# Patient Record
Sex: FEMALE | Race: WHITE | NOT HISPANIC OR LATINO | Employment: FULL TIME | URBAN - METROPOLITAN AREA
[De-identification: names, ages, dates, MRNs, and addresses within clinical notes are randomized per-mention and may not be internally consistent; named-entity substitution may affect disease eponyms.]

---

## 2017-03-06 DIAGNOSIS — M79.7 FIBROMYALGIA SYNDROME: ICD-10-CM

## 2017-03-07 RX ORDER — DULOXETIN HYDROCHLORIDE 60 MG/1
CAPSULE, DELAYED RELEASE ORAL
Qty: 90 CAPSULE | Refills: 2 | Status: SHIPPED | OUTPATIENT
Start: 2017-03-07 | End: 2017-11-22 | Stop reason: SDUPTHER

## 2017-03-10 ENCOUNTER — OFFICE VISIT (OUTPATIENT)
Dept: OBSTETRICS AND GYNECOLOGY | Facility: CLINIC | Age: 37
End: 2017-03-10
Payer: COMMERCIAL

## 2017-03-10 VITALS
WEIGHT: 204.56 LBS | HEIGHT: 68 IN | BODY MASS INDEX: 31 KG/M2 | SYSTOLIC BLOOD PRESSURE: 96 MMHG | DIASTOLIC BLOOD PRESSURE: 68 MMHG

## 2017-03-10 DIAGNOSIS — R10.2 CHRONIC PELVIC PAIN IN FEMALE: ICD-10-CM

## 2017-03-10 DIAGNOSIS — Z01.419 WELL WOMAN EXAM WITH ROUTINE GYNECOLOGICAL EXAM: Primary | ICD-10-CM

## 2017-03-10 DIAGNOSIS — N94.10 DYSPAREUNIA IN FEMALE: ICD-10-CM

## 2017-03-10 DIAGNOSIS — G89.29 CHRONIC PELVIC PAIN IN FEMALE: ICD-10-CM

## 2017-03-10 PROCEDURE — 99999 PR PBB SHADOW E&M-EST. PATIENT-LVL III: CPT | Mod: PBBFAC,,, | Performed by: OBSTETRICS & GYNECOLOGY

## 2017-03-10 PROCEDURE — 99395 PREV VISIT EST AGE 18-39: CPT | Mod: S$GLB,,, | Performed by: OBSTETRICS & GYNECOLOGY

## 2017-03-10 RX ORDER — MEDROXYPROGESTERONE ACETATE 150 MG/ML
150 INJECTION, SUSPENSION INTRAMUSCULAR
Qty: 1 ML | Refills: 3 | Status: SHIPPED | OUTPATIENT
Start: 2017-03-10 | End: 2018-02-23 | Stop reason: SDUPTHER

## 2017-03-10 RX ORDER — CHOLECALCIFEROL (VITAMIN D3) 25 MCG
185 TABLET ORAL DAILY
COMMUNITY
End: 2017-10-13 | Stop reason: SDUPTHER

## 2017-03-10 NOTE — MR AVS SNAPSHOT
[facility] - OB/ GYN  [address]  [place] [address]  Phone: [phone]  Fax: [phone]                  Joy Recinos   3/10/2017 8:30 AM   Office Visit    Description:  Female : 1980   Provider:  David Goldman MD   Department:  [facility] - OB/ GYN           Reason for Visit     Annual Exam           Diagnoses this Visit        Comments    Well woman exam with routine gynecological exam    -  Primary     Chronic pelvic pain in female         Dyspareunia in female                To Do List           Goals (5 Years of Data)     None      Follow-Up and Disposition     Return in about 1 year (around 3/10/2018).       These Medications        Disp Refills Start End    medroxyPROGESTERone (DEPO-PROVERA) 150 mg/mL Syrg 1 mL 3 3/10/2017     Inject 1 mL (150 mg total) into the muscle every 3 (three) months. - Intramuscular    Pharmacy: [facility] - [place], [place] - [address] [place] [address] Ph #: [phone]         [facility]     [facility] Nurse Care Line -  Assistance  Registered nurses in the [facility] provide clinical advisement, health education, appointment booking, and other advisory services.  Call for this free service at [phone].             Medications           Message regarding Medications     Verify the changes and/or additions to your medication regime listed below are the same as discussed with your clinician today.  If any of these changes or additions are incorrect, please notify your healthcare provider.        START taking these NEW medications        Refills    medroxyPROGESTERone (DEPO-PROVERA) 150 mg/mL Syrg 3    Sig: Inject 1 mL (150 mg total) into the muscle every 3 (three) months.    Class: Normal    Route: Intramuscular      STOP taking these medications     VITAMIN D2 50,000 unit capsule TAKE ONE CAPSULE WEEKLY           Verify that the below list of medications is an accurate representation of the medications  "you are currently taking.  If none reported, the list may be blank. If incorrect, please contact your healthcare provider. Carry this list with you in case of emergency.           Current Medications     duloxetine (CYMBALTA) 60 MG capsule TAKE 1 CAPSULE EVERY DAY    fluticasone (FLONASE) 50 mcg/actuation nasal spray 2 sprays by Each Nare route once daily.    omeprazole (PRILOSEC) 20 MG capsule TAKE ONE CAPSULE BY MOUTH DAILY    topiramate (TOPAMAX) 50 MG tablet TAKE 1 TABLET BY MOUTH DAILY    vitamin D 1000 units Tab Take 185 mg by mouth once daily.    albuterol (PROAIR HFA) 90 mcg/actuation inhaler Inhale 2 puffs into the lungs every 4 (four) hours as needed for Shortness of Breath.    medroxyPROGESTERone (DEPO-PROVERA) 150 mg/mL Syrg Inject 1 mL (150 mg total) into the muscle every 3 (three) months.           Clinical Reference Information           Your Vitals Were     BP Height Weight BMI       96/68 5' 8" (1.727 m) 92.8 kg (204 lb 9.4 oz) 31.11 kg/m2       Blood Pressure          Most Recent Value    BP  96/68      Allergies as of 3/10/2017     No Known Allergies      Immunizations Administered on Date of Encounter - 3/10/2017     None      Language Assistance Services     ATTENTION: Language assistance services are available, free of charge. Please call [phone].      ATENCIÓN: Si elo brandt, tiene a olmos disposición servicios gratuitos de asistencia lingüística. Llame al [phone].     CHÚ Ý: N?u b?n nói Ti?ng Vi?t, có các d?ch v? h? tr? ngôn ng? mi?n phí dành cho b?n. G?i s? [phone].         [facility] complies with applicable Federal civil rights laws and does not discriminate on the basis of race, color, national origin, age, disability, or sex.        "

## 2017-03-10 NOTE — PROGRESS NOTES
Subjective:       Patient ID: Joy Recinos is a 36 y.o. female.    Chief Complaint:  Annual Exam      History of Present Illness  HPI  Annual Exam-Premenopausal  Patient presents for annual exam. The patient is sexually active. GYN screening history: last pap: approximate date  and was normal. The patient wears seatbelts: yes. The patient participates in regular exercise: no. Has the patient ever been transfused or tattooed?: no. The patient reports that there is not domestic violence in her life.  Pt is doing well on Depo Provera and so far is satisfied with results.  However, pt states that she is still having pain with intercourse and has recently noted issues with ability to keep penis in vagina during intercourse.  Pt did not follow up with PT as advised previously due to insurance not covering this particular PT clinic.  Pt has not tried recommended home exercises either.          GYN & OB History  No LMP recorded. Patient is not currently having periods (Reason: Birth Control).   Date of Last Pap: 2016    OB History    Para Term  AB SAB TAB Ectopic Multiple Living   2 2        2      # Outcome Date GA Lbr Zachery/2nd Weight Sex Delivery Anes PTL Lv   2 Para      CS-LTranv   Y   1 Para      CS-LTranv   Y          Review of Systems  Review of Systems   Constitutional: Negative for activity change, appetite change, fatigue, fever and unexpected weight change.   Respiratory: Negative for shortness of breath.    Cardiovascular: Negative for chest pain, palpitations and leg swelling.   Gastrointestinal: Negative for abdominal pain, constipation, diarrhea, nausea and vomiting.   Genitourinary: Positive for dyspareunia. Negative for dysuria, genital sores, hematuria, menorrhagia, menstrual problem, pelvic pain, vaginal bleeding, vaginal discharge, vaginal pain, dysmenorrhea and vaginal odor.   Musculoskeletal: Negative for back pain.   Neurological: Negative for syncope and headaches.    Breast: Negative for breast mass, breast pain, nipple discharge and skin changes          Objective:    Physical Exam:   Constitutional: She is oriented to person, place, and time. She appears well-developed and well-nourished. No distress.    HENT:   Head: Normocephalic and atraumatic.    Eyes: EOM are normal. Pupils are equal, round, and reactive to light.    Neck: Normal range of motion. Neck supple.    Cardiovascular: Normal rate, regular rhythm and normal heart sounds.     Pulmonary/Chest: Effort normal and breath sounds normal.        Abdominal: Soft. Bowel sounds are normal. She exhibits no distension. There is no tenderness.     Genitourinary: Vagina normal and uterus normal. Pelvic exam was performed with patient supine. There is no rash, tenderness, lesion or injury on the right labia. There is no rash, tenderness, lesion or injury on the left labia. Uterus is not deviated, not enlarged and not tender. Cervix is normal. Right adnexum displays no mass, no tenderness and no fullness. Left adnexum displays no mass, no tenderness and no fullness. No erythema, tenderness or bleeding in the vagina. No foreign body in the vagina. No signs of injury around the vagina. No vaginal discharge found. Cervix exhibits no motion tenderness, no discharge and no friability.   Genitourinary Comments: Exam limited to guarding and habitus           Musculoskeletal: Normal range of motion and moves all extremeties. She exhibits no edema or tenderness.       Neurological: She is alert and oriented to person, place, and time.    Skin: Skin is warm and dry.    Psychiatric: She has a normal mood and affect. Her behavior is normal. Thought content normal.          Assessment:        1. Well woman exam with routine gynecological exam    2. Chronic pelvic pain in female    3. Dyspareunia in female             Plan:      Well woman exam with routine gynecological exam  -     Pt was counseled on cervical/vaginal screening guidelines  and recommendations.  Last pap NILM on 2016.  As per current ASCCP guidelines, next pap is due 2020.  -     Pt was advised on current breast cancer screening recommendations.      Chronic pelvic pain in female  -     medroxyPROGESTERone (DEPO-PROVERA) 150 mg/mL Syrg; Inject 1 mL (150 mg total) into the muscle every 3 (three) months.  Dispense: 1 mL; Refill: 3  -     Good response thus far on Depo Provera.  Pt would like to continue use.    Dyspareunia in female  -     Exam today unremarkable except for pt guarding during exam.  Pains are non-specific and pt has several risk factors (scarring from prior abdominal and pelvic surgeries).  Pt again advised on recommendations for pelvic floor therapy with PT and on home exercises.  Pt voiced understanding and will look into insurance coverage.  Pt will call back with decision on PT.        Return in about 1 year (around 3/10/2018).

## 2017-06-02 ENCOUNTER — PATIENT MESSAGE (OUTPATIENT)
Dept: NEUROLOGY | Facility: CLINIC | Age: 37
End: 2017-06-02

## 2017-06-03 ENCOUNTER — HOSPITAL ENCOUNTER (EMERGENCY)
Facility: HOSPITAL | Age: 37
Discharge: HOME | End: 2017-06-03
Attending: EMERGENCY MEDICINE
Payer: COMMERCIAL

## 2017-06-03 VITALS
BODY MASS INDEX: 30.31 KG/M2 | TEMPERATURE: 98 F | HEART RATE: 93 BPM | HEIGHT: 68 IN | OXYGEN SATURATION: 100 % | RESPIRATION RATE: 16 BRPM | DIASTOLIC BLOOD PRESSURE: 85 MMHG | WEIGHT: 200 LBS | SYSTOLIC BLOOD PRESSURE: 118 MMHG

## 2017-06-03 DIAGNOSIS — R51.9 NONINTRACTABLE HEADACHE, UNSPECIFIED CHRONICITY PATTERN, UNSPECIFIED HEADACHE TYPE: Primary | ICD-10-CM

## 2017-06-03 PROCEDURE — 99284 EMERGENCY DEPT VISIT MOD MDM: CPT

## 2017-06-03 PROCEDURE — 25000003 PHARM REV CODE 250: Performed by: EMERGENCY MEDICINE

## 2017-06-03 RX ORDER — BUTALBITAL, ACETAMINOPHEN AND CAFFEINE 50; 325; 40 MG/1; MG/1; MG/1
1 TABLET ORAL
Status: COMPLETED | OUTPATIENT
Start: 2017-06-03 | End: 2017-06-03

## 2017-06-03 RX ORDER — BUTALBITAL, ACETAMINOPHEN AND CAFFEINE 50; 325; 40 MG/1; MG/1; MG/1
1 TABLET ORAL EVERY 4 HOURS PRN
Qty: 15 TABLET | Refills: 0 | Status: SHIPPED | OUTPATIENT
Start: 2017-06-03 | End: 2017-07-03

## 2017-06-03 RX ADMIN — BUTALBITAL, ACETAMINOPHEN, AND CAFFEINE 1 TABLET: 50; 325; 40 TABLET ORAL at 11:06

## 2017-06-03 NOTE — ED PROVIDER NOTES
SCRIBE #1 NOTE: I, Kadeem Dickson, am scribing for, and in the presence of, Kemar Hernández MD. I have scribed the entire note.      History      Chief Complaint   Patient presents with    Migraine     Patient c/o migraine for the last few days       Review of patient's allergies indicates:  No Known Allergies     HPI   HPI    6/3/2017, 11:39 AM   History obtained from the patient      History of Present Illness: Joy Recinos is a 36 y.o. female patient who presents to the Emergency Department for headache which onset 1 day ago. Symptoms are intermittent and moderate in severity. Pt states the headache is not similar to her normal migraine. Pt states the pain has resolved but still has a pressure in her head. Pt's headache is mitigated by lying down. No exacerbating factors reported. Associated sxs include neck pain. Patient denies any fever, chills, n/v/d, lightheadedness, dizziness, numbness, weakness, facial asymmetry, speech difficulty, and all other sxs at this time. No further complaints or concerns at this time.         Arrival mode: Personal vehicle     PCP: Zeus Oliva MD       Past Medical History:  Past Medical History:   Diagnosis Date    B12 deficiency     Chronic low back pain     oberlander    Endometriosis     Fibromyalgia syndrome     Idiopathic hypersomnia     Irritable bowel syndrome     goldman    Migraines     frances       Past Surgical History:  Past Surgical History:   Procedure Laterality Date     SECTION      OOPHORECTOMY      PELVIC LAPAROSCOPY      TUBAL LIGATION           Family History:  Family History   Problem Relation Age of Onset    Cataracts Maternal Grandmother     Diabetes Maternal Grandmother     Glaucoma Maternal Grandmother     Cataracts Maternal Grandfather     Breast cancer Neg Hx     Ovarian cancer Neg Hx     Deep vein thrombosis Neg Hx        Social History:  Social History     Social History Main Topics    Smoking status: Never  Smoker    Smokeless tobacco: Unknown    Alcohol use Yes      Comment: rarely    Drug use: No    Sexual activity: Yes     Partners: Male     Birth control/ protection: None       ROS   Review of Systems   Constitutional: Negative for chills and fever.   HENT: Negative for sore throat.    Respiratory: Negative for shortness of breath.    Cardiovascular: Negative for chest pain.   Gastrointestinal: Negative for diarrhea, nausea and vomiting.   Genitourinary: Negative for dysuria.   Musculoskeletal: Positive for neck pain. Negative for back pain.   Skin: Negative for rash.   Neurological: Positive for headaches. Negative for dizziness, facial asymmetry, speech difficulty, weakness, light-headedness and numbness.   Hematological: Does not bruise/bleed easily.       Physical Exam      Initial Vitals [06/03/17 1136]   BP Pulse Resp Temp SpO2   (!) 169/97 104 18 98.2 °F (36.8 °C) 98 %      Physical Exam  Nursing Notes and Vital Signs Reviewed.  Constitutional: Patient is in no acute distress. Well-developed and well-nourished.  Head: Atraumatic. Normocephalic.  Eyes: PERRL. EOM intact. Conjunctivae are not pale. No scleral icterus.  ENT: Mucous membranes are moist. Oropharynx is clear and symmetric.    Neck: Supple. Full ROM. No lymphadenopathy.  Cardiovascular: Regular rate. Regular rhythm. No murmurs, rubs, or gallops. Distal pulses are 2+ and symmetric.  Pulmonary/Chest: No respiratory distress. Clear to auscultation bilaterally. No wheezing, rales, or rhonchi.  Abdominal: Soft and non-distended.  There is no tenderness.  No rebound, guarding, or rigidity.   Musculoskeletal: Moves all extremities. No obvious deformities. No edema.  Skin: Warm and dry.  Neurological:  Alert, awake, and appropriate.  Normal speech.  No acute focal neurological deficits are appreciated.  Psychiatric: Normal affect. Good eye contact. Appropriate in content.    ED Course    Procedures  ED Vital Signs:  Vitals:    06/03/17 1136 06/03/17  "1241   BP: (!) 169/97 118/85   Pulse: 104 93   Resp: 18 16   Temp: 98.2 °F (36.8 °C)    TempSrc: Oral    SpO2: 98% 100%   Weight: 90.7 kg (200 lb)    Height: 5' 8" (1.727 m)          Imaging Results:  Imaging Results          CT Cervical Spine Without Contrast (Final result)  Result time 06/03/17 12:16:35    Final result by Cliff Zamora MD (06/03/17 12:16:35)                 Impression:           Normal cervical spine CT.    All CT scans at this facility use dose modulation, iterative reconstruction and/or weight based dosing when appropriate to reduce radiation dose to as low as reasonably achievable.      Electronically signed by: CLIFF ZAMORA MD  Date:     06/03/17  Time:    12:16              Narrative:    Exam: CT scan of the cervical spine without contrast    Technique: Axial CT imaging was performed through the cervical spine without intravenous contrast. Multiplanar reformats were reviewed and interpreted.    Clinical History:    Severe headache and neck pain with onset one day ago.    Comparison:  None     Findings:         Vertebral body heights are normal.Alignment is normal.  Disc spaces are normal.  No soft tissue abnormality detected. No acute fracture or subluxation of the cervical spine.                             CT Head Without Contrast (Final result)  Result time 06/03/17 12:11:40    Final result by Cliff Zamora MD (06/03/17 12:11:40)                 Impression:         Normal unenhanced head CT.        All CT scans at this facility use dose modulation, iterative reconstruction and/or weight based dosing when appropriate to reduce radiation dose to as low as reasonably achievable.      Electronically signed by: CLIFF ZAMORA MD  Date:     06/03/17  Time:    12:11              Narrative:    Exam: CT scan of the head without contrast    Clinical History: Severe headache with onset one day.      Technique: Axial CT imaging was performed through the head without intravenous contrast.     Findings:   "  No hydrocephalus, midline shift, mass effect, or acute intracranial hemorrhage. Cortical sulcal pattern and gray-white matter differentiation is normal. Basilar cisterns and posterior fossa are normal. The visualized paranasal sinuses and mastoid air cells are clear. The skull is intact.                                      The Emergency Provider reviewed the vital signs and test results, which are outlined above.    ED Discussion     12:31 PM: Reassessed pt at this time.  Pt is awake, alert, and in no distress. Discussed with pt all pertinent ED information and results. Discussed pt dx and plan of tx. Gave pt all f/u and return to the ED instructions. All questions and concerns were addressed at this time. Pt expresses understanding of information and instructions, and is comfortable with plan to discharge. Pt is stable for discharge.        ED Medication(s):  Medications   butalbital-acetaminophen-caffeine -40 mg per tablet 1 tablet (1 tablet Oral Given 6/3/17 1156)       Discharge Medication List as of 6/3/2017 12:33 PM      START taking these medications    Details   butalbital-acetaminophen-caffeine -40 mg (FIORICET, ESGIC) -40 mg per tablet Take 1 tablet by mouth every 4 (four) hours as needed for Pain., Starting Sat 6/3/2017, Until Mon 7/3/2017, Print             Follow-up Information     Zeus Oliva MD.    Specialty:  Family Medicine  Contact information:  [address]  [place] [address] [phone]                     Medical Decision Making    Medical Decision Making:   Clinical Tests:   Radiological Study: Ordered and Reviewed           Scribe Attestation:   Scribe #1: I performed the above scribed service and the documentation accurately describes the services I performed. I attest to the accuracy of the note.    Attending:   Physician Attestation Statement for Scribe #1: I, Kemar Hernández MD, personally performed the services described in this documentation, as  scribed by Kadeem Dickson, in my presence, and it is both accurate and complete.          Clinical Impression       ICD-10-CM ICD-9-CM   1. Nonintractable headache, unspecified chronicity pattern, unspecified headache type R51 784.0       Disposition:   Disposition: Discharged  Condition: Stable         Kemar Hernández MD  06/03/17 1597

## 2017-06-07 ENCOUNTER — OFFICE VISIT (OUTPATIENT)
Dept: NEUROLOGY | Facility: CLINIC | Age: 37
End: 2017-06-07
Payer: COMMERCIAL

## 2017-06-07 VITALS
SYSTOLIC BLOOD PRESSURE: 120 MMHG | BODY MASS INDEX: 30.87 KG/M2 | DIASTOLIC BLOOD PRESSURE: 70 MMHG | HEIGHT: 68 IN | HEART RATE: 88 BPM | WEIGHT: 203.69 LBS

## 2017-06-07 DIAGNOSIS — M35.9 UNDIFFERENTIATED CONNECTIVE TISSUE DISEASE: ICD-10-CM

## 2017-06-07 DIAGNOSIS — G43.009 MIGRAINE WITHOUT AURA AND WITHOUT STATUS MIGRAINOSUS, NOT INTRACTABLE: Primary | ICD-10-CM

## 2017-06-07 PROCEDURE — 99214 OFFICE O/P EST MOD 30 MIN: CPT | Mod: S$GLB,,, | Performed by: PSYCHIATRY & NEUROLOGY

## 2017-06-07 PROCEDURE — 99999 PR PBB SHADOW E&M-EST. PATIENT-LVL III: CPT | Mod: PBBFAC,,, | Performed by: PSYCHIATRY & NEUROLOGY

## 2017-06-07 NOTE — PROGRESS NOTES
This is an 36-year-old right-handed patient who has a long-standing history of idiopathic hypersomnia and chronic migraine headache.  The patient returns today because of an incident that occurred over the past weekend.    The patient states that on Thursday she woke from her usual sleeping routine but when she attempted to stand up she had a sudden severe sharp pain.  The pain was distributed in a holocephalic distribution and was only relieved by laying flat in bed.  The patient returned to the supine position and the headache pain resolves until she attempted to sit up again when the headache returned.  In this fashion throughout the day Thursday and into finding a, she would experience headache when standing relieved by being supine.  She was concerned and was seen in the hospital emergency room where a CT scan of the brain was done and was negative.  She denies any recent head trauma and does not recall any straining prior to the development of the headache.    Over the past several days, the patient states that the severe pain has resolved when she occasionally will feel a slight pressure sensation when standing.  She denies any stiff neck other than the pain that she has associated with her fibromyalgia.  She does not recall having any fever.  She did not have any nausea or vomiting.  She did not notice any tinnitus or change in hearing.  In fact, the patient did not notice any change in vision or any other neurological symptoms.    The patient continues to express her headache on an almost daily basis with very severe migraine occurring at least once a month.  The daily headache develops during the day and is distributed generally.  Her migraine headache however is much more severe pain that since incapacitating.  The patient is no longer attempting to treat her idiopathic hypersomnia as the medications all appear to be ineffective.      ROS:  GENERAL: No fever, chills, fatigability or weight loss.  SKIN: No  rashes, itching or changes in color or texture of skin.  HEAD: No  recent head trauma.  EYES: Visual acuity fine. No photophobia, ocular pain or diplopia.  EARS: Denies ear pain, discharge or vertigo.  NOSE: No loss of smell, no epistaxis or postnasal drip.  MOUTH & THROAT: No hoarseness or change in voice. No excessive gum bleeding.  NODES: Denies swollen glands.  CHEST: Denies ARELLANO, cyanosis, wheezing, cough and sputum production.  CARDIOVASCULAR: Denies chest pain, PND, orthopnea or reduced exercise tolerance.  ABDOMEN: Appetite fine. No weight loss. Denies diarrhea, abdominal pain, hematemesis or blood in stool.  URINARY: No flank pain, dysuria or hematuria.  PERIPHERAL VASCULAR: No claudication or cyanosis.  MUSCULOSKELETAL: No joint stiffness or swelling. Denies back pain.  NEUROLOGIC: No history of seizures, paralysis, alteration of gait or coordination.    The patient's past history, family history, and social history were reviewed with the patient and her medical records.    PE:   VITAL SIGNS: Blood pressure 120/70, pulse 80, weight 92.4 kg, height 5 foot 8 inches, BMI 30.97  APPEARANCE: Well nourished, well developed, in no acute distress.    HEAD: Normocephalic, atraumatic.  No TMJ tenderness.  EYES: PERRL. EOMI.  Non-icteric sclerae.    EARS: TM's intact. Light reflex normal. No retraction or perforation.    NOSE: Mucosa pink. Airway clear.  MOUTH & THROAT: No tonsillar enlargement. No pharyngeal erythema or exudate. No stridor.  NECK: Supple. No bruits.  No palpable cervical paraspinous muscle spasm  CHEST: Lungs clear to auscultation.  CARDIOVASCULAR: Regular rhythm without significant murmurs.  ABDOMEN: Bowel sounds normal. Not distended. Soft. No tenderness or masses.  MUSCULOSKELETAL:  No bony deformity seen.  Muscle tone and muscle mass are normal in both upper and both lower extremities.  NEUROLOGIC:   Mental Status:  The patient is well oriented to person, time, place, and situation.  The patient  is attentive to the environment and cooperative for the exam.  Cranial Nerves: II-XII grossly intact. Fundoscopic exam is normal.  No hemorrhage, exudate or papilledema is present. The extraocular muscles are intact in the cardinal directions of gaze.  No ptosis is present. Facial features are symmetrical.  Speech is normal in fluency, diction, and phrasing.  Tongue protrudes in the midline.    Gait and Station:  Romberg is negative.  Good alternate armswing with normal gait.  Motor:  No downdrift of either arm when held at shoulder level.  Manual muscle testing of proximal and distal muscles of both upper and lower extremities is normal.   Sensory:  Intact both upper and lower extremities to pin prick, touch, and vibration.  Cerebellar:  Finger to nose done well.  Alternating movements intact.  No involuntary movements or tremor seen.  Reflexes:  Stretch reflexes are 2+ both upper and lower extremities.  Plantar stimulation is flexor bilaterally and no pathological reflexes are seen    ASSESSMENT:  1.  History of chronic daily headache and migraine with recent orthostatic headache suggestive of idiopathic intracranial hypotension although now the symptoms are resolving spontaneously    DISCUSSION:  The patient's recent headache does sound like an sudden onset of intracranial hypotension.  This is usually associated with spinal fluid leak although now the patient's symptoms are resolving.  Because of the resolution of her symptoms I do not think it reasonable to undertake diagnostic evaluation such as a cisternogram in an attempt to identify a spinal fluid leak.  If the symptoms do occur again, then she will need that type of study to try to define a spinal fluid leak.  In the meantime, she should continue her usual treatment of her migraine headache and chronic daily headache.  She is to return in 6 months for routine follow-up.        This note is generated with speech recognition software and is subject to  transcription error and sound alike phrases that may be missed by proofreading.

## 2017-06-08 ENCOUNTER — LAB VISIT (OUTPATIENT)
Dept: LAB | Facility: HOSPITAL | Age: 37
End: 2017-06-08
Attending: PSYCHIATRY & NEUROLOGY
Payer: COMMERCIAL

## 2017-06-08 DIAGNOSIS — M35.9 UNDIFFERENTIATED CONNECTIVE TISSUE DISEASE: ICD-10-CM

## 2017-06-08 DIAGNOSIS — G43.009 MIGRAINE WITHOUT AURA AND WITHOUT STATUS MIGRAINOSUS, NOT INTRACTABLE: ICD-10-CM

## 2017-06-08 LAB
25(OH)D3+25(OH)D2 SERPL-MCNC: 15 NG/ML
ALBUMIN SERPL BCP-MCNC: 3.7 G/DL
ALP SERPL-CCNC: 131 U/L
ALT SERPL W/O P-5'-P-CCNC: 10 U/L
ANION GAP SERPL CALC-SCNC: 8 MMOL/L
AST SERPL-CCNC: 11 U/L
BASOPHILS # BLD AUTO: 0.02 K/UL
BASOPHILS NFR BLD: 0.3 %
BILIRUB SERPL-MCNC: 0.2 MG/DL
BUN SERPL-MCNC: 13 MG/DL
CALCIUM SERPL-MCNC: 9.1 MG/DL
CHLORIDE SERPL-SCNC: 111 MMOL/L
CO2 SERPL-SCNC: 22 MMOL/L
CREAT SERPL-MCNC: 0.9 MG/DL
DIFFERENTIAL METHOD: NORMAL
EOSINOPHIL # BLD AUTO: 0.1 K/UL
EOSINOPHIL NFR BLD: 1.1 %
ERYTHROCYTE [DISTWIDTH] IN BLOOD BY AUTOMATED COUNT: 13.4 %
EST. GFR  (AFRICAN AMERICAN): >60 ML/MIN/1.73 M^2
EST. GFR  (NON AFRICAN AMERICAN): >60 ML/MIN/1.73 M^2
GLUCOSE SERPL-MCNC: 89 MG/DL
HCT VFR BLD AUTO: 41.3 %
HGB BLD-MCNC: 13.3 G/DL
LYMPHOCYTES # BLD AUTO: 2 K/UL
LYMPHOCYTES NFR BLD: 25.8 %
MCH RBC QN AUTO: 29.8 PG
MCHC RBC AUTO-ENTMCNC: 32.2 %
MCV RBC AUTO: 92 FL
MONOCYTES # BLD AUTO: 0.4 K/UL
MONOCYTES NFR BLD: 5.7 %
NEUTROPHILS # BLD AUTO: 5.1 K/UL
NEUTROPHILS NFR BLD: 66.8 %
PLATELET # BLD AUTO: 215 K/UL
PMV BLD AUTO: 12.2 FL
POTASSIUM SERPL-SCNC: 4.1 MMOL/L
PROT SERPL-MCNC: 7.2 G/DL
RBC # BLD AUTO: 4.47 M/UL
SODIUM SERPL-SCNC: 141 MMOL/L
WBC # BLD AUTO: 7.6 K/UL

## 2017-06-08 PROCEDURE — 85025 COMPLETE CBC W/AUTO DIFF WBC: CPT

## 2017-06-08 PROCEDURE — 82306 VITAMIN D 25 HYDROXY: CPT

## 2017-06-08 PROCEDURE — 80053 COMPREHEN METABOLIC PANEL: CPT

## 2017-06-22 RX ORDER — TOPIRAMATE 50 MG/1
50 TABLET, FILM COATED ORAL DAILY
Qty: 30 TABLET | Refills: 11 | Status: SHIPPED | OUTPATIENT
Start: 2017-06-22 | End: 2017-06-30 | Stop reason: SDUPTHER

## 2017-06-30 RX ORDER — TOPIRAMATE 50 MG/1
50 TABLET, FILM COATED ORAL DAILY
Qty: 120 TABLET | Refills: 6 | Status: SHIPPED | OUTPATIENT
Start: 2017-06-30 | End: 2018-07-11 | Stop reason: SDUPTHER

## 2017-07-10 ENCOUNTER — PATIENT MESSAGE (OUTPATIENT)
Dept: NEUROLOGY | Facility: CLINIC | Age: 37
End: 2017-07-10

## 2017-07-28 ENCOUNTER — OFFICE VISIT (OUTPATIENT)
Dept: PSYCHIATRY | Facility: CLINIC | Age: 37
End: 2017-07-28
Payer: COMMERCIAL

## 2017-07-28 DIAGNOSIS — R69 OTHER UNKNOWN AND UNSPECIFIED CAUSE OF MORBIDITY OR MORTALITY: Primary | ICD-10-CM

## 2017-07-28 PROCEDURE — 90791 PSYCH DIAGNOSTIC EVALUATION: CPT | Mod: S$GLB,,, | Performed by: SOCIAL WORKER

## 2017-07-31 ENCOUNTER — PATIENT MESSAGE (OUTPATIENT)
Dept: PSYCHIATRY | Facility: CLINIC | Age: 37
End: 2017-07-31

## 2017-09-22 ENCOUNTER — OFFICE VISIT (OUTPATIENT)
Dept: URGENT CARE | Facility: CLINIC | Age: 37
End: 2017-09-22
Payer: COMMERCIAL

## 2017-09-22 VITALS
DIASTOLIC BLOOD PRESSURE: 74 MMHG | BODY MASS INDEX: 32.27 KG/M2 | HEIGHT: 68 IN | SYSTOLIC BLOOD PRESSURE: 122 MMHG | TEMPERATURE: 99 F | HEART RATE: 116 BPM | WEIGHT: 212.94 LBS | OXYGEN SATURATION: 99 %

## 2017-09-22 DIAGNOSIS — B96.89 BACTERIAL LOWER RESPIRATORY INFECTION: Primary | ICD-10-CM

## 2017-09-22 DIAGNOSIS — J22 BACTERIAL LOWER RESPIRATORY INFECTION: Primary | ICD-10-CM

## 2017-09-22 DIAGNOSIS — R05.9 COUGH: ICD-10-CM

## 2017-09-22 PROCEDURE — 96372 THER/PROPH/DIAG INJ SC/IM: CPT | Mod: S$GLB,,, | Performed by: PHYSICIAN ASSISTANT

## 2017-09-22 PROCEDURE — 99999 PR PBB SHADOW E&M-EST. PATIENT-LVL III: CPT | Mod: PBBFAC,,, | Performed by: PHYSICIAN ASSISTANT

## 2017-09-22 PROCEDURE — 99213 OFFICE O/P EST LOW 20 MIN: CPT | Mod: 25,S$GLB,, | Performed by: PHYSICIAN ASSISTANT

## 2017-09-22 PROCEDURE — 3008F BODY MASS INDEX DOCD: CPT | Mod: S$GLB,,, | Performed by: PHYSICIAN ASSISTANT

## 2017-09-22 RX ORDER — PROMETHAZINE HYDROCHLORIDE AND DEXTROMETHORPHAN HYDROBROMIDE 6.25; 15 MG/5ML; MG/5ML
5 SYRUP ORAL NIGHTLY PRN
Qty: 120 ML | Refills: 0 | Status: SHIPPED | OUTPATIENT
Start: 2017-09-22 | End: 2017-10-13

## 2017-09-22 RX ORDER — AMOXICILLIN AND CLAVULANATE POTASSIUM 875; 125 MG/1; MG/1
1 TABLET, FILM COATED ORAL 2 TIMES DAILY
Qty: 14 TABLET | Refills: 0 | Status: SHIPPED | OUTPATIENT
Start: 2017-09-22 | End: 2017-09-26

## 2017-09-22 RX ORDER — CETIRIZINE HYDROCHLORIDE 10 MG/1
10 TABLET ORAL DAILY
COMMUNITY
End: 2021-09-24 | Stop reason: ALTCHOICE

## 2017-09-22 RX ORDER — BETAMETHASONE SODIUM PHOSPHATE AND BETAMETHASONE ACETATE 3; 3 MG/ML; MG/ML
6 INJECTION, SUSPENSION INTRA-ARTICULAR; INTRALESIONAL; INTRAMUSCULAR; SOFT TISSUE
Status: COMPLETED | OUTPATIENT
Start: 2017-09-22 | End: 2017-09-22

## 2017-09-22 RX ADMIN — BETAMETHASONE SODIUM PHOSPHATE AND BETAMETHASONE ACETATE 6 MG: 3; 3 INJECTION, SUSPENSION INTRA-ARTICULAR; INTRALESIONAL; INTRAMUSCULAR; SOFT TISSUE at 11:09

## 2017-09-22 NOTE — PATIENT INSTRUCTIONS
Sinusitis (Antibiotic Treatment)    The sinuses are air-filled spaces within the bones of the face. They connect to the inside of the nose. Sinusitis is an inflammation of the tissue lining the sinus cavity. Sinus inflammation can occur during a cold. It can also be due to allergies to pollens and other particles in the air. Sinusitis can cause symptoms of sinus congestion and fullness. A sinus infection causes fever, headache and facial pain. There is often green or yellow drainage from the nose or into the back of the throat (post-nasal drip). You have been given antibiotics to treat this condition.  Home care:  · Take the full course of antibiotics as instructed. Do not stop taking them, even if you feel better.  · Drink plenty of water, hot tea, and other liquids. This may help thin mucus. It also may promote sinus drainage.  · Heat may help soothe painful areas of the face. Use a towel soaked in hot water. Or,  the shower and direct the hot spray onto your face. Using a vaporizer along with a menthol rub at night may also help.   · An expectorant containing guaifenesin may help thin the mucus and promote drainage from the sinuses.  · Over-the-counter decongestants may be used unless a similar medicine was prescribed. Nasal sprays work the fastest. Use one that contains phenylephrine or oxymetazoline. First blow the nose gently. Then use the spray. Do not use these medicines more often than directed on the label or symptoms may get worse. You may also use tablets containing pseudoephedrine. Avoid products that combine ingredients, because side effects may be increased. Read labels. You can also ask the pharmacist for help. (NOTE: Persons with high blood pressure should not use decongestants. They can raise blood pressure.)  · Over-the-counter antihistamines may help if allergies contributed to your sinusitis.    · Do not use nasal rinses or irrigation during an acute sinus infection, unless told to by  your health care provider. Rinsing may spread the infection to other sinuses.  · Use acetaminophen or ibuprofen to control pain, unless another pain medicine was prescribed. (If you have chronic liver or kidney disease or ever had a stomach ulcer, talk with your doctor before using these medicines. Aspirin should never be used in anyone under 18 years of age who is ill with a fever. It may cause severe liver damage.)  · Don't smoke. This can worsen symptoms.  Follow-up care  Follow up with your healthcare provider or our staff if you are not improving within the next week.  When to seek medical advice  Call your healthcare provider if any of these occur:  · Facial pain or headache becoming more severe  · Stiff neck  · Unusual drowsiness or confusion  · Swelling of the forehead or eyelids  · Vision problems, including blurred or double vision  · Fever of 100.4ºF (38ºC) or higher, or as directed by your healthcare provider  · Seizure  · Breathing problems  · Symptoms not resolving within 10 days  Date Last Reviewed: 4/13/2015  © 8595-1689 [employer]. [address] [place] [address]. All rights reserved. This information is not intended as a substitute for professional medical care. Always follow your healthcare professional's instructions.        Bronchitis, Antibiotic Treatment (Adult)    Bronchitis is an infection of the air passages (bronchial tubes) in your lungs. It often occurs when you have a cold. This illness is contagious during the first few days and is spread through the air by coughing and sneezing, or by direct contact (touching the sick person and then touching your own eyes, nose, or mouth).  Symptoms of bronchitis include cough with mucus (phlegm) and low-grade fever. Bronchitis usually lasts 7 to 14 days. Mild cases can be treated with simple home remedies. More severe infection is treated with an antibiotic.  Home care  Follow these guidelines when caring for yourself at  home:  · If your symptoms are severe, rest at home for the first 2 to 3 days. When you go back to your usual activities, don't let yourself get too tired.  · Do not smoke. Also avoid being exposed to secondhand smoke.  · You may use over-the-counter medicines to control fever or pain, unless another medicine was prescribed. (Note: If you have chronic liver or kidney disease or have ever had a stomach ulcer or gastrointestinal bleeding, talk with your healthcare provider before using these medicines. Also talk to your provider if you are taking medicine to prevent blood clots.) Aspirin should never be given to anyone younger than 18 years of age who is ill with a viral infection or fever. It may cause severe liver or brain damage.  · Your appetite may be poor, so a light diet is fine. Avoid dehydration by drinking 6 to 8 glasses of fluids per day (such as water, soft drinks, sports drinks, juices, tea, or soup). Extra fluids will help loosen secretions in the nose and lungs.  · Over-the-counter cough, cold, and sore-throat medicines will not shorten the length of the illness, but they may be helpful to reduce symptoms. (Note: Do not use decongestants if you have high blood pressure.)  · Finish all antibiotic medicine. Do this even if you are feeling better after only a few days.  Follow-up care  Follow up with your healthcare provider, or as advised. If you had an X-ray or ECG (electrocardiogram), a specialist will review it. You will be notified of any new findings that may affect your care.  Note: If you are age 65 or older, or if you have a chronic lung disease or condition that affects your immune system, or you smoke, talk to your healthcare provider about having pneumococcal vaccinations and a yearly influenza vaccination (flu shot).  When to seek medical advice  Call your healthcare provider right away if any of these occur:  · Fever of 100.4°F (38°C) or higher  · Coughing up increased amounts of colored  sputum  · Weakness, drowsiness, headache, facial pain, ear pain, or a stiff neck  Call 911, or get immediate medical care  Contact emergency services right away if any of these occur.  · Coughing up blood  · Worsening weakness, drowsiness, headache, or stiff neck  · Trouble breathing, wheezing, or pain with breathing  Date Last Reviewed: 9/13/2015  © 9352-8560 [employer]. [address] [place] [address], [place] [address]. All rights reserved. This information is not intended as a substitute for professional medical care. Always follow your healthcare professional's instructions.

## 2017-09-22 NOTE — PROGRESS NOTES
"Subjective:      Patient ID: Joy Recinos is a 37 y.o. female.    Chief Complaint: Nasal Congestion and Cough    Ms. Recinos is a 38yo female that presents to Urgent Care for URI symptoms x1wk with no resolution with otc treatment.     Patient also states last Friday she had abdominal pain and what felt like a "bump" in the middle of her stomach.  She admits she felt nauseated and vomited.  Patient continued to feel dizzy/nauseated all weekend.  Patient states nausea has improved but she still occasionally has belly pain.  Patient states the "bump" she initially felt has disappeared.      Patient does not currently have periods.  On Depo shot (last received last week, after her symptoms began).       URI    This is a new problem. The current episode started 1 to 4 weeks ago. The problem has been gradually worsening. Associated symptoms include abdominal pain, congestion, coughing (productive in AM), ear pain (pressure bilaterally), headaches, nausea, rhinorrhea, sinus pain, a sore throat (improved), vomiting (1 episode, Friday) and wheezing (mild). Pertinent negatives include no diarrhea or rash. Treatments tried: Mucinex, Zyrtec. The treatment provided mild relief.     Review of Systems   Constitutional: Negative for chills, diaphoresis and fever.   HENT: Positive for congestion, ear pain (pressure bilaterally), rhinorrhea, sinus pain, sinus pressure and sore throat (improved).    Respiratory: Positive for cough (productive in AM) and wheezing (mild). Negative for shortness of breath.    Gastrointestinal: Positive for abdominal pain, constipation (  patient's norm), nausea and vomiting (1 episode, Friday). Negative for diarrhea.   Skin: Negative for rash.   Neurological: Positive for dizziness (mild) and headaches. Negative for light-headedness.       Objective:   /74 (BP Location: Right arm, Patient Position: Sitting, BP Method: Large (Manual))   Pulse (!) 116   Temp 99.3 °F (37.4 °C) " "(Tympanic)   Ht 5' 8" (1.727 m)   Wt 96.6 kg (212 lb 15.4 oz)   SpO2 99%   BMI 32.38 kg/m²   Physical Exam   Constitutional: She appears well-developed and well-nourished. She does not appear ill. No distress.   HENT:   Head: Normocephalic and atraumatic.   Right Ear: Tympanic membrane and ear canal normal. No tenderness. Tympanic membrane is not erythematous. No middle ear effusion.   Left Ear: Tympanic membrane and ear canal normal. No tenderness. Tympanic membrane is not erythematous.  No middle ear effusion.   Nose: Right sinus exhibits maxillary sinus tenderness and frontal sinus tenderness. Left sinus exhibits maxillary sinus tenderness and frontal sinus tenderness.   Mouth/Throat: Uvula is midline. Posterior oropharyngeal erythema present.   Cardiovascular: Normal rate, regular rhythm and normal heart sounds.    No murmur heard.  Pulmonary/Chest: Effort normal and breath sounds normal. No respiratory distress. She has no decreased breath sounds. She has no wheezes. She has no rhonchi. She has no rales.   Abdominal: Soft. She exhibits no distension. There is tenderness (mild) in the periumbilical area.   No abdominal mass felt   Skin: Skin is warm and dry. No rash noted. She is not diaphoretic.   Psychiatric: She has a normal mood and affect. Her speech is normal and behavior is normal. Thought content normal.     Assessment:      1. Bacterial lower respiratory infection    2. Cough       Plan:   Bacterial lower respiratory infection  -     amoxicillin-clavulanate 875-125mg (AUGMENTIN) 875-125 mg per tablet; Take 1 tablet by mouth 2 (two) times daily.  Dispense: 14 tablet; Refill: 0    Cough  -     promethazine-dextromethorphan (PROMETHAZINE-DM) 6.25-15 mg/5 mL Syrp; Take 5 mLs by mouth nightly as needed (Cough).  Dispense: 120 mL; Refill: 0    Other orders  -     betamethasone acetate-betamethasone sodium phosphate injection 6 mg; Inject 1 mL (6 mg total) into the muscle one time.    Patient requesting " "steroid injection.  Risk and benefits of steroid therapy were reviewed in detail and include, but not limited to, elevated blood sugars, joint avascular necrosis, necrosis of tissue or infection of the injection site, hallucinations, insomnia, sweating, hyperactivity, tremors, suppression of one's own cortisone production, delayed wound healing and GI bleeding. The patient would like to proceed with steroid treatment (injection/medication) knowing and verbally accepting the risks.    Gave handout on bronchitis/sinusitis.  Printed and reviewed AVS.     Further patient instruction:  Take Mucinex or Mucinex-D for congestion (avoid decongestants (Sudafed,Mucinex-D,Claritin-D, etc) if you have hypertension.  Use nasal steroid (Flonase) for inflammation.  Use normal saline nasal spray during the day every 3 hours for sinus irrigation and congestion.    Avoid cigarette smoke.    Practice good handwashing.  Use warm compresses on sinuses for relief several times a day.  Increase fluid intake to at least 64 ounces of water per day to keep secretions thin and loose.  Use a cool mist vaporizer or humidifier in home to help relieve congestion.    Take tylenol/ibuprofen for sinus headache.    Use warm salt water gargles and lozenges for throat discomfort.  Go to ER if you develop fever of 103 or higher, chest pain, shortness of breath, upper back pain, stiff neck or severe headache.    Discussed with patient taking cough syrup at night due to potential drowsiness.  Explained that patient should monitor their BP and watch for signs of elevated BP (headache, dizziness, light headedness, etc.)  If symptoms occur, discontinue this medication.      For severe abdominal pain or intractable vomiting, go to ED.  Follow up with PCP if abdominal pain does not resolve or if "bump" returns.     Patient expresses understanding and agrees with treatment plan.   "

## 2017-09-26 ENCOUNTER — TELEPHONE (OUTPATIENT)
Dept: URGENT CARE | Facility: CLINIC | Age: 37
End: 2017-09-26

## 2017-09-26 DIAGNOSIS — J22 ACUTE LOWER RESPIRATORY INFECTION: Primary | ICD-10-CM

## 2017-09-26 RX ORDER — AZITHROMYCIN 250 MG/1
TABLET, FILM COATED ORAL
Qty: 6 TABLET | Refills: 0 | Status: SHIPPED | OUTPATIENT
Start: 2017-09-26 | End: 2017-10-13

## 2017-09-26 NOTE — TELEPHONE ENCOUNTER
Call returned to patient. States she have been feeling dizzy and drowsy since starting her antibiotics. Pt would like to know if she should take another medication. States she is currently taking antibiotic with food and staying hydrated. Please advise.

## 2017-09-26 NOTE — TELEPHONE ENCOUNTER
Recommend taking medication with food, start probiotics, and eat yogurt to see if that helps. If she does not like the way the augmentin is making her feel, I sent a zpack to the pharmacy for her to change to.

## 2017-09-26 NOTE — TELEPHONE ENCOUNTER
----- Message from Ganesh Mitchell sent at 9/26/2017 12:03 PM CDT -----  Contact: pt  Pt is calling nurse staff regarding questions about the medication. Pt feels dizzy and Nausea with taking antibiotics Pt is not sure if the medications. Pt call back [phone] to be advised. thanks

## 2017-10-09 ENCOUNTER — OFFICE VISIT (OUTPATIENT)
Dept: URGENT CARE | Facility: CLINIC | Age: 37
End: 2017-10-09
Payer: COMMERCIAL

## 2017-10-09 ENCOUNTER — LAB VISIT (OUTPATIENT)
Dept: LAB | Facility: HOSPITAL | Age: 37
End: 2017-10-09
Attending: PHYSICIAN ASSISTANT
Payer: COMMERCIAL

## 2017-10-09 ENCOUNTER — CLINICAL SUPPORT (OUTPATIENT)
Dept: CARDIOLOGY | Facility: CLINIC | Age: 37
End: 2017-10-09
Payer: COMMERCIAL

## 2017-10-09 VITALS
WEIGHT: 216.63 LBS | OXYGEN SATURATION: 98 % | TEMPERATURE: 99 F | BODY MASS INDEX: 32.83 KG/M2 | HEART RATE: 73 BPM | HEIGHT: 68 IN | DIASTOLIC BLOOD PRESSURE: 80 MMHG | SYSTOLIC BLOOD PRESSURE: 132 MMHG

## 2017-10-09 DIAGNOSIS — R07.89 CHEST TIGHTNESS: ICD-10-CM

## 2017-10-09 DIAGNOSIS — R53.1 WEAKNESS: ICD-10-CM

## 2017-10-09 DIAGNOSIS — R53.1 WEAKNESS: Primary | ICD-10-CM

## 2017-10-09 LAB
ALBUMIN SERPL BCP-MCNC: 3.8 G/DL
ALP SERPL-CCNC: 117 U/L
ALT SERPL W/O P-5'-P-CCNC: 13 U/L
ANION GAP SERPL CALC-SCNC: 7 MMOL/L
AST SERPL-CCNC: 12 U/L
BASOPHILS # BLD AUTO: 0.02 K/UL
BASOPHILS NFR BLD: 0.3 %
BILIRUB SERPL-MCNC: 0.3 MG/DL
BUN SERPL-MCNC: 14 MG/DL
CALCIUM SERPL-MCNC: 9.7 MG/DL
CHLORIDE SERPL-SCNC: 109 MMOL/L
CO2 SERPL-SCNC: 25 MMOL/L
CREAT SERPL-MCNC: 0.9 MG/DL
CTP QC/QA: YES
DIFFERENTIAL METHOD: NORMAL
EOSINOPHIL # BLD AUTO: 0.1 K/UL
EOSINOPHIL NFR BLD: 1.8 %
ERYTHROCYTE [DISTWIDTH] IN BLOOD BY AUTOMATED COUNT: 13.2 %
EST. GFR  (AFRICAN AMERICAN): >60 ML/MIN/1.73 M^2
EST. GFR  (NON AFRICAN AMERICAN): >60 ML/MIN/1.73 M^2
FLUAV AG NPH QL: NEGATIVE
FLUBV AG NPH QL: NEGATIVE
GLUCOSE SERPL-MCNC: 102 MG/DL (ref 70–110)
GLUCOSE SERPL-MCNC: 96 MG/DL
HCT VFR BLD AUTO: 40.1 %
HGB BLD-MCNC: 13.6 G/DL
LYMPHOCYTES # BLD AUTO: 2.3 K/UL
LYMPHOCYTES NFR BLD: 30.4 %
MCH RBC QN AUTO: 30.8 PG
MCHC RBC AUTO-ENTMCNC: 33.9 G/DL
MCV RBC AUTO: 91 FL
MONOCYTES # BLD AUTO: 0.4 K/UL
MONOCYTES NFR BLD: 5.8 %
NEUTROPHILS # BLD AUTO: 4.7 K/UL
NEUTROPHILS NFR BLD: 61.7 %
PLATELET # BLD AUTO: 199 K/UL
PMV BLD AUTO: 11.8 FL
POTASSIUM SERPL-SCNC: 3.9 MMOL/L
PROT SERPL-MCNC: 7.4 G/DL
RBC # BLD AUTO: 4.42 M/UL
SODIUM SERPL-SCNC: 141 MMOL/L
WBC # BLD AUTO: 7.61 K/UL

## 2017-10-09 PROCEDURE — 36415 COLL VENOUS BLD VENIPUNCTURE: CPT | Mod: PO

## 2017-10-09 PROCEDURE — 99214 OFFICE O/P EST MOD 30 MIN: CPT | Mod: 25,S$GLB,, | Performed by: PHYSICIAN ASSISTANT

## 2017-10-09 PROCEDURE — 80053 COMPREHEN METABOLIC PANEL: CPT | Mod: PO

## 2017-10-09 PROCEDURE — 99999 PR PBB SHADOW E&M-EST. PATIENT-LVL III: CPT | Mod: PBBFAC,,, | Performed by: PHYSICIAN ASSISTANT

## 2017-10-09 PROCEDURE — 99173 VISUAL ACUITY SCREEN: CPT | Mod: S$GLB,,, | Performed by: PHYSICIAN ASSISTANT

## 2017-10-09 PROCEDURE — 93000 ELECTROCARDIOGRAM COMPLETE: CPT | Mod: S$GLB,,, | Performed by: INTERNAL MEDICINE

## 2017-10-09 PROCEDURE — 82948 REAGENT STRIP/BLOOD GLUCOSE: CPT | Mod: S$GLB,,, | Performed by: PHYSICIAN ASSISTANT

## 2017-10-09 PROCEDURE — 85025 COMPLETE CBC W/AUTO DIFF WBC: CPT | Mod: PO

## 2017-10-09 PROCEDURE — 87804 INFLUENZA ASSAY W/OPTIC: CPT | Mod: QW,S$GLB,, | Performed by: PHYSICIAN ASSISTANT

## 2017-10-09 NOTE — PROGRESS NOTES
"Subjective:      Patient ID: Joy Recinos is a 37 y.o. female.    Chief Complaint: Fatigue    Ms. Recinos is a 36yo female that presents to Urgent Care for generalized weakness and feeling unwell.  Patient states symptoms began with sudden onset, was not doing anything in particular when symptoms began.  Patient's  states she has had episodes like this previously that improved after having a light snack and lying down.       Fatigue   This is a new problem. The current episode started today. Associated symptoms include abdominal pain, diaphoresis, fatigue and nausea. Pertinent negatives include no chest pain, chills, congestion, coughing, fever, headaches, numbness, rash, vomiting or weakness.     Review of Systems   Constitutional: Positive for diaphoresis and fatigue. Negative for chills and fever.   HENT: Negative for congestion, ear pain, rhinorrhea and tinnitus.    Eyes: Positive for visual disturbance (blurry).   Respiratory: Positive for chest tightness. Negative for cough, shortness of breath and wheezing.    Cardiovascular: Negative for chest pain, palpitations and leg swelling.   Gastrointestinal: Positive for abdominal pain and nausea. Negative for constipation, diarrhea and vomiting.   Musculoskeletal:        Body feels "heavy"   Skin: Negative for rash.        Itchy on face   Neurological: Positive for dizziness and light-headedness. Negative for syncope, speech difficulty, weakness, numbness and headaches.   Psychiatric/Behavioral: The patient is nervous/anxious.        Objective:   /80   Pulse 73   Temp 98.8 °F (37.1 °C)   Ht 5' 8" (1.727 m)   Wt 98.2 kg (216 lb 9.6 oz)   SpO2 98%   BMI 32.93 kg/m²   Physical Exam   Constitutional: She is oriented to person, place, and time. She appears well-developed and well-nourished. She does not appear ill. No distress.   HENT:   Head: Normocephalic and atraumatic.   Right Ear: Tympanic membrane and ear canal normal. No tenderness. " Tympanic membrane is not erythematous. No middle ear effusion.   Left Ear: Tympanic membrane and ear canal normal. No tenderness. Tympanic membrane is not erythematous.  No middle ear effusion.   Nose: Nose normal. Right sinus exhibits no maxillary sinus tenderness and no frontal sinus tenderness. Left sinus exhibits no maxillary sinus tenderness and no frontal sinus tenderness.   Mouth/Throat: Uvula is midline and oropharynx is clear and moist.   Eyes: Conjunctivae, EOM and lids are normal. Pupils are equal, round, and reactive to light.   Cardiovascular: Normal rate, regular rhythm and normal heart sounds.    No murmur heard.  Pulmonary/Chest: Effort normal and breath sounds normal. No respiratory distress. She has no decreased breath sounds. She has no wheezes. She has no rhonchi. She has no rales.   Neurological: She is alert and oriented to person, place, and time. She has normal strength. No sensory deficit.   CN 2, 4, 6, 7, 11 and 12 checked intact    Skin: Skin is warm and dry. No rash noted. She is not diaphoretic. There is erythema.        Psychiatric: She has a normal mood and affect. Her speech is normal and behavior is normal. Thought content normal.   Patient tearful on examination     Assessment:      1. Weakness    2. Chest tightness       Plan:   Weakness  -     POCT Influenza A/B  -     POCT glucose  -     Visual acuity screening  -     CBC auto differential; Future; Expected date: 10/09/2017  -     Comprehensive metabolic panel; Future; Expected date: 10/09/2017    Chest tightness  -     EKG 12-lead; Future    Flu, EKG, blood sugar, visual acuity and CBC unremarkable.  Will release CMP to [facility].   Advise patient to follow up with PCP for further evaluation.  For severe or worsening symptoms, go to ED.   Gave handout on weakness, printed and reviewed AVS.   Patient expresses understanding and agrees with treatment plan.

## 2017-10-09 NOTE — PATIENT INSTRUCTIONS
Weakness (Uncertain Cause)  Based on your exam today, the exact cause of your weakness is not certain. However, your weakness does not seem to be a sign of a serious illness at this time. Keep an eye on your symptoms and get medical advice as instructed below.  Home care  · Rest at home today. Do not over-exert yourself.  · Take any medicine as prescribed.  · For the next few days, drink extra fluids (unless your healthcare provider wants you to restrict fluids for other reasons). Do not skip meals.  Follow-up care  Follow up with your healthcare provider or as advised.  When to seek medical advice  Call your healthcare provider for any of the following  · Worsening of your symptoms  · Symptoms don't start getting better within 2 days  · Fever of 100.4º F (38º C) or higher, or as directed by your healthcare provider·    Call 911  Get emergency medical care for any of these:  · Chest, arm, neck, jaw or upper back pain  · Trouble breathing  · Numbness or weakness of the face, one arm or one leg  · Slurred speech, confusion, trouble speaking, walking or seeing  · Blood in vomit or stool (black or red color)  · Loss of consciousness  Date Last Reviewed: 6/10/2015  © 8389-9337 [employer]. [address] [place] [address], [place] [address]. All rights reserved. This information is not intended as a substitute for professional medical care. Always follow your healthcare professional's instructions.

## 2017-10-13 ENCOUNTER — HOSPITAL ENCOUNTER (OUTPATIENT)
Dept: RADIOLOGY | Facility: HOSPITAL | Age: 37
Discharge: HOME | End: 2017-10-13
Attending: NURSE PRACTITIONER
Payer: COMMERCIAL

## 2017-10-13 ENCOUNTER — OFFICE VISIT (OUTPATIENT)
Dept: INTERNAL MEDICINE | Facility: CLINIC | Age: 37
End: 2017-10-13
Payer: COMMERCIAL

## 2017-10-13 VITALS
TEMPERATURE: 99 F | HEIGHT: 68 IN | HEART RATE: 105 BPM | OXYGEN SATURATION: 99 % | BODY MASS INDEX: 32.61 KG/M2 | DIASTOLIC BLOOD PRESSURE: 74 MMHG | WEIGHT: 215.19 LBS | SYSTOLIC BLOOD PRESSURE: 122 MMHG

## 2017-10-13 DIAGNOSIS — E55.9 VITAMIN D DEFICIENCY: ICD-10-CM

## 2017-10-13 DIAGNOSIS — R53.83 FATIGUE, UNSPECIFIED TYPE: Primary | ICD-10-CM

## 2017-10-13 DIAGNOSIS — M79.7 FIBROMYALGIA SYNDROME: ICD-10-CM

## 2017-10-13 DIAGNOSIS — Q79.59 ABDOMINAL WALL ANOMALY: ICD-10-CM

## 2017-10-13 DIAGNOSIS — E53.8 B12 DEFICIENCY: ICD-10-CM

## 2017-10-13 PROCEDURE — 99999 PR PBB SHADOW E&M-EST. PATIENT-LVL IV: CPT | Mod: PBBFAC,,, | Performed by: NURSE PRACTITIONER

## 2017-10-13 PROCEDURE — 76705 ECHO EXAM OF ABDOMEN: CPT | Mod: TC,PO

## 2017-10-13 PROCEDURE — 99214 OFFICE O/P EST MOD 30 MIN: CPT | Mod: S$GLB,,, | Performed by: NURSE PRACTITIONER

## 2017-10-13 PROCEDURE — 76705 ECHO EXAM OF ABDOMEN: CPT | Mod: 26,,, | Performed by: RADIOLOGY

## 2017-10-13 RX ORDER — CHOLECALCIFEROL (VITAMIN D3) 25 MCG
1000 TABLET ORAL DAILY
Qty: 30 TABLET | Refills: 1 | Status: SHIPPED | OUTPATIENT
Start: 2017-10-13 | End: 2018-02-16 | Stop reason: SDUPTHER

## 2017-10-13 NOTE — PROGRESS NOTES
Subjective:       Patient ID: Joy Recinos is a 37 y.o. female.    Chief Complaint: Follow-up (fatigue and pain)    Patient presents for urgent care follow up.  PCP: Dr. Oliva.  Has been feeling fatigue/weak with some short-term memory loss.  Reported in urgent care visit, she was not able to recall the name of current president.  Reports today she feels much better. Has been off Vitamin D supplement for the past 2 months.  Requesting refill.  Has fibromyalgia.  Under rheumatologist care .  Last visit was 03/2016.  Will schedule patient a follow up.      Patient also reported of feeling an abdominal protrusion that comes and goes.  Reports area is tender.        Review of Systems   Constitutional: Negative for activity change and unexpected weight change.   HENT: Negative for hearing loss, rhinorrhea and trouble swallowing.    Eyes: Positive for visual disturbance. Negative for discharge.   Respiratory: Positive for chest tightness. Negative for wheezing.    Cardiovascular: Negative for chest pain and palpitations.   Gastrointestinal: Positive for constipation (currently taking Miralax daily and has improved. ) and vomiting. Negative for blood in stool and diarrhea.   Endocrine: Negative for polydipsia and polyuria.   Genitourinary: Positive for menstrual problem. Negative for difficulty urinating, dysuria and hematuria.   Musculoskeletal: Positive for arthralgias and neck pain. Negative for joint swelling.   Neurological: Positive for weakness and headaches.   Psychiatric/Behavioral: Positive for confusion and dysphoric mood.       Objective:      Physical Exam   Constitutional: She is oriented to person, place, and time. Vital signs are normal. She appears well-developed and well-nourished.   HENT:   Head: Normocephalic and atraumatic.   Neck: Normal range of motion.   Cardiovascular: Normal rate and regular rhythm.    Pulmonary/Chest: Effort normal and breath sounds normal.   Abdominal:  Soft. Bowel sounds are normal. She exhibits no distension and no mass. There is no tenderness.   Musculoskeletal: Normal range of motion.   Neurological: She is alert and oriented to person, place, and time.   Skin: Skin is warm.   Psychiatric: She has a normal mood and affect. Her behavior is normal.       Assessment:       1. Fatigue, unspecified type    2. B12 deficiency    3. Vitamin D deficiency    4. Abdominal wall anomaly (intermittent protrustion)        Plan:         Fatigue, unspecified type  -     Vitamin D; Future; Expected date: 10/13/2017  -     TSH; Future; Expected date: 10/13/2017    B12 deficiency  -     Vitamin B12; Future; Expected date: 10/13/2017    Vitamin D deficiency  -     Vitamin D; Future; Expected date: 10/13/2017  -     vitamin D 1000 units Tab; Take 1 tablet (1,000 Units total) by mouth once daily.  Dispense: 30 tablet; Refill: 1    Abdominal wall anomaly (intermittent protrustion)  -     US Abdomen Complete; Future; Expected date: 10/13/2017

## 2017-10-13 NOTE — PATIENT INSTRUCTIONS
Managing Fatigue     Family members can help with meals and chores around the house.   Fatigue is common. It can be caused by worry, lack of sleep, or poor appetite. Fatigue can also be a sign of anemia, a shortage of red blood cells. You might need medical treatment for anemia. The tips below can help you feel better.  Conserving energy  · Keep track of the times of day when you are most tired and plan around them. For instance, if you are more tired in the afternoon, try to get tasks done in the morning.  · Decide which tasks are most important. Do those first.  · Pass tasks along to others when you need to. Ask for help.  · Accept help when its offered. Tell people what they can do to help. For instance, you may need someone to fix a meal, fold clothes, or put gas in your car.  · Plan rest times. You may want to take a nap each day. Just sitting quietly for a few minutes can make you feel more rested.  What you can do to feel better  · Relax before you try to sleep. Take a bath or read for a while.  · Form a sleep pattern. Go to bed at the same time each night and get up at the same time each morning.  · Eat well. Choose foods from all of the food groups each day.  · Exercise. Take a brisk walk to help increase your energy.  · Avoid caffeine and alcohol. Drink plenty of water or fruit juices instead.  Treating anemia  If you begin to feel more tired than normal, tell your doctor. Fatigue could be a sign of anemia. This problem is fairly common in cancer patients, especially during chemotherapy and radiation treatments. If your red blood cell count is too low, you may get a blood transfusion. In some cases, you may need medicine to increase the number of red blood cells your body makes.  When to call your healthcare provider  Call your healthcare provider if you have:  · Shortness of breath or chest pain  · A dizzy feeling when you get up from lying or sitting down  · Paler skin than normal  · Extreme tiredness  that is not helped by sleep   Date Last Reviewed: 1/3/2016  © 5447-6146 [employer]. [address] [place] [address], [place] [address]. All rights reserved. This information is not intended as a substitute for professional medical care. Always follow your healthcare professional's instructions.        Fibromyalgia  Fibromyalgia is a chronic condition. It causes pain and tenderness in connective tissues. This causes muscle pain. Often, there are also many tender areas throughout the body. Symptoms may also include stiffness and feelings of numbness and tingling. Symptoms may be worse upon waking up. They may increase with poor sleep, heavy activity, cold or damp weather, anxiety, or stress.  People with fibromyalgia often feel tired. They may have trouble sleeping. Other symptoms include morning stiffness, headaches, and painful menstrual periods. Some people have problems with thinking clearly and changes in memory.  The cause of fibromyalgia is not known. Symptoms are similar to that of other diseases. These include rheumatoid arthritis, low thyroid, chronic fatigue syndrome, and Lyme disease. In some cases, these diseases may occur together.  Fibromyalgia is often treated with medicines. You and your healthcare provider can discuss the medicine that may work best for you. You may have to try more than one medicine or combination of medicines before you find what works for you.  Home care  · If your healthcare provider has prescribed or recommended medicines, take them as directed.  · Rest as needed. Try to get enough sleep. If you have trouble sleeping, discuss this with your healthcare provider.   · Be active. Regular exercise can help manage symptoms. Some options include walking, swimming, and biking. Strengthening exercises may also be helpful. Talk to your healthcare provider about the best ways to be active.  · Follow a healthy diet. Limit caffeine and alcohol. If you smoke, ask your healthcare  provider for help to stop.  · Notice how your body reacts to stress. Learn to listen to your body signals. This will help you take action before the stress becomes severe.  · Learn relaxation techniques. Also consider joining a stress reduction program or class.  · Talk to your healthcare provider about trying complementary treatments. These include acupuncture, hypnosis, and biofeedback. Yoga and daquan chi may be helpful.  · Ask your healthcare provider about cognitive behavioral therapy (CBT). This type of counseling can help people with fibromyalgia cope better with their illness.  Follow-up care  Follow up with your healthcare provider or as advised by our staff. In many cases, fibromyalgia is best treated with a team approach.  This may involve your primary care provider, a rheumatologist, a physical therapist, and a mental health professional.   For more information: National [place] [employer] Skin Diseases (NIAMS)  www.niams.nih.gov 786-336-8443  When to seek medical advice  Contact your healthcare provider right away if any of these occur:  · Symptoms getting worse or new symptoms developing  · You feel hopeless, helpless, or lose interest in day-to-day life  Date Last Reviewed: 3/1/2017  © 5696-0140 [employer]. [address] [place] [address], [place] [address]. All rights reserved. This information is not intended as a substitute for professional medical care. Always follow your healthcare professional's instructions.

## 2017-11-15 ENCOUNTER — OFFICE VISIT (OUTPATIENT)
Dept: GASTROENTEROLOGY | Facility: CLINIC | Age: 37
End: 2017-11-15
Payer: COMMERCIAL

## 2017-11-15 VITALS
SYSTOLIC BLOOD PRESSURE: 110 MMHG | BODY MASS INDEX: 31.66 KG/M2 | HEART RATE: 98 BPM | WEIGHT: 221.13 LBS | DIASTOLIC BLOOD PRESSURE: 68 MMHG | HEIGHT: 70 IN

## 2017-11-15 DIAGNOSIS — K58.2 IRRITABLE BOWEL SYNDROME WITH BOTH CONSTIPATION AND DIARRHEA: ICD-10-CM

## 2017-11-15 DIAGNOSIS — R10.13 EPIGASTRIC ABDOMINAL PAIN: Primary | ICD-10-CM

## 2017-11-15 DIAGNOSIS — R10.11 RUQ ABDOMINAL PAIN: ICD-10-CM

## 2017-11-15 PROCEDURE — 99999 PR PBB SHADOW E&M-EST. PATIENT-LVL III: CPT | Mod: PBBFAC,,, | Performed by: NURSE PRACTITIONER

## 2017-11-15 PROCEDURE — 99204 OFFICE O/P NEW MOD 45 MIN: CPT | Mod: S$GLB,,, | Performed by: NURSE PRACTITIONER

## 2017-11-21 ENCOUNTER — TELEPHONE (OUTPATIENT)
Dept: RADIOLOGY | Facility: HOSPITAL | Age: 37
End: 2017-11-21

## 2017-11-22 ENCOUNTER — HOSPITAL ENCOUNTER (OUTPATIENT)
Dept: RADIOLOGY | Facility: HOSPITAL | Age: 37
Discharge: HOME | End: 2017-11-22
Attending: NURSE PRACTITIONER
Payer: COMMERCIAL

## 2017-11-22 ENCOUNTER — PATIENT MESSAGE (OUTPATIENT)
Dept: GASTROENTEROLOGY | Facility: CLINIC | Age: 37
End: 2017-11-22

## 2017-11-22 DIAGNOSIS — R10.11 RUQ ABDOMINAL PAIN: ICD-10-CM

## 2017-11-22 DIAGNOSIS — R10.13 EPIGASTRIC ABDOMINAL PAIN: ICD-10-CM

## 2017-11-22 DIAGNOSIS — M79.7 FIBROMYALGIA SYNDROME: ICD-10-CM

## 2017-11-22 PROCEDURE — 76700 US EXAM ABDOM COMPLETE: CPT | Mod: 26,,, | Performed by: RADIOLOGY

## 2017-11-22 PROCEDURE — 76700 US EXAM ABDOM COMPLETE: CPT | Mod: TC,PO

## 2017-11-22 RX ORDER — DULOXETIN HYDROCHLORIDE 60 MG/1
CAPSULE, DELAYED RELEASE ORAL
Qty: 90 CAPSULE | Refills: 2 | Status: SHIPPED | OUTPATIENT
Start: 2017-11-22 | End: 2018-05-11 | Stop reason: ALTCHOICE

## 2017-11-27 ENCOUNTER — OFFICE VISIT (OUTPATIENT)
Dept: RHEUMATOLOGY | Facility: CLINIC | Age: 37
End: 2017-11-27
Payer: COMMERCIAL

## 2017-11-27 ENCOUNTER — LAB VISIT (OUTPATIENT)
Dept: LAB | Facility: HOSPITAL | Age: 37
End: 2017-11-27
Attending: INTERNAL MEDICINE
Payer: COMMERCIAL

## 2017-11-27 VITALS
HEART RATE: 100 BPM | SYSTOLIC BLOOD PRESSURE: 119 MMHG | DIASTOLIC BLOOD PRESSURE: 82 MMHG | WEIGHT: 222.69 LBS | BODY MASS INDEX: 32.32 KG/M2

## 2017-11-27 DIAGNOSIS — R76.8 POSITIVE ANA (ANTINUCLEAR ANTIBODY): ICD-10-CM

## 2017-11-27 DIAGNOSIS — R76.8 POSITIVE ANA (ANTINUCLEAR ANTIBODY): Primary | ICD-10-CM

## 2017-11-27 DIAGNOSIS — L53.8 PERIORBITAL ERYTHEMA: ICD-10-CM

## 2017-11-27 DIAGNOSIS — M79.7 FIBROMYALGIA SYNDROME: ICD-10-CM

## 2017-11-27 PROCEDURE — 86038 ANTINUCLEAR ANTIBODIES: CPT

## 2017-11-27 PROCEDURE — 99214 OFFICE O/P EST MOD 30 MIN: CPT | Mod: S$GLB,,, | Performed by: INTERNAL MEDICINE

## 2017-11-27 PROCEDURE — 99999 PR PBB SHADOW E&M-EST. PATIENT-LVL IV: CPT | Mod: PBBFAC,,, | Performed by: INTERNAL MEDICINE

## 2017-11-27 PROCEDURE — 86039 ANTINUCLEAR ANTIBODIES (ANA): CPT

## 2017-11-27 PROCEDURE — 36415 COLL VENOUS BLD VENIPUNCTURE: CPT | Mod: PO

## 2017-11-27 PROCEDURE — 86235 NUCLEAR ANTIGEN ANTIBODY: CPT | Mod: 59

## 2017-11-27 NOTE — ASSESSMENT & PLAN NOTE
Positive DARRION without any specific antibody for Lupus/Scleroderma/Myositis/RA/ Sjogren's syndrome. Her joint exam failed to reveal any arthritis to just underlying inflammation. Her ESR and CRP were normal in past. Even with all above negative results, I went ahead and gave a trial of steroid for one week and she had no benefit which confirmed absence of any arthritides. Repeat DARRION yearly.

## 2017-11-27 NOTE — ASSESSMENT & PLAN NOTE
Active. On 60 mg cymbalta. Failed high dose lyrica and muscle relaxants in past.  didn't recommend starting opioids for her pain. Continue cymbalta.

## 2017-11-27 NOTE — ASSESSMENT & PLAN NOTE
Mild intermittent history of rash around periorbital region. Normal CK, normal aldolase, no suspicion for dermatomyositis. Refer to  for evaluation of the rash.

## 2017-11-27 NOTE — PROGRESS NOTES
RHEUMATOLOGY CLINIC FOLLOW UP VISIT  Chief complaints:-  I hurt all over. I have getting rash around my eyelids.     HPI:-  Joy Willett a 37 y.o. pleasant female comes in for a follow up visit with above chief complaints.   Location- All over.  Severity- Moderate to severe.  Timing- All day long  Modifying factors-Worsened by activity.   Quality- Sharp, achy .   Duration- Since last visit.   Context- Fibromyalgia.   Associated signs & symptoms- Intermittent rash around eyelids and malar erythema.     ROS    Past Medical History:   Diagnosis Date    B12 deficiency     Chronic low back pain     oberlander    Endometriosis     Fibromyalgia syndrome     Idiopathic hypersomnia     Irritable bowel syndrome     goldman    Migraines     frances       Past Surgical History:   Procedure Laterality Date     SECTION      OOPHORECTOMY      PELVIC LAPAROSCOPY      TUBAL LIGATION          Social History   Substance Use Topics    Smoking status: Never Smoker    Smokeless tobacco: Never Used    Alcohol use Yes      Comment: rarely       Family History   Problem Relation Age of Onset    Cataracts Maternal Grandmother     Diabetes Maternal Grandmother     Glaucoma Maternal Grandmother     Cataracts Maternal Grandfather     Breast cancer Neg Hx     Ovarian cancer Neg Hx     Deep vein thrombosis Neg Hx        Review of patient's allergies indicates:  No Known Allergies        Physical examination:-    Vitals:    17 1507   BP: 119/82   Pulse: 100   Weight: 101 kg (222 lb 10.6 oz)   PainSc:   4       Physical Exam   Constitutional: She is oriented to person, place, and time and well-developed, well-nourished, and in no distress. No distress.   HENT:   Head: Normocephalic.   Mouth/Throat: Oropharynx is clear and moist.   Eyes: Conjunctivae and EOM are normal. Pupils are equal, round, and reactive to light.   Neck: Normal range of motion. Neck  supple.   Cardiovascular: Normal rate and intact distal pulses.    Pulmonary/Chest: Effort normal. No respiratory distress.   Abdominal: Soft. There is no tenderness.   Musculoskeletal:   Multiple tender points. No synovitis over small joints of hands or feet.  No effusion over large joints.   Neurological: She is alert and oriented to person, place, and time. No cranial nerve deficit.   Skin: Skin is warm. No rash noted. No erythema.   Psychiatric: Mood and affect normal.   Nursing note and vitals reviewed.      Labs:-  Positive DARRION with negative KATLYN.     Medication List with Changes/Refills   Current Medications    ALBUTEROL (PROAIR HFA) 90 MCG/ACTUATION INHALER    Inhale 2 puffs into the lungs every 4 (four) hours as needed for Shortness of Breath.    CETIRIZINE (ZYRTEC) 10 MG TABLET    Take 10 mg by mouth once daily.    DULOXETINE (CYMBALTA) 60 MG CAPSULE    TAKE 1 CAPSULE EVERY DAY    MEDROXYPROGESTERONE (DEPO-PROVERA) 150 MG/ML SYRG    Inject 1 mL (150 mg total) into the muscle every 3 (three) months.    OMEPRAZOLE (PRILOSEC) 20 MG CAPSULE    TAKE ONE CAPSULE BY MOUTH DAILY    TOPIRAMATE (TOPAMAX) 50 MG TABLET    Take 1 tablet (50 mg total) by mouth once daily.    VITAMIN D 1000 UNITS TAB    Take 1 tablet (1,000 Units total) by mouth once daily.       Assessment/Plans:-  # Positive DARRION (antinuclear antibody)  Positive DARRION without any specific antibody for Lupus/Scleroderma/Myositis/RA/ Sjogren's syndrome. Her joint exam failed to reveal any arthritis to just underlying inflammation. Her ESR and CRP were normal in past. Even with all above negative results, I went ahead and gave a trial of steroid for one week and she had no benefit which confirmed absence of any arthritides. Repeat DARROIN yearly.   - DARRION; Future    # Periorbital erythema  Mild intermittent history of rash around periorbital region. Normal CK, normal aldolase, no suspicion for dermatomyositis. Refer to  for evaluation of the rash.   -  Ambulatory Referral to Dermatology    # Fibromyalgia syndrome  Active. On 60 mg cymbalta. Failed high dose lyrica and muscle relaxants in past.  didn't recommend starting opioids for her pain. Continue cymbalta.      Disclaimer: This note was prepared using voice recognition system and is likely to have sound alike errors and is not proof read.  Please call me with any questions.

## 2017-11-28 LAB
ANA SER QL IF: POSITIVE
ANA TITR SER IF: NORMAL {TITER}

## 2017-11-30 ENCOUNTER — PATIENT MESSAGE (OUTPATIENT)
Dept: RHEUMATOLOGY | Facility: CLINIC | Age: 37
End: 2017-11-30

## 2017-11-30 LAB
ANTI SM ANTIBODY: 5.53 EU
ANTI SM/RNP ANTIBODY: 4.97 EU
ANTI-SM INTERPRETATION: NEGATIVE
ANTI-SM/RNP INTERPRETATION: NEGATIVE
ANTI-SSA ANTIBODY: 8.28 EU
ANTI-SSA INTERPRETATION: NEGATIVE
ANTI-SSB ANTIBODY: 0.64 EU
ANTI-SSB INTERPRETATION: NEGATIVE
DSDNA AB SER-ACNC: NORMAL [IU]/ML

## 2017-12-15 ENCOUNTER — HOSPITAL ENCOUNTER (OUTPATIENT)
Dept: RADIOLOGY | Facility: HOSPITAL | Age: 37
Discharge: HOME | End: 2017-12-15
Attending: NURSE PRACTITIONER
Payer: COMMERCIAL

## 2017-12-15 ENCOUNTER — PATIENT MESSAGE (OUTPATIENT)
Dept: URGENT CARE | Facility: CLINIC | Age: 37
End: 2017-12-15

## 2017-12-15 ENCOUNTER — OFFICE VISIT (OUTPATIENT)
Dept: URGENT CARE | Facility: CLINIC | Age: 37
End: 2017-12-15
Payer: COMMERCIAL

## 2017-12-15 VITALS
BODY MASS INDEX: 33.28 KG/M2 | TEMPERATURE: 101 F | HEIGHT: 68 IN | WEIGHT: 219.56 LBS | SYSTOLIC BLOOD PRESSURE: 120 MMHG | DIASTOLIC BLOOD PRESSURE: 80 MMHG | HEART RATE: 131 BPM | OXYGEN SATURATION: 98 %

## 2017-12-15 DIAGNOSIS — E55.9 VITAMIN D DEFICIENCY: ICD-10-CM

## 2017-12-15 DIAGNOSIS — R68.89 FLU-LIKE SYMPTOMS: Primary | ICD-10-CM

## 2017-12-15 DIAGNOSIS — M79.674 PAIN OF TOE OF RIGHT FOOT: ICD-10-CM

## 2017-12-15 PROCEDURE — 99999 PR PBB SHADOW E&M-EST. PATIENT-LVL IV: CPT | Mod: PBBFAC,,, | Performed by: NURSE PRACTITIONER

## 2017-12-15 PROCEDURE — 71020 XR CHEST PA AND LATERAL: CPT | Mod: TC,PO

## 2017-12-15 PROCEDURE — 73630 X-RAY EXAM OF FOOT: CPT | Mod: TC,PO,RT

## 2017-12-15 PROCEDURE — 71020 XR CHEST PA AND LATERAL: CPT | Mod: 26,,, | Performed by: RADIOLOGY

## 2017-12-15 PROCEDURE — 99214 OFFICE O/P EST MOD 30 MIN: CPT | Mod: S$GLB,,, | Performed by: NURSE PRACTITIONER

## 2017-12-15 PROCEDURE — 73630 X-RAY EXAM OF FOOT: CPT | Mod: 26,RT,, | Performed by: RADIOLOGY

## 2017-12-15 RX ORDER — CHOLECALCIFEROL (VITAMIN D3) 25 MCG
1000 TABLET ORAL DAILY
Qty: 30 TABLET | Refills: 1 | OUTPATIENT
Start: 2017-12-15

## 2017-12-15 RX ORDER — OSELTAMIVIR PHOSPHATE 75 MG/1
75 CAPSULE ORAL 2 TIMES DAILY
Qty: 10 CAPSULE | Refills: 0 | Status: SHIPPED | OUTPATIENT
Start: 2017-12-15 | End: 2017-12-20

## 2017-12-15 NOTE — PATIENT INSTRUCTIONS

## 2017-12-16 NOTE — PROGRESS NOTES
Subjective:       Patient ID: Joy Recinos is a 37 y.o. female.    Chief Complaint: Cough (x5 days); Wheezing; Headache; Fever (x3 days); and Toe Pain (Right foot big toe pain)    Toe Pain    The incident occurred more than 1 week ago (1 month). There was no injury mechanism. The pain is present in the right foot (right GT pain). Pertinent negatives include no inability to bear weight, loss of motion, loss of sensation, muscle weakness, numbness or tingling. The symptoms are aggravated by weight bearing. She has tried nothing for the symptoms.   URI    This is a new problem. The current episode started in the past 7 days. Associated symptoms include congestion, coughing, headaches, rhinorrhea, sinus pain and wheezing. Pertinent negatives include no chest pain, ear pain, sneezing or sore throat.     Review of Systems   Constitutional: Positive for diaphoresis and fever (3 days). Negative for chills and fatigue.   HENT: Positive for congestion, rhinorrhea, sinus pain and sinus pressure. Negative for ear discharge, ear pain, postnasal drip, sneezing and sore throat.    Respiratory: Positive for cough and wheezing. Negative for shortness of breath.    Cardiovascular: Negative for chest pain and palpitations.   Musculoskeletal: Positive for gait problem and myalgias (3 days). Negative for back pain and joint swelling.   Neurological: Positive for headaches. Negative for tingling and numbness.       Objective:      Physical Exam   Constitutional: She is oriented to person, place, and time. She appears well-developed and well-nourished. No distress.   HENT:   Head: Normocephalic.   Right Ear: External ear and ear canal normal. Tympanic membrane is not erythematous. A middle ear effusion is present.   Left Ear: External ear and ear canal normal. Tympanic membrane is not erythematous. A middle ear effusion is present.   Nose: Nose normal. No mucosal edema or rhinorrhea. Right sinus exhibits no maxillary sinus  tenderness and no frontal sinus tenderness. Left sinus exhibits no maxillary sinus tenderness and no frontal sinus tenderness.   Mouth/Throat: Uvula is midline, oropharynx is clear and moist and mucous membranes are normal. No oropharyngeal exudate, posterior oropharyngeal edema or posterior oropharyngeal erythema.   Eyes: Conjunctivae and EOM are normal.   Neck: Normal range of motion. Neck supple.   Cardiovascular: Normal rate, regular rhythm and normal heart sounds.    Pulmonary/Chest: Effort normal and breath sounds normal. No accessory muscle usage. No apnea, no tachypnea and no bradypnea. No respiratory distress. She has no decreased breath sounds. She has no wheezes. She has no rhonchi. She has no rales.   Musculoskeletal:        Right foot: There is normal range of motion, no tenderness, no bony tenderness, no swelling, normal capillary refill, no crepitus, no deformity and no laceration.        Feet:    Pain with standing in the right GT; no increased warmth, no erythema, no swelling, nail is normal ; skin intact    Lymphadenopathy:        Head (right side): No submental, no submandibular and no tonsillar adenopathy present.        Head (left side): No submental, no submandibular and no tonsillar adenopathy present.     She has no cervical adenopathy.   Neurological: She is alert and oriented to person, place, and time.   Skin: Skin is warm and dry. She is not diaphoretic.       Assessment:       1. Flu-like symptoms    2. Pain of toe of right foot        Plan:   Joy was seen today for cough, wheezing, headache, fever and toe pain.    Diagnoses and all orders for this visit:    Flu-like symptoms  -     X-Ray Chest PA And Lateral  -     oseltamivir (TAMIFLU) 75 MG capsule; Take 1 capsule (75 mg total) by mouth 2 (two) times daily.    Pain of toe of right foot  -     X-Ray Foot Complete Right      -     Diagnosis and treatment discussed, AVS provided  -     CXR to r/o pneumonia   -     Follow up with PCP  or ER immediately for worsening, new or no improvement of symptoms.   -     Patient understands and agrees with plan

## 2017-12-18 ENCOUNTER — PATIENT MESSAGE (OUTPATIENT)
Dept: PEDIATRICS | Facility: CLINIC | Age: 37
End: 2017-12-18

## 2017-12-22 ENCOUNTER — OFFICE VISIT (OUTPATIENT)
Dept: URGENT CARE | Facility: CLINIC | Age: 37
End: 2017-12-22
Payer: COMMERCIAL

## 2017-12-22 VITALS
DIASTOLIC BLOOD PRESSURE: 80 MMHG | BODY MASS INDEX: 33.34 KG/M2 | WEIGHT: 220 LBS | SYSTOLIC BLOOD PRESSURE: 114 MMHG | OXYGEN SATURATION: 99 % | TEMPERATURE: 99 F | HEIGHT: 68 IN | HEART RATE: 85 BPM

## 2017-12-22 DIAGNOSIS — J40 BRONCHITIS: Primary | ICD-10-CM

## 2017-12-22 PROCEDURE — 99999 PR PBB SHADOW E&M-EST. PATIENT-LVL III: CPT | Mod: PBBFAC,,, | Performed by: FAMILY MEDICINE

## 2017-12-22 PROCEDURE — 99214 OFFICE O/P EST MOD 30 MIN: CPT | Mod: S$GLB,,, | Performed by: FAMILY MEDICINE

## 2017-12-22 RX ORDER — AZITHROMYCIN 250 MG/1
TABLET, FILM COATED ORAL
Qty: 6 TABLET | Refills: 0 | Status: SHIPPED | OUTPATIENT
Start: 2017-12-22 | End: 2018-01-04

## 2017-12-22 RX ORDER — ALBUTEROL SULFATE 90 UG/1
2 AEROSOL, METERED RESPIRATORY (INHALATION) EVERY 6 HOURS PRN
Qty: 1 INHALER | Refills: 0 | Status: SHIPPED | OUTPATIENT
Start: 2017-12-22 | End: 2018-09-17

## 2017-12-22 NOTE — PROGRESS NOTES
"Subjective:       Patient ID: Joy Recinos is a 37 y.o. female.    Chief Complaint: Cough; Wheezing; and Headache    /80 (BP Location: Left arm, Patient Position: Sitting, BP Method: Large (Manual))   Pulse 85   Temp 98.5 °F (36.9 °C) (Tympanic)   Ht 5' 8" (1.727 m)   Wt 99.8 kg (220 lb 0.3 oz)   SpO2 99%   BMI 33.45 kg/m²     HPI  Cough and wheezing for 2 weeks. Seen here last week for same, given tamiflu, finished full course. Subjective fever last night.    Review of Systems   Constitutional: Positive for activity change and fever. Negative for chills.   HENT: Positive for congestion. Negative for postnasal drip, sinus pressure and sore throat.    Respiratory: Positive for cough and wheezing.    Cardiovascular: Negative.    Gastrointestinal: Negative.    Genitourinary: Negative.    Musculoskeletal: Negative.  Negative for myalgias.   Skin: Negative.    Neurological: Positive for headaches.   Hematological: Negative.        Objective:      Physical Exam   Constitutional: She is oriented to person, place, and time. She appears well-developed and well-nourished. No distress.   HENT:   Head: Normocephalic and atraumatic.   Mouth/Throat: No oropharyngeal exudate.   Eyes: EOM are normal. Pupils are equal, round, and reactive to light. Right eye exhibits no discharge. Left eye exhibits no discharge.   Neck: Normal range of motion. Neck supple.   Cardiovascular: Normal rate, regular rhythm and normal heart sounds.    No murmur heard.  Pulmonary/Chest: Effort normal and breath sounds normal. She has no wheezes. She has no rales.   Musculoskeletal: Normal range of motion.   Lymphadenopathy:     She has no cervical adenopathy.   Neurological: She is alert and oriented to person, place, and time. No cranial nerve deficit.   Skin: Skin is warm and dry. She is not diaphoretic.   Nursing note and vitals reviewed.      Assessment:       1. Bronchitis        Plan:     Joy was seen today for cough, " wheezing and headache.    Diagnoses and all orders for this visit:    Bronchitis  -     azithromycin (Z-ADALID) 250 MG tablet; As per packet instructions  -     albuterol 90 mcg/actuation inhaler; Inhale 2 puffs into the lungs every 6 (six) hours as needed for Wheezing.              Discussed with pt all information and results pertaining to this visit. Discussed dx and plan of tx.  All questions and concerns were addressed at this time. Pt expresses understanding of information and instructions.  Care and follow up instruction given to patient.

## 2018-01-04 ENCOUNTER — INITIAL CONSULT (OUTPATIENT)
Dept: DERMATOLOGY | Facility: CLINIC | Age: 38
End: 2018-01-04
Payer: COMMERCIAL

## 2018-01-04 DIAGNOSIS — D22.9 MULTIPLE NEVI: ICD-10-CM

## 2018-01-04 DIAGNOSIS — H01.133 EYELID ECZEMA, RIGHT: Primary | ICD-10-CM

## 2018-01-04 DIAGNOSIS — L82.1 SEBORRHEIC KERATOSIS: ICD-10-CM

## 2018-01-04 DIAGNOSIS — L65.9 ALOPECIA: ICD-10-CM

## 2018-01-04 DIAGNOSIS — D18.00 ANGIOMA: ICD-10-CM

## 2018-01-04 PROCEDURE — 88305 TISSUE EXAM BY PATHOLOGIST: CPT | Performed by: PATHOLOGY

## 2018-01-04 PROCEDURE — 99999 PR PBB SHADOW E&M-EST. PATIENT-LVL III: CPT | Mod: PBBFAC,,, | Performed by: DERMATOLOGY

## 2018-01-04 PROCEDURE — 11100 PR BIOPSY OF SKIN LESION: CPT | Mod: S$GLB,,, | Performed by: DERMATOLOGY

## 2018-01-04 PROCEDURE — 99203 OFFICE O/P NEW LOW 30 MIN: CPT | Mod: 25,S$GLB,, | Performed by: DERMATOLOGY

## 2018-01-04 PROCEDURE — 88305 TISSUE EXAM BY PATHOLOGIST: CPT | Mod: 26,,, | Performed by: PATHOLOGY

## 2018-01-04 NOTE — PROGRESS NOTES
Subjective:       Patient ID:  Joy Recinos is a 37 y.o. female who presents for   Chief Complaint   Patient presents with    Eczema     c/o eczema flare up right eyelid x 3 months w/ flaking tx cortisone    Hair Loss     c/o hair loss/ thinning x 3 years tx nioxin shampoo     Hx of IBS, fibromyalgia    History of Present Illness: The patient presents with chief complaint of eczema.  Location: right eyelid  Duration: 3 months   Signs/Symptoms: itchy, flaky    Prior treatments: OTC cortisone    Patient denies personal hx of skin cancer.  Father has hx of skin cancer    History of Present Illness: The patient presents with chief complaint of hair loss.  Location: scalp  Duration: several years  Signs/Symptoms: hair loss    Prior treatments: nioxin    Brothers and father with thinning hair            Review of Systems   Constitutional: Negative for fever and chills.   Gastrointestinal: Negative for nausea and vomiting.   Skin: Negative for daily sunscreen use, activity-related sunscreen use and recent sunburn.   Hematologic/Lymphatic: Does not bruise/bleed easily.        Objective:    Physical Exam   Constitutional: She appears well-developed and well-nourished. No distress.   Neurological: She is alert and oriented to person, place, and time. She is not disoriented.   Psychiatric: She has a normal mood and affect.   Skin:   Areas Examined (abnormalities noted in diagram):   Scalp / Hair Palpated and Inspected  Head / Face Inspection Performed  Neck Inspection Performed  Chest / Axilla Inspection Performed  Abdomen Inspection Performed  Genitals / Buttocks / Groin Inspection Performed  Back Inspection Performed  RUE Inspected  LUE Inspection Performed  RLE Inspected  LLE Inspection Performed  Nails and Digits Inspection Performed                   Diagram Legend     Erythematous scaling macule/papule c/w actinic keratosis       Vascular papule c/w angioma      Pigmented verrucoid papule/plaque c/w  seborrheic keratosis      Yellow umbilicated papule c/w sebaceous hyperplasia      Irregularly shaped tan macule c/w lentigo     1-2 mm smooth white papules consistent with Milia      Movable subcutaneous cyst with punctum c/w epidermal inclusion cyst      Subcutaneous movable cyst c/w pilar cyst      Firm pink to brown papule c/w dermatofibroma      Pedunculated fleshy papule(s) c/w skin tag(s)      Evenly pigmented macule c/w junctional nevus     Mildly variegated pigmented, slightly irregular-bordered macule c/w mildly atypical nevus      Flesh colored to evenly pigmented papule c/w intradermal nevus       Pink pearly papule/plaque c/w basal cell carcinoma      Erythematous hyperkeratotic cursted plaque c/w SCC      Surgical scar with no sign of skin cancer recurrence      Open and closed comedones      Inflammatory papules and pustules      Verrucoid papule consistent consistent with wart     Erythematous eczematous patches and plaques     Dystrophic onycholytic nail with subungual debris c/w onychomycosis     Umbilicated papule    Erythematous-base heme-crusted tan verrucoid plaque consistent with inflamed seborrheic keratosis     Erythematous Silvery Scaling Plaque c/w Psoriasis     See annotation                Assessment / Plan:      Pathology Orders:     Normal Orders This Visit    Tissue Specimen To Pathology, Dermatology     Questions:    Directional Terms:  Other(comment)    Clinical information:  female pattern hair loss vs. diffuse AA vs. chronic telogen.  hx of vitamin B12 def.    Specific Site:  scalp        Eyelid eczema, right  -     crisaborole (EUCRISA) 2 % Oint; AAA of eyelid bid prn eczema  Dispense: 60 g; Refill: 1  -      Discussed dx, recommend d/c eye make up.  Will start above med.    Multiple nevi  Reassurance given.  Discussed ABCDEF of melanoma and changes for patient to look for.  AAD Handout given. Discussed importance of daily use of sunscreen.      Seborrheic keratosis  Reassurance  given. Discussed diagnosis and that lesions are benign.  AAD handout given.     Angioma  Reassurance given.  Lesions are benign.    Alopecia  -     Tissue Specimen To Pathology, Dermatology  -     See differential above. Punch biopsy done.  RTC in 3 weeks for results.           Return in about 3 weeks (around 1/25/2018).     PROCEDURE NOTE -- PUNCH BIOPSY  Location: scalp    After risks, benefits, and alternatives were discussed with the patient, the patient agrees to the procedure by verbal consent. The area(s) is cleansed with alcohol. A total of 2 cc of lidocaine 1% with epinephrine and sodium bicarbonate was injected for local anesthesia. A 3 mm punch biopsy was used to remove part or all of the lesion(s). The specimen was sent for tissue pathology. The defect(s) was then closed with interrupted 4.0 Prolene sutures. The area was dressed with antibiotic ointment and bandaged. The patient tolerated the procedure well without adverse events.  Wound care instructions were given to the patient on the AVS.  The patient will be notified of pathology results once available. Results will also be available in Epic.

## 2018-01-04 NOTE — LETTER
January 4, 2018      Adalberto Kong MD  [address] [facility] [address]  [place] [address]           [facility]  [address] [place] [address]  Phone: [phone]  Fax: [phone]          Patient: Joy Recinos   MR Number: [id number]   YOB: 1980   Date of Visit: 1/4/2018       Dear Dr. Adalberto Kong:    Thank you for referring Joy Recinos to me for evaluation. Attached you will find relevant portions of my assessment and plan of care.    If you have questions, please do not hesitate to call me. I look forward to following Joy Recinos along with you.    Sincerely,    Vane Pike MD    Enclosure  CC:  No Recipients    If you would like to receive this communication electronically, please contact [email or web address] or [phone] to request more information on [employer] Link access.    For providers and/or their staff who would like to refer a patient to [facility], please contact us through our one-stop-shop provider referral line, [facility], at [phone].    If you feel you have received this communication in error or would no longer like to receive these types of communications, please e-mail [email or web address]

## 2018-01-04 NOTE — PATIENT INSTRUCTIONS
Punch Biopsy Wound Care    Your doctor has performed a punch biopsy today.   It is recommended that you keep the area dry for the first 24 hours.  After 24 hours, you may wash the area with warm soap and water and apply Vaseline jelly.  Many patients prefer to use Neosporin or Bacitracin ointment.  This is acceptable; however know that you can develop an allergy to this medication even if you have used it safely for years.  It is important to keep the area moist.  Letting it dry out and get air slows healing time, will worsen the scar, and make it more difficult to remove the stitches if they were placed.  Band aid is optional after first 24 hours.      If you notice increasing redness, tenderness, pain, or yellow drainage at the biopsy or surgical site, please notify your doctor.  These are signs of an infection.    If your biopsy/surgical site is bleeding, apply firm pressure for 15 minutes straight.  Repeat for another 15 minutes, if it is still bleeding.   If the surgical site continues to bleed, then please contact your doctor.      [facility]   DERMATOLOGY  [address]   [place] [address]   Dept: [phone]   Dept Fax: [phone]

## 2018-01-15 ENCOUNTER — CLINICAL SUPPORT (OUTPATIENT)
Dept: DERMATOLOGY | Facility: CLINIC | Age: 38
End: 2018-01-15
Payer: COMMERCIAL

## 2018-01-15 DIAGNOSIS — Z48.02 VISIT FOR SUTURE REMOVAL: Primary | ICD-10-CM

## 2018-01-15 PROCEDURE — 99024 POSTOP FOLLOW-UP VISIT: CPT | Mod: S$GLB,,, | Performed by: DERMATOLOGY

## 2018-01-15 NOTE — PROGRESS NOTES
Patient presents for suture removal of scalp. The wound is well healed without signs of infection.  The sutures are removed. Wound care and activity instructions given. Return in 6 months.

## 2018-01-25 ENCOUNTER — TELEPHONE (OUTPATIENT)
Dept: DERMATOLOGY | Facility: CLINIC | Age: 38
End: 2018-01-25

## 2018-01-25 ENCOUNTER — PATIENT MESSAGE (OUTPATIENT)
Dept: DERMATOLOGY | Facility: CLINIC | Age: 38
End: 2018-01-25

## 2018-01-25 NOTE — TELEPHONE ENCOUNTER
FINAL PATHOLOGIC DIAGNOSIS  [place] DIAGNOSIS:  SKIN, SCALP, PUNCH BIOPSY ([id number]; 1/4/2018):  Non-diagnostic study, favor non-scarring non-inflammatory alopecia. See comment.  LEELEE Melendez (Ray)., Ph.D.  Note: Please see the attached report for comment.    Called pt and notified of the above results.  Notified of non-specific findings and no evidence of scarring. Will also send message via [facility].  Recommend biotin 4700-5762 mcg daily and rogaine x 6 months.  Will send 6 month appt reminder.

## 2018-02-16 ENCOUNTER — LAB VISIT (OUTPATIENT)
Dept: LAB | Facility: HOSPITAL | Age: 38
End: 2018-02-16
Attending: INTERNAL MEDICINE
Payer: COMMERCIAL

## 2018-02-16 ENCOUNTER — OFFICE VISIT (OUTPATIENT)
Dept: INTERNAL MEDICINE | Facility: CLINIC | Age: 38
End: 2018-02-16
Payer: COMMERCIAL

## 2018-02-16 VITALS
BODY MASS INDEX: 34.89 KG/M2 | DIASTOLIC BLOOD PRESSURE: 82 MMHG | HEIGHT: 68 IN | SYSTOLIC BLOOD PRESSURE: 118 MMHG | TEMPERATURE: 99 F | OXYGEN SATURATION: 98 % | WEIGHT: 230.19 LBS | HEART RATE: 104 BPM

## 2018-02-16 DIAGNOSIS — E55.9 VITAMIN D DEFICIENCY: ICD-10-CM

## 2018-02-16 DIAGNOSIS — M79.7 FIBROMYALGIA SYNDROME: ICD-10-CM

## 2018-02-16 DIAGNOSIS — F41.8 DEPRESSION WITH ANXIETY: ICD-10-CM

## 2018-02-16 DIAGNOSIS — G43.009 MIGRAINE WITHOUT AURA AND WITHOUT STATUS MIGRAINOSUS, NOT INTRACTABLE: ICD-10-CM

## 2018-02-16 DIAGNOSIS — Z23 NEED FOR INFLUENZA VACCINATION: ICD-10-CM

## 2018-02-16 DIAGNOSIS — E53.8 B12 DEFICIENCY: Primary | ICD-10-CM

## 2018-02-16 DIAGNOSIS — E53.8 B12 DEFICIENCY: ICD-10-CM

## 2018-02-16 PROCEDURE — 3008F BODY MASS INDEX DOCD: CPT | Mod: S$GLB,,, | Performed by: INTERNAL MEDICINE

## 2018-02-16 PROCEDURE — 99999 PR PBB SHADOW E&M-EST. PATIENT-LVL V: CPT | Mod: PBBFAC,,, | Performed by: INTERNAL MEDICINE

## 2018-02-16 PROCEDURE — 99214 OFFICE O/P EST MOD 30 MIN: CPT | Mod: 25,S$GLB,, | Performed by: INTERNAL MEDICINE

## 2018-02-16 PROCEDURE — 83921 ORGANIC ACID SINGLE QUANT: CPT

## 2018-02-16 PROCEDURE — 36415 COLL VENOUS BLD VENIPUNCTURE: CPT | Mod: PO

## 2018-02-16 PROCEDURE — 90471 IMMUNIZATION ADMIN: CPT | Mod: S$GLB,,, | Performed by: INTERNAL MEDICINE

## 2018-02-16 PROCEDURE — 90686 IIV4 VACC NO PRSV 0.5 ML IM: CPT | Mod: S$GLB,,, | Performed by: INTERNAL MEDICINE

## 2018-02-16 RX ORDER — BUPROPION HYDROCHLORIDE 150 MG/1
150 TABLET ORAL DAILY
Qty: 90 TABLET | Refills: 3 | Status: SHIPPED | OUTPATIENT
Start: 2018-02-16 | End: 2018-03-16 | Stop reason: SDUPTHER

## 2018-02-16 RX ORDER — VIT C/E/ZN/COPPR/LUTEIN/ZEAXAN 250MG-90MG
1000 CAPSULE ORAL DAILY
Qty: 90 CAPSULE | Refills: 0 | COMMUNITY
Start: 2018-02-16 | End: 2018-12-19 | Stop reason: SDUPTHER

## 2018-02-16 NOTE — PROGRESS NOTES
"Subjective:      Patient ID: Joy Recinos is a 37 y.o. female.    Chief Complaint: Establish Care    38 yo with Patient Active Problem List:     Chronic pelvic pain in female     Dyspareunia in female     B12 deficiency     Idiopathic hypersomnia     Migraines     Fibromyalgia syndrome     Breakthrough bleeding on birth control pills     Vitamin D deficiency     Positive DARRION (antinuclear antibody)     Periorbital erythema    Here today for management of mult med problems as outlined below and c/o Depression is worsening. Anxiety, stress, nervousness is worsening. Feeling like she doesn't want to get out of bed. Depression is worse than anxiety.   Compliant with meds without significant side effects.       Review of Systems   Constitutional: Negative for chills and fever.   Respiratory: Negative for cough.    Cardiovascular: Negative for chest pain.   Gastrointestinal: Negative for abdominal pain.   Psychiatric/Behavioral: Positive for dysphoric mood. Negative for hallucinations and suicidal ideas. The patient is nervous/anxious.      Objective:   /82 (BP Location: Right arm, Patient Position: Sitting)   Pulse 104   Temp 98.8 °F (37.1 °C) (Tympanic)   Ht 5' 8" (1.727 m)   Wt 104.4 kg (230 lb 2.6 oz)   LMP  (LMP Unknown)   SpO2 98%   BMI 35.00 kg/m²     Physical Exam   Constitutional: She is oriented to person, place, and time. She appears well-developed and well-nourished. No distress.   HENT:   Head: Normocephalic and atraumatic.   Mouth/Throat: Oropharynx is clear and moist.   Eyes: EOM are normal. Pupils are equal, round, and reactive to light.   Neck: Neck supple. No thyromegaly present.   Cardiovascular: Normal rate and regular rhythm.    Pulmonary/Chest: Breath sounds normal. She has no wheezes. She has no rales.   Abdominal: Soft. Bowel sounds are normal. There is no tenderness.   Lymphadenopathy:     She has no cervical adenopathy.   Neurological: She is alert and oriented to person, " place, and time.   Skin: Skin is warm and dry.   Psychiatric: She has a normal mood and affect. Her behavior is normal.     No visits with results within 2 Week(s) from this visit.   Latest known visit with results is:   Lab Visit on 11/27/2017   Component Date Value Ref Range Status    DARRION Screen 11/27/2017 Positive* Negative <1:160 Final    DARRION HEP-2 Titer 11/27/2017 Positive 1:160 Homogeneous   Final    Anti Sm Antibody 11/27/2017 5.53  0.00 - 19.99 EU Final    Anti-Sm Interpretation 11/27/2017 Negative  Negative Final    Comment: <20 EU...............Negative  20 - 25 EU...........Borderline  >25 EU...............Positive  Borderline results are repeated before reporting. If  repeat results are still borderline, the sample has no   significant antibody.      Anti-SSA Antibody 11/27/2017 8.28  0.00 - 19.99 EU Final    Anti-SSA Interpretation 11/27/2017 Negative  Negative Final    Comment: <20 EU...............Negative  20 - 25 EU...........Borderline  >25 EU...............Positive  Borderline results are repeated before reporting. If  repeat results are still borderline, the sample has no   significant antibody.      Anti-SSB Antibody 11/27/2017 0.64  0.00 - 19.99 EU Final    Anti-SSB Interpretation 11/27/2017 Negative  Negative Final    Comment: <20 EU...............Negative  20 - 25 EU...........Borderline  >25 EU...............Positive  Borderline results are repeated before reporting. If  repeat results are still borderline, the sample has no   significant antibody.      ds DNA Ab 11/27/2017 Negative 1:10  Negative 1:10 Final    Anti Sm/RNP Antibody 11/27/2017 4.97  0.00 - 19.99 EU Final    Anti-Sm/RNP Interpretation 11/27/2017 Negative  Negative Final    Comment: <20 EU...............Negative  20 - 25 EU...........Borderline  >25 EU...............Positive  Borderline results are repeated before reporting. If  repeat results are still borderline, the sample has no   significant antibody.          Assessment:     1. B12 deficiency    2. Migraine without aura and without status migrainosus, not intractable    3. Fibromyalgia syndrome    4. Vitamin D deficiency    5. Depression with anxiety    6. Need for influenza vaccination      Plan:   B12 deficiency  Start otc b12 daily  -     Methylmalonic acid, serum; Future; Expected date: 02/16/2018    Migraine without aura and without status migrainosus, not intractable  Stable    Fibromyalgia syndrome  Cont recs and f/u as per rheum    Vitamin D deficiency  Currently on 1000 iu daily x 2 mo  -     cholecalciferol, vitamin D3, 1,000 unit capsule; Take 1 capsule (1,000 Units total) by mouth once daily.  Dispense: 90 capsule; Refill: 0  -     Vitamin D; Future; Expected date: 03/16/2018    Depression with anxiety  Worsening  Try add wellbutrin  -     Ambulatory Referral to Social Work  -     Ambulatory referral to Psychiatry  -     buPROPion (WELLBUTRIN XL) 150 MG TB24 tablet; Take 1 tablet (150 mg total) by mouth once daily.  Dispense: 90 tablet; Refill: 3    Need for influenza vaccination    Other orders  -     Influenza - Quadrivalent (3 years & older) (PF)            Problem List Items Addressed This Visit        Neuro    Migraines    Overview     frances            Endocrine    B12 deficiency - Primary    Relevant Orders    Methylmalonic acid, serum    Vitamin D deficiency    Relevant Medications    cholecalciferol, vitamin D3, 1,000 unit capsule    Other Relevant Orders    Vitamin D       Orthopedic    Fibromyalgia syndrome      Other Visit Diagnoses     Depression with anxiety        Relevant Medications    buPROPion (WELLBUTRIN XL) 150 MG TB24 tablet    Other Relevant Orders    Ambulatory Referral to Social Work    Ambulatory referral to Psychiatry    Need for influenza vaccination              Follow-up in about 4 weeks (around 3/16/2018), or if symptoms worsen or fail to improve.

## 2018-02-21 LAB — METHYLMALONATE SERPL-SCNC: 0.24 UMOL/L

## 2018-02-23 DIAGNOSIS — G89.29 CHRONIC PELVIC PAIN IN FEMALE: ICD-10-CM

## 2018-02-23 DIAGNOSIS — R10.2 CHRONIC PELVIC PAIN IN FEMALE: ICD-10-CM

## 2018-02-23 RX ORDER — MEDROXYPROGESTERONE ACETATE 150 MG/ML
INJECTION, SUSPENSION INTRAMUSCULAR
Qty: 1 ML | Refills: 0 | Status: SHIPPED | OUTPATIENT
Start: 2018-02-23 | End: 2018-08-01

## 2018-03-05 ENCOUNTER — OFFICE VISIT (OUTPATIENT)
Dept: OBSTETRICS AND GYNECOLOGY | Facility: CLINIC | Age: 38
End: 2018-03-05
Payer: COMMERCIAL

## 2018-03-05 ENCOUNTER — PATIENT OUTREACH (OUTPATIENT)
Dept: ADMINISTRATIVE | Facility: HOSPITAL | Age: 38
End: 2018-03-05

## 2018-03-05 ENCOUNTER — TELEPHONE (OUTPATIENT)
Dept: OBSTETRICS AND GYNECOLOGY | Facility: CLINIC | Age: 38
End: 2018-03-05

## 2018-03-05 VITALS
SYSTOLIC BLOOD PRESSURE: 130 MMHG | HEIGHT: 68 IN | WEIGHT: 231.06 LBS | DIASTOLIC BLOOD PRESSURE: 84 MMHG | BODY MASS INDEX: 35.02 KG/M2

## 2018-03-05 DIAGNOSIS — B02.9 HERPES ZOSTER WITHOUT COMPLICATION: Primary | ICD-10-CM

## 2018-03-05 PROCEDURE — 99213 OFFICE O/P EST LOW 20 MIN: CPT | Mod: S$GLB,,, | Performed by: NURSE PRACTITIONER

## 2018-03-05 PROCEDURE — 99999 PR PBB SHADOW E&M-EST. PATIENT-LVL III: CPT | Mod: PBBFAC,,, | Performed by: NURSE PRACTITIONER

## 2018-03-05 RX ORDER — VALACYCLOVIR HYDROCHLORIDE 1 G/1
1000 TABLET, FILM COATED ORAL 3 TIMES DAILY
Qty: 21 TABLET | Refills: 0 | Status: SHIPPED | OUTPATIENT
Start: 2018-03-05 | End: 2018-03-16 | Stop reason: ALTCHOICE

## 2018-03-05 NOTE — TELEPHONE ENCOUNTER
----- Message from Sera Mcclain sent at 3/5/2018  7:50 AM CST -----  Contact: self [phone]  States that she is having pain in her rt breast for about a week. States that last night she noticed several red bumps on rt breast. Please call back at [phone]//thank you acc

## 2018-03-05 NOTE — TELEPHONE ENCOUNTER
Spoke with pt. States her right breast has been sore for 1 week and last night 3/4/18 noticed red bumps under her breast. Notified pt Dr. Goldman does not have availability until next week. Scheduled with Alexus BRASWELL for today 3/5/18 at 10:00. Pt verbalized understanding.

## 2018-03-05 NOTE — PROGRESS NOTES
"    Joy Recinos is a 37 y.o. female  presents for pain to right breast for a about a week, then last night noted some red nodules to skin.  Pt made same day with gyn for evaluation.     LMP: No LMP recorded (lmp unknown). Patient has had an injection..      Past Medical History:   Diagnosis Date    B12 deficiency     Chronic low back pain     oberlander    Endometriosis     Fibromyalgia syndrome     Idiopathic hypersomnia     Irritable bowel syndrome     goldman    Migraines     frances     Past Surgical History:   Procedure Laterality Date     SECTION      OOPHORECTOMY      PELVIC LAPAROSCOPY      TUBAL LIGATION       Social History     Social History    Marital status:      Spouse name: N/A    Number of children: 2    Years of education: N/A     Occupational History     [employer]     Social History Main Topics    Smoking status: Never Smoker    Smokeless tobacco: Never Used    Alcohol use Yes      Comment: rarely    Drug use: No    Sexual activity: Yes     Partners: Male     Birth control/ protection: Injection      Comment:      Other Topics Concern    Are You Pregnant Or Think You May Be? No    Breast-Feeding No     Social History Narrative    No narrative on file     Family History   Problem Relation Age of Onset    No Known Problems Mother     Heart disease Father      Bypass    Skin cancer Father     Cataracts Maternal Grandmother     Diabetes Maternal Grandmother     Glaucoma Maternal Grandmother     Cataracts Maternal Grandfather     Breast cancer Neg Hx     Ovarian cancer Neg Hx     Deep vein thrombosis Neg Hx      OB History      Para Term  AB Living    2 2       2    SAB TAB Ectopic Multiple Live Births            2          /84   Ht 5' 8" (1.727 m)   Wt 104.8 kg (231 lb 0.7 oz)   LMP  (LMP Unknown)   BMI 35.13 kg/m²       ROS:  Per hpi    PHYSICAL EXAM:  APPEARANCE: Well nourished, well developed, in no " acute distress.  AFFECT: WNL, alert and oriented x 3    BREASTS: pt appears to have developed a 2 row pattern of blisters along bottom outer quadrant of right breast - appears to be shingles      Physical Exam    1. Herpes zoster without complication  valACYclovir (VALTREX) 1000 MG tablet    AND PLAN:    Start valtrex today and f/u with her pcp

## 2018-03-05 NOTE — PATIENT INSTRUCTIONS

## 2018-03-14 ENCOUNTER — TELEPHONE (OUTPATIENT)
Dept: OBSTETRICS AND GYNECOLOGY | Facility: CLINIC | Age: 38
End: 2018-03-14

## 2018-03-14 NOTE — TELEPHONE ENCOUNTER
Rescheduled patient's appointment with Annabella Vásquez this Friday due to Dr. Goldman being in surgery all day. Patient verbalized understanding of new time and location.

## 2018-03-16 ENCOUNTER — TELEPHONE (OUTPATIENT)
Dept: OBSTETRICS AND GYNECOLOGY | Facility: CLINIC | Age: 38
End: 2018-03-16

## 2018-03-16 ENCOUNTER — HOSPITAL ENCOUNTER (OUTPATIENT)
Dept: RADIOLOGY | Facility: HOSPITAL | Age: 38
Discharge: HOME | End: 2018-03-16
Attending: NURSE PRACTITIONER
Payer: COMMERCIAL

## 2018-03-16 ENCOUNTER — OFFICE VISIT (OUTPATIENT)
Dept: INTERNAL MEDICINE | Facility: CLINIC | Age: 38
End: 2018-03-16
Payer: COMMERCIAL

## 2018-03-16 ENCOUNTER — OFFICE VISIT (OUTPATIENT)
Dept: OBSTETRICS AND GYNECOLOGY | Facility: CLINIC | Age: 38
End: 2018-03-16
Payer: COMMERCIAL

## 2018-03-16 VITALS
SYSTOLIC BLOOD PRESSURE: 124 MMHG | BODY MASS INDEX: 35.42 KG/M2 | WEIGHT: 233.69 LBS | HEIGHT: 68 IN | DIASTOLIC BLOOD PRESSURE: 82 MMHG

## 2018-03-16 VITALS
HEIGHT: 68 IN | TEMPERATURE: 99 F | DIASTOLIC BLOOD PRESSURE: 82 MMHG | OXYGEN SATURATION: 98 % | SYSTOLIC BLOOD PRESSURE: 118 MMHG | BODY MASS INDEX: 34.38 KG/M2 | HEART RATE: 93 BPM | WEIGHT: 226.88 LBS

## 2018-03-16 DIAGNOSIS — N64.4 BREAST PAIN, RIGHT: ICD-10-CM

## 2018-03-16 DIAGNOSIS — N94.2 VAGINISMUS: ICD-10-CM

## 2018-03-16 DIAGNOSIS — Z01.419 ENCOUNTER FOR WELL WOMAN EXAM WITH ROUTINE GYNECOLOGICAL EXAM: Primary | ICD-10-CM

## 2018-03-16 DIAGNOSIS — F41.8 DEPRESSION WITH ANXIETY: ICD-10-CM

## 2018-03-16 DIAGNOSIS — Z00.00 PREVENTATIVE HEALTH CARE: ICD-10-CM

## 2018-03-16 DIAGNOSIS — R09.A2 GLOBUS SENSATION: Primary | ICD-10-CM

## 2018-03-16 PROCEDURE — 76642 ULTRASOUND BREAST LIMITED: CPT | Mod: TC,50,PO

## 2018-03-16 PROCEDURE — 76642 ULTRASOUND BREAST LIMITED: CPT | Mod: 26,50,, | Performed by: RADIOLOGY

## 2018-03-16 PROCEDURE — 77062 BREAST TOMOSYNTHESIS BI: CPT | Mod: TC,PO

## 2018-03-16 PROCEDURE — 77066 DX MAMMO INCL CAD BI: CPT | Mod: 26,,, | Performed by: RADIOLOGY

## 2018-03-16 PROCEDURE — 99999 PR PBB SHADOW E&M-EST. PATIENT-LVL IV: CPT | Mod: PBBFAC,,, | Performed by: INTERNAL MEDICINE

## 2018-03-16 PROCEDURE — 99214 OFFICE O/P EST MOD 30 MIN: CPT | Mod: S$GLB,,, | Performed by: INTERNAL MEDICINE

## 2018-03-16 PROCEDURE — 99395 PREV VISIT EST AGE 18-39: CPT | Mod: S$GLB,,, | Performed by: NURSE PRACTITIONER

## 2018-03-16 PROCEDURE — 99999 PR PBB SHADOW E&M-EST. PATIENT-LVL IV: CPT | Mod: PBBFAC,,, | Performed by: NURSE PRACTITIONER

## 2018-03-16 PROCEDURE — 88175 CYTOPATH C/V AUTO FLUID REDO: CPT

## 2018-03-16 PROCEDURE — G0279 TOMOSYNTHESIS, MAMMO: HCPCS | Mod: 26,,, | Performed by: RADIOLOGY

## 2018-03-16 RX ORDER — FLUTICASONE PROPIONATE 50 MCG
2 SPRAY, SUSPENSION (ML) NASAL DAILY
Qty: 16 G | Refills: 1 | Status: SHIPPED | OUTPATIENT
Start: 2018-03-16 | End: 2018-04-15

## 2018-03-16 RX ORDER — BUPROPION HYDROCHLORIDE 300 MG/1
300 TABLET ORAL DAILY
Qty: 90 TABLET | Refills: 0 | Status: SHIPPED | OUTPATIENT
Start: 2018-03-16 | End: 2018-05-11 | Stop reason: DRUGHIGH

## 2018-03-16 NOTE — TELEPHONE ENCOUNTER
----- Message from Annabella Vásquez NP sent at 3/16/2018  4:26 PM CDT -----  Needs an appt with RENY Jackson NP. I talked to the patient and she is aware of results.

## 2018-03-16 NOTE — PROGRESS NOTES
"CC: Well woman exam    Joy Recinos is a 37 y.o. female  presents for well woman exam.  LMP: No LMP recorded (lmp unknown). Patient has had an injection..  Patient is currently on depo for heavy cycles, wants to consider another option. Was seen by Alexus and given meds for shingles of the right breast. Patient reports breast is painful. No nipple discharge. No f/h of breast cancer. Patient reports " sex in painful". Last pap exam was normal.    Past Medical History:   Diagnosis Date    B12 deficiency     Chronic low back pain     oberlander    Endometriosis     Fibromyalgia syndrome     Idiopathic hypersomnia     Irritable bowel syndrome     goldman    Migraines     frances    Shingles      Past Surgical History:   Procedure Laterality Date     SECTION      x 2     OOPHORECTOMY      PELVIC LAPAROSCOPY      TUBAL LIGATION       Social History     Social History    Marital status:      Spouse name: N/A    Number of children: 2    Years of education: N/A     Occupational History     [employer]     Social History Main Topics    Smoking status: Never Smoker    Smokeless tobacco: Never Used    Alcohol use Yes      Comment: rarely    Drug use: No    Sexual activity: Yes     Partners: Male     Birth control/ protection: Injection      Comment:      Other Topics Concern    Are You Pregnant Or Think You May Be? No    Breast-Feeding No     Social History Narrative    No narrative on file     Family History   Problem Relation Age of Onset    No Known Problems Mother     Heart disease Father      Bypass    Skin cancer Father     Cataracts Maternal Grandmother     Diabetes Maternal Grandmother     Glaucoma Maternal Grandmother     Skin cancer Maternal Grandmother     Cataracts Maternal Grandfather     Skin cancer Paternal Grandfather     Breast cancer Neg Hx     Ovarian cancer Neg Hx     Deep vein thrombosis Neg Hx      OB History      Para " "Term  AB Living    2 2 2     2    SAB TAB Ectopic Multiple Live Births            2          /82 (BP Location: Right arm, Patient Position: Sitting, BP Method: Medium (Manual))   Ht 5' 8" (1.727 m)   Wt 106 kg (233 lb 11 oz)   LMP  (LMP Unknown)   BMI 35.53 kg/m²       ROS:  GENERAL: Denies weight gain or weight loss. Feeling well overall.   SKIN: Denies rash or lesions.   HEAD: Denies head injury or headache.   NODES: Denies enlarged lymph nodes.   CHEST: Denies chest pain or shortness of breath.   CARDIOVASCULAR: Denies palpitations or left sided chest pain.   ABDOMEN: No abdominal pain, constipation, diarrhea, nausea, vomiting or rectal bleeding.   URINARY: No frequency, dysuria, hematuria, or burning on urination.  REPRODUCTIVE: See HPI.   BREASTS: HPI  HEMATOLOGIC: No easy bruisability or excessive bleeding.   MUSCULOSKELETAL: Denies joint pain or swelling.   NEUROLOGIC: Denies syncope or weakness.   PSYCHIATRIC: Denies depression, anxiety or mood swings.    PHYSICAL EXAM:  APPEARANCE: Well nourished, well developed, in no acute distress.  AFFECT: WNL, alert and oriented x 3  SKIN: No acne or hirsutism  NECK: Neck symmetric without masses or thyromegaly  NODES: No inguinal, cervical, axillary, or femoral lymph node enlargement  CHEST: Good respiratory effect  ABDOMEN: Soft.  No tenderness or masses.  No hepatosplenomegaly.  No hernias.  BREASTS: Symmetrical, no skin changes or visible lesions.  No palpable masses, nipple discharge bilaterally.  PELVIC: Normal external genitalia without lesions.  Normal hair distribution.  Adequate perineal body, normal urethral meatus.  Vagina moist and well rugated without lesions or discharge.  Cervix pink, without lesions, discharge or tenderness.  No significant cystocele or rectocele.  Bimanual exam shows uterus to be normal size, regular, mobile and nontender.  Adnexa without masses or tenderness.    EXTREMITIES: No edema.    1. Encounter for well woman " exam with routine gynecological exam  Liquid-based pap smear, screening   2. Preventative health care  Liquid-based pap smear, screening   3. Vaginismus  PT evaluation   4. Breast pain, right  Mammo Digital Diagnostic Right With CAD    US Breast Right Limited    CANCELED: US Breast Right Complete    PLAN:  Pap exam  Patient sent for PT pelvic floor for painful intercourse  DX right breast mammogram and ultrasound        Patient was counseled today on A.C.S. Pap guidelines and recommendations for yearly pelvic exams, mammograms and monthly self breast exams; to see her PCP for other health maintenance.

## 2018-03-18 NOTE — PROGRESS NOTES
"Subjective:      Patient ID: Joy Recinos is a 37 y.o. female.    Chief Complaint: Follow-up    36 yo with Patient Active Problem List:     B12 deficiency    Today  Idiopathic hypersomnia     Migraines     Fibromyalgia syndrome     Breakthrough bleeding on birth control pills     Vitamin D deficiency     Positive DARRION (antinuclear antibody)     Periorbital erythema    Here today for f/u on depression and  Anxiety.  She reports that since medication adjustment she has been less irritable.  Her  is here with her today and affirms this.  She has a psych appointment scheduled for May 11.  She reports it has been some mild improvement in her volition.  She also reports a globus sensation for 1-2 months.  She admits to some ALLERGY symptoms.  She denies any heartburn or reflux.  She is taking Zyrtec daily.      Review of Systems   Constitutional: Negative for chills and fever.   HENT: Negative for ear pain and sore throat.    Respiratory: Negative for cough.    Cardiovascular: Negative for chest pain.   Gastrointestinal: Negative for abdominal pain and blood in stool.   Genitourinary: Negative for dysuria and hematuria.   Neurological: Negative for seizures and syncope.   Psychiatric/Behavioral: Positive for dysphoric mood. Negative for hallucinations and suicidal ideas. The patient is nervous/anxious.      Objective:   /82 (BP Location: Right arm, Patient Position: Sitting)   Pulse 93   Temp 98.9 °F (37.2 °C) (Tympanic)   Ht 5' 8" (1.727 m)   Wt 102.9 kg (226 lb 13.7 oz)   LMP  (LMP Unknown)   SpO2 98%   BMI 34.49 kg/m²     Physical Exam   Constitutional: She is oriented to person, place, and time. She appears well-developed and well-nourished. No distress.   HENT:   Head: Normocephalic and atraumatic.   Right Ear: Tympanic membrane normal.   Left Ear: Tympanic membrane normal.   Nose: Right sinus exhibits no maxillary sinus tenderness and no frontal sinus tenderness. Left sinus exhibits no " maxillary sinus tenderness and no frontal sinus tenderness.   Mouth/Throat: Posterior oropharyngeal erythema present. No oropharyngeal exudate or posterior oropharyngeal edema.   Eyes: EOM are normal. Pupils are equal, round, and reactive to light.   Neck: Neck supple. No thyromegaly present.   Cardiovascular: Normal rate and regular rhythm.    Pulmonary/Chest: Breath sounds normal. She has no wheezes. She has no rales.   Abdominal: Soft. Bowel sounds are normal. There is no tenderness.   Lymphadenopathy:     She has no cervical adenopathy.   Neurological: She is alert and oriented to person, place, and time.   Skin: Skin is warm and dry.   Psychiatric: She has a normal mood and affect. Her behavior is normal.       Assessment:     1. Globus sensation    2. Depression with anxiety      Plan:   Globus sensation  -     fluticasone (FLONASE) 50 mcg/actuation nasal spray; 2 sprays (100 mcg total) by Each Nare route once daily. For nasal congestion or runny nose  Dispense: 16 g; Refill: 1    Depression with anxiety  -     buPROPion (WELLBUTRIN XL) 300 MG 24 hr tablet; Take 1 tablet (300 mg total) by mouth once daily.  Dispense: 90 tablet; Refill: 0    try increase wellbutrin from 150 to 300. Keep psyc appt.     Lab Frequency Next Occurrence       Problem List Items Addressed This Visit     None      Visit Diagnoses     Globus sensation    -  Primary    Relevant Medications    fluticasone (FLONASE) 50 mcg/actuation nasal spray    Depression with anxiety        Relevant Medications    buPROPion (WELLBUTRIN XL) 300 MG 24 hr tablet          Follow-up in about 4 weeks (around 4/13/2018), or if symptoms worsen or fail to improve.

## 2018-03-19 ENCOUNTER — OFFICE VISIT (OUTPATIENT)
Dept: SURGERY | Facility: CLINIC | Age: 38
End: 2018-03-19
Payer: COMMERCIAL

## 2018-03-19 VITALS
WEIGHT: 229.94 LBS | DIASTOLIC BLOOD PRESSURE: 80 MMHG | HEIGHT: 68 IN | HEART RATE: 92 BPM | OXYGEN SATURATION: 100 % | SYSTOLIC BLOOD PRESSURE: 122 MMHG | BODY MASS INDEX: 34.85 KG/M2

## 2018-03-19 DIAGNOSIS — Z55.9 EDUCATIONAL CIRCUMSTANCE: ICD-10-CM

## 2018-03-19 DIAGNOSIS — R92.8 ABNORMAL MAMMOGRAM: Primary | ICD-10-CM

## 2018-03-19 DIAGNOSIS — Z71.89 COUNSELING REGARDING GOALS OF CARE: ICD-10-CM

## 2018-03-19 PROCEDURE — 99204 OFFICE O/P NEW MOD 45 MIN: CPT | Mod: S$GLB,,, | Performed by: NURSE PRACTITIONER

## 2018-03-19 PROCEDURE — 99999 PR PBB SHADOW E&M-EST. PATIENT-LVL III: CPT | Mod: PBBFAC,,, | Performed by: NURSE PRACTITIONER

## 2018-03-19 SDOH — SOCIAL DETERMINANTS OF HEALTH (SDOH): PROBLEMS RELATED TO EDUCATION AND LITERACY, UNSPECIFIED: Z55.9

## 2018-03-19 NOTE — PROGRESS NOTES
Pt is on Wellbutrin and Cymbalta for anxiety. She has been referred to see phys. Her apt is may 11 2018

## 2018-03-19 NOTE — PROGRESS NOTES
Patient ID: Joy Recinos is a 37 y.o. female.    Chief Complaint: Abnormal Mammogram (Lt Breast )      HPI: Patient presents for the evaluation of an abnormal mammogram of the left breast.  Patient reports seeing right breast skin eruption few weeks ago when in to see GYN was diagnosed with shingles, was placed on antivirals.  Denies any discomfort in the area and no new lesions.  States lesions have disappeared and the largest pink spots that remain.  The skin eruption led to a bilateral mammogram.  Right breast did not reveal any abnormality.  Left breast revealed in the 8:00 position 2 nodules 1 solid M1 a complex cyst.  Patient comes in today to discuss management.      Review of Systems   Constitutional: Positive for unexpected weight change. Negative for activity change.   HENT: Positive for trouble swallowing. Negative for hearing loss and rhinorrhea.    Eyes: Negative for discharge and visual disturbance.   Respiratory: Positive for chest tightness and wheezing.    Cardiovascular: Positive for chest pain and palpitations.   Gastrointestinal: Positive for constipation and vomiting. Negative for blood in stool and diarrhea.   Endocrine: Negative for polydipsia and polyuria.   Genitourinary: Positive for menstrual problem. Negative for difficulty urinating, dysuria and hematuria.   Musculoskeletal: Positive for arthralgias and neck pain. Negative for joint swelling.   Neurological: Positive for weakness and headaches.   Psychiatric/Behavioral: Positive for confusion and dysphoric mood.   Breast: Pt denies any breast pain, nipple discharge, or palpable mass on the left. No prior trauma or bruising. No breast surgeries or abnormalities.    Current Outpatient Prescriptions   Medication Sig Dispense Refill    albuterol 90 mcg/actuation inhaler Inhale 2 puffs into the lungs every 6 (six) hours as needed for Wheezing. 1 Inhaler 0    buPROPion (WELLBUTRIN XL) 300 MG 24 hr tablet Take 1 tablet (300 mg  total) by mouth once daily. 90 tablet 0    cetirizine (ZYRTEC) 10 MG tablet Take 10 mg by mouth once daily.      cholecalciferol, vitamin D3, 1,000 unit capsule Take 1 capsule (1,000 Units total) by mouth once daily. 90 capsule 0    crisaborole (EUCRISA) 2 % Oint AAA of eyelid bid prn eczema 60 g 1    DULoxetine (CYMBALTA) 60 MG capsule TAKE 1 CAPSULE EVERY DAY 90 capsule 2    fluticasone (FLONASE) 50 mcg/actuation nasal spray 2 sprays (100 mcg total) by Each Nare route once daily. For nasal congestion or runny nose 16 g 1    medroxyPROGESTERone (DEPO-PROVERA) 150 mg/mL injection INJECT 1 ML INTO THE MUSCLE EVERY 3 MONTHS 1 mL 0    omeprazole (PRILOSEC) 20 MG capsule TAKE ONE CAPSULE BY MOUTH DAILY 30 capsule 0    topiramate (TOPAMAX) 50 MG tablet Take 1 tablet (50 mg total) by mouth once daily. 120 tablet 6     No current facility-administered medications for this visit.        Review of patient's allergies indicates:  No Known Allergies    Past Medical History:   Diagnosis Date    B12 deficiency     Chronic low back pain     oberlander    Endometriosis     Fibromyalgia syndrome     Idiopathic hypersomnia     Irritable bowel syndrome     goldman    Migraines     frances    Shingles        Past Surgical History:   Procedure Laterality Date     SECTION      x 2     OOPHORECTOMY      PELVIC LAPAROSCOPY      TUBAL LIGATION         Family History   Problem Relation Age of Onset    No Known Problems Mother     Heart disease Father      Bypass    Skin cancer Father     Cataracts Maternal Grandmother     Diabetes Maternal Grandmother     Glaucoma Maternal Grandmother     Skin cancer Maternal Grandmother     Cataracts Maternal Grandfather     Skin cancer Paternal Grandfather     Breast cancer Neg Hx     Ovarian cancer Neg Hx     Deep vein thrombosis Neg Hx        Social History     Social History    Marital status:      Spouse name: N/A    Number of children: 2    Years of  education: N/A     Occupational History     [employer]     Social History Main Topics    Smoking status: Never Smoker    Smokeless tobacco: Never Used    Alcohol use Yes      Comment: rarely    Drug use: No    Sexual activity: Yes     Partners: Male     Birth control/ protection: Injection      Comment:      Other Topics Concern    Are You Pregnant Or Think You May Be? No    Breast-Feeding No     Social History Narrative    No narrative on file       Vitals:    03/19/18 1035   BP: 122/80   Pulse: 92       Physical Exam   Constitutional: She is oriented to person, place, and time. She appears well-developed and well-nourished.   HENT:   Head: Normocephalic and atraumatic.   Right Ear: External ear normal.   Left Ear: External ear normal.   Mouth/Throat: No oropharyngeal exudate.   Eyes: Conjunctivae and EOM are normal. Pupils are equal, round, and reactive to light. Right eye exhibits no discharge. Left eye exhibits no discharge. No scleral icterus.   Neck: Normal range of motion. Neck supple. No thyromegaly present.   Cardiovascular: Normal rate, regular rhythm and normal heart sounds.    Pulmonary/Chest: Effort normal and breath sounds normal. Right breast exhibits no inverted nipple, no mass, no nipple discharge, no skin change and no tenderness. Left breast exhibits no inverted nipple, no mass, no nipple discharge, no skin change and no tenderness.       No palpable mass appreciated in the left breast   Abdominal: Soft. Bowel sounds are normal.   Musculoskeletal: Normal range of motion. She exhibits no edema.        Right shoulder: She exhibits no crepitus and normal strength.   Lymphadenopathy:        Head (right side): No submental, no submandibular, no tonsillar, no preauricular, no posterior auricular and no occipital adenopathy present.        Head (left side): No submental, no submandibular, no tonsillar, no preauricular, no posterior auricular and no occipital adenopathy present.      She has no cervical adenopathy.        Right cervical: No superficial cervical and no posterior cervical adenopathy present.       Left cervical: No superficial cervical and no posterior cervical adenopathy present.     She has no axillary adenopathy.        Right: No supraclavicular adenopathy present.        Left: No supraclavicular adenopathy present.   Neurological: She is alert and oriented to person, place, and time. She has normal reflexes.   Skin: Skin is warm and dry. No rash noted. No erythema. No pallor.   Psychiatric: She has a normal mood and affect. Her behavior is normal. Judgment and thought content normal.       Imaging: 3/16/18  Result:   Mammo Digital Diagnostic Bilat with Tomosynthesis_CAD  US Breast Bilateral Limited     History:  Patient is 37 y.o. and is seen for a diagnostic mammogram.     Films Compared:  Study is a baseline exam.     Findings:  This procedure was performed using tomosynthesis.  Computer-aided detection was utilized in the interpretation of this examination.  The breasts are heterogeneously dense, which may obscure small masses.      Mammo Digital Diagnostic Bilat with Tomosynthesis_CAD  Left  Finding 1- Mass: There is a 10 mm oval mass with circumscribed margins seen in the lower inner quadrant of the left breast in the anterior depth.   Finding 2-Mass: There is a 16 mm oval mass with circumscribed margins seen in the left breast at 8 o'clock in the anterior depth.      US Breast Bilateral Limited  Left  Mass: There is a 10 mm x 6 mm x 9 mm oval, parallel, hypoechoic mass with circumscribed margins seen in the left breast at 8 o'clock, 5 cm from the nipple. The appearance is highly suggestive of fibroadenoma.   Cyst: There is a 15 mm x 7 mm x 12 mm oval complicated cyst with circumscribed margins seen in the left breast at 8 o'clock, 1 cm from the nipple. Finding correlates with the mammographic abnormality.      Right  There is no evidence of suspicious masses,  calcifications, or other abnormal findings.  No abnormal mammographic or sonographic findings at the area of clinical concern in the lower outer quadrant.      Impression:  Left  Mass: Left breast 10 mm mass at the lower inner anterior position. Assessment: 3 - Probably benign. Short Interval Follow-Up in 6 Months is recommended.   Mass: Left breast 16 mm mass at the anterior 8 o'clock position. Assessment: 3 - Probably benign. Short Interval Follow-Up in 6 Months is recommended.      Right  There is no mammographic or sonographic evidence of malignancy.     BI-RADS Category:   Overall: 3 - Probably Benign     Recommendation:  Short interval follow-up is recommended in 6 Months.     Note that a negative imaging exam does not supersede clinical suspicion.  Any decision to biopsy should be made on a clinical basis.        The patient's estimated lifetime risk of breast cancer (to age 85) based on Tyrer-Cuzick - 7 risk assessment model is: Tyrer-Cuzick: 13.04 %. According to the American Cancer Society,  patients with a lifetime breast cancer risk of 20% or higher might benefit from supplemental screening tests.    Menarche at 12  G 2  P 2  Fist pregnancy at 23 y/o  LMP: on depoprovera- no cycle  Estrogen: none  Radiation to the neck or chest wall- none    Prior breast biopsies or atypical hyperplasia- none     FH: negative for breast cancer    Assessment & Plan:  Abnormal mammogram  -     Mammo Digital Diagnostic Left with CAD; Future; Expected date: 09/19/2018  -     US Breast Left Limited; Future; Expected date: 09/19/2018     right breast healed rash.  Faint pink healed eruption.  Left breast solid mass and complex cyst at 8:00.  Solid mass to favor a fibroadenoma.  Six-month follow-up recommended for both entities.  Negative clinical exam  Explained to patient imaging and clinical exam results.  Patient and  verbalized understanding.  They are in agreement with six-month follow-up.  Patient was encouraged  to do monthly breast self-examinations and if she feels any changes she is to let us know.  We will plan on doing a left breast diagnostic mammogram and ultrasound in September.  I will see her a few days after for a clinical exam and review of imaging.  It was explained to the patient that follow-up may be recommended every 6 months for 18 months versus 24 months to document stability.  Patient was asked if she had any thoughts of harming herself.  Patient denies any thoughts of harming herself or others.  Encouraged patient to reach out to her primary doctor if she should have any changes in her depression.  Patient verbalizes understanding.

## 2018-03-19 NOTE — LETTER
March 19, 2018      Annabella Vásquez NP  [address] [place] [address] [address] Rafi GRAVES [id number]           [facility]  [address] [place] [address] [address]  [place] [address]  Phone: [phone]  Fax: [phone]          Patient: Joy Recinos   MR Number: [id number]   YOB: 1980   Date of Visit: 3/19/2018       Dear Annabella Vásquez:    Thank you for referring Joy Rceinos to me for evaluation. Attached you will find relevant portions of my assessment and plan of care.    If you have questions, please do not hesitate to call me. I look forward to following Joy Recinos along with you.    Sincerely,    Debora Jackson NP    Enclosure  CC:  No Recipients    If you would like to receive this communication electronically, please contact [email or web address] or [phone] to request more information on [employer] access.    For providers and/or their staff who would like to refer a patient to [facility], please contact us through our one-stop-shop provider referral line, [facility] , at [phone].    If you feel you have received this communication in error or would no longer like to receive these types of communications, please e-mail [email or web address]

## 2018-04-02 ENCOUNTER — PATIENT MESSAGE (OUTPATIENT)
Dept: NEUROLOGY | Facility: CLINIC | Age: 38
End: 2018-04-02

## 2018-04-09 ENCOUNTER — PATIENT MESSAGE (OUTPATIENT)
Dept: DERMATOLOGY | Facility: CLINIC | Age: 38
End: 2018-04-09

## 2018-05-10 NOTE — PROGRESS NOTES
"Outpatient Psychiatry Initial Visit (MD/NP)    5/11/2018    Joy Recinos, a 37 y.o. female, presenting for initial evaluation visit. Met with patient.    Reason for Encounter: Patient complains of depression, anxiety.     History of Present Illness: 37 year old F presents for establishment of care, reports problems with anxiety and depression. From PCP note (3.16.18):    Here today for f/u on depression & anxiety. She reports that since medication adjustment she has been less irritable. Her  is here with her today & affirms this. She has a psych appointment scheduled for May 11. She reports it has been some mild improvement in her volition.     Reports treatment with cymbalta - for fibromyalgia, anxiety, depression for 5 years. Neither fibromyalgia pain no moods improved.  wellbutrin trial has been perhaps helpful. Describes baseline symptoms as "worried about everything", "worried about worrying", sad, not interested in things, "like I don't have a purpose". Negative thoughts about self. Tired all the time, even when sleeps excessively. Denies SI. "All my life". "stays all the time", though better/worse at times, influenced by stressful situations. Problems with attention/concentration. Reports all of the following daily in past 2 weeks (around which time she was laid off):    Feeling nervous, anxious or on edge   Not being able to stop or control worrying  Worrying too much about different things   Trouble relaxing   Being so restless that it is hard to sit still   Becoming easily annoyed or irritable   Feeling afraid as if something awful might happen    DOUG-7 = 21 (estimates a 19 in weeks prior to the layoff)    Sleep Problems - hypersomnia  Sad Mood more than 1/2 time  Appetite and weight changes (decreased appetite, small subjective weight loss)  Concentration problems  Guilt   Thoughts of Emptiness  Anhedonia  Anergia  Slowing/PMR    QIDS = 12    Also has decreased sexual interest, pain " "during intercourse.    PsychHx: first treatment by Obgyn - sertraline(?) when had problems with endometriosis. No AVH, no SI/HI, no delusions. No hospitalizations or self-harm behaviors. Prolonged duloxetine trial, thinks transient or minimal benefit.     MedHx: idiopathic hypersomnia. 2 sleep studies, narcolepsy ruled out. Endometriosis. 2 herniated lumbar discs. Migraines. GERD. Seasonal allergies.   FamHx: mother and dad both take medication for depression. Father diagnosed with adhd in 60's.   SocHx: from [facility]. Heart murmur at birth. "fainted and blacked out school", had several episodes between 5 and 9. Worked up but never medically explained. Still gets dizzy and weak, but doesn't black out. Also had back pain frequently as a child. No developmental problems. Regular classes in school. Ok with going to school. Normal socially. Average student. 3 semesters of college. Went to technical school for 1 year, finished (computer/). Has worked as . Most recently in [employer] work for past 6 years. laid off along with 4 other people 2 weeks ago. Applying for new jobs. Lives with  and 2 kids.  x 14 years. "really good" relationship x decreased sexual interest and pain. Week before  pulled out a tampon - was extremely painful. Nambe has been painful ever since. Has had PT recommended for this. 2 kids (13 and 9). Dtr has been bullied most of her life due to being overweight. She also has problems with oppositionality at home (though is a good student, doesn't have discipline problems at school), won't take a bath. Son is "good kid", "very attached to me". Quiet, not many friends. Mom is supportive, lives nearby.       Review Of Systems:     GENERAL:  No weight gain or loss  SKIN:  No rashes or lacerations  HEAD:  No headaches  EYES:  No exophthalmos, jaundice or blindness  EARS:  No dizziness, tinnitus or hearing loss  NOSE:  No changes in " smell  MOUTH & THROAT:  No dyskinetic movements or obvious goiter  CHEST:  No shortness of breath, hyperventilation or cough  CARDIOVASCULAR:  No tachycardia or chest pain  ABDOMEN:  No nausea, vomiting, pain, constipation or diarrhea  URINARY:  No frequency, dysuria or sexual dysfunction  ENDOCRINE:  No polydipsia, polyuria  MUSCULOSKELETAL:  No pain or stiffness of the joints  NEUROLOGIC:  No weakness, sensory changes, seizures, confusion, memory loss, tremor or other abnormal movements    Current Evaluation:     Nutritional Screening: Considering the patient's height and weight, medications, medical history and preferences, should a referral be made to the dietitian? no    Constitutional  Vitals:  Most recent vital signs, dated less than 90 days prior to this appointment, were not reviewed.    There were no vitals filed for this visit.     General:  unremarkable, age appropriate     Musculoskeletal  Muscle Strength/Tone:  no tremor, no tic   Gait & Station:  non-ataxic     Psychiatric  Appearance: casually dressed & groomed;   Behavior: calm,   Cooperation: cooperative with assessment  Speech: normal rate, volume, tone  Thought Process: linear, goal-directed  Thought Content: No suicidal or homicidal ideation; no delusions  Affect: anxious  Mood: anxious  Perceptions: No auditory or visual hallucinations  Level of Consciousness: alert throughout interview  Insight: fair  Cognition: Oriented to person, place, time, & situation  Memory: no apparent deficits to general clinical interview; not formally assessed  Attention/Concentration: no apparent deficits to general clinical interview; not formally assessed  Fund of Knowledge: average by vocabulary/education    Laboratory Data  No visits with results within 1 Month(s) from this visit.   Latest known visit with results is:   Lab Visit on 03/16/2018   Component Date Value Ref Range Status    Vit D, 25-Hydroxy 03/16/2018 22* 30 - 96 ng/mL Final      Medications  Outpatient Encounter Prescriptions as of 5/11/2018   Medication Sig Dispense Refill    albuterol 90 mcg/actuation inhaler Inhale 2 puffs into the lungs every 6 (six) hours as needed for Wheezing. 1 Inhaler 0    buPROPion (WELLBUTRIN XL) 300 MG 24 hr tablet Take 1 tablet (300 mg total) by mouth once daily. 90 tablet 0    cetirizine (ZYRTEC) 10 MG tablet Take 10 mg by mouth once daily.      cholecalciferol, vitamin D3, 1,000 unit capsule Take 1 capsule (1,000 Units total) by mouth once daily. 90 capsule 0    crisaborole (EUCRISA) 2 % Oint AAA of eyelid bid prn eczema 60 g 1    DULoxetine (CYMBALTA) 60 MG capsule TAKE 1 CAPSULE EVERY DAY 90 capsule 2    medroxyPROGESTERone (DEPO-PROVERA) 150 mg/mL injection INJECT 1 ML INTO THE MUSCLE EVERY 3 MONTHS 1 mL 0    omeprazole (PRILOSEC) 20 MG capsule TAKE ONE CAPSULE BY MOUTH DAILY 30 capsule 0    topiramate (TOPAMAX) 50 MG tablet Take 1 tablet (50 mg total) by mouth once daily. 120 tablet 6     No facility-administered encounter medications on file as of 5/11/2018.      Assessment - Diagnosis - Goals:     Impression: 36 y/o F with generalized anxiety disorder. Previous trials of duloxetine with minimal benefit, low dose bupropion with modest benefit.     Dx: generalized anxiety disorder.     Treatment Goals:  Specify outcomes written in observable, behavioral terms:   Reduce anxiety by DOUG-7.     Treatment Plan/Recommendations:   · escitalopram 10 mg daily. Continue bupropion.   · Discussed risks, benefits, and alternatives to treatment plan documented above with patient. I answered all patient questions related to this plan and patient expressed understanding and agreement.     Return to Clinic: 2 months    Counseling time: 10 minutes  Total time: 50 minutes    WINIFRED Nieto MD  Psychiatry  [facility]  [address], [place] [address]  [phone]

## 2018-05-11 ENCOUNTER — OFFICE VISIT (OUTPATIENT)
Dept: PSYCHIATRY | Facility: CLINIC | Age: 38
End: 2018-05-11
Payer: COMMERCIAL

## 2018-05-11 DIAGNOSIS — F41.1 GENERALIZED ANXIETY DISORDER: Primary | ICD-10-CM

## 2018-05-11 DIAGNOSIS — F41.8 DEPRESSION WITH ANXIETY: ICD-10-CM

## 2018-05-11 PROCEDURE — 90792 PSYCH DIAG EVAL W/MED SRVCS: CPT | Mod: S$GLB,,, | Performed by: PSYCHIATRY & NEUROLOGY

## 2018-05-11 RX ORDER — BUPROPION HYDROCHLORIDE 150 MG/1
150 TABLET, EXTENDED RELEASE ORAL 2 TIMES DAILY
Qty: 60 TABLET | Refills: 2 | Status: SHIPPED | OUTPATIENT
Start: 2018-05-11 | End: 2018-07-20 | Stop reason: SDUPTHER

## 2018-05-11 RX ORDER — ESCITALOPRAM OXALATE 10 MG/1
10 TABLET ORAL DAILY
Qty: 30 TABLET | Refills: 2 | Status: SHIPPED | OUTPATIENT
Start: 2018-05-11 | End: 2018-07-20 | Stop reason: SDUPTHER

## 2018-05-29 ENCOUNTER — OFFICE VISIT (OUTPATIENT)
Dept: PODIATRY | Facility: CLINIC | Age: 38
End: 2018-05-29
Payer: COMMERCIAL

## 2018-05-29 ENCOUNTER — HOSPITAL ENCOUNTER (OUTPATIENT)
Dept: RADIOLOGY | Facility: HOSPITAL | Age: 38
Discharge: HOME | End: 2018-05-29
Attending: PODIATRIST
Payer: COMMERCIAL

## 2018-05-29 VITALS
HEART RATE: 88 BPM | HEIGHT: 68 IN | SYSTOLIC BLOOD PRESSURE: 118 MMHG | WEIGHT: 229.94 LBS | DIASTOLIC BLOOD PRESSURE: 74 MMHG | BODY MASS INDEX: 34.85 KG/M2

## 2018-05-29 DIAGNOSIS — M79.671 RIGHT FOOT PAIN: ICD-10-CM

## 2018-05-29 DIAGNOSIS — B35.1 DERMATOPHYTOSIS OF NAIL: ICD-10-CM

## 2018-05-29 DIAGNOSIS — M79.671 RIGHT FOOT PAIN: Primary | ICD-10-CM

## 2018-05-29 DIAGNOSIS — M20.5X1 HALLUX LIMITUS OF RIGHT FOOT: Primary | ICD-10-CM

## 2018-05-29 DIAGNOSIS — M79.7 FIBROMYALGIA: ICD-10-CM

## 2018-05-29 PROCEDURE — 3008F BODY MASS INDEX DOCD: CPT | Mod: CPTII,S$GLB,, | Performed by: PODIATRIST

## 2018-05-29 PROCEDURE — 99999 PR PBB SHADOW E&M-EST. PATIENT-LVL IV: CPT | Mod: PBBFAC,,, | Performed by: PODIATRIST

## 2018-05-29 PROCEDURE — 99203 OFFICE O/P NEW LOW 30 MIN: CPT | Mod: 25,S$GLB,, | Performed by: PODIATRIST

## 2018-05-29 PROCEDURE — 87101 SKIN FUNGI CULTURE: CPT

## 2018-05-29 PROCEDURE — 73630 X-RAY EXAM OF FOOT: CPT | Mod: TC,FY,PO,RT

## 2018-05-29 PROCEDURE — 73630 X-RAY EXAM OF FOOT: CPT | Mod: 26,RT,, | Performed by: RADIOLOGY

## 2018-05-29 PROCEDURE — 11755 BIOPSY NAIL UNIT: CPT | Mod: T5,S$GLB,, | Performed by: PODIATRIST

## 2018-05-29 RX ORDER — INDOMETHACIN 50 MG/1
50 CAPSULE ORAL 2 TIMES DAILY WITH MEALS
Qty: 28 CAPSULE | Refills: 0 | Status: SHIPPED | OUTPATIENT
Start: 2018-05-29 | End: 2018-08-09 | Stop reason: ALTCHOICE

## 2018-05-29 NOTE — PROGRESS NOTES
[facility]  PODIATRIC MEDICINE AND SURGERY  PROGRESS NOTE  6/7/2018    PODIATRY NOTE  PCP: Dr. Zeus Oliva MD    CHIEF COMPLAINT   Chief Complaint   Patient presents with    Foot Pain     right 1st met and bunion site pain starting in november worse when walking xrays prior       HPI  Joy Recinos is a 37 y.o. female who has a past medical history of B12 deficiency; Chronic low back pain; Endometriosis; Fibromyalgia syndrome; Idiopathic hypersomnia; Irritable bowel syndrome; Migraines; and Shingles.   Joy presents to clinic today complaining of right great toe joint pain.    Patient describes pain as:   Location: pt points to right great toe and 1st mtpj   Quality: throbbing  Severity:7/10  Duration: several months  Modifying Factors (Aggravating):  Weight bearing   Modifying Factors (Alleviating):  No alleviating factors    Pt has history of fibromyalgia which affects multiple joints in body. Was taking cymbalta but recently stopped due to medication changes.    Patient denies other pedal complaints at this time.     PMH  Past Medical History:   Diagnosis Date    B12 deficiency     Chronic low back pain     oberlander    Endometriosis     Fibromyalgia syndrome     Idiopathic hypersomnia     Irritable bowel syndrome     goldman    Migraines     frances    Shingles        PROBLEM LIST  Patient Active Problem List    Diagnosis Date Noted    Positive DARRION (antinuclear antibody) 11/27/2017    Periorbital erythema 11/27/2017    Vitamin D deficiency 03/18/2016    Breakthrough bleeding on birth control pills 10/03/2014    B12 deficiency     Idiopathic hypersomnia     Migraines     Fibromyalgia syndrome        MEDS  Current Outpatient Prescriptions on File Prior to Visit   Medication Sig Dispense Refill    albuterol 90 mcg/actuation inhaler Inhale 2 puffs into the lungs every 6 (six) hours as needed for Wheezing. 1 Inhaler 0    buPROPion (WELLBUTRIN SR) 150 MG TBSR 12 hr  tablet Take 1 tablet (150 mg total) by mouth 2 (two) times daily. 60 tablet 2    cetirizine (ZYRTEC) 10 MG tablet Take 10 mg by mouth once daily.      cholecalciferol, vitamin D3, 1,000 unit capsule Take 1 capsule (1,000 Units total) by mouth once daily. 90 capsule 0    crisaborole (EUCRISA) 2 % Oint AAA of eyelid bid prn eczema 60 g 1    escitalopram oxalate (LEXAPRO) 10 MG tablet Take 1 tablet (10 mg total) by mouth once daily. 30 tablet 2    omeprazole (PRILOSEC) 20 MG capsule TAKE ONE CAPSULE BY MOUTH DAILY 30 capsule 0    topiramate (TOPAMAX) 50 MG tablet Take 1 tablet (50 mg total) by mouth once daily. 120 tablet 6    medroxyPROGESTERone (DEPO-PROVERA) 150 mg/mL injection INJECT 1 ML INTO THE MUSCLE EVERY 3 MONTHS 1 mL 0     No current facility-administered medications on file prior to visit.        Medication List with Changes/Refills   New Medications    EFINACONAZOLE 10 % GEETA    Apply 1 application topically once daily.    INDOMETHACIN (INDOCIN) 50 MG CAPSULE    Take 1 capsule (50 mg total) by mouth 2 (two) times daily with meals. Take for 14 days   Current Medications    ALBUTEROL 90 MCG/ACTUATION INHALER    Inhale 2 puffs into the lungs every 6 (six) hours as needed for Wheezing.    BUPROPION (WELLBUTRIN SR) 150 MG TBSR 12 HR TABLET    Take 1 tablet (150 mg total) by mouth 2 (two) times daily.    CETIRIZINE (ZYRTEC) 10 MG TABLET    Take 10 mg by mouth once daily.    CHOLECALCIFEROL, VITAMIN D3, 1,000 UNIT CAPSULE    Take 1 capsule (1,000 Units total) by mouth once daily.    CRISABOROLE (EUCRISA) 2 % OINT    AAA of eyelid bid prn eczema    ESCITALOPRAM OXALATE (LEXAPRO) 10 MG TABLET    Take 1 tablet (10 mg total) by mouth once daily.    MEDROXYPROGESTERONE (DEPO-PROVERA) 150 MG/ML INJECTION    INJECT 1 ML INTO THE MUSCLE EVERY 3 MONTHS    OMEPRAZOLE (PRILOSEC) 20 MG CAPSULE    TAKE ONE CAPSULE BY MOUTH DAILY    TOPIRAMATE (TOPAMAX) 50 MG TABLET    Take 1 tablet (50 mg total) by mouth once daily.  "      PS     Past Surgical History:   Procedure Laterality Date     SECTION      x 2     OOPHORECTOMY      PELVIC LAPAROSCOPY      TUBAL LIGATION          ALL  Review of patient's allergies indicates:  No Known Allergies    SOC     Social History   Substance Use Topics    Smoking status: Never Smoker    Smokeless tobacco: Never Used    Alcohol use Yes      Comment: rarely         FAMILY HX    Family History   Problem Relation Age of Onset    No Known Problems Mother     Heart disease Father         Bypass    Skin cancer Father     Cataracts Maternal Grandmother     Diabetes Maternal Grandmother     Glaucoma Maternal Grandmother     Skin cancer Maternal Grandmother     Cataracts Maternal Grandfather     Skin cancer Paternal Grandfather     Breast cancer Neg Hx     Ovarian cancer Neg Hx     Deep vein thrombosis Neg Hx             REVIEW OF SYSTEMS   General: This patient is well-developed, well-nourished and appears stated age, well-oriented to person, place and time, and cooperative and pleasant on today's visit  Constitutional: Negative for chills and fever.   Respiratory: Negative for shortness of breath.    Cardiovascular: Negative for chest pain, palpitations, orthopnea  Gastrointestinal: Negative for diarrhea, nausea and vomiting.   Musculoskeletal: Positive for above noted in HPI  Skin: Negative for rash, skin, or nail changes   Neurological: negative for tingling and sensory changes  Peripheral Vascular: no claudication or cyanosis  Psychiatric/Behavioral: Negative for altered mental status     PHYSICAL EXAM  Vitals:    18 1333   BP: 118/74   Pulse: 88   Weight: 104.3 kg (229 lb 15 oz)   Height: 5' 8" (1.727 m)   PainSc:   7   PainLoc: Foot       General: This patient is well-developed, well-nourished and appears stated age, well-oriented to person, place and time, and cooperative and pleasant on today's visit    LOWER EXTREMITY  Vascular exam:   · Dorsalis pedis and posterior " tibial pulses palpable 2/4 bilaterally.   · Capillary refill time immediate to the toes.   · Feet are warm to the touch. Skin temperature warm to warm from proximally to distally   · There are no varicosities, telangiectasias noted to bilateral foot and ankle regions.   · There are no ecchymoses noted to bilateral foot and ankle regions.   · There is no gross lower extremity edema.    Dermatologic exam:   · Skin moist with healthy texture and turgor.  · There are no open ulcerations, lacerations, or fissures to bilateral foot and ankle regions. There are no signs of infection as there is no erythema, no proximal-extending lymphangiitis, no fluctuance, or crepitus noted on palpation to bilateral foot and ankle regions.   · There is no interdigital maceration.   · There are no hyperkeratotic lesions noted to feet. Nails are well-trimmed, thickened discolored     Neurologic exam:  · Epicritic sensation is intact as the patient is able to sense light touch to bilateral foot and ankle regions.   · Achilles and patellar deep tendon reflexes intact  · Babinski reflex absent    Musculoskeletal/Orthopedic exam:   · No symptomatic structural abnormalities noted  · Muscle strength AT/EHL/EDL/PT: 5/5; Achilles/Gastroc/Soleus: 5/5; PB/PL: 5/5 Muscle tone is normal.  · Ankle joint ROM  B/L supple DF/PF, non-crepitus  · STJ ROM supple inv/ev, non crepitus   · TTP w/ ROM of 1st MTPJ with limitation right     IMAGING    Results for orders placed during the hospital encounter of 05/29/18   X-Ray Foot Complete Right    Narrative EXAMINATION:  XR FOOT COMPLETE 3 VIEW RIGHT    CLINICAL HISTORY:  . Pain in right foot    TECHNIQUE:  AP, lateral, and oblique views of the right foot were performed.    COMPARISON:  December 15, 2017.    FINDINGS:  The joint spaces are preserved.  No periarticular erosions.  Small posterior calcaneal spur.  No acute fracture or dislocation.      Impression As above.      Electronically signed by: CARLOS Wallis  MD [place]  Date:    05/29/2018  Time:    13:19           ASSESSMENT  Encounter Diagnoses   Name Primary?    Hallux limitus of right foot Yes    Fibromyalgia     Dermatophytosis of nail          PLAN  1. Patient was educated about clinical and imaging findings, and verbalizes understanding of above.     Diagnoses and all orders for this visit:  Hallux limitus of right foot    Fibromyalgia    Dermatophytosis of nail  -     CULTURE, FUNGUS - SKIN, HAIR, OR NAILS    Other orders  -     indomethacin (INDOCIN) 50 MG capsule; Take 1 capsule (50 mg total) by mouth 2 (two) times daily with meals. Take for 14 days  Dispense: 28 capsule; Refill: 0  -     efinaconazole 10 % Wu; Apply 1 application topically once daily.  Dispense: 8 mL; Refill: 11      2. Treatment plan:  -Patient was given written and verbal instructions on maintenance care for mycotic nails. The need for proper skin care in order to prevent infection and colonization in the nails was explained to the patient.    -For the nails, patient was prescribed  Jublia. Nail culture biopsy was taken from proximal nail plate.   -In addition, recommendations were made to spray and disinfect shoes with Lysol and to alternate shoes      Reviewed findings and explained condition to the patient with visual reference to the foot and x-rays. Patient was educated and counseled regarding hallux limitus. I explained the abnormal mechanics have lead to the jamming and degenerative changes of the big toe joint. Sometimes surgical intervention involving remodeling of the joint and decompressional osteotomy of the 1st ray is necessary to resolve symptoms if conservative treatment measures fail. We discussed the use of NSAIDs, injections, and functional foot orthotics to improve pain and mechanics of the foot. The patient was given a prescription for Indomethacin and did not wish to proceed with injection to the joint.        3. RTC  for follow up/evaluation as scheduled        Future Appointments  Date Time Provider Department Center   7/20/2018 3:00 PM Lenin Doyle MD [facility] [place]       Report Electronically Signed By:  Roxana Yarbrough DPM   Podiatric Medicine & Surgery  [facility]  6/7/2018

## 2018-05-29 NOTE — PATIENT INSTRUCTIONS
Hallux Rigidus  What Is Hallux Rigidus?  Hallux rigidus is a disorder of the joint located at the base of the big toe. It causes pain and stiffness in the joint, and with time it gets increasingly harder to bend the toe. Hallux refers to the big toe, while rigidus indicates that the toe is rigid and cannot move. Hallux rigidus is actually a form of degenerative arthritis.  This disorder can be very troubling and even disabling, since we use the big toe whenever we walk, stoop down, climb up, or even stand. Many patients confuse hallux rigidus with a bunion, which affects the same joint, but they are very different conditions requiring different treatment.  Because hallux rigidus is a progressive condition, the toes motion decreases as time goes on. In its earlier stage, when motion of the big toe is only somewhat limited, the condition is called hallux limitus. But as the problem advances, the toes range of motion gradually decreases until it potentially reaches the end stage of rigidus, in which the big toe becomes stiff, or what is sometimes called a frozen joint.    Causes  Common causes of hallux rigidus are faulty function (biomechanics) and structural abnormalities of the foot that can lead to osteoarthritis in the big toe joint. This type of arthritis - the kind that results from wear and tear - often develops in people who have defects that change the way their foot and big toe functions. For example, those with fallen arches or excessive pronation (rolling in) of the ankles are susceptible to developing hallux rigidus.  In some people, hallux rigidus runs in the family and is a result of inheriting a foot type that is prone to developing this condition. In other cases, it is associated with overuse - especially among people engaged in activities or jobs that increase the stress on the big toe, such as workers who often have to stoop or squat. Hallux rigidus can also result from an injury, such  as stubbing your toe. Or it may be caused by inflammatory diseases such as rheumatoid arthritis or gout. Your foot and ankle surgeon can determine the cause of your hallux rigidus and recommend the best treatment.    Symptoms  Early signs and symptoms include:  Pain and stiffness in the big toe during use (walking, standing, bending, etc.)   Pain and stiffness aggravated by cold, damp weather   Difficulty with certain activities (running, squatting)   Swelling and inflammation around the joint   As the disorder gets more serious, additional symptoms may develop, including:  Pain, even during rest   Difficulty wearing shoes because bone spurs (overgrowths) develop   Dull pain in the hip, knee, or lower back due to changes in the way you walk   Limping (in severe cases)     Diagnosis  The sooner this condition is diagnosed, the easier it is to treat. Therefore, the best time to see a foot and ankle surgeon is when you first notice symptoms. If you wait until bone spurs develop, your condition is likely to be more difficult to manage.  In diagnosing hallux rigidus, the surgeon will examine your feet and move the toe to determine its range of motion. X-rays help determine how much arthritis is present as well as to evaluate any bone spurs or other abnormalities that may have formed.  Non-Surgical Treatment  In many cases, early treatment may prevent or postpone the need for surgery in the future. Treatment for mild or moderate cases of hallux rigidus may include  Shoe modifications. Shoes with a large toe box put less pressure on your toe. Stiff or rocker-bottom soles may also be recommended.   Orthotic devices. Custom orthotic devices may improve foot function.   Medications. Oral nonsteroidal anti-inflammatory drugs (NSAIDs), such as ibuprofen, may be recommended to reduce pain and inflammation.   Injection therapy. Injections of corticosteroids may reduce inflammation and pain.   Physical therapy. Ultrasound therapy  or other physical therapy modalities may be undertaken to provide temporary relief.       Treatment  Pain relievers and anti-inflammatory medications such as Ibuprofen may help reduce the swelling and ease the pain. Applying ice packs or taking contrast baths (described below) may also help reduce inflammation and control symptoms for a short period of time. But they aren't enough to stop the condition from progressing. Wearing a shoe with a large toe box will reduce the pressure on the toe, and you will probably have to give up wearing high heels. Your doctor may recommend that you get a stiff-soled shoe with a rocker or roller bottom design and possibly even a steel shank or metal brace in the sole. This type of shoe supports the foot when you walk and reduces the amount of bend in the big toe.  An injection of cortisone into the joint may give temporary relief of symptoms. Your doctor may suggest an injection in conjunction with trying special shoes to control your pain while walking. As with any injection into a joint, a small risk of infection exists with this procedure.    When Is Surgery Needed?  In some cases, surgery is the only way to eliminate or reduce pain. There are several types of surgery for treatment of hallux rigidus. In selecting the procedure or combination of procedures for your particular case, the foot and ankle surgeon will take into consideration the extent of your deformity based on the x-ray findings, your age, your activity level, and other factors. The length of the recovery period will vary, depending on the procedure or procedures performed.    Surgical Treatment    Cheilectomy   This surgery is usually recommended when damage is mild or moderate. It involves removing the bone spurs as well as a portion of the foot bone, so the toe has more room to bend. The incision is made on the top of the foot. The toe and the operative site may remain swollen for several months after the  operation, and you will have to wear a wooden-soled sandal for at least two weeks after the surgery. But most patients do experience long-term relief.    Arthrodesis   Fusing the bones together (arthrodesis) is often recommended when the damage to the cartilage is severe. The damaged cartilage is removed and pins, screws, or a plate are used to fix the joint in a permanent position. Gradually, the bones grow together. This type of surgery means that you will not be able to bend the toe at all. However, it is the most reliable way to reduce pain in these severe cases.   For the first six weeks after surgery, you will have to wear a cast and use crutches.  You will be allowed to transition into a walking boot for full weight bearing for about another two weeks.    The fusion typically takes about three months to become solid.  Once healed, you won't be able to wear high heels, and you may need to wear a shoe with a rocker-type sole.    Arthroplasty (Artificial Joint Replacement)  Older patients who place few functional demands on the feet may be candidates for joint replacement surgery. In this procedure, one or both of the joint surfaces is removed and replaced with a plastic or metal surface. This procedure may relieve the pain and preserve the joint motion. The major drawback to this procedure is that the artificial joint probably will not last a lifetime and will require more operations later if it begins to fail.   Modified from:  [employer]

## 2018-05-31 ENCOUNTER — CLINICAL SUPPORT (OUTPATIENT)
Dept: REHABILITATION | Facility: HOSPITAL | Age: 38
End: 2018-05-31
Payer: COMMERCIAL

## 2018-05-31 DIAGNOSIS — N94.2 VAGINISMUS: ICD-10-CM

## 2018-05-31 PROCEDURE — 97535 SELF CARE MNGMENT TRAINING: CPT | Performed by: PHYSICAL THERAPIST

## 2018-05-31 PROCEDURE — 97161 PT EVAL LOW COMPLEX 20 MIN: CPT | Performed by: PHYSICAL THERAPIST

## 2018-05-31 NOTE — PROGRESS NOTES
OUTPATIENT PHYSICAL THERAPY EVALUATION        Patient: Joy Huntley [facility] #:  [id number]    Date of treatment: 2018         HISTORY      Joy is a 37 y.o. female evaluated on 2018    Physician:  Annabella Vásquez NP   Diagnosis:   Encounter Diagnosis   Name Primary?    Vaginismus       Treatment ordered: Physical Therapy  Medical History:   Past Medical History:   Diagnosis Date    B12 deficiency     Chronic low back pain     oberlander    Endometriosis     Fibromyalgia syndrome     Idiopathic hypersomnia     Irritable bowel syndrome     goldman    Migraines     frances    Shingles       Surgical History:   Past Surgical History:   Procedure Laterality Date     SECTION      x 2     OOPHORECTOMY      PELVIC LAPAROSCOPY      TUBAL LIGATION        Medications:   Current Outpatient Prescriptions   Medication Sig    albuterol 90 mcg/actuation inhaler Inhale 2 puffs into the lungs every 6 (six) hours as needed for Wheezing.    buPROPion (WELLBUTRIN SR) 150 MG TBSR 12 hr tablet Take 1 tablet (150 mg total) by mouth 2 (two) times daily.    cetirizine (ZYRTEC) 10 MG tablet Take 10 mg by mouth once daily.    cholecalciferol, vitamin D3, 1,000 unit capsule Take 1 capsule (1,000 Units total) by mouth once daily.    crisaborole (EUCRISA) 2 % Oint AAA of eyelid bid prn eczema    efinaconazole 10 % Wu Apply 1 application topically once daily.    escitalopram oxalate (LEXAPRO) 10 MG tablet Take 1 tablet (10 mg total) by mouth once daily.    indomethacin (INDOCIN) 50 MG capsule Take 1 capsule (50 mg total) by mouth 2 (two) times daily with meals. Take for 14 days    medroxyPROGESTERone (DEPO-PROVERA) 150 mg/mL injection INJECT 1 ML INTO THE MUSCLE EVERY 3 MONTHS    omeprazole (PRILOSEC) 20 MG capsule TAKE ONE CAPSULE BY MOUTH DAILY    topiramate (TOPAMAX) 50 MG tablet Take 1 tablet (50 mg total) by mouth once daily.     No current facility-administered medications for this visit.         Allergies: Review of patient's allergies indicates:  No Known Allergies     Precautions: none    OB/GYN History:  Caesarean, painful periods, painful vaginal penetration, pelvic pain and endometriosis    Bladder/Bowel History: Pt reports hx of IBS- constipation mainly with bouts of diarrhea . Pt denies UTI      SUBJECTIVE     Date of onset: 15 years ago, Pt reports removing a tampon and experienced tremendous pain in vagina. States that intercourse become painful after this incident.  Pt appears anxious at times and becomes tearful  History of current complaint: pain with intercourse at initial penetration  Patient's goals for therapy: pain free intercourse  Pain: Patient reports 8/10, with 0 being the lowest and 10 being the highest.    Activities that cause symptoms: sexual activity    Previous treatment included none    Sexually active? Yes. 2-4 x yearly    Frequency of urination:   Daytime: 2-4 hours          Nighttime: 0    Difficulty initiating urine stream: no  Urine stream: splayed  Complete emptying: no  Bladder leakage: no      Frequency of bowel movements: once every 3 days  Difficulty initiating BM: yes  Quality/Shape of BM: Reeves Stool Chart type 1        Types of fluid intake: water  Diet:rarely gets breakfast, low fiber- minimal fruits and vegetables  Current exercise:none    Occupation: Pt recently laid off     OBJECTIVE     ORTHO SCREEN  WNL  Chaperone: refused  Consent signed: yes    VAGINAL PELVIC FLOOR EXAM    EXTERNAL ASSESSMENT  Introitus: normal  Skin condition: normal  Scarring: none   Sensation:WNL  Pain:  none  Voluntary contraction: visible lift  Voluntary relaxation: visible lift  Involuntary contraction: visible lift  Bearing down: visible lift  Perineal descent: absent      INTERNAL ASSESSMENT  Pain: at 3-5 o clock    Sensation: hypersensitive  Vaginal vault: {WNL  Muscle Bulk: increased tone to R pelvic mm   Muscle Power: 4/5       Quality of contraction: WNL  Specificity:     Coordination: Pt unable to relax pelvic floor mm during voluntary and involunary   Prolapse check:DNA      SEMG EVALUATION: next visit    PFDI-20      Modifier  Impairment Limitation Restriction    CH  0 % impaired, limited or restricted    CI  @ least 1% but less than 20% impaired, limited or restricted    CJ  @ least 20%<40% impaired, limited or restricted    CK  @ least 40%<60% impaired, limited or restricted    CL  @ least 60% <80% impaired, limited or restricted    CM  @ least 80%<100% impaired limited or restricted    CN  100% impaired, limited or restricted         TREATMENT          Neuromuscular Re-education     Manual Therapy not today    Modalities NT      Education: instructed on general anatomy/physiology of urinary/bowel system; discussed plan of care with patient and parent/guardian; instructed in benefits/risks of treatment; instructed in alternative methods of treatment; instructed in risks of refusing treatment; patient a parent agreed to treatment plan.     Also educated in: bladder irritants, anatomy/physiology of pelvic floor, diaphragmatic breathing, double voiding techniques, proper bearing down techniques, fluid intake/dietary modifications and behavior modifications    ASSESSMENT      This is a 37 y.o. female referred to outpatient physical therapy and presents with a medical diagnosis of vaginismus. Patient will benefit from skilled physical therapy to improve coordination of pelvic mm to reduce constipation and pelvic pain during intercourse    SEMG Problems: did not evaluate  No skin irritation, erythema, or other adverse effects observed from electrode placement.    Educational/Spiritual/Cultural needs: none  Abuse/Neglect: no signs      Pt's spiritual, cultural and educational needs considered and pt agreeable to plan of care and goals as stated below:     Medical necessity is demonstrated by the following IMPAIRMENTS/PROMBLEMS     History  Co-morbidities and personal factors that may  impact the plan of care Examination  Body Structures and Functions, activity limitations and participation restrictions that may impact the plan of care Clinical Presentation   Decision Making/ Complexity Score   Co-morbidities:                 Personal Factors:    Body Regions/Systems/Functions:    pelvic floor tenderness, increased tension of the pelvic muscles, poor quality of pelvic muscle contraction, poor fluid intake, poor diet, dysfunctional defecation and unable to co-contract or co-relax abdominal wall and pelvic floor muscles           Activity limitations:   Barriers to Learning:none  Environmental Barriers: none noted    Participation Restrictions: intercourse          low           PLAN    Frequency: 1-2x weekly  Duration: 6-8 weeks  Short Term Goals: 2-4  weeks   Pt/family will be independent with HEP for continued self-management of symptoms.  Pt will be able to tolerate speculum exam for increased access to women's healthcare.   Pt/family with be independent with double voiding techniques.  Pt to report increased fiber and water intake for improved bowel consistency for less difficult bowel movement  Pt I in identifying relaxation of PF mm  Long Term Goals: 5-8 weeks   1. Pt to report minimal( 1-2) to no pain during intercourse with   2. Pt to report normal bowel habits- not difficult initiating BM and stool type 4 regularity     Physical therapy will include: therapeutic exercises, neuromuscular re-education, manual therapy, modalities PRN, patient/family education and self care/home management      Therapist: Bee Benton, PT  5/31/2018

## 2018-06-06 ENCOUNTER — CLINICAL SUPPORT (OUTPATIENT)
Dept: REHABILITATION | Facility: HOSPITAL | Age: 38
End: 2018-06-06
Payer: COMMERCIAL

## 2018-06-06 DIAGNOSIS — N94.2 VAGINISMUS: Primary | ICD-10-CM

## 2018-06-06 PROCEDURE — 97112 NEUROMUSCULAR REEDUCATION: CPT | Performed by: PHYSICAL THERAPIST

## 2018-06-06 PROCEDURE — 97110 THERAPEUTIC EXERCISES: CPT | Performed by: PHYSICAL THERAPIST

## 2018-06-06 PROCEDURE — 97140 MANUAL THERAPY 1/> REGIONS: CPT | Performed by: PHYSICAL THERAPIST

## 2018-06-06 NOTE — PROGRESS NOTES
OUTPATIENT PHYSICAL THERAPY DAILY NOTE       Patient: Joy Huntley [facility] #:  [id number]    Diagnosis:   Encounter Diagnosis   Name Primary?    Vaginismus Yes      Date of treatment: 06/06/2018       SUBJECTIVE   Pt reports: no changes since last visit. Have been trying to do relaxation exercises and diaphragmatic breathing maybe 10 minutes daily    OBJECTIVE       Neuromuscular Re-education to develop relaxation of PF mm: physiological quieting and diaphragmatic breathing 25 min  diaphragmatic breathing    Manual Therapy to develop desensitization for 10 minutes including: urogenital diaphragm mm- externally      Modalities t    Education: Discussed progression of plan of care with patient; educated pt in activity modification; reviewed HEP with pt. Pt demonstrated and verbalized understanding of all instruction and was provided with a handout of HEP (see Patient Instructions). TP release 8 min      ASSESSMENT      Pt tolerated entire treatment and reported decreased pain from 5/10 to 1/10 after manual PT. Pt also demo decreased PF mm tone from 5.5 MV to 3.5 MV        PLAN     Continue with established Plan of Care,

## 2018-06-26 LAB — FUNGUS BLD CULT: NORMAL

## 2018-06-29 ENCOUNTER — CLINICAL SUPPORT (OUTPATIENT)
Dept: REHABILITATION | Facility: HOSPITAL | Age: 38
End: 2018-06-29
Payer: COMMERCIAL

## 2018-06-29 DIAGNOSIS — N94.2 VAGINISMUS: Primary | ICD-10-CM

## 2018-06-29 PROCEDURE — 97140 MANUAL THERAPY 1/> REGIONS: CPT | Performed by: PHYSICAL THERAPIST

## 2018-06-29 PROCEDURE — 97110 THERAPEUTIC EXERCISES: CPT | Performed by: PHYSICAL THERAPIST

## 2018-07-11 RX ORDER — TOPIRAMATE 50 MG/1
TABLET, FILM COATED ORAL
Qty: 120 TABLET | Refills: 3 | Status: SHIPPED | OUTPATIENT
Start: 2018-07-11 | End: 2019-07-18 | Stop reason: SDUPTHER

## 2018-07-13 ENCOUNTER — CLINICAL SUPPORT (OUTPATIENT)
Dept: REHABILITATION | Facility: HOSPITAL | Age: 38
End: 2018-07-13
Payer: COMMERCIAL

## 2018-07-13 DIAGNOSIS — N94.2 VAGINISMUS: Primary | ICD-10-CM

## 2018-07-13 PROCEDURE — 97014 ELECTRIC STIMULATION THERAPY: CPT | Performed by: PHYSICAL THERAPIST

## 2018-07-13 PROCEDURE — 97112 NEUROMUSCULAR REEDUCATION: CPT | Performed by: PHYSICAL THERAPIST

## 2018-07-13 NOTE — PROGRESS NOTES
OUTPATIENT PHYSICAL THERAPY DAILY NOTE       Patient: Joy Huntley [facility] #:  [id number]    Diagnosis:   Encounter Diagnosis   Name Primary?    Vaginismus Yes      Date of treatment: 07/13/2018       SUBJECTIVE   Pt reports:I have been very stressed at work lately due to working over time and I am having more flair ups. Dry needling helped pain for about a day    OBJECTIVE       Neuromuscular Re-education to develop relaxation of PF mm: physiological quieting and diaphragmatic breathing 25 min  diaphragmatic breathing    Manual Therapy NT      Modalities tens internally with rectal probe at 100 mhz continue     Education: Discussed progression of plan of care with patient; educated pt in activity modification; reviewed HEP with pt. Pt demonstrated and verbalized understanding of all instruction and was provided with a handout of HEP (see Patient Instructions). TP release 8 min      ASSESSMENT      Pt tolerated entire treatment         PLAN     Continue with established Plan of Care, ordering vaginal dilators for desensitization of tissue

## 2018-07-17 NOTE — PROGRESS NOTES
OUTPATIENT PHYSICAL THERAPY DAILY NOTE       Patient: Joy Huntley [facility] #:  [id number]    Diagnosis:   Encounter Diagnosis   Name Primary?    Vaginismus Yes      Date of treatment: 07/17/2018       SUBJECTIVE   Pt reports: no changes since last visit. Have been trying to do relaxation exercises and diaphragmatic breathing maybe 10 minutes daily    OBJECTIVE       Neuromuscular Re-education to develop relaxation of PF mm: physiological quieting and diaphragmatic breathing 25 min  diaphragmatic breathing    Manual Therapy to develop desensitization for 10 minutes including: urogenital diaphragm mm- externally. Dry needling to gluteal medius and piriformis mm 15      Modalities t    Education: Discussed progression of plan of care with patient; educated pt in activity modification; reviewed HEP with pt. Pt demonstrated and verbalized understanding of all instruction and was provided with a handout of HEP (see Patient Instructions). TP release 8 min      ASSESSMENT      Pt demo WNL resting pelvic tone. She appears highly anxious at this time and mentioned stress in her life which could be related to increased pain and tone in pelvic mm        PLAN     Continue with established Plan of Care,

## 2018-07-19 NOTE — PROGRESS NOTES
"Outpatient Psychiatry Follow-up Visit (MD/NP)    7/20/2018    Joy Recinos, a 38 y.o. female, presenting for follow-up visit. Met with patient.    Reason for Encounter: f/u, DOUG.     Interval History: Patient is a 38 year old F with DOUG returns for follow-up, last seen about 2 months prior. Despite having been laid off, was invited back to work due to ongoing work demand, now back for 3 weeks. Describes situation as gratifying but stressful (as it was before), though doing "half of my old position and half a new position". More difficult physically since back at work. Has had benefit from medication with large initial effect (first week "thought it was a miracle" - was cleaning house, energy to do many tasks), but benefit seemed to wane over time. Has had more trouble with pain and stiffness in recent weeks. To follow up with rheumatology. Taking 1 daily dose of wellbutrin (irritable when took daytime dose). lexapro (some increased vividness of dreaming). Family situation ok. Endorses the following:     Feeling nervous, anxious or on edge   Not being able to stop or control worrying  Worrying too much about different things   Trouble relaxing   Being so restless that it is hard to sit still   Becoming easily annoyed or irritable   Feeling afraid as if something awful might happen    DOUG-7 = 15 (down from 21)      Mild Sleep Problems  Sad Mood less than half the time  Concentration problems  Guilt  Thoughts of Emptiness/Death/Suicide  Anhedonia  Anergia  Slowing/PMR    QIDS = 6 (down from 12)    Background: 37 year old F presents for establishment of care, reports problems with anxiety and depression. From PCP note (3.16.18): Here today for f/u on depression & anxiety. She reports that since medication adjustment she has been less irritable. Her  is here with her today & affirms this. She has a psych appointment scheduled for May 11. She reports it has been some mild improvement in her volition. " "Reports treatment with cymbalta - for fibromyalgia, anxiety, depression for 5 years. Neither fibromyalgia pain no moods improved.  wellbutrin trial has been perhaps helpful. Describes baseline symptoms as "worried about everything", "worried about worrying", sad, not interested in things, "like I don't have a purpose". Negative thoughts about self. Tired all the time, even when sleeps excessively. Denies SI. "All my life". "stays all the time", though better/worse at times, influenced by stressful situations. Problems with attention/concentration. Reports all of the following daily in past 2 weeks (around which time she was laid off): Feeling nervous, anxious or on edge, Not being able to stop or control worrying  Worrying too much about different things   Trouble relaxing   Being so restless that it is hard to sit still   Becoming easily annoyed or irritable   Feeling afraid as if something awful might happen    DOUG-7 = 21 (estimates a 19 in weeks prior to the layoff)    Sleep Problems - hypersomnia  Sad Mood more than 1/2 time  Appetite and weight changes (decreased appetite, small subjective weight loss)  Concentration problems  Guilt   Thoughts of Emptiness  Anhedonia  Anergia  Slowing/PMR    QIDS = 12    Also has decreased sexual interest, pain during intercourse.    PsychHx: first treatment by Obgyn - sertraline(?) when had problems with endometriosis. No AVH, no SI/HI, no delusions. No hospitalizations or self-harm behaviors. Prolonged duloxetine trial, thinks transient or minimal benefit.     MedHx: idiopathic hypersomnia. 2 sleep studies, narcolepsy ruled out. Endometriosis. 2 herniated lumbar discs. Migraines. GERD. Seasonal allergies.   FamHx: mother and dad both take medication for depression. Father diagnosed with adhd in 60's.   SocHx: from [facility]. Heart murmur at birth. "fainted and blacked out school", had several episodes between 5 and 9. Worked up but never medically explained. Still gets " "dizzy and weak, but doesn't black out. Also had back pain frequently as a child. No developmental problems. Regular classes in school. Ok with going to school. Normal socially. Average student. 3 semesters of college. Went to technical school for 1 year, finished (computer/). 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 Applying for new jobs. Lives with  and 2 kids.  x 14 years. "really good" relationship x decreased sexual interest and pain. Week before  pulled out a tampon - was extremely painful. [place] has been painful ever since. Has had PT recommended for this. 2 kids (13 and 9). Dtr has been bullied most of her life due to being overweight. She also has problems with oppositionality at home (though is a good student, doesn't have discipline problems at school), won't take a bath. Son is "good kid", "very attached to me". Quiet, not many friends. Mom is supportive, lives nearby.       Review Of Systems:     GENERAL:  No weight gain or loss  SKIN:  No rashes or lacerations  HEAD:  No headaches  CHEST:  No shortness of breath, hyperventilation or cough  CARDIOVASCULAR:  No tachycardia or chest pain  ABDOMEN:  No nausea, vomiting, pain, constipation or diarrhea  URINARY:  No frequency, dysuria or sexual dysfunction  ENDOCRINE:  No polydipsia, polyuria  MUSCULOSKELETAL:  pain and stiffness of the joints  NEUROLOGIC:  No weakness, sensory changes, seizures, confusion, memory loss, tremor or other abnormal movements    Current Evaluation:     Nutritional Screening: Considering the patient's height and weight, medications, medical history and preferences, should a referral be made to the dietitian? no    Constitutional  Vitals:  Most recent vital signs, dated less than 90 days prior to this appointment, were not reviewed.    There were no vitals filed for this visit.     General:  " unremarkable, age appropriate     Musculoskeletal  Muscle Strength/Tone:  no tremor, no tic   Gait & Station:  non-ataxic     Psychiatric  Appearance: casually dressed & groomed;   Behavior: calm,   Cooperation: cooperative with assessment  Speech: normal rate, volume, tone  Thought Process: linear, goal-directed  Thought Content: No suicidal or homicidal ideation; no delusions  Affect: anxious  Mood: anxious  Perceptions: No auditory or visual hallucinations  Level of Consciousness: alert throughout interview  Insight: fair  Cognition: Oriented to person, place, time, & situation  Memory: no apparent deficits to general clinical interview; not formally assessed  Attention/Concentration: no apparent deficits to general clinical interview; not formally assessed  Fund of Knowledge: average by vocabulary/education    Laboratory Data  No visits with results within 1 Month(s) from this visit.   Latest known visit with results is:   Office Visit on 05/29/2018   Component Date Value Ref Range Status    Fungus Cult, skin, hair or nails 05/29/2018 No fungus isolated after 4 weeks   Final     Medications  Outpatient Encounter Prescriptions as of 7/20/2018   Medication Sig Dispense Refill    albuterol 90 mcg/actuation inhaler Inhale 2 puffs into the lungs every 6 (six) hours as needed for Wheezing. 1 Inhaler 0    buPROPion (WELLBUTRIN SR) 150 MG TBSR 12 hr tablet Take 1 tablet (150 mg total) by mouth 2 (two) times daily. 60 tablet 2    cetirizine (ZYRTEC) 10 MG tablet Take 10 mg by mouth once daily.      cholecalciferol, vitamin D3, 1,000 unit capsule Take 1 capsule (1,000 Units total) by mouth once daily. 90 capsule 0    crisaborole (EUCRISA) 2 % Oint AAA of eyelid bid prn eczema 60 g 1    efinaconazole 10 % Wu Apply 1 application topically once daily. 8 mL 11    escitalopram oxalate (LEXAPRO) 10 MG tablet Take 1 tablet (10 mg total) by mouth once daily. 30 tablet 2    indomethacin (INDOCIN) 50 MG capsule Take 1  capsule (50 mg total) by mouth 2 (two) times daily with meals. Take for 14 days 28 capsule 0    medroxyPROGESTERone (DEPO-PROVERA) 150 mg/mL injection INJECT 1 ML INTO THE MUSCLE EVERY 3 MONTHS 1 mL 0    omeprazole (PRILOSEC) 20 MG capsule TAKE ONE CAPSULE BY MOUTH DAILY 30 capsule 0    topiramate (TOPAMAX) 50 MG tablet TAKE 1 TABLET EVERY  tablet 3     No facility-administered encounter medications on file as of 7/20/2018.      Assessment - Diagnosis - Goals:     Impression: 36 y/o F with generalized anxiety disorder. Previous trials of duloxetine with minimal benefit, low dose bupropion with modest benefit. Recent response to escitalopram, tolerating ok. Working again.     Dx: generalized anxiety disorder.     Treatment Goals:  Specify outcomes written in observable, behavioral terms:   Reduce anxiety by DOUG-7.     Treatment Plan/Recommendations:   · escitalopram to 20 mg daily as tolerated. Continue bupropion.   · Discussed risks, benefits, and alternatives to treatment plan documented above with patient. I answered all patient questions related to this plan and patient expressed understanding and agreement.     Return to Clinic: 2 months    Counseling time: 5 minutes  Total time: 20 minutes    WINIFRED Nieto MD  Psychiatry  [facility]  [address] , [place] [address] [phone]

## 2018-07-20 ENCOUNTER — OFFICE VISIT (OUTPATIENT)
Dept: PSYCHIATRY | Facility: CLINIC | Age: 38
End: 2018-07-20
Payer: COMMERCIAL

## 2018-07-20 DIAGNOSIS — F41.8 DEPRESSION WITH ANXIETY: ICD-10-CM

## 2018-07-20 DIAGNOSIS — F41.1 GAD (GENERALIZED ANXIETY DISORDER): Primary | ICD-10-CM

## 2018-07-20 PROCEDURE — 99213 OFFICE O/P EST LOW 20 MIN: CPT | Mod: S$GLB,,, | Performed by: PSYCHIATRY & NEUROLOGY

## 2018-07-20 PROCEDURE — 99999 PR PBB SHADOW E&M-EST. PATIENT-LVL I: CPT | Mod: PBBFAC,,, | Performed by: PSYCHIATRY & NEUROLOGY

## 2018-07-20 RX ORDER — BUPROPION HYDROCHLORIDE 150 MG/1
150 TABLET, EXTENDED RELEASE ORAL DAILY
Qty: 30 TABLET | Refills: 1 | Status: SHIPPED | OUTPATIENT
Start: 2018-07-20 | End: 2018-12-18 | Stop reason: SDUPTHER

## 2018-07-20 RX ORDER — ESCITALOPRAM OXALATE 10 MG/1
20 TABLET ORAL DAILY
Qty: 60 TABLET | Refills: 2 | Status: SHIPPED | OUTPATIENT
Start: 2018-07-20 | End: 2018-10-29 | Stop reason: SDUPTHER

## 2018-07-24 ENCOUNTER — OFFICE VISIT (OUTPATIENT)
Dept: RHEUMATOLOGY | Facility: CLINIC | Age: 38
End: 2018-07-24
Payer: COMMERCIAL

## 2018-07-24 ENCOUNTER — LAB VISIT (OUTPATIENT)
Dept: LAB | Facility: HOSPITAL | Age: 38
End: 2018-07-24
Attending: INTERNAL MEDICINE
Payer: COMMERCIAL

## 2018-07-24 VITALS
BODY MASS INDEX: 35.52 KG/M2 | HEIGHT: 68 IN | SYSTOLIC BLOOD PRESSURE: 119 MMHG | WEIGHT: 234.38 LBS | DIASTOLIC BLOOD PRESSURE: 77 MMHG | HEART RATE: 75 BPM

## 2018-07-24 DIAGNOSIS — M79.7 FIBROMYALGIA SYNDROME: ICD-10-CM

## 2018-07-24 DIAGNOSIS — M79.674 GREAT TOE PAIN, RIGHT: Primary | ICD-10-CM

## 2018-07-24 DIAGNOSIS — M79.674 GREAT TOE PAIN, RIGHT: ICD-10-CM

## 2018-07-24 DIAGNOSIS — R76.8 POSITIVE ANA (ANTINUCLEAR ANTIBODY): ICD-10-CM

## 2018-07-24 LAB
ALBUMIN SERPL BCP-MCNC: 3.8 G/DL
ALP SERPL-CCNC: 126 U/L
ALT SERPL W/O P-5'-P-CCNC: 14 U/L
ANION GAP SERPL CALC-SCNC: 7 MMOL/L
AST SERPL-CCNC: 14 U/L
BILIRUB SERPL-MCNC: 0.3 MG/DL
BUN SERPL-MCNC: 16 MG/DL
CALCIUM SERPL-MCNC: 9.3 MG/DL
CHLORIDE SERPL-SCNC: 107 MMOL/L
CO2 SERPL-SCNC: 24 MMOL/L
CREAT SERPL-MCNC: 0.9 MG/DL
EST. GFR  (AFRICAN AMERICAN): >60 ML/MIN/1.73 M^2
EST. GFR  (NON AFRICAN AMERICAN): >60 ML/MIN/1.73 M^2
GLUCOSE SERPL-MCNC: 91 MG/DL
POTASSIUM SERPL-SCNC: 4.1 MMOL/L
PROT SERPL-MCNC: 7.2 G/DL
SODIUM SERPL-SCNC: 138 MMOL/L
URATE SERPL-MCNC: 5.4 MG/DL

## 2018-07-24 PROCEDURE — 3008F BODY MASS INDEX DOCD: CPT | Mod: CPTII,S$GLB,, | Performed by: INTERNAL MEDICINE

## 2018-07-24 PROCEDURE — 96372 THER/PROPH/DIAG INJ SC/IM: CPT | Mod: S$GLB,,, | Performed by: INTERNAL MEDICINE

## 2018-07-24 PROCEDURE — 84550 ASSAY OF BLOOD/URIC ACID: CPT

## 2018-07-24 PROCEDURE — 80053 COMPREHEN METABOLIC PANEL: CPT

## 2018-07-24 PROCEDURE — 99999 PR PBB SHADOW E&M-EST. PATIENT-LVL IV: CPT | Mod: PBBFAC,,, | Performed by: INTERNAL MEDICINE

## 2018-07-24 PROCEDURE — 36415 COLL VENOUS BLD VENIPUNCTURE: CPT | Mod: PO

## 2018-07-24 PROCEDURE — 99214 OFFICE O/P EST MOD 30 MIN: CPT | Mod: 25,S$GLB,, | Performed by: INTERNAL MEDICINE

## 2018-07-24 RX ORDER — BETAMETHASONE SODIUM PHOSPHATE AND BETAMETHASONE ACETATE 3; 3 MG/ML; MG/ML
6 INJECTION, SUSPENSION INTRA-ARTICULAR; INTRALESIONAL; INTRAMUSCULAR; SOFT TISSUE
Status: COMPLETED | OUTPATIENT
Start: 2018-07-24 | End: 2018-07-24

## 2018-07-24 RX ORDER — TRAMADOL HYDROCHLORIDE 50 MG/1
50 TABLET ORAL
Qty: 30 TABLET | Refills: 0 | Status: SHIPPED | OUTPATIENT
Start: 2018-07-24 | End: 2023-12-27

## 2018-07-24 RX ADMIN — BETAMETHASONE SODIUM PHOSPHATE AND BETAMETHASONE ACETATE 6 MG: 3; 3 INJECTION, SUSPENSION INTRA-ARTICULAR; INTRALESIONAL; INTRAMUSCULAR; SOFT TISSUE at 09:07

## 2018-07-24 NOTE — PROGRESS NOTES
Administered 1 cc Betamethasone 6mg/cc  to right ventrogluteal. Pt tolerated well. No acute reaction noted to site. Pt instructed on S/S to report. Advised patient to wait in lobby 15 minutes after receiving injection to monitor for any reactions. Pt verbalized understanding.     Lot: [id number]  Exp: 10/18

## 2018-07-24 NOTE — PROGRESS NOTES
RHEUMATOLOGY CLINIC FOLLOW UP VISIT  Chief complaints: R 1st hallux pain and diffused body aches       HPI:-  Joy Willett a 38 y.o. pleasant female comes in for a follow up visit with above chief complaints.     Patient has a history +DARRION without any specific antibodies for SLE/Scleroderma/RA/Sjogrens/Myositis.  Inflammatory markers are negative.  Repeated labs had been negative.      Patient has a history of fibromyalgia that was previously treated with Cymbalta.  Patient had been referred to psychiatry for further manangment of depression/anxiety.  She has been off of Cymbalta for ~2months now and started on Wellbutrin and Lexapro.  Today she is complaining that she is having diffused body aches that is not controlled with current medication.  She is feeling more stress from work.  Sleeping ~7hours/night, but having night time awaking, difficulty falling asleep and snoring/tossing in bed.  She states that her mood has improved and she is trying to be more active.  But she is feeling more pain and fatigue.      Patient has a history of R hallux pain that went to podiatry for evaluation.  Since the evaluation, the area has been swollen and tender (making it difficult to ambulate).  Patient recalled prior to the onset of swelling, she had some seafood.  FHx of gout.  Denies any recent trauma to the area.      Denies any new rash, sicca symptoms, GI/ abnormalities, myalgia, photosensitivity, or dysphagia     Review of Systems   Constitutional: Negative for chills, diaphoresis, fever, malaise/fatigue and weight loss.   HENT: Negative for congestion, ear discharge, ear pain, hearing loss, nosebleeds, sinus pain and tinnitus.    Eyes: Negative for photophobia, pain, discharge and redness.   Respiratory: Negative for cough, hemoptysis, sputum production, shortness of breath, wheezing and stridor.    Cardiovascular: Negative for chest pain, palpitations,  "orthopnea, claudication, leg swelling and PND.   Gastrointestinal: Negative for abdominal pain, constipation, diarrhea, heartburn, nausea and vomiting.   Genitourinary: Negative for dysuria, frequency, hematuria and urgency.   Musculoskeletal: Positive for joint pain. Negative for back pain, myalgias and neck pain.   Skin: Negative for rash.   Neurological: Negative for dizziness, tingling, tremors, weakness and headaches.   Endo/Heme/Allergies: Does not bruise/bleed easily.   Psychiatric/Behavioral: Negative for depression, hallucinations and suicidal ideas. The patient is nervous/anxious and has insomnia.        Past Medical History:   Diagnosis Date    B12 deficiency     Chronic low back pain     oberlander    Endometriosis     Fibromyalgia syndrome     Idiopathic hypersomnia     Irritable bowel syndrome     goldman    Migraines     frances    Shingles        Past Surgical History:   Procedure Laterality Date     SECTION      x 2     OOPHORECTOMY      PELVIC LAPAROSCOPY      TUBAL LIGATION          Social History   Substance Use Topics    Smoking status: Never Smoker    Smokeless tobacco: Never Used    Alcohol use Yes      Comment: rarely       Family History   Problem Relation Age of Onset    No Known Problems Mother     Heart disease Father         Bypass    Skin cancer Father     Cataracts Maternal Grandmother     Diabetes Maternal Grandmother     Glaucoma Maternal Grandmother     Skin cancer Maternal Grandmother     Cataracts Maternal Grandfather     Skin cancer Paternal Grandfather     Breast cancer Neg Hx     Ovarian cancer Neg Hx     Deep vein thrombosis Neg Hx        Review of patient's allergies indicates:  No Known Allergies        Physical examination:-    Vitals:    18 0837   BP: 119/77   Pulse: 75   Weight: 106.3 kg (234 lb 5.6 oz)   Height: 5' 8" (1.727 m)   PainSc:   8   PainLoc: Foot       Physical Exam   Constitutional: She is oriented to person, place, and " time and well-developed, well-nourished, and in no distress.   HENT:   Head: Normocephalic and atraumatic.   Eyes: Conjunctivae are normal. Pupils are equal, round, and reactive to light.   Neck: Normal range of motion. Neck supple.   Cardiovascular: Normal rate, regular rhythm and normal heart sounds.    Pulmonary/Chest: Effort normal and breath sounds normal.   Abdominal: Soft. Bowel sounds are normal.   Musculoskeletal: Normal range of motion.   R hallux - swollen and tender.  All other joints - no synovitis, dactylitis, or deformities. ROM intact  8/18 point tenderness for fibromyalgia    Neurological: She is alert and oriented to person, place, and time. Gait normal.   Skin: Skin is warm and dry.   Psychiatric: Mood, memory, affect and judgment normal.       Labs:-  Results for FAB ALVARADO (MRN [id number]) as of 7/24/2018 11:11   Ref. Range 10/9/2017 17:05   WBC Latest Ref Range: 3.90 - 12.70 K/uL 7.61   RBC Latest Ref Range: 4.00 - 5.40 M/uL 4.42   Hemoglobin Latest Ref Range: 12.0 - 16.0 g/dL 13.6   Hematocrit Latest Ref Range: 37.0 - 48.5 % 40.1   MCV Latest Ref Range: 82 - 98 fL 91   MCH Latest Ref Range: 27.0 - 31.0 pg 30.8   MCHC Latest Ref Range: 32.0 - 36.0 g/dL 33.9   RDW Latest Ref Range: 11.5 - 14.5 % 13.2   Platelets Latest Ref Range: 150 - 350 K/uL 199   MPV Latest Ref Range: 9.2 - 12.9 fL 11.8   Gran% Latest Ref Range: 38.0 - 73.0 % 61.7   Gran # (ANC) Latest Ref Range: 1.8 - 7.7 K/uL 4.7   Lymph% Latest Ref Range: 18.0 - 48.0 % 30.4   Lymph # Latest Ref Range: 1.0 - 4.8 K/uL 2.3   Mono% Latest Ref Range: 4.0 - 15.0 % 5.8   Mono # Latest Ref Range: 0.3 - 1.0 K/uL 0.4   Eosinophil% Latest Ref Range: 0.0 - 8.0 % 1.8   Eos # Latest Ref Range: 0.0 - 0.5 K/uL 0.1   Basophil% Latest Ref Range: 0.0 - 1.9 % 0.3   Baso # Latest Ref Range: 0.00 - 0.20 K/uL 0.02     Results for FAB ALVARADO (MRN [id number]) as of 7/24/2018 11:11   Ref. Range 10/9/2017 17:05   Sodium Latest Ref Range: 136 -  145 mmol/L 141   Potassium Latest Ref Range: 3.5 - 5.1 mmol/L 3.9   Chloride Latest Ref Range: 95 - 110 mmol/L 109   CO2 Latest Ref Range: 23 - 29 mmol/L 25   Anion Gap Latest Ref Range: 8 - 16 mmol/L 7 (L)   BUN, Bld Latest Ref Range: 6 - 20 mg/dL 14   Creatinine Latest Ref Range: 0.5 - 1.4 mg/dL 0.9   eGFR if non African American Latest Ref Range: >60 mL/min/1.73 m^2 >60   eGFR if  Latest Ref Range: >60 mL/min/1.73 m^2 >60   Glucose Latest Ref Range: 70 - 110 mg/dL 96   Calcium Latest Ref Range: 8.7 - 10.5 mg/dL 9.7   Alkaline Phosphatase Latest Ref Range: 55 - 135 U/L 117   Total Protein Latest Ref Range: 6.0 - 8.4 g/dL 7.4   Albumin Latest Ref Range: 3.5 - 5.2 g/dL 3.8   Total Bilirubin Latest Ref Range: 0.1 - 1.0 mg/dL 0.3   AST Latest Ref Range: 10 - 40 U/L 12   ALT Latest Ref Range: 10 - 44 U/L 13     Results for FAB ALVARADO (MRN [id number]) as of 7/24/2018 11:11   Ref. Range 5/28/2009 11:28 6/6/2012 15:50 3/18/2016 08:53 11/27/2017 15:49   DARRION Latest Ref Range: Neg <1:160  Negative      DARRION HEP-2 Titer Unknown   Positive 1:320 Ho... Positive 1:160 Ho...   Anti-SSA Antibody Latest Ref Range: 0.00 - 19.99 EU   2.04 8.28   Anti-SSA Interpretation Latest Ref Range: Negative    Negative Negative   Anti-SSB Antibody Latest Ref Range: 0.00 - 19.99 EU   0.71 0.64   Anti-SSB Interpretation Latest Ref Range: Negative    Negative Negative   ds DNA Ab Latest Ref Range: Negative 1:10    Negative 1:10 Negative 1:10   Anti Sm Antibody Latest Ref Range: 0.00 - 19.99 EU   7.20 5.53   Anti-Sm Interpretation Latest Ref Range: Negative    Negative Negative   Anti Sm/RNP Antibody Latest Ref Range: 0.00 - 19.99 EU   3.19 4.97   Anti-Sm/RNP Interpretation Latest Ref Range: Negative    Negative Negative   TTG IgA Latest Units: U/mL  <1.2     CCP Antibodies Latest Ref Range: <5.0 U/mL   <0.5    IBD Serology 7 Unknown  See report under ...     Rheumatoid Factor Latest Ref Range: 0.0 - 15.0 IU/mL Negative   <10.0        Radiographs:-  Independent visualization of images done.   5/2018 - XR Foot - No periarticular erosion.  Small posterior calcaneal spur       Medication List with Changes/Refills   Current Medications    ALBUTEROL 90 MCG/ACTUATION INHALER    Inhale 2 puffs into the lungs every 6 (six) hours as needed for Wheezing.    BUPROPION (WELLBUTRIN SR) 150 MG TBSR 12 HR TABLET    Take 1 tablet (150 mg total) by mouth once daily.    CETIRIZINE (ZYRTEC) 10 MG TABLET    Take 10 mg by mouth once daily.    CHOLECALCIFEROL, VITAMIN D3, 1,000 UNIT CAPSULE    Take 1 capsule (1,000 Units total) by mouth once daily.    CRISABOROLE (EUCRISA) 2 % OINT    AAA of eyelid bid prn eczema    EFINACONAZOLE 10 % GEETA    Apply 1 application topically once daily.    ESCITALOPRAM OXALATE (LEXAPRO) 10 MG TABLET    Take 2 tablets (20 mg total) by mouth once daily.    INDOMETHACIN (INDOCIN) 50 MG CAPSULE    Take 1 capsule (50 mg total) by mouth 2 (two) times daily with meals. Take for 14 days    MEDROXYPROGESTERONE (DEPO-PROVERA) 150 MG/ML INJECTION    INJECT 1 ML INTO THE MUSCLE EVERY 3 MONTHS    OMEPRAZOLE (PRILOSEC) 20 MG CAPSULE    TAKE ONE CAPSULE BY MOUTH DAILY    TOPIRAMATE (TOPAMAX) 50 MG TABLET    TAKE 1 TABLET EVERY DAY           Assessment/Plans:-    1. Acute onset of R hallux swelling and tenderness.  Onset after consumption of seafood.  Most likely gout.  - Uric acid and CMP  - If uric acid is normal - may need MRI for further evaluation   - Steroid IM today     2. Fibromyalgia - current medication is not sufficient in pain management.  Failed lyrica and flexeril.   - Return back to psychiatry for more recommendations  - Ultram 50mg PRN for now     3. Positive DARRION - repeat markers are negative.  No other antibodies are positive.  Inflammatory markers are negative.  PE exam unremarkable  - repeat DARRION yearly    RTC in 6mths     Disclaimer: This note was prepared using voice recognition system and is likely to have sound alike  errors and is not proof read.  Please call me with any questions.

## 2018-07-25 ENCOUNTER — PATIENT MESSAGE (OUTPATIENT)
Dept: RHEUMATOLOGY | Facility: CLINIC | Age: 38
End: 2018-07-25

## 2018-07-26 NOTE — TELEPHONE ENCOUNTER
Debra Hamilton.  It does not rule out gout entirely . We have to repeat it when the inflammation resolves. If that's still normal, we proceed with MRI.

## 2018-07-27 PROBLEM — F41.8 DEPRESSION WITH ANXIETY: Status: ACTIVE | Noted: 2018-07-27

## 2018-07-27 PROBLEM — F41.1 GAD (GENERALIZED ANXIETY DISORDER): Status: ACTIVE | Noted: 2018-07-27

## 2018-07-31 ENCOUNTER — PATIENT MESSAGE (OUTPATIENT)
Dept: RHEUMATOLOGY | Facility: CLINIC | Age: 38
End: 2018-07-31

## 2018-07-31 DIAGNOSIS — M79.674 PAIN AND SWELLING OF TOE OF RIGHT FOOT: Primary | ICD-10-CM

## 2018-07-31 DIAGNOSIS — M79.89 PAIN AND SWELLING OF TOE OF RIGHT FOOT: Primary | ICD-10-CM

## 2018-07-31 NOTE — TELEPHONE ENCOUNTER
Please schedule MRI of right foot. Advice patient to avoid any anti inflammatories 72 hours before MRI and take tramadol for pain . Thanks.

## 2018-08-01 ENCOUNTER — OFFICE VISIT (OUTPATIENT)
Dept: URGENT CARE | Facility: CLINIC | Age: 38
End: 2018-08-01
Payer: COMMERCIAL

## 2018-08-01 VITALS
TEMPERATURE: 98 F | BODY MASS INDEX: 35.05 KG/M2 | HEIGHT: 68 IN | RESPIRATION RATE: 16 BRPM | WEIGHT: 231.25 LBS | OXYGEN SATURATION: 98 % | SYSTOLIC BLOOD PRESSURE: 110 MMHG | DIASTOLIC BLOOD PRESSURE: 72 MMHG | HEART RATE: 102 BPM

## 2018-08-01 DIAGNOSIS — R52 BODY ACHES: ICD-10-CM

## 2018-08-01 DIAGNOSIS — R11.2 NON-INTRACTABLE VOMITING WITH NAUSEA, UNSPECIFIED VOMITING TYPE: Primary | ICD-10-CM

## 2018-08-01 DIAGNOSIS — M79.7 FIBROMYALGIA: ICD-10-CM

## 2018-08-01 DIAGNOSIS — K59.00 CONSTIPATION, UNSPECIFIED CONSTIPATION TYPE: ICD-10-CM

## 2018-08-01 PROCEDURE — 96372 THER/PROPH/DIAG INJ SC/IM: CPT | Mod: S$GLB,,, | Performed by: NURSE PRACTITIONER

## 2018-08-01 PROCEDURE — 99214 OFFICE O/P EST MOD 30 MIN: CPT | Mod: 25,S$GLB,, | Performed by: NURSE PRACTITIONER

## 2018-08-01 PROCEDURE — 99999 PR PBB SHADOW E&M-EST. PATIENT-LVL V: CPT | Mod: PBBFAC,,, | Performed by: NURSE PRACTITIONER

## 2018-08-01 PROCEDURE — 3008F BODY MASS INDEX DOCD: CPT | Mod: CPTII,S$GLB,, | Performed by: NURSE PRACTITIONER

## 2018-08-01 RX ORDER — ONDANSETRON 4 MG/1
4 TABLET, ORALLY DISINTEGRATING ORAL EVERY 8 HOURS PRN
Qty: 6 TABLET | Refills: 0 | Status: SHIPPED | OUTPATIENT
Start: 2018-08-01 | End: 2018-08-01 | Stop reason: SDUPTHER

## 2018-08-01 RX ORDER — ONDANSETRON 4 MG/1
4 TABLET, ORALLY DISINTEGRATING ORAL EVERY 8 HOURS PRN
Qty: 6 TABLET | Refills: 0 | Status: SHIPPED | OUTPATIENT
Start: 2018-08-01 | End: 2019-01-30

## 2018-08-01 RX ORDER — PROMETHAZINE HYDROCHLORIDE 25 MG/ML
25 INJECTION, SOLUTION INTRAMUSCULAR; INTRAVENOUS
Status: COMPLETED | OUTPATIENT
Start: 2018-08-01 | End: 2018-08-01

## 2018-08-01 RX ORDER — ACETAMINOPHEN 325 MG/1
650 TABLET ORAL
Status: COMPLETED | OUTPATIENT
Start: 2018-08-01 | End: 2018-08-01

## 2018-08-01 RX ADMIN — PROMETHAZINE HYDROCHLORIDE 25 MG: 25 INJECTION, SOLUTION INTRAMUSCULAR; INTRAVENOUS at 10:08

## 2018-08-01 RX ADMIN — ACETAMINOPHEN 650 MG: 325 TABLET ORAL at 10:08

## 2018-08-01 NOTE — PATIENT INSTRUCTIONS
Rest  Push fluids  zofran as needed for nausea  Tylenol for body aches  If symptoms worsen or fail to improve with treatment, see your Primary Care Provider or go to the nearest Emergency Room.    Diet for Vomiting  (Adult)    Your symptoms may return or get worse after eating certain foods listed below. If this happens, stop eating these foods until your symptoms ease and you feel better.  Once the vomiting stops, follow the steps below.   During the first 12 to 24 hours  During the first 12 to 24 hours, follow this diet:  · Drinks. Plain water, sport drinks like electrolyte solutions, soft drinks without caffeine, mineral water (plain or flavored), clear fruit juices, and decaffeinated tea and coffee.  · Soups. Clear broth.  · Desserts. Plain gelatin, popsicles, and fruit juice bars. As you feel better, you may add 6 to 8 ounces of yogurt per day. If you have diarrhea, don't have foods or drinks that contain sugar, high-fructose corn syrup, or sugar alcohols.  During the next 24 hours  During the next 24 hours you may add the following to the above:  · Hot cereal, plain toast, bread, rolls, and crackers  · Plain noodles, rice, mashed potatoes, and chicken noodle or rice soup  · Unsweetened canned fruit (but not pineapple) and bananas  Don't eat more than 15 grams of fat a day. Do this by staying away from margarine, butter, oils, mayonnaise, sauces, gravies, fried foods, peanut butter, meat, poultry, and fish.  Don't eat much fiber. Stay away from raw or cooked vegetables, fresh fruits (except bananas), and bran cereals.  Limit how much caffeine and chocolate you have. Do not use any spices or seasonings except salt.  During the next 24 hours  Slowly go back to your normal diet, as you feel better and your symptoms ease.  Date Last Reviewed: 8/1/2016  © 5935-1950 [employer]. [address] [place] [address], [place] [address]. All rights reserved. This information is not intended as a substitute for  professional medical care. Always follow your healthcare professional's instructions.

## 2018-08-01 NOTE — PROGRESS NOTES
Administered phenergan 25mg  IM to pt left ventrogluteal. Pt tolerated well. No distress noted. Advised pt to wait 20 minutes in lobby and to inform  if any adverse reaction occurs. Pt stated understanding.

## 2018-08-01 NOTE — PROGRESS NOTES
Subjective:       Patient ID: Joy Recinos is a 38 y.o. female.    Chief Complaint: Emesis    Patient presents to Urgent Care with concern of sudden onset body aches, nausea, vomiting since yesterday. She took her first dose of tramadol the night before. She is not sure if this is viral or a reaction to the tramadol. No fever. No diarrhea. She does admit to feeling flushed at home.      Emesis    This is a new problem. The current episode started yesterday. The problem occurs 2 to 4 times per day. The problem has been waxing and waning. The emesis has an appearance of stomach contents. There has been no fever. Associated symptoms include abdominal pain, arthralgias, chills and myalgias. Pertinent negatives include no chest pain, coughing, diarrhea, dizziness, fever, headaches, sweats, URI or weight loss. She has tried bed rest and increased fluids for the symptoms. The treatment provided no relief.     Review of Systems   Constitutional: Positive for activity change, appetite change and chills. Negative for diaphoresis, fatigue, fever, unexpected weight change and weight loss.   HENT: Negative.    Respiratory: Negative for cough and shortness of breath.    Cardiovascular: Negative for chest pain, palpitations and leg swelling.   Gastrointestinal: Positive for abdominal pain, constipation, nausea and vomiting. Negative for abdominal distention, anal bleeding, blood in stool, diarrhea and rectal pain.   Genitourinary: Negative.    Musculoskeletal: Positive for arthralgias, back pain, myalgias and neck pain. Negative for gait problem, joint swelling and neck stiffness.   Skin: Negative for color change, pallor, rash and wound.   Allergic/Immunologic: Negative for immunocompromised state.   Neurological: Negative.  Negative for dizziness, facial asymmetry and headaches.   Hematological: Negative for adenopathy. Does not bruise/bleed easily.   Psychiatric/Behavioral: Negative for agitation, behavioral problems  and confusion.       Objective:      Physical Exam   Constitutional: She appears well-developed and well-nourished. She is cooperative. She appears ill. No distress.   HENT:   Head: Normocephalic and atraumatic.   Cardiovascular: Normal rate, regular rhythm and normal heart sounds.    No murmur heard.  Pulmonary/Chest: Effort normal and breath sounds normal. No respiratory distress.   Abdominal: Normal appearance and bowel sounds are normal. She exhibits no distension, no pulsatile liver, no fluid wave and no ascites. There is no tenderness. There is no rigidity, no rebound, no guarding and no CVA tenderness.   Musculoskeletal: Normal range of motion.   Neurological: She is alert.   Skin: Skin is warm and dry. No rash noted. She is not diaphoretic.   Psychiatric: She has a normal mood and affect. Her behavior is normal. Judgment and thought content normal.   Nursing note and vitals reviewed.      Assessment:       1. Non-intractable vomiting with nausea, unspecified vomiting type    2. Body aches    3. Constipation, unspecified constipation type    4. Fibromyalgia        Plan:         Joy was seen today for emesis.    Diagnoses and all orders for this visit:    Non-intractable vomiting with nausea, unspecified vomiting type  -     promethazine injection 25 mg; Inject 1 mL (25 mg total) into the muscle one time.  -     acetaminophen tablet 650 mg; Take 2 tablets (650 mg total) by mouth one time.  -     ondansetron (ZOFRAN-ODT) 4 MG TbDL; Take 1 tablet (4 mg total) by mouth every 8 (eight) hours as needed.  Follow clear liquid diet guidelines, may progress to bland diet as tolerated.   Zofran as needed for nausea.   Drink a small sips of clear liquids every 15-20 minutes to re-hydrate yourself.  See PCP or go to ER if decreased urination, inability to hold down fluids, if any dizziness or lightheadedness occurs, or if symptoms worsen of fail to improve with treatment.    Body aches  -     promethazine injection 25  mg; Inject 1 mL (25 mg total) into the muscle one time.  -     acetaminophen tablet 650 mg; Take 2 tablets (650 mg total) by mouth one time.    Constipation, unspecified constipation type  miralax and probiotics     Fibromyalgia  Follow up with rheumatology for continued care

## 2018-08-02 ENCOUNTER — TELEPHONE (OUTPATIENT)
Dept: RADIOLOGY | Facility: HOSPITAL | Age: 38
End: 2018-08-02

## 2018-08-03 ENCOUNTER — HOSPITAL ENCOUNTER (OUTPATIENT)
Dept: RADIOLOGY | Facility: HOSPITAL | Age: 38
Discharge: HOME | End: 2018-08-03
Attending: INTERNAL MEDICINE
Payer: COMMERCIAL

## 2018-08-03 ENCOUNTER — CLINICAL SUPPORT (OUTPATIENT)
Dept: REHABILITATION | Facility: HOSPITAL | Age: 38
End: 2018-08-03
Payer: COMMERCIAL

## 2018-08-03 DIAGNOSIS — M79.674 PAIN AND SWELLING OF TOE OF RIGHT FOOT: ICD-10-CM

## 2018-08-03 DIAGNOSIS — N94.2 VAGINISMUS: Primary | ICD-10-CM

## 2018-08-03 DIAGNOSIS — M79.89 PAIN AND SWELLING OF TOE OF RIGHT FOOT: ICD-10-CM

## 2018-08-03 PROCEDURE — 97535 SELF CARE MNGMENT TRAINING: CPT | Performed by: PHYSICAL THERAPIST

## 2018-08-03 PROCEDURE — 73720 MRI LWR EXTREMITY W/O&W/DYE: CPT | Mod: TC,PO,RT

## 2018-08-03 PROCEDURE — 25500020 PHARM REV CODE 255: Mod: PO | Performed by: INTERNAL MEDICINE

## 2018-08-03 PROCEDURE — 73720 MRI LWR EXTREMITY W/O&W/DYE: CPT | Mod: 26,RT,, | Performed by: RADIOLOGY

## 2018-08-03 PROCEDURE — 97140 MANUAL THERAPY 1/> REGIONS: CPT | Performed by: PHYSICAL THERAPIST

## 2018-08-03 PROCEDURE — A9585 GADOBUTROL INJECTION: HCPCS | Mod: PO | Performed by: INTERNAL MEDICINE

## 2018-08-03 RX ORDER — GADOBUTROL 604.72 MG/ML
10 INJECTION INTRAVENOUS
Status: COMPLETED | OUTPATIENT
Start: 2018-08-03 | End: 2018-08-03

## 2018-08-03 RX ADMIN — GADOBUTROL 10 ML: 604.72 INJECTION INTRAVENOUS at 10:08

## 2018-08-06 ENCOUNTER — PATIENT MESSAGE (OUTPATIENT)
Dept: RHEUMATOLOGY | Facility: CLINIC | Age: 38
End: 2018-08-06

## 2018-08-06 NOTE — PROGRESS NOTES
OUTPATIENT PHYSICAL THERAPY DAILY NOTE       Patient: Joy Huntley [facility] #:  [id number]    Diagnosis:   Encounter Diagnosis   Name Primary?    Vaginismus Yes      Date of treatment: 08/06/2018       SUBJECTIVE   Pt reports:I hve not been doing my exercises at home due to time limitations. I have a hard time doing breathing exercises due to small nasal passages. I am having more low back pain today    OBJECTIVE       Neuromuscular Re-education NT  Manual Therapy general pelvic release externally and c section scar tissue mobilization 15 min      Modalities NT    Education: spent 50% of time together on self care: mediation daily, eating well to reduce inflammation, promote better sleep and GI issues and breathing exercises to rev down nervous system  . Pt also educated on dilator use, progression 38 min    ASSESSMENT      Pt very emotional today regarding her progress and was contemplating changing her sexual goal. She would like to be more sexual with  but feels she wont be able to achieve this        PLAN     Continue with established Plan of Care, adding lumbo pelvic exercises next visit

## 2018-08-06 NOTE — TELEPHONE ENCOUNTER
MRI scan shows signs of inflammation inside the joint space. Have you had any open sore in that toe in the recent months?

## 2018-08-09 ENCOUNTER — TELEPHONE (OUTPATIENT)
Dept: RHEUMATOLOGY | Facility: CLINIC | Age: 38
End: 2018-08-09

## 2018-08-09 ENCOUNTER — PATIENT MESSAGE (OUTPATIENT)
Dept: RHEUMATOLOGY | Facility: CLINIC | Age: 38
End: 2018-08-09

## 2018-08-09 DIAGNOSIS — M06.4 UNDIFFERENTIATED INFLAMMATORY ARTHRITIS: Primary | ICD-10-CM

## 2018-08-09 PROBLEM — M19.90 UNDIFFERENTIATED INFLAMMATORY ARTHRITIS: Status: ACTIVE | Noted: 2018-08-09

## 2018-08-09 RX ORDER — CELECOXIB 200 MG/1
200 CAPSULE ORAL 2 TIMES DAILY
Qty: 60 CAPSULE | Refills: 0 | Status: SHIPPED | OUTPATIENT
Start: 2018-08-09 | End: 2018-08-18

## 2018-08-09 NOTE — TELEPHONE ENCOUNTER
Spoke with pt and advised her of MRI test results. Pt states she has not have any open sores in the recent months. Please Advise.

## 2018-08-09 NOTE — TELEPHONE ENCOUNTER
----- Message from Dalia Soriano sent at 8/9/2018 10:27 AM CDT -----  Contact: Cas  Patient is requesting her MRI results, please call her back at [phone]. Thank you

## 2018-08-17 ENCOUNTER — PATIENT MESSAGE (OUTPATIENT)
Dept: RHEUMATOLOGY | Facility: CLINIC | Age: 38
End: 2018-08-17

## 2018-08-17 ENCOUNTER — CLINICAL SUPPORT (OUTPATIENT)
Dept: REHABILITATION | Facility: HOSPITAL | Age: 38
End: 2018-08-17
Payer: COMMERCIAL

## 2018-08-17 ENCOUNTER — TELEPHONE (OUTPATIENT)
Dept: RHEUMATOLOGY | Facility: CLINIC | Age: 38
End: 2018-08-17

## 2018-08-17 DIAGNOSIS — N94.2 VAGINISMUS: Primary | ICD-10-CM

## 2018-08-17 DIAGNOSIS — M54.40 LOW BACK PAIN WITH SCIATICA, SCIATICA LATERALITY UNSPECIFIED, UNSPECIFIED BACK PAIN LATERALITY, UNSPECIFIED CHRONICITY: ICD-10-CM

## 2018-08-17 DIAGNOSIS — M06.4 UNDIFFERENTIATED INFLAMMATORY ARTHRITIS: Primary | ICD-10-CM

## 2018-08-17 PROCEDURE — 97014 ELECTRIC STIMULATION THERAPY: CPT | Performed by: PHYSICAL THERAPIST

## 2018-08-17 PROCEDURE — 97140 MANUAL THERAPY 1/> REGIONS: CPT | Performed by: PHYSICAL THERAPIST

## 2018-08-17 PROCEDURE — 97112 NEUROMUSCULAR REEDUCATION: CPT | Performed by: PHYSICAL THERAPIST

## 2018-08-17 NOTE — PROGRESS NOTES
OUTPATIENT PHYSICAL THERAPY DAILY NOTE       Patient: Joy Huntley [facility] [address] #:  [id number]    Diagnosis:   Encounter Diagnoses   Name Primary?    Vaginismus Yes    Low back pain with sciatica, sciatica laterality unspecified, unspecified back pain laterality, unspecified chronicity       Date of treatment: 08/17/2018       SUBJECTIVE   Pt reports:I have been in a lot of pain lately, my back and hip hurt a lot. I been using my dilator and was able to get the thinnest one in for 5 minutes half way in    OBJECTIVE       Neuromuscular Re-education piriformis release in ER/IR in flexion 3x x30 secbird dog, cat- cow, LTR, DKTC, abd hollow 15 min  Manual Therapy general pelvic release externally, TL junction gapping mob, STM to B l/sp paraspinals 25 min      Modalities MHP to l/sp and pelvis with estim at S2-5 15 min    Education: spent 25% of time together on self care: mediation daily, eating well to reduce inflammation, promote better sleep and GI issues and breathing exercises to rev down nervous system  . Pt also educated on dilator use, progression 25 min    ASSESSMENT      Pt in better spirits today. Pt progress slow but steady        PLAN     Continue with established Plan of Care

## 2018-08-18 NOTE — TELEPHONE ENCOUNTER
Please stop celebrex and start prednisone . I have sent a new prescription to your pharmacy. We need to repeat labs in one week after starting the medication.  Regards

## 2018-08-20 RX ORDER — PREDNISONE 10 MG/1
TABLET ORAL
Qty: 60 TABLET | Refills: 0 | Status: SHIPPED | OUTPATIENT
Start: 2018-08-20 | End: 2018-09-17

## 2018-08-29 ENCOUNTER — PATIENT MESSAGE (OUTPATIENT)
Dept: NEUROLOGY | Facility: CLINIC | Age: 38
End: 2018-08-29

## 2018-08-31 ENCOUNTER — CLINICAL SUPPORT (OUTPATIENT)
Dept: REHABILITATION | Facility: HOSPITAL | Age: 38
End: 2018-08-31
Payer: COMMERCIAL

## 2018-08-31 DIAGNOSIS — N94.2 VAGINISMUS: Primary | ICD-10-CM

## 2018-08-31 DIAGNOSIS — M54.40 LOW BACK PAIN WITH SCIATICA, SCIATICA LATERALITY UNSPECIFIED, UNSPECIFIED BACK PAIN LATERALITY, UNSPECIFIED CHRONICITY: ICD-10-CM

## 2018-08-31 PROCEDURE — 97140 MANUAL THERAPY 1/> REGIONS: CPT | Performed by: PHYSICAL THERAPIST

## 2018-08-31 PROCEDURE — 97112 NEUROMUSCULAR REEDUCATION: CPT | Performed by: PHYSICAL THERAPIST

## 2018-08-31 PROCEDURE — 97014 ELECTRIC STIMULATION THERAPY: CPT | Performed by: PHYSICAL THERAPIST

## 2018-08-31 PROCEDURE — 97110 THERAPEUTIC EXERCISES: CPT | Performed by: PHYSICAL THERAPIST

## 2018-08-31 NOTE — PROGRESS NOTES
OUTPATIENT PHYSICAL THERAPY DAILY NOTE       Patient: Joy Huntley [facility] #:  [id number]    Diagnosis:   Encounter Diagnoses   Name Primary?    Vaginismus Yes    Low back pain with sciatica, sciatica laterality unspecified, unspecified back pain laterality, unspecified chronicity       Date of treatment: 08/31/2018       SUBJECTIVE   Pt reports:I have been in a lot of pain lately, my back and hip hurt a lot.     OBJECTIVE       Neuromuscular Re-education piriformis release in ER/IR in flexion 3x x30 secbird dog, cat- cow, LTR, DKTC, abd hollow, clams 20x each 15 min  Manual Therapy general pelvic release externally, TL junction gapping mob, STM to B l/sp paraspinals 25 min  Therex: Nu step 10 min    Modalities MHP to l/sp and pelvis with estim at S2-5 15 min    Education: spent 25% of time together on self care: mediation daily, eating well to reduce inflammation, promote better sleep and GI issues and breathing exercises to rev down nervous system  . Pt also educated on dilator use, progression 25 min    ASSESSMENT      Pt in better spirits today. Pt progress slow but steady        PLAN     Continue with established Plan of Care

## 2018-09-07 ENCOUNTER — PATIENT MESSAGE (OUTPATIENT)
Dept: RHEUMATOLOGY | Facility: CLINIC | Age: 38
End: 2018-09-07

## 2018-09-07 ENCOUNTER — CLINICAL SUPPORT (OUTPATIENT)
Dept: REHABILITATION | Facility: HOSPITAL | Age: 38
End: 2018-09-07
Payer: COMMERCIAL

## 2018-09-07 DIAGNOSIS — M06.4 UNDIFFERENTIATED INFLAMMATORY ARTHRITIS: Primary | ICD-10-CM

## 2018-09-07 DIAGNOSIS — N94.2 VAGINISMUS: Primary | ICD-10-CM

## 2018-09-07 DIAGNOSIS — M54.40 LOW BACK PAIN WITH SCIATICA, SCIATICA LATERALITY UNSPECIFIED, UNSPECIFIED BACK PAIN LATERALITY, UNSPECIFIED CHRONICITY: ICD-10-CM

## 2018-09-07 PROCEDURE — 97112 NEUROMUSCULAR REEDUCATION: CPT | Performed by: PHYSICAL THERAPIST

## 2018-09-07 PROCEDURE — 97014 ELECTRIC STIMULATION THERAPY: CPT | Performed by: PHYSICAL THERAPIST

## 2018-09-07 PROCEDURE — 97140 MANUAL THERAPY 1/> REGIONS: CPT | Performed by: PHYSICAL THERAPIST

## 2018-09-07 PROCEDURE — 97110 THERAPEUTIC EXERCISES: CPT | Performed by: PHYSICAL THERAPIST

## 2018-09-07 NOTE — PROGRESS NOTES
OUTPATIENT PHYSICAL THERAPY DAILY NOTE       Patient: Joy Huntley [facility] #:  [id number]    Diagnosis:   Encounter Diagnoses   Name Primary?    Vaginismus Yes    Low back pain with sciatica, sciatica laterality unspecified, unspecified back pain laterality, unspecified chronicity       Date of treatment: 09/07/2018       SUBJECTIVE   Pt reports:I had the most relief of body pain after last session.  I feel like I need to go to the restroom more frequrntly today and I have a heaviness in my bladder region    OBJECTIVE       Neuromuscular Re-education piriformis release in ER/IR in flexion 3x x30 secbird dog, cat- cow, LTR, DKTC, abd hollow, clams 20x each 15 min  Manual Therapy general pelvic release externally, TL junction gapping mob, STM to B l/sp paraspinals 25 min  Therex: Nu step 10 min    Modalities MHP to l/sp and pelvis with estim at S2-5 15 min    Education: spent 25% of time together on self care: mediation daily, eating well to reduce inflammation, promote better sleep and GI issues and breathing exercises to rev down nervous system  . Pt also educated on dilator use, progression 25 min    ASSESSMENT      Pt given handout on bladder care today- avoiding bladder irritants, increasing fluids and need of AZO. Pt did report decrease bladder pain after external trigger point release of bulbo and cavernous mm        PLAN     Continue with established Plan of Care

## 2018-09-13 ENCOUNTER — PATIENT MESSAGE (OUTPATIENT)
Dept: RHEUMATOLOGY | Facility: CLINIC | Age: 38
End: 2018-09-13

## 2018-09-14 ENCOUNTER — HOSPITAL ENCOUNTER (OUTPATIENT)
Dept: RADIOLOGY | Facility: HOSPITAL | Age: 38
Discharge: HOME | End: 2018-09-14
Attending: NURSE PRACTITIONER
Payer: COMMERCIAL

## 2018-09-14 ENCOUNTER — CLINICAL SUPPORT (OUTPATIENT)
Dept: REHABILITATION | Facility: HOSPITAL | Age: 38
End: 2018-09-14
Payer: COMMERCIAL

## 2018-09-14 VITALS — HEIGHT: 68 IN | BODY MASS INDEX: 35.01 KG/M2 | WEIGHT: 231 LBS

## 2018-09-14 DIAGNOSIS — M54.40 LOW BACK PAIN WITH SCIATICA, SCIATICA LATERALITY UNSPECIFIED, UNSPECIFIED BACK PAIN LATERALITY, UNSPECIFIED CHRONICITY: Primary | ICD-10-CM

## 2018-09-14 DIAGNOSIS — R92.8 ABNORMAL MAMMOGRAM: ICD-10-CM

## 2018-09-14 DIAGNOSIS — N94.2 VAGINISMUS: ICD-10-CM

## 2018-09-14 PROCEDURE — 77065 DX MAMMO INCL CAD UNI: CPT | Mod: TC,PO,LT

## 2018-09-14 PROCEDURE — 97112 NEUROMUSCULAR REEDUCATION: CPT | Performed by: PHYSICAL THERAPIST

## 2018-09-14 PROCEDURE — 77065 DX MAMMO INCL CAD UNI: CPT | Mod: 26,LT,, | Performed by: RADIOLOGY

## 2018-09-14 PROCEDURE — G0279 MAMMO DIGITAL DIAGNOSTIC LEFT WITH TOMOSYNTHESIS_CAD: ICD-10-PCS | Mod: 26,LT,, | Performed by: RADIOLOGY

## 2018-09-14 PROCEDURE — 77065 MAMMO DIGITAL DIAGNOSTIC LEFT WITH TOMOSYNTHESIS_CAD: ICD-10-PCS | Mod: 26,LT,, | Performed by: RADIOLOGY

## 2018-09-14 PROCEDURE — 76642 ULTRASOUND BREAST LIMITED: CPT | Mod: 26,LT,, | Performed by: RADIOLOGY

## 2018-09-14 PROCEDURE — 97140 MANUAL THERAPY 1/> REGIONS: CPT | Performed by: PHYSICAL THERAPIST

## 2018-09-14 PROCEDURE — 97014 ELECTRIC STIMULATION THERAPY: CPT | Performed by: PHYSICAL THERAPIST

## 2018-09-14 PROCEDURE — 77061 BREAST TOMOSYNTHESIS UNI: CPT | Mod: TC,PO,LT

## 2018-09-14 PROCEDURE — G0279 TOMOSYNTHESIS, MAMMO: HCPCS | Mod: 26,LT,, | Performed by: RADIOLOGY

## 2018-09-14 PROCEDURE — 76642 ULTRASOUND BREAST LIMITED: CPT | Mod: TC,PO,LT

## 2018-09-14 NOTE — PROGRESS NOTES
OUTPATIENT PHYSICAL THERAPY DAILY NOTE       Patient: Joy Huntley [facility] #:  [id number]    Diagnosis:   Encounter Diagnoses   Name Primary?    Low back pain with sciatica, sciatica laterality unspecified, unspecified back pain laterality, unspecified chronicity Yes    Vaginismus       Date of treatment: 09/14/2018       SUBJECTIVE   Pt reports:I took AZO and it helped my bladder issues. I used the smallest dilator for several minutes and it was ok! I have been having swelling in my joint and have a Rheum Appt on Monday    OBJECTIVE       Neuromuscular Re-education piriformis release in ER/IR in flexion 3x x30 secbird dog, cat- cow, LTR, DKTC, abd hollow, clams 20x each 15 min  Manual Therapy general pelvic release externally, TL junction gapping mob, STM to B l/sp paraspinals 15 min  Therex: Nu step 10 min    Modalities MHP to l/sp and pelvis with estim at S2-5 15 min    Education: spent 25% of time together on self care: mediation daily, eating well to reduce inflammation, promote better sleep and GI issues and breathing exercises to rev down nervous system  . Pt also educated on dilator use, progression 25 min    ASSESSMENT      Pt reports minimal to no pain with external palpation of pelvic floor mm        PLAN     Continue with established Plan of Care

## 2018-09-17 ENCOUNTER — LAB VISIT (OUTPATIENT)
Dept: LAB | Facility: HOSPITAL | Age: 38
End: 2018-09-17
Attending: INTERNAL MEDICINE
Payer: COMMERCIAL

## 2018-09-17 ENCOUNTER — OFFICE VISIT (OUTPATIENT)
Dept: RHEUMATOLOGY | Facility: CLINIC | Age: 38
End: 2018-09-17
Payer: COMMERCIAL

## 2018-09-17 VITALS
DIASTOLIC BLOOD PRESSURE: 71 MMHG | HEART RATE: 90 BPM | SYSTOLIC BLOOD PRESSURE: 118 MMHG | WEIGHT: 236.31 LBS | BODY MASS INDEX: 35.81 KG/M2 | HEIGHT: 68 IN

## 2018-09-17 DIAGNOSIS — R76.8 POSITIVE ANA (ANTINUCLEAR ANTIBODY): Primary | ICD-10-CM

## 2018-09-17 DIAGNOSIS — E55.9 VITAMIN D DEFICIENCY: ICD-10-CM

## 2018-09-17 DIAGNOSIS — M79.674 GREAT TOE PAIN, RIGHT: ICD-10-CM

## 2018-09-17 DIAGNOSIS — R60.0 PEDAL EDEMA: ICD-10-CM

## 2018-09-17 DIAGNOSIS — R76.8 POSITIVE ANA (ANTINUCLEAR ANTIBODY): ICD-10-CM

## 2018-09-17 PROBLEM — M19.90 UNDIFFERENTIATED INFLAMMATORY ARTHRITIS: Status: RESOLVED | Noted: 2018-08-09 | Resolved: 2018-09-17

## 2018-09-17 PROBLEM — M06.4 UNDIFFERENTIATED INFLAMMATORY ARTHRITIS: Status: RESOLVED | Noted: 2018-08-09 | Resolved: 2018-09-17

## 2018-09-17 LAB
BASOPHILS # BLD AUTO: 0.01 K/UL
BASOPHILS NFR BLD: 0.2 %
C3 SERPL-MCNC: 132 MG/DL
C4 SERPL-MCNC: 35 MG/DL
CRP SERPL-MCNC: 13.9 MG/L
DIFFERENTIAL METHOD: NORMAL
EOSINOPHIL # BLD AUTO: 0.1 K/UL
EOSINOPHIL NFR BLD: 1.7 %
ERYTHROCYTE [DISTWIDTH] IN BLOOD BY AUTOMATED COUNT: 13.7 %
ERYTHROCYTE [SEDIMENTATION RATE] IN BLOOD BY WESTERGREN METHOD: 15 MM/HR
HCT VFR BLD AUTO: 40.1 %
HGB BLD-MCNC: 13.1 G/DL
IGA SERPL-MCNC: 226 MG/DL
IGG SERPL-MCNC: 870 MG/DL
IGM SERPL-MCNC: 60 MG/DL
LYMPHOCYTES # BLD AUTO: 1.6 K/UL
LYMPHOCYTES NFR BLD: 25.2 %
MCH RBC QN AUTO: 29.3 PG
MCHC RBC AUTO-ENTMCNC: 32.7 G/DL
MCV RBC AUTO: 90 FL
MONOCYTES # BLD AUTO: 0.4 K/UL
MONOCYTES NFR BLD: 5.6 %
NEUTROPHILS # BLD AUTO: 4.2 K/UL
NEUTROPHILS NFR BLD: 67.3 %
PLATELET # BLD AUTO: 219 K/UL
PMV BLD AUTO: 11.5 FL
RBC # BLD AUTO: 4.47 M/UL
WBC # BLD AUTO: 6.3 K/UL

## 2018-09-17 PROCEDURE — 86160 COMPLEMENT ANTIGEN: CPT | Mod: 59

## 2018-09-17 PROCEDURE — 86160 COMPLEMENT ANTIGEN: CPT

## 2018-09-17 PROCEDURE — 85025 COMPLETE CBC W/AUTO DIFF WBC: CPT | Mod: PO

## 2018-09-17 PROCEDURE — 82784 ASSAY IGA/IGD/IGG/IGM EACH: CPT | Mod: 59

## 2018-09-17 PROCEDURE — 99214 OFFICE O/P EST MOD 30 MIN: CPT | Mod: S$GLB,,, | Performed by: INTERNAL MEDICINE

## 2018-09-17 PROCEDURE — 85651 RBC SED RATE NONAUTOMATED: CPT | Mod: PO

## 2018-09-17 PROCEDURE — 86334 IMMUNOFIX E-PHORESIS SERUM: CPT

## 2018-09-17 PROCEDURE — 86140 C-REACTIVE PROTEIN: CPT

## 2018-09-17 PROCEDURE — 99999 PR PBB SHADOW E&M-EST. PATIENT-LVL III: CPT | Mod: PBBFAC,,, | Performed by: INTERNAL MEDICINE

## 2018-09-17 PROCEDURE — 86038 ANTINUCLEAR ANTIBODIES: CPT

## 2018-09-17 PROCEDURE — 84165 PROTEIN E-PHORESIS SERUM: CPT | Mod: 26,,, | Performed by: PATHOLOGY

## 2018-09-17 PROCEDURE — 3008F BODY MASS INDEX DOCD: CPT | Mod: CPTII,S$GLB,, | Performed by: INTERNAL MEDICINE

## 2018-09-17 PROCEDURE — 86235 NUCLEAR ANTIGEN ANTIBODY: CPT | Mod: 59

## 2018-09-17 PROCEDURE — 84165 PROTEIN E-PHORESIS SERUM: CPT

## 2018-09-17 PROCEDURE — 86334 IMMUNOFIX E-PHORESIS SERUM: CPT | Mod: 26,,, | Performed by: PATHOLOGY

## 2018-09-17 PROCEDURE — 86039 ANTINUCLEAR ANTIBODIES (ANA): CPT

## 2018-09-17 PROCEDURE — 36415 COLL VENOUS BLD VENIPUNCTURE: CPT | Mod: PO

## 2018-09-17 RX ORDER — ERGOCALCIFEROL 1.25 MG/1
50000 CAPSULE ORAL
Qty: 12 CAPSULE | Refills: 3 | Status: SHIPPED | OUTPATIENT
Start: 2018-09-17 | End: 2020-08-17

## 2018-09-17 NOTE — ASSESSMENT & PLAN NOTE
Intermittent worsening bilateral pedal edema with no evidence of effusion found on ultrasound examination in the clinic today.  Advised salt restriction and weight reduction.

## 2018-09-17 NOTE — PROGRESS NOTES
RHEUMATOLOGY CLINIC FOLLOW UP VISIT  Chief complaints:-  Both of my legs swelled up last week.    HPI:-  Joy Willett a 38 y.o. pleasant female comes in for a follow up visit with above chief complaints.     Review of Systems   Constitutional: Negative for chills and fever.   HENT: Negative for congestion and sore throat.    Eyes: Negative for blurred vision and redness.   Respiratory: Negative for cough and shortness of breath.    Cardiovascular: Positive for leg swelling. Negative for chest pain.   Gastrointestinal: Negative for abdominal pain.   Genitourinary: Negative for dysuria.   Musculoskeletal: Positive for back pain, joint pain and myalgias. Negative for falls and neck pain.   Skin: Negative for rash.   Neurological: Negative for headaches.   Endo/Heme/Allergies: Does not bruise/bleed easily.   Psychiatric/Behavioral: Negative for memory loss. The patient does not have insomnia.        Past Medical History:   Diagnosis Date    B12 deficiency     Chronic low back pain     oberlander    Endometriosis     Fibromyalgia syndrome     Idiopathic hypersomnia     Irritable bowel syndrome     goldman    Migraines     frances    Shingles     Undifferentiated inflammatory arthritis 2018    Undifferentiated inflammatory arthritis 2018       Past Surgical History:   Procedure Laterality Date     SECTION      x 2     OOPHORECTOMY      PELVIC LAPAROSCOPY      TUBAL LIGATION          Social History     Tobacco Use    Smoking status: Never Smoker    Smokeless tobacco: Never Used   Substance Use Topics    Alcohol use: Yes     Comment: rarely    Drug use: No       Family History   Problem Relation Age of Onset    No Known Problems Mother     Heart disease Father         Bypass    Skin cancer Father     Cataracts Maternal Grandmother     Diabetes Maternal Grandmother     Glaucoma Maternal Grandmother     Skin cancer Maternal  "Grandmother     Cataracts Maternal Grandfather     Skin cancer Paternal Grandfather     Breast cancer Neg Hx     Ovarian cancer Neg Hx     Deep vein thrombosis Neg Hx        Review of patient's allergies indicates:  No Known Allergies        Physical examination:-    Vitals:    09/17/18 1234   BP: 118/71   Pulse: 90   Weight: 107.2 kg (236 lb 5.3 oz)   Height: 5' 8" (1.727 m)   PainSc:   6       Physical Exam   Constitutional: She is oriented to person, place, and time and well-developed, well-nourished, and in no distress. No distress.   HENT:   Head: Normocephalic.   Mouth/Throat: Oropharynx is clear and moist.   Eyes: Conjunctivae and EOM are normal. Pupils are equal, round, and reactive to light.   Neck: Normal range of motion. Neck supple.   Cardiovascular: Normal rate and intact distal pulses.   Pulmonary/Chest: Effort normal. No respiratory distress.   Abdominal: Soft. There is no tenderness.   Musculoskeletal:   No synovitis over small joints of hands or feet.  Tenderness present over right 1st MTP.  Few tender points.  No effusion over large joints.  No dactylitis/enthesitis/effusion.   Neurological: She is alert and oriented to person, place, and time. No cranial nerve deficit.   Skin: Skin is warm. No rash noted. No erythema.   Psychiatric: Mood and affect normal.   Nursing note and vitals reviewed.                       Medication List           Accurate as of 9/17/18  5:20 PM. If you have any questions, ask your nurse or doctor.               START taking these medications    ergocalciferol 50,000 unit Cap  Commonly known as:  ERGOCALCIFEROL  Take 1 capsule (50,000 Units total) by mouth every 7 days.  Started by:  Adalberto Kong MD        CONTINUE taking these medications    buPROPion 150 MG TBSR 12 hr tablet  Commonly known as:  WELLBUTRIN SR  Take 1 tablet (150 mg total) by mouth once daily.     cetirizine 10 MG tablet  Commonly known as:  ZYRTEC     cholecalciferol (vitamin D3) 1,000 " unit capsule  Commonly known as:  VITAMIN D3  Take 1 capsule (1,000 Units total) by mouth once daily.     crisaborole 2 % Oint  Commonly known as:  EUCRISA  AAA of eyelid bid prn eczema     efinaconazole 10 % Leslie  Commonly known as:  JUBLIA  Apply 1 application topically once daily.     escitalopram oxalate 10 MG tablet  Commonly known as:  LEXAPRO  Take 2 tablets (20 mg total) by mouth once daily.     omeprazole 20 MG capsule  Commonly known as:  PRILOSEC  TAKE ONE CAPSULE BY MOUTH DAILY     ondansetron 4 MG Tbdl  Commonly known as:  ZOFRAN-ODT  Take 1 tablet (4 mg total) by mouth every 8 (eight) hours as needed.     topiramate 50 MG tablet  Commonly known as:  TOPAMAX  TAKE 1 TABLET EVERY DAY     traMADol 50 mg tablet  Commonly known as:  ULTRAM  Take 1 tablet (50 mg total) by mouth every 24 hours as needed for Pain.        STOP taking these medications    albuterol 90 mcg/actuation inhaler  Commonly known as:  PROVENTIL/VENTOLIN HFA  Stopped by:  Adalberto Kong MD     predniSONE 10 MG tablet  Commonly known as:  DELTASONE  Stopped by:  Adalberto Kong MD           Where to Get Your Medications      These medications were sent to [facility]  [address] [facility] [place] [facility] [address]    Hours:  Mon-Fri, 8a-5:30p Phone:  [phone]   · ergocalciferol 50,000 unit Cap         Assessment/Plans:-  Positive DARRION (antinuclear antibody)  Longstanding history of positive DARRION with negative KATLYN without any evidence of systemic lupus erythematosus, Sjogren syndrome, rheumatoid arthritis or myositis.  She has significant vertical knee ridges and the nail deformities raising possibility of early psoriasis.  This could explain the posterior DARRION if confirmed.  Advised to schedule an appointment with dermatologist for the same. Recheck serologies today.   - DARRION; Future  - C3 complement; Standing  - C4 complement; Standing  - C-reactive protein; Standing  - Sedimentation rate; Standing  - Protein  electrophoresis, serum; Standing  - Immunofixation electrophoresis; Standing  - Immunoglobulins (IgG, IgA, IgM) Quantitative; Standing  - CBC auto differential; Future    Great toe pain, right  Chronic pain confirmed to be secondary to osteoarthritis without any associated enthesitis/dactylitis/synovitis.  MRI showed contrast enhancement.  I did a detailed musculoskeletal ultrasound examination today of multiple tendons in both feet.  No evidence of active in decided/synovitis.  Schedule follow-up with podiatrist.    Vitamin D deficiency  Start replacement therapy with weekly ergocalciferol.  Check levels.  - ergocalciferol (ERGOCALCIFEROL) 50,000 unit Cap; Take 1 capsule (50,000 Units total) by mouth every 7 days.  Dispense: 12 capsule; Refill: 3    Pedal edema  Intermittent worsening bilateral pedal edema with no evidence of effusion found on ultrasound examination in the clinic today.  Advised salt restriction and weight reduction.     Disclaimer: This note was prepared using voice recognition system and is likely to have sound alike errors and is not proof read.  Please call me with any questions.

## 2018-09-17 NOTE — ASSESSMENT & PLAN NOTE
Chronic pain confirmed to be secondary to osteoarthritis without any associated enthesitis/dactylitis/synovitis.  MRI showed contrast enhancement.  I did a detailed musculoskeletal ultrasound examination today of multiple tendons in both feet.  No evidence of active in decided/synovitis.  Schedule follow-up with podiatrist.

## 2018-09-18 LAB
ALBUMIN SERPL ELPH-MCNC: 3.84 G/DL
ALPHA1 GLOB SERPL ELPH-MCNC: 0.34 G/DL
ALPHA2 GLOB SERPL ELPH-MCNC: 0.72 G/DL
ANA SER QL IF: POSITIVE
ANA TITR SER IF: NORMAL {TITER}
B-GLOBULIN SERPL ELPH-MCNC: 0.85 G/DL
GAMMA GLOB SERPL ELPH-MCNC: 0.85 G/DL
INTERPRETATION SERPL IFE-IMP: NORMAL
PATHOLOGIST INTERPRETATION IFE: NORMAL
PATHOLOGIST INTERPRETATION SPE: NORMAL
PROT SERPL-MCNC: 6.6 G/DL

## 2018-09-19 LAB
ANTI SM ANTIBODY: 6.05 EU
ANTI SM/RNP ANTIBODY: 3.36 EU
ANTI-SM INTERPRETATION: NEGATIVE
ANTI-SM/RNP INTERPRETATION: NEGATIVE
ANTI-SSA ANTIBODY: 4.35 EU
ANTI-SSA INTERPRETATION: NEGATIVE
ANTI-SSB ANTIBODY: 0.8 EU
ANTI-SSB INTERPRETATION: NEGATIVE
DSDNA AB SER-ACNC: NORMAL [IU]/ML

## 2018-09-20 ENCOUNTER — OFFICE VISIT (OUTPATIENT)
Dept: DERMATOLOGY | Facility: CLINIC | Age: 38
End: 2018-09-20
Payer: COMMERCIAL

## 2018-09-20 DIAGNOSIS — L60.3 ONYCHORRHEXIS: Primary | ICD-10-CM

## 2018-09-20 DIAGNOSIS — L82.1 SEBORRHEIC KERATOSES: ICD-10-CM

## 2018-09-20 DIAGNOSIS — L71.9 ROSACEA: ICD-10-CM

## 2018-09-20 PROCEDURE — 99214 OFFICE O/P EST MOD 30 MIN: CPT | Mod: S$GLB,,, | Performed by: PHYSICIAN ASSISTANT

## 2018-09-20 PROCEDURE — 99999 PR PBB SHADOW E&M-EST. PATIENT-LVL II: CPT | Mod: PBBFAC,,, | Performed by: PHYSICIAN ASSISTANT

## 2018-09-20 NOTE — PATIENT INSTRUCTIONS
Start over the counter daily nail supplement such as Viviscal or AG Pro for nails. Use lukewarm water for handwashing, mild soap, and moisturize the hands immediately after washing. Cetaphil, Eucerin, or Cera Ve.  [place] application of Cera Ve Healing Ointment.     Start broad spectrum sunscreen with minimum SPF 30 and zinc oxide and/or titanium dioxide.  Use sunscreen daily.  Examples: Cera Ve AM and Elta MD UV Physical.

## 2018-09-20 NOTE — PROGRESS NOTES
Subjective:       Patient ID:  Joy Recinos is a 38 y.o. female who presents for   Chief Complaint   Patient presents with    Nail Problem     cheeks     Hx of eczema, hair loss, SKs, IBS, Fibromyalgia, and +DARRION, last seen by  Dr. Pike 1/4/18. Here today at referral of Dr. CARDENAS for evaluation of nail changes concerning for an early presentation of psoriasis. Admits to hand washing > 8 times daily. Denies wet work or gardening. Occupation: administrative document control.      History of Present Illness: The patient presents with chief complaint of nail changes.   Location: bilateral hands (ring fingers > others)  Duration: 2 years (ridges) and 3 weeks (downward curvature)  Signs/Symptoms: denies thickening, pitting, discoloration, or subungal debris of nails; Denies any new rashes.    Prior treatments: none    Also, c/o facial redness of the cheeks. +FHX of rosacea (mother).  +Flushing associated with heat, alcohol. + Denies sensitive skin. +Daily sunscreen w/SPF 15.        Review of Systems   Constitutional: Negative for fever and chills.   Gastrointestinal: Negative for nausea and vomiting.   Skin: Positive for daily sunscreen use and activity-related sunscreen use. Negative for itching, rash, dry skin and recent sunburn.   Hematologic/Lymphatic: Does not bruise/bleed easily.        Objective:    Physical Exam   Constitutional: She appears well-developed and well-nourished. No distress.   Neurological: She is alert and oriented to person, place, and time. She is not disoriented.   Psychiatric: She has a normal mood and affect.   Skin:   Areas Examined (abnormalities noted in diagram):   Head / Face Inspection Performed  Neck Inspection Performed  Chest / Axilla Inspection Performed  RUE Inspected  LUE Inspection Performed  RLE Inspected  LLE Inspection Performed  Nails and Digits Inspection Performed                           Diagram Legend     Erythematous scaling macule/papule c/w actinic keratosis        Vascular papule c/w angioma      Pigmented verrucoid papule/plaque c/w seborrheic keratosis      Yellow umbilicated papule c/w sebaceous hyperplasia      Irregularly shaped tan macule c/w lentigo     1-2 mm smooth white papules consistent with Milia      Movable subcutaneous cyst with punctum c/w epidermal inclusion cyst      Subcutaneous movable cyst c/w pilar cyst      Firm pink to brown papule c/w dermatofibroma      Pedunculated fleshy papule(s) c/w skin tag(s)      Evenly pigmented macule c/w junctional nevus     Mildly variegated pigmented, slightly irregular-bordered macule c/w mildly atypical nevus      Flesh colored to evenly pigmented papule c/w intradermal nevus       Pink pearly papule/plaque c/w basal cell carcinoma      Erythematous hyperkeratotic cursted plaque c/w SCC      Surgical scar with no sign of skin cancer recurrence      Open and closed comedones      Inflammatory papules and pustules      Verrucoid papule consistent consistent with wart     Erythematous eczematous patches and plaques     Dystrophic onycholytic nail with subungual debris c/w onychomycosis     Umbilicated papule    Erythematous-base heme-crusted tan verrucoid plaque consistent with inflamed seborrheic keratosis     Erythematous Silvery Scaling Plaque c/w Psoriasis     See annotation      Assessment / Plan:        Onychorrhexis  Provided reassurance. I do not suspect psorasis at this time. Changes appear to be related to loss of moisture retention of nails. Discussed hand care to wash w/lukewarm water, mild soap, and moisturize after handwashing. Recommend trial of liberal Cera Ve healing ointment daily to nailbeds. Start otc nail supplement such as Viviscal daily or AG Pro. AAD brochure about nails and nail diseases provided.    Rosacea  Discussed dx and potential triggers, as well as tx options. Start daily mineral sunscreen minimum spf 30 and broad-spectrum. Sample of Cera Ve AM provided. Also, Elta MD UV Physical  suggested. Defer rx at this time, but may consider Mirvaso in future, if desired for redness.      Seborrheic keratoses  Reassurance given.  Lesions are benign.       Follow-up in about 3 months (around 12/20/2018) for rosacea.

## 2018-09-21 ENCOUNTER — CLINICAL SUPPORT (OUTPATIENT)
Dept: REHABILITATION | Facility: HOSPITAL | Age: 38
End: 2018-09-21
Payer: COMMERCIAL

## 2018-09-21 DIAGNOSIS — M54.40 LOW BACK PAIN WITH SCIATICA, SCIATICA LATERALITY UNSPECIFIED, UNSPECIFIED BACK PAIN LATERALITY, UNSPECIFIED CHRONICITY: Primary | ICD-10-CM

## 2018-09-21 DIAGNOSIS — N94.2 VAGINISMUS: ICD-10-CM

## 2018-09-21 PROCEDURE — 97014 ELECTRIC STIMULATION THERAPY: CPT | Performed by: PHYSICAL THERAPIST

## 2018-09-21 PROCEDURE — 97110 THERAPEUTIC EXERCISES: CPT | Performed by: PHYSICAL THERAPIST

## 2018-09-21 PROCEDURE — 97112 NEUROMUSCULAR REEDUCATION: CPT | Performed by: PHYSICAL THERAPIST

## 2018-09-21 PROCEDURE — 97140 MANUAL THERAPY 1/> REGIONS: CPT | Performed by: PHYSICAL THERAPIST

## 2018-09-24 NOTE — PROGRESS NOTES
OUTPATIENT PHYSICAL THERAPY DAILY NOTE       Patient: Joy Huntley [facility] #:  [id number]    Diagnosis:   Encounter Diagnoses   Name Primary?    Low back pain with sciatica, sciatica laterality unspecified, unspecified back pain laterality, unspecified chronicity Yes    Vaginismus       Date of treatment: 09/24/2018       SUBJECTIVE   Pt reports:I had a Rheum appt on Monday- Doctor said I need to start a KETO diet but I am afraid to start cause I might gain more weight when I stop the diet    OBJECTIVE       Neuromuscular Re-education piriformis release in ER/IR in flexion 3x x30 secbird dog, cat- cow, LTR, DKTC, abd hollow, clams 20x each 15 min  Manual Therapy general pelvic release externally, TL junction gapping mob, STM to B l/sp paraspinals 15 min  Therex: Nu step 10 min    Modalities MHP to l/sp and pelvis with estim at S2-5 15 min    Education: spent 25% of time together on self care: mediation daily, eating well to reduce inflammation, promote better sleep and GI issues and breathing exercises to rev down nervous system   25 min    ASSESSMENT      Pt reports minimal to no pain with external palpation of pelvic floor mm. Pt to continue use of dilators at home and HEP of pelvic stretches and core stab exercises        PLAN     Continue with established Plan of Care

## 2018-09-26 NOTE — ASSESSMENT & PLAN NOTE
Longstanding history of positive DARRION with negative KATLYN without any evidence of systemic lupus erythematosus, Sjogren syndrome, rheumatoid arthritis or myositis.  She has significant vertical knee ridges and the nail deformities raising possibility of early psoriasis.  This could explain the posterior DARRION if confirmed.  Advised to schedule an appointment with dermatologist for the same.   decreased fahad/decreased step length

## 2018-09-27 DIAGNOSIS — R92.8 FOLLOW-UP EXAMINATION OF ABNORMAL MAMMOGRAM: Primary | ICD-10-CM

## 2018-09-28 ENCOUNTER — CLINICAL SUPPORT (OUTPATIENT)
Dept: REHABILITATION | Facility: HOSPITAL | Age: 38
End: 2018-09-28
Payer: COMMERCIAL

## 2018-09-28 DIAGNOSIS — M54.40 LOW BACK PAIN WITH SCIATICA, SCIATICA LATERALITY UNSPECIFIED, UNSPECIFIED BACK PAIN LATERALITY, UNSPECIFIED CHRONICITY: Primary | ICD-10-CM

## 2018-09-28 DIAGNOSIS — N94.2 VAGINISMUS: ICD-10-CM

## 2018-09-28 PROCEDURE — 97112 NEUROMUSCULAR REEDUCATION: CPT | Performed by: PHYSICAL THERAPIST

## 2018-09-28 PROCEDURE — 97110 THERAPEUTIC EXERCISES: CPT | Performed by: PHYSICAL THERAPIST

## 2018-09-28 PROCEDURE — 97140 MANUAL THERAPY 1/> REGIONS: CPT | Performed by: PHYSICAL THERAPIST

## 2018-09-28 PROCEDURE — 97014 ELECTRIC STIMULATION THERAPY: CPT | Performed by: PHYSICAL THERAPIST

## 2018-09-28 NOTE — PROGRESS NOTES
OUTPATIENT PHYSICAL THERAPY DAILY NOTE       Patient: Joy Huntley [facility] #:  [id number]    Diagnosis:   Encounter Diagnoses   Name Primary?    Low back pain with sciatica, sciatica laterality unspecified, unspecified back pain laterality, unspecified chronicity Yes    Vaginismus       Date of treatment: 09/28/2018       SUBJECTIVE     Pt reports:I am having leg swelling and my ankle get very white, I am tired and I don't feel like my depression meds are working    OBJECTIVE       Neuromuscular Re-education piriformis release in ER/IR in flexion 3x x30 secbird dog, cat- cow, LTR, DKTC, abd hollow, clams 20x each 15 min  Manual Therapy general pelvic release externally, TL junction gapping mob, STM to B l/sp paraspinals 15 min  Therex: Nu step 10 min    Modalities MHP to l/sp and pelvis with estim at S2-5 15 min    Education: spent 25% of time together on self care: mediation daily, eating well to reduce inflammation, promote better sleep and GI issues and breathing exercises to rev down nervous system   25 min    ASSESSMENT      Pt reports moderate pain especially on left with external palpation of pelvic floor mm. Pt to continue use of dilators at home and HEP of pelvic stretches and core stab exercises- pt to bring dilator next visit for biofeedback session        PLAN     Continue with established Plan of Care

## 2018-10-01 ENCOUNTER — OFFICE VISIT (OUTPATIENT)
Dept: PODIATRY | Facility: CLINIC | Age: 38
End: 2018-10-01
Payer: COMMERCIAL

## 2018-10-01 ENCOUNTER — TELEPHONE (OUTPATIENT)
Dept: PODIATRY | Facility: CLINIC | Age: 38
End: 2018-10-01

## 2018-10-01 ENCOUNTER — TELEPHONE (OUTPATIENT)
Dept: SURGERY | Facility: CLINIC | Age: 38
End: 2018-10-01

## 2018-10-01 VITALS
DIASTOLIC BLOOD PRESSURE: 75 MMHG | HEART RATE: 77 BPM | WEIGHT: 237.19 LBS | RESPIRATION RATE: 16 BRPM | HEIGHT: 63 IN | SYSTOLIC BLOOD PRESSURE: 113 MMHG | BODY MASS INDEX: 42.03 KG/M2

## 2018-10-01 DIAGNOSIS — I87.2 VENOUS INSUFFICIENCY (CHRONIC) (PERIPHERAL): ICD-10-CM

## 2018-10-01 DIAGNOSIS — M79.674 PAIN OF RIGHT GREAT TOE: ICD-10-CM

## 2018-10-01 DIAGNOSIS — M20.5X1 HALLUX LIMITUS OF RIGHT FOOT: Primary | ICD-10-CM

## 2018-10-01 PROCEDURE — 99213 OFFICE O/P EST LOW 20 MIN: CPT | Mod: 25,S$GLB,, | Performed by: PODIATRIST

## 2018-10-01 PROCEDURE — 99999 PR PBB SHADOW E&M-EST. PATIENT-LVL III: CPT | Mod: PBBFAC,,, | Performed by: PODIATRIST

## 2018-10-01 PROCEDURE — 20605 DRAIN/INJ JOINT/BURSA W/O US: CPT | Mod: RT,S$GLB,, | Performed by: PODIATRIST

## 2018-10-01 PROCEDURE — 3008F BODY MASS INDEX DOCD: CPT | Mod: CPTII,S$GLB,, | Performed by: PODIATRIST

## 2018-10-01 RX ORDER — DEXAMETHASONE SODIUM PHOSPHATE 4 MG/ML
4 INJECTION, SOLUTION INTRA-ARTICULAR; INTRALESIONAL; INTRAMUSCULAR; INTRAVENOUS; SOFT TISSUE ONCE
Status: COMPLETED | OUTPATIENT
Start: 2018-10-01 | End: 2018-10-01

## 2018-10-01 RX ORDER — TRIAMCINOLONE ACETONIDE 40 MG/ML
40 INJECTION, SUSPENSION INTRA-ARTICULAR; INTRAMUSCULAR ONCE
Status: COMPLETED | OUTPATIENT
Start: 2018-10-01 | End: 2018-10-01

## 2018-10-01 RX ADMIN — TRIAMCINOLONE ACETONIDE 40 MG: 40 INJECTION, SUSPENSION INTRA-ARTICULAR; INTRAMUSCULAR at 04:10

## 2018-10-01 RX ADMIN — DEXAMETHASONE SODIUM PHOSPHATE 4 MG: 4 INJECTION, SOLUTION INTRA-ARTICULAR; INTRALESIONAL; INTRAMUSCULAR; INTRAVENOUS; SOFT TISSUE at 04:10

## 2018-10-01 NOTE — TELEPHONE ENCOUNTER
----- Message from Mahi Qureshi sent at 10/1/2018  7:33 AM CDT -----  Contact: pt   Call pt regarding upcoming appt. Pt states that she just had a Mammo done in Sept and not sure why she need another.    [phone].

## 2018-10-01 NOTE — TELEPHONE ENCOUNTER
----- Message from Mahi Qureshi sent at 10/1/2018  7:35 AM CDT -----  Contact: pt   Call pt regarding upcoming appt. Pt states that she want to know if a procedure will be done on the day of appt.    [phone]

## 2018-10-01 NOTE — PROGRESS NOTES
Subjective:       Patient ID: Joy Recinos is a 38 y.o. female.    Chief Complaint: Toe Pain (bilateral toe pain, pain level 7/10, swelling ankles, wears casual shoes, non-diabetic Pt, PCP Dr. Oliva.)      HPI: Joy Recinos presents to the office today with complaints of Moderate to severe pains to the right foot at the great toe due to arthritis and/or bunion deformity. Patient states pains are recalcitrant to oral NSAID therapy and wider width shoe gear and high toe box shoe gear. Patient has had no injection therapy to the 1st MTPJ on the affected limb prior. Patient has had discomfort to the great toe for the past several months. Patient's Primary Care Provider is Zeus Oliva MD.  Patient also states persistent, mild to moderate edema to bilateral lower leg, ankle and foot.  Denies cough or dyspnea.  States no bilateral calf pains with compression or palpation.  She has seen a Dermatologist and the Rheumatologist for the aforementioned symptoms as well as pains at the bilateral posterior heel and ankle joint. The rheumatologist ruled out any seronegative or seropositive arthropathy at this time.  Patient denies any new medication adjustments.  Denies a history of Cushing Syndrome.  She does have a history of Fibromyalgia.  Review of patient's allergies indicates:  She is not diabetic. Zeus Oliva MD is her internist.    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       Family History   Problem Relation Age of Onset    No Known Problems Mother     Heart disease Father         Bypass    Skin cancer Father     Cataracts Maternal Grandmother      Diabetes Maternal Grandmother     Glaucoma Maternal Grandmother     Skin cancer Maternal Grandmother     Cataracts Maternal Grandfather     Skin cancer Paternal Grandfather     Breast cancer Neg Hx     Ovarian cancer Neg Hx     Deep vein thrombosis Neg Hx        Social History     Socioeconomic History    Marital status:      Spouse name: Not on file    Number of children: 2    Years of education: Not on file    Highest education level: Not on file   Social Needs    Financial resource strain: Not on file    Food insecurity - worry: Not on file    Food insecurity - inability: Not on file    Transportation needs - medical: Not on file    Transportation needs - non-medical: Not on file   Occupational History     Employer: [employer]   Tobacco Use    Smoking status: Never Smoker    Smokeless tobacco: Never Used   Substance and Sexual Activity    Alcohol use: Yes     Comment: rarely    Drug use: No    Sexual activity: Yes     Partners: Male     Birth control/protection: Injection     Comment:    Other Topics Concern    Are you pregnant or think you may be? No    Breast-feeding No   Social History Narrative    Not on file       Past Surgical History:   Procedure Laterality Date     SECTION      x 2     OOPHORECTOMY      PELVIC LAPAROSCOPY      TUBAL LIGATION         Review of Systems   Constitutional: Negative for chills, fatigue and fever.   HENT: Negative for hearing loss.    Eyes: Negative for photophobia and visual disturbance.   Respiratory: Negative for cough, chest tightness, shortness of breath and wheezing.    Cardiovascular: Negative for chest pain and palpitations.   Gastrointestinal: Negative for constipation, diarrhea, nausea and vomiting.   Endocrine: Negative for cold intolerance and heat intolerance.   Genitourinary: Negative for flank pain.   Musculoskeletal: Positive for gait problem. Negative for neck pain and neck stiffness.   Skin: Negative for  "wound.   Neurological: Negative for light-headedness and headaches.   Psychiatric/Behavioral: Negative for sleep disturbance.         Objective:   /75 (BP Location: Right arm, Patient Position: Sitting, BP Method: Large (Automatic))   Pulse 77   Resp 16   Ht 5' 3" (1.6 m)   Wt 107.6 kg (237 lb 3.4 oz)   BMI 42.02 kg/m²       MRI Foot (Forefoot) Right W W/O Contrast   Order: [id number]   Status:  Final result   Visible to patient:  Yes (Patient Portal) Next appt:  10/05/2018 at 01:00 PM in Outpatient [facility]hab (Bee Benton, PT) Dx:  Pain and swelling of toe of right foot   Details     Reading Physician Reading Date Result Priority   Nabor Walsh MD 8/3/2018       Narrative     EXAMINATION:  MRI FOOT (FOREFOOT) RIGHT W W/O CONTRAST    CLINICAL HISTORY:  Foot pain, chronic, etiol unknown, first study;normal xray, soft tissue swelling, no trauma.;  Pain in right toe(s)    TECHNIQUE:  Multiplanar, multisequence MR imaging of the right forefoot without the use of intravenous gadolinium IV contrast.    COMPARISON:  None.    FINDINGS:  There is edema and enhancement noted at the base the 1st digit proximal phalanx.  Less prevalent findings noted within the 1st metatarsal head centrally.  Tiny erosion is present within the central 1st metatarsal.  Mild dorsal synovial enhancement present at the 1st MTP joint.  No significant joint effusion.  Sesamoid complex is unremarkable.    No evidence of periarticular erosion or gouty tophus formation.  No significant periarticular soft tissue enhancement or edema.    Remaining visualized foot is unremarkable.      Impression       Nonspecific inflammatory changes at the 1st MTP joint as above.  Infectious process possible.  Clinical correlation needed.      Electronically signed by: Nabor Walsh MD  Date: 08/03/2018  Time: 11:11             Last Resulted: 08/03/18 11:11             Pathologist Interpretation SPE   Order: [id number]   Status:  Final result   Visible to " patient:  Yes (Patient Portal) Next appt:  10/05/2018 at 01:00 PM in Outpatient Rehab (Bee Benton, PT)   Component 2wk ago   Pathologist Interpretation SPE REVIEWED    Comment: Electronically reviewed and signed by:   Krys White MD   Signed on 09/18/18 at 14:56   Normal total protein, normal pattern.    Resulting Agency [facility]         Specimen Collected: 09/17/18 13:40 Last Resulted: 09/18/18 14:56           DARRION Titer   Order: [id number]   Status:  Final result   Visible to patient:  Yes (Patient Portal) Next appt:  10/05/2018 at 01:00 PM in Outpatient Rehab (Bee Benton, PT)   Component 2wk ago   DARRION HEP-2 Titer Positive 1:320 Homogeneous    Resulting Agency [facility]         Specimen Collected: 09/17/18 13:40 Last Resulted: 09/18/18 15:39           DARRION Profile   Order: [id number]   Status:  Final result   Visible to patient:  Yes (Patient Portal) Next appt:  10/05/2018 at 01:00 PM in Outpatient Rehab (Bee Benton, PT)    Ref Range & Units 2wk ago 10mo ago   Anti Sm Antibody 0.00 - 19.99 EU 6.05  5.53    Anti-Sm Interpretation Negative Negative  Negative CM   Comment: <20 EU...............Negative   20 - 25 EU...........Borderline   >25 EU...............Positive   Borderline results are repeated before reporting. If   repeat results are still borderline, the sample has no   significant antibody.    Anti-SSA Antibody 0.00 - 19.99 EU 4.35  8.28    Anti-SSA Interpretation Negative Negative  Negative CM   Comment: <20 EU...............Negative   20 - 25 EU...........Borderline   >25 EU...............Positive   Borderline results are repeated before reporting. If   repeat results are still borderline, the sample has no   significant antibody.    Anti-SSB Antibody 0.00 - 19.99 EU 0.80  0.64    Anti-SSB Interpretation Negative Negative  Negative CM   Comment: <20 EU...............Negative   20 - 25 EU...........Borderline   >25 EU...............Positive   Borderline results are repeated before reporting. If   repeat  results are still borderline, the sample has no   significant antibody.    ds DNA Ab Negative 1:10 Negative 1:10  Negative 1:10 CM   Comment: Performed by fluorescent crithidia assay.   Anti Sm/RNP Antibody 0.00 - 19.99 EU 3.36  4.97    Anti-Sm/RNP Interpretation Negative Negative  Negative CM   Comment: <20 EU...............Negative   20 - 25 EU...........Borderline   >25 EU...............Positive   Borderline results are repeated before reporting. If   repeat results are still borderline, the sample has no   significant antibody.    Resulting Agency  [facility]         Specimen Collected: 09/17/18 13:40 Last Resulted: 09/19/18 14:51 Lab Flowsheet Order Details View Encounter Lab and Collection Details Routing Result History      CM=Additional comments             Contains abnormal data DARRION   Order: [id number]   Status:  Final result   Visible to patient:  Yes (Patient Portal) Next appt:  10/05/2018 at 01:00 PM in Outpatient [facility]hab (Bee Benton, PT) Dx:  Positive DARRION (antinuclear antibody)    Ref Range & Units 2wk ago 10mo ago   DARRION Screen Negative <1:160 Positive Abnormal   Positive Abnormal     Resulting Agency  [facility]         Specimen Collected: 09/17/18 13:40 Last Resulted: 09/18/18 15:28           C3 complement   Order: [id number]   Status:  Final result   Visible to patient:  Yes (Patient Portal) Next appt:  10/05/2018 at 01:00 PM in Outpatient [facility]hab (Bee Benton, PT) Dx:  Positive DARRION (antinuclear antibody)    Ref Range & Units 2wk ago   Complement (C-3) 50 - 180 mg/dL 132    Resulting Agency  [facility]         Specimen Collected: 09/17/18 13:40 Last Resulted: 09/17/18 22:27           C4 complement   Order: [id number]   Status:  Final result   Visible to patient:  Yes (Patient Portal) Next appt:  10/05/2018 at 01:00 PM in Outpatient Rehab (Bee Benton, PT) Dx:  Positive DARRION (antinuclear antibody)    Ref Range & Units 2wk ago   Complement (C-4) 11 - 44 mg/dL 35    Resulting Agency  [facility]         Specimen Collected:  09/17/18 13:40 Last Resulted: 09/17/18 22:27           C-reactive protein   Order: [id number]   Status:  Final result   Visible to patient:  Yes (Patient Portal) Next appt:  10/05/2018 at 01:00 PM in Outpatient Rehab (Bee Benton, PT) Dx:  Positive DARRION (antinuclear antibody)    Ref Range & Units 2wk ago 2yr ago   CRP 0.0 - 8.2 mg/L 13.9 Abnormally high   4.7    Resulting Agency  [facility]         Specimen Collected: 09/17/18 13:40 Last Resulted: 09/17/18 22:06           Sedimentation rate   Order: [id number]   Status:  Final result   Visible to patient:  Yes (Patient Portal) Next appt:  10/05/2018 at 01:00 PM in Outpatient [facility]hab (Bee Benton, PT) Dx:  Positive DARRION (antinuclear antibody)    Ref Range & Units 2wk ago 2yr ago   Sed Rate 0 - 20 mm/Hr 15  16    Resulting Agency  [facility]         Specimen Collected: 09/17/18 13:40 Last Resulted: 09/17/18 15:14           Protein electrophoresis, serum   Order: [id number]   Status:  Final result   Visible to patient:  Yes (Patient Portal) Next appt:  10/05/2018 at 01:00 PM in Outpatient [facility]hab (Bee Benton, PT) Dx:  Positive DARRION (antinuclear antibody)    Ref Range & Units 2wk ago   Protein, Serum 6.0 - 8.4 g/dL 6.6    Albumin grams/dl 3.35 - 5.55 g/dL 3.84    Alpha-1 grams/dl 0.17 - 0.41 g/dL 0.34    Alpha-2 grams/dl 0.43 - 0.99 g/dL 0.72    Beta grams/dl 0.50 - 1.10 g/dL 0.85    Gamma grams/dl 0.67 - 1.58 g/dL 0.85    Resulting Agency  [facility]         Specimen Collected: 09/17/18 13:40 Last Resulted: 09/18/18 12:15           Immunofixation electrophoresis   Order: [id number]   Status:  Final result   Visible to patient:  Yes (Patient Portal) Next appt:  10/05/2018 at 01:00 PM in Outpatient [facility]hab (Bee Benton, PT) Dx:  Positive DARRION (antinuclear antibody)   Component 2wk ago   Immunofix Interp. SEE COMMENT    Comment: See pathologist's interpretation.   Resulting Agency [facility]         Specimen Collected: 09/17/18 13:40 Last Resulted: 09/18/18 12:05           LOWER EXTREMITY PHYSICAL  EXAMINATION    NEUROLOGY: Patient is alert and oriented to person, place, and time. Protective sensation is intact via 5.07 Winters Chinmay monofilament. Proprioception is intact. Sensation to light touch is intact.    VASCULAR: On the left and right foot, the dorsalis pedis pulse is 2/4 and the posterior tibial pulse is 2/4. Capillary refill time is less than 3 seconds. Hair growth is present on the dorsum of the foot and at the digits. Proximal to distal temperature is warm to warm.  Bilateral lower extremity edema is noted, nonpitting, lower leg, ankle and foot. No palpable cords or tenderness with palpation compression of the calf musculature.  No obvious varicosities or telangiectasias and spider veins noted.    ORTHOPEDIC: Mild bunion deformity is noted to the right foot. Degenerative arthropathy; hallux limitus is noted. Upon ROM in the sagittal plan, there is mild pains to the end ROM, dorsally. There are slight pains to the plantar aspect of the 1st MTPJ as well. No pains to palpation of the tibial or the fibular sesamoid. There is a medium medial eminence appreciated. Mild dorsal spurring noted. Palpation of the dorsal-lateral aspect of the 1st MTPJ is moderate to severely painful. There is joint crepitus noted. The joint is full and somewhat edematous.      DERMATOLOGY: Skin is supple, dry and intact. No hypertrophic skin formation is noted. No overt breaks in the skin is noted.       Physical Exam    Assessment:     1. Hallux limitus of right foot    2. Pain of right great toe    3. Venous insufficiency (chronic) (peripheral)          Plan:     Hallux limitus of right foot  Pain of right great toe  -     dexamethasone injection 4 mg; Inject 1 mL (4 mg total) into the muscle once.  -     triamcinolone acetonide injection 40 mg; 1 mL (40 mg total) by Tendon Sheath Injection route once.    Thorough discussion is had with the patient today, concerning the diagnosis, its etiology, and the treatment algorithm  at present.  MRI is reviewed in detail with the patient. All questions and concerns regarding findings and its/their implications are outlined and discussed.  Following sterile preparation with alcohol swab and/or betadine paint, injection was given into and around the area of the right 1st metatarsophalangeal joint, using 25-gauge needle. Injection consisted of approximately 0.5cc of Dexamethasone Sodium Phosphate, 0.5cc of Kenalog-40 and 1cc of 1% Lidocaine w/ Epinephrine. Bandage application thereafter. Patient tolerated procedure well, and without complications or complaints. Patient educated that the area of pathology, might actually be slightly more painful, due to the injection, over the course of the next one to two days.       Venous insufficiency (chronic) (peripheral)  -     HME - OTHER    Most recent labs reviewed in detail with the patient. All questions and concerns regarding findings and its/their implications are outlined and discussed.  Patient's past medical history is thoroughly reviewed today, in light of the fact that surgical intervention is discussed/planned/initiated.  Prescription is written for compression therapy.  Did discuss possible initiation and/or continuation of lower extremity compression therapy (stockings).  Recommend continue was limb elevation.  Consider initiation oral diuretic of no overt contraindication.  I encouraged the patient to follow-up and discuss with his/her PCP.          Future Appointments   Date Time Provider Department Center   10/5/2018  1:00 PM DEANGELO Jolley [facility] [place]   10/31/2018  3:00 PM Debora Jackson NP [facility] HEM ON[facility] [place]   11/2/2018  9:30 AM John Quiñones DPM [facility] [place]   3/18/2019  4:30 PM Adalberto Kong MD [facility] RHEUM [place]

## 2018-10-02 ENCOUNTER — OFFICE VISIT (OUTPATIENT)
Dept: HEMATOLOGY/ONCOLOGY | Facility: CLINIC | Age: 38
End: 2018-10-02
Payer: COMMERCIAL

## 2018-10-02 ENCOUNTER — PATIENT MESSAGE (OUTPATIENT)
Dept: PODIATRY | Facility: CLINIC | Age: 38
End: 2018-10-02

## 2018-10-02 VITALS
WEIGHT: 237.19 LBS | DIASTOLIC BLOOD PRESSURE: 78 MMHG | HEIGHT: 68 IN | OXYGEN SATURATION: 98 % | BODY MASS INDEX: 35.95 KG/M2 | SYSTOLIC BLOOD PRESSURE: 132 MMHG | HEART RATE: 85 BPM | TEMPERATURE: 98 F

## 2018-10-02 DIAGNOSIS — R92.8 FOLLOW-UP EXAMINATION OF ABNORMAL MAMMOGRAM: Primary | ICD-10-CM

## 2018-10-02 PROCEDURE — 99999 PR PBB SHADOW E&M-EST. PATIENT-LVL IV: CPT | Mod: PBBFAC,,, | Performed by: NURSE PRACTITIONER

## 2018-10-02 PROCEDURE — 99213 OFFICE O/P EST LOW 20 MIN: CPT | Mod: S$GLB,,, | Performed by: NURSE PRACTITIONER

## 2018-10-02 PROCEDURE — 3008F BODY MASS INDEX DOCD: CPT | Mod: CPTII,S$GLB,, | Performed by: NURSE PRACTITIONER

## 2018-10-02 NOTE — PROGRESS NOTES
Patient ID: Joy Recinos is a 38 y.o. female.    Chief Complaint: No chief complaint on file.      HPI: Patient presents for 6 mon f/u evaluation of an abnormal mammogram of the left breast.  In March 2018 Patient reports seeing right breast skin eruption and went in to see GYN was diagnosed with shingles, was placed on antivirals.  The skin eruption led to a bilateral mammogram.  Right breast did not reveal any abnormality.  Left breast revealed in the 8:00 position 2 nodules 1 solid and 1 a complex cyst.  Patient comes in today to to review 6 mon f/u imaging performed Sept 2018.      Review of Systems   Constitutional: Positive for unexpected weight change. Negative for activity change.   HENT: Positive for trouble swallowing. Negative for hearing loss and rhinorrhea.    Eyes: Negative for discharge and visual disturbance.   Respiratory: Positive for chest tightness and wheezing.    Cardiovascular: Positive for chest pain and palpitations.   Gastrointestinal: Positive for constipation and vomiting. Negative for blood in stool and diarrhea.   Endocrine: Negative for polydipsia and polyuria.   Genitourinary: Positive for menstrual problem. Negative for difficulty urinating, dysuria and hematuria.   Musculoskeletal: Positive for arthralgias and neck pain. Negative for joint swelling.   Neurological: Positive for weakness and headaches.   Psychiatric/Behavioral: Positive for confusion and dysphoric mood.   Breast: Pt denies any nipple discharge or palpable mass on the left. Luis breast pain last week and this week- stopped her depo shots in August and has not been on any BC since. No prior trauma or bruising. No breast surgeries.    Current Outpatient Medications   Medication Sig Dispense Refill    buPROPion (WELLBUTRIN SR) 150 MG TBSR 12 hr tablet Take 1 tablet (150 mg total) by mouth once daily. 30 tablet 1    cetirizine (ZYRTEC) 10 MG tablet Take 10 mg by mouth once daily.      cholecalciferol, vitamin  D3, 1,000 unit capsule Take 1 capsule (1,000 Units total) by mouth once daily. 90 capsule 0    crisaborole (EUCRISA) 2 % Oint AAA of eyelid bid prn eczema 60 g 1    efinaconazole 10 % Wu Apply 1 application topically once daily. 8 mL 11    ergocalciferol (ERGOCALCIFEROL) 50,000 unit Cap Take 1 capsule (50,000 Units total) by mouth every 7 days. 12 capsule 3    escitalopram oxalate (LEXAPRO) 10 MG tablet Take 2 tablets (20 mg total) by mouth once daily. 60 tablet 2    omeprazole (PRILOSEC) 20 MG capsule TAKE ONE CAPSULE BY MOUTH DAILY 30 capsule 0    ondansetron (ZOFRAN-ODT) 4 MG TbDL Take 1 tablet (4 mg total) by mouth every 8 (eight) hours as needed. 6 tablet 0    topiramate (TOPAMAX) 50 MG tablet TAKE 1 TABLET EVERY  tablet 3    traMADol (ULTRAM) 50 mg tablet Take 1 tablet (50 mg total) by mouth every 24 hours as needed for Pain. 30 tablet 0     No current facility-administered medications for this visit.        Review of patient's allergies indicates:  No Known Allergies    Past Medical History:   Diagnosis Date    B12 deficiency     Chronic low back pain     oberlander    Endometriosis     Fibromyalgia syndrome     Idiopathic hypersomnia     Irritable bowel syndrome     goldman    Migraines     frances    Shingles     Undifferentiated inflammatory arthritis 2018    Undifferentiated inflammatory arthritis 2018       Past Surgical History:   Procedure Laterality Date     SECTION      x 2     OOPHORECTOMY      PELVIC LAPAROSCOPY      TUBAL LIGATION         Family History   Problem Relation Age of Onset    No Known Problems Mother     Heart disease Father         Bypass    Skin cancer Father     Cataracts Maternal Grandmother     Diabetes Maternal Grandmother     Glaucoma Maternal Grandmother     Skin cancer Maternal Grandmother     Cataracts Maternal Grandfather     Skin cancer Paternal Grandfather     Breast cancer Neg Hx     Ovarian cancer Neg Hx     Deep  vein thrombosis Neg Hx        Social History     Socioeconomic History    Marital status:      Spouse name: Not on file    Number of children: 2    Years of education: Not on file    Highest education level: Not on file   Social Needs    Financial resource strain: Not on file    Food insecurity - worry: Not on file    Food insecurity - inability: Not on file    Transportation needs - medical: Not on file    Transportation needs - non-medical: Not on file   Occupational History     Employer: [employer]   Tobacco Use    Smoking status: Never Smoker    Smokeless tobacco: Never Used   Substance and Sexual Activity    Alcohol use: Yes     Comment: rarely    Drug use: No    Sexual activity: Yes     Partners: Male     Birth control/protection: Injection     Comment:    Other Topics Concern    Are you pregnant or think you may be? No    Breast-feeding No   Social History Narrative    Not on file       There were no vitals filed for this visit.    Physical Exam   Constitutional: She is oriented to person, place, and time. She appears well-developed and well-nourished.   HENT:   Head: Normocephalic and atraumatic.   Right Ear: External ear normal.   Left Ear: External ear normal.   Mouth/Throat: No oropharyngeal exudate.   Eyes: Conjunctivae and EOM are normal. Pupils are equal, round, and reactive to light. Right eye exhibits no discharge. Left eye exhibits no discharge. No scleral icterus.   Neck: Normal range of motion. Neck supple. No thyromegaly present.   Cardiovascular: Normal rate, regular rhythm and normal heart sounds.   Pulmonary/Chest: Effort normal and breath sounds normal. Right breast exhibits no inverted nipple, no mass, no nipple discharge, no skin change and no tenderness. Left breast exhibits no inverted nipple, no mass, no nipple discharge, no skin change and no tenderness.   No palpable mass appreciated in the left breast   Abdominal: Soft. Bowel sounds are normal.    Musculoskeletal: Normal range of motion. She exhibits no edema.        Right shoulder: She exhibits no crepitus and normal strength.   Lymphadenopathy:        Head (right side): No submental, no submandibular, no tonsillar, no preauricular, no posterior auricular and no occipital adenopathy present.        Head (left side): No submental, no submandibular, no tonsillar, no preauricular, no posterior auricular and no occipital adenopathy present.     She has no cervical adenopathy.        Right cervical: No superficial cervical and no posterior cervical adenopathy present.       Left cervical: No superficial cervical and no posterior cervical adenopathy present.     She has no axillary adenopathy.        Right: No supraclavicular adenopathy present.        Left: No supraclavicular adenopathy present.   Neurological: She is alert and oriented to person, place, and time. She has normal reflexes.   Skin: Skin is warm and dry. No rash noted. No erythema. No pallor.   Psychiatric: She has a normal mood and affect. Her behavior is normal. Judgment and thought content normal.       [id number]  Result:   Mammo Digital Diagnostic Left with Tomosynthesis_CAD  US Breast Left Limited     History:  Patient is 38 y.o. and is seen for a diagnostic mammogram.     Films Compared:  Compared to: 2018 Mammo Digital Diagnostic Bilat with Tomosynthesis_CAD and 2018 US Breast Bilateral Limited     Findings:  This procedure was performed using tomosynthesis.  Computer-aided detection was utilized in the interpretation of this examination.  Mammo Digital Diagnostic Left with Tomosynthesis_CAD  The breast is heterogeneously dense, which may obscure small masses.     There is a 10 mm x 6 mm x 9 mm oval mass with circumscribed margins seen in the lower inner quadrant of the left breast at 8 o'clock in the anterior depth, 5 cm from the nipple. Compared to the previous study, there are no significant changes. The appearance is  highly suggestive of fibroadenoma.      There is an oval mass with circumscribed margins seen in the left breast at 8 o'clock in the anterior depth, 1 cm from the nipple. Compared to the previous study, the mass decreased in size. Finding correlates with the mammographic abnormality.      US Breast Left Limited  There is a 10 mm x 6 mm x 9 mm oval mass with circumscribed margins seen in the lower inner quadrant of the left breast at 8 o'clock in the anterior depth, 5 cm from the nipple. Compared to the previous study, there are no significant changes. The appearance is highly suggestive of fibroadenoma.      There is a 7 mm x 4 mm x 4 mm complicated cyst with circumscribed margins seen in the left breast at 8 o'clock in the anterior depth, 1 cm from the nipple. This lesion has decreased in size in the interval. Finding correlates with the mammographic abnormality.      Impression:  Left  Mass: Left breast 10 mm x 6 mm x 9 mm mass at the lower inner anterior 8 o'clock position. Assessment: 3 - Probably benign. Short Interval Follow-Up in 6 Months is recommended.      BI-RADS Category:   Left: 3 - Probably Benign  Overall: 3 - Probably Benign     Recommendation:  Short interval follow-up is recommended in 6 Months.  Annual mammogram is recommended beginning at age 40.     The patient's estimated lifetime risk of breast cancer (to age 85) based on Tyrer-Cuzick - 7 risk assessment model is: Tyrer-Cuzick: 12.95 %. According to the American Cancer Society,  patients with a lifetime breast cancer risk of 20% or higher might benefit from supplemental screening tests.    Menarche at 12  G 2  P 2  Fist pregnancy at 23 y/o  LMP: on depoprovera- no cycle  Estrogen: none  Radiation to the neck or chest wall- none    Prior breast biopsies or atypical hyperplasia- none     FH: negative for breast cancer    Assessment & Plan:  Follow-up examination of abnormal mammogram    1.  Left breast solid mass and complex cyst at 8:00.  Solid  mass to favor a fibroadenoma.  Six-month follow-up recommended for solid entity.  2.  Negative clinical exam  3.  Explained to patient imaging and clinical exam results.  Patient and  verbalized understanding.  They are in agreement with six-month follow-up.  Patient was encouraged to do monthly breast self-examinations and if she feels any changes she is to let us know.  We will plan on doing a left breast diagnostic mammogram and ultrasound in March.  I will see her a few days after for a clinical exam and review of imaging.  It was explained to the patient that follow-up may be recommended every 6 months for 18 months versus 24 months to document stability.

## 2018-10-05 ENCOUNTER — CLINICAL SUPPORT (OUTPATIENT)
Dept: REHABILITATION | Facility: HOSPITAL | Age: 38
End: 2018-10-05
Payer: COMMERCIAL

## 2018-10-05 DIAGNOSIS — M54.40 LOW BACK PAIN WITH SCIATICA, SCIATICA LATERALITY UNSPECIFIED, UNSPECIFIED BACK PAIN LATERALITY, UNSPECIFIED CHRONICITY: Primary | ICD-10-CM

## 2018-10-05 DIAGNOSIS — N94.2 VAGINISMUS: ICD-10-CM

## 2018-10-05 PROCEDURE — 97140 MANUAL THERAPY 1/> REGIONS: CPT | Performed by: PHYSICAL THERAPIST

## 2018-10-05 PROCEDURE — 97112 NEUROMUSCULAR REEDUCATION: CPT | Performed by: PHYSICAL THERAPIST

## 2018-10-05 PROCEDURE — 97110 THERAPEUTIC EXERCISES: CPT | Performed by: PHYSICAL THERAPIST

## 2018-10-05 NOTE — PROGRESS NOTES
OUTPATIENT PHYSICAL THERAPY DAILY NOTE       Patient: Joy [facility] #:  [id number]    Diagnosis:   Encounter Diagnoses   Name Primary?    Low back pain with sciatica, sciatica laterality unspecified, unspecified back pain laterality, unspecified chronicity Yes    Vaginismus       Date of treatment: 10/05/2018       SUBJECTIVE     Pt reports:I used my dilator last night but it still hurts    OBJECTIVE       Neuromuscular Re-education kegels and relaxation with SEMG 10 min  Manual Therapy trigger point release to posterior fourchette and 6 oclock with contract relax techniques 15 min  Therex: physiological qiueting with diaphramatic breathing for PF mm relaxation 25 min    Modalities NT    Education: spent 25% of time together on self care: mediation daily, eating well to reduce inflammation, promote better sleep and GI issues and breathing exercises to rev down nervous system   15 min    ASSESSMENT      Pt reports moderate pain especially on left with external palpation of pelvic floor mm. Pt to continue use of dilators at home and HEP of pelvic stretches and core stab exercises- pt to bring dilator next visit for biofeedback session        PLAN     Continue with established Plan of Care

## 2018-10-23 ENCOUNTER — PATIENT MESSAGE (OUTPATIENT)
Dept: PODIATRY | Facility: CLINIC | Age: 38
End: 2018-10-23

## 2018-10-23 ENCOUNTER — PATIENT MESSAGE (OUTPATIENT)
Dept: RHEUMATOLOGY | Facility: CLINIC | Age: 38
End: 2018-10-23

## 2018-10-25 ENCOUNTER — PATIENT MESSAGE (OUTPATIENT)
Dept: RHEUMATOLOGY | Facility: CLINIC | Age: 38
End: 2018-10-25

## 2018-10-25 NOTE — TELEPHONE ENCOUNTER
Ivon Robertson MD   You 3 minutes ago (10:45 AM)     Looks like it was podiatry who gave injection. I usually recommend pt contact whoever it was that gave her injection first and see them. (Routing comment)

## 2018-10-29 RX ORDER — ESCITALOPRAM OXALATE 10 MG/1
20 TABLET ORAL DAILY
Qty: 60 TABLET | Refills: 2 | Status: SHIPPED | OUTPATIENT
Start: 2018-10-29 | End: 2019-01-28 | Stop reason: SDUPTHER

## 2018-11-02 ENCOUNTER — HOSPITAL ENCOUNTER (OUTPATIENT)
Dept: RADIOLOGY | Facility: HOSPITAL | Age: 38
Discharge: HOME | End: 2018-11-02
Attending: PODIATRIST
Payer: COMMERCIAL

## 2018-11-02 ENCOUNTER — OFFICE VISIT (OUTPATIENT)
Dept: PODIATRY | Facility: CLINIC | Age: 38
End: 2018-11-02
Payer: COMMERCIAL

## 2018-11-02 VITALS
SYSTOLIC BLOOD PRESSURE: 116 MMHG | DIASTOLIC BLOOD PRESSURE: 73 MMHG | HEIGHT: 68 IN | BODY MASS INDEX: 35.88 KG/M2 | WEIGHT: 236.75 LBS | RESPIRATION RATE: 18 BRPM

## 2018-11-02 DIAGNOSIS — M79.672 PAIN IN LEFT FOOT: ICD-10-CM

## 2018-11-02 DIAGNOSIS — M79.674 PAIN OF RIGHT GREAT TOE: ICD-10-CM

## 2018-11-02 DIAGNOSIS — M77.42 METATARSALGIA OF LEFT FOOT: ICD-10-CM

## 2018-11-02 DIAGNOSIS — M20.5X1 HALLUX LIMITUS OF RIGHT FOOT: ICD-10-CM

## 2018-11-02 DIAGNOSIS — S96.912A STRAIN OF LEFT FOOT, INITIAL ENCOUNTER: ICD-10-CM

## 2018-11-02 DIAGNOSIS — M77.42 METATARSALGIA OF LEFT FOOT: Primary | ICD-10-CM

## 2018-11-02 PROCEDURE — 73630 X-RAY EXAM OF FOOT: CPT | Mod: 26,LT,, | Performed by: RADIOLOGY

## 2018-11-02 PROCEDURE — 3008F BODY MASS INDEX DOCD: CPT | Mod: CPTII,S$GLB,, | Performed by: PODIATRIST

## 2018-11-02 PROCEDURE — 73630 X-RAY EXAM OF FOOT: CPT | Mod: TC,FY,PO,LT

## 2018-11-02 PROCEDURE — 99214 OFFICE O/P EST MOD 30 MIN: CPT | Mod: S$GLB,,, | Performed by: PODIATRIST

## 2018-11-02 PROCEDURE — 99999 PR PBB SHADOW E&M-EST. PATIENT-LVL III: CPT | Mod: PBBFAC,,, | Performed by: PODIATRIST

## 2018-11-02 RX ORDER — NABUMETONE 500 MG/1
500 TABLET, FILM COATED ORAL 2 TIMES DAILY
Qty: 60 TABLET | Refills: 1 | Status: SHIPPED | OUTPATIENT
Start: 2018-11-02 | End: 2018-11-06 | Stop reason: SINTOL

## 2018-11-02 NOTE — PROGRESS NOTES
Subjective:       Patient ID: Joy Recinos is a 38 y.o. female.    Chief Complaint: Foot Pain (c/o left foot pain, rates pain 8/10, wears tennis shoes with socks, non-diabetic Pt, PCP Dr. Caceres)    HPI: Joy Recinos presents to the office today, with a concern of severe pain to the left foot, midfoot.  Patient denies recent trauma but states that several days ago, her left foot started to hurt a great deal and became swollen.  Denies being an avid walker or jogging.  States no bruising.  States the swelling has been persistently moderate.  Denies RICE therapy.  Has been taking Motrin 600 daily for relief. Angus Caceres MD is her primary care provided.  States antalgic gait pattern.  Presents wearing sneakers this afternoon.  Denies recent trauma.  Denies local or systemic signs infection.  Pains are exacerbated with prolonged walking standing.  At the last evaluation several weeks ago, did give her intra-articular injection to the right 1st metatarsophalangeal joint. She states resolved symptoms to this location.    Review of patient's allergies indicates:  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       Family History   Problem Relation Age of Onset    No Known Problems Mother     Heart disease Father         Bypass    Skin cancer Father     Cataracts Maternal Grandmother     Diabetes Maternal Grandmother     Glaucoma Maternal Grandmother     Skin cancer Maternal Grandmother     Cataracts Maternal Grandfather     Skin cancer Paternal Grandfather     Breast cancer Neg Hx     Ovarian cancer Neg Hx     Deep vein thrombosis Neg Hx        Social  History     Socioeconomic History    Marital status:      Spouse name: Not on file    Number of children: 2    Years of education: Not on file    Highest education level: Not on file   Social Needs    Financial resource strain: Not on file    Food insecurity - worry: Not on file    Food insecurity - inability: Not on file    Transportation needs - medical: Not on file    Transportation needs - non-medical: Not on file   Occupational History     Employer: [employer]   Tobacco Use    Smoking status: Never Smoker    Smokeless tobacco: Never Used   Substance and Sexual Activity    Alcohol use: Yes     Comment: rarely    Drug use: No    Sexual activity: Yes     Partners: Male     Birth control/protection: Injection     Comment:    Other Topics Concern    Are you pregnant or think you may be? No    Breast-feeding No   Social History Narrative    Not on file       Past Surgical History:   Procedure Laterality Date     SECTION      x 2     OOPHORECTOMY      PELVIC LAPAROSCOPY      TUBAL LIGATION         Review of Systems   Constitutional: Negative for chills, fatigue and fever.   HENT: Negative for hearing loss.    Eyes: Negative for photophobia and visual disturbance.   Respiratory: Negative for cough, chest tightness, shortness of breath and wheezing.    Cardiovascular: Negative for chest pain and palpitations.   Gastrointestinal: Negative for constipation, diarrhea, nausea and vomiting.   Endocrine: Negative for cold intolerance and heat intolerance.   Genitourinary: Negative for flank pain.   Musculoskeletal: Positive for arthralgias and gait problem. Negative for neck pain and neck stiffness.   Skin: Negative for wound.   Neurological: Negative for light-headedness and headaches.   Psychiatric/Behavioral: Negative for sleep disturbance.          Objective:   /73 (BP Location: Right arm, Patient Position: Sitting, BP Method: Large (Automatic))   Resp 18   Ht 5'  "8" (1.727 m)   Wt 107.4 kg (236 lb 12.4 oz)   BMI 36.00 kg/m²     X-Ray Foot Complete Left  Narrative: EXAMINATION:  XR FOOT COMPLETE 3 VIEW LEFT    CLINICAL HISTORY:  . Metatarsalgia, left foot    TECHNIQUE:  AP, lateral, and oblique views of the foot were performed.    COMPARISON:  None    FINDINGS:  There is no evidence to suggest acute fracture or dislocation.  The joint spaces appear to be well maintained.  Small dorsal calcaneal enthesophyte noted.  Impression: As above    Electronically signed by: Jv Lindsay DO  Date:    11/02/2018  Time:    10:23          LOWER EXTREMITY PHYSICAL EXAMINATION  VASCULAR: The right DP pulse is 2/4 and the left DP is 2/4. The right PT pulse is 2/4 and the left PT pulse is 2/4. Proximal to distal, warm to warm. No dependent rubor or elevation palor is noted. Capillary refill time is less than 3 seconds. Hair growth is appreciated to the dorsal foot and digits.    NEUROLOGY: Protective sensation is intact via 5.07 [place] monofilament. Proprioception is intact. Sensation to light touch is intact. Vibratory sensation is WNL.  Upon palpation of the interspaces, there are no neurological sensations stated that radiate proximal or distal. Upon compression of the metatarsal heads from medial to lateral, no neurological sensations or symptoms are stated.    DERMATOLOGY: Skin is supple, dry and intact. No ecchymosis is noted. No hypertrophic skin formation. No erythema or cellulitis is noted.    ORTHOPEDIC: Manual Muscle Testing is 5/5 in all planes on the right, without pains, with and without resistance. No pains to palpation of the medial or lateral ankle ligaments. No discomfort to palpation of the posterior tibial tendon, peroneal tendon, Achilles tendon or the anterior ankle tendons.  No discomfort palpation right 1st metatarsophalangeal joint. Manual Muscle Testing is 5/5 in all planes on the left, without pains, with and without resistance. No pains to palpation of " the medial or lateral ankle ligaments. No discomfort to palpation of the posterior tibial tendon, peroneal tendon, Achilles tendon or the anterior ankle tendons.  Substantial discomfort palpation of plantar aspect of the left 2nd through 4th metatarsal heads this was of dorsal surface of the metatarsal diaphysis and distal metaphysis.  Moderate edema is noted. No crepitus noted.  Gait pattern is antalgic.    Assessment:     1. Metatarsalgia of left foot    2. Pain in left foot    3. Hallux limitus of right foot    4. Pain of right great toe        Plan:     Metatarsalgia of left foot  Pain in left foot  -     X-Ray Foot Complete Left; Future; Expected date: 11/02/2018    Thorough discussion is had with the patient today, concerning the diagnosis, its etiology, and the treatment algorithm at present.  XRAYS are reviewed in detail with the patient. All questions and concerns regarding findings and its/their implications are outlined and discussed.  Prescriptions written for appropriate NSAIDs.  Please start protected weight-bearing with walking boot.    CAM Walker (Walking Boot), short/tall is dispensed to the patient. The CAM Walker is appropriately fitted and customized to the patient's lower extremity physique by the LPN/MA. Patient to ambulate with the CAM Walker at all times. The patient should not sleep with the device or shower with the device, or drive with the device (if dispensed for right ankle/foot pathology).    Patient will follow up in approximately 2 weeks.       Hallux limitus of right foot  Pain of right great toe  Resolved pain to the 1st metatarsophalangeal joint, right foot status post cortisone injection.  Follow up as needed or in the event of symptomology.  NSAIDs as needed.        Future Appointments   Date Time Provider Department Center   3/18/2019  4:45 PM Adalberto Kong MD [facility] [place]

## 2018-11-02 NOTE — LETTER
November 2, 2018      [facility]  [address] [place] [address]  Phone: [phone]       Patient: Joy Recinos   YOB: 1980  Date of Visit: 11/02/2018    To Whom It May Concern:    Park Recinos  was at [facility] on 11/02/2018. She may return to work on 11/05/2018 with restrictions of wearing boot on left foot and tennis shoe on right foot, no prolonged walking or standing, and no climbing of stairs or ladders for the next 8 weeks (12/31/2018). If you have any questions or concerns, or if I can be of further assistance, please do not hesitate to contact me.    Sincerely,    Jennifer Gonzalez LPN

## 2018-11-06 ENCOUNTER — PATIENT MESSAGE (OUTPATIENT)
Dept: PODIATRY | Facility: CLINIC | Age: 38
End: 2018-11-06

## 2018-11-06 DIAGNOSIS — M77.42 METATARSALGIA, LEFT FOOT: Primary | ICD-10-CM

## 2018-11-06 RX ORDER — MELOXICAM 15 MG/1
15 TABLET ORAL DAILY
Qty: 30 TABLET | Refills: 1 | Status: SHIPPED | OUTPATIENT
Start: 2018-11-06 | End: 2018-12-06

## 2018-11-16 ENCOUNTER — OFFICE VISIT (OUTPATIENT)
Dept: CARDIOLOGY | Facility: CLINIC | Age: 38
End: 2018-11-16
Payer: COMMERCIAL

## 2018-11-16 ENCOUNTER — OFFICE VISIT (OUTPATIENT)
Dept: PODIATRY | Facility: CLINIC | Age: 38
End: 2018-11-16
Payer: COMMERCIAL

## 2018-11-16 ENCOUNTER — CLINICAL SUPPORT (OUTPATIENT)
Dept: CARDIOLOGY | Facility: CLINIC | Age: 38
End: 2018-11-16
Payer: COMMERCIAL

## 2018-11-16 VITALS
WEIGHT: 241.88 LBS | HEIGHT: 68 IN | DIASTOLIC BLOOD PRESSURE: 76 MMHG | BODY MASS INDEX: 36.66 KG/M2 | RESPIRATION RATE: 16 BRPM | SYSTOLIC BLOOD PRESSURE: 120 MMHG

## 2018-11-16 VITALS
WEIGHT: 240.94 LBS | HEIGHT: 68 IN | BODY MASS INDEX: 36.52 KG/M2 | DIASTOLIC BLOOD PRESSURE: 76 MMHG | HEART RATE: 79 BPM | SYSTOLIC BLOOD PRESSURE: 116 MMHG

## 2018-11-16 DIAGNOSIS — M79.672 PAIN IN LEFT FOOT: ICD-10-CM

## 2018-11-16 DIAGNOSIS — M77.42 METATARSALGIA OF LEFT FOOT: Primary | ICD-10-CM

## 2018-11-16 DIAGNOSIS — E66.09 CLASS 2 OBESITY DUE TO EXCESS CALORIES WITHOUT SERIOUS COMORBIDITY WITH BODY MASS INDEX (BMI) OF 36.0 TO 36.9 IN ADULT: ICD-10-CM

## 2018-11-16 DIAGNOSIS — I87.2 VENOUS INSUFFICIENCY (CHRONIC) (PERIPHERAL): ICD-10-CM

## 2018-11-16 DIAGNOSIS — R06.09 DOE (DYSPNEA ON EXERTION): ICD-10-CM

## 2018-11-16 DIAGNOSIS — I87.303 VENOUS HYPERTENSION OF BOTH LOWER EXTREMITIES: ICD-10-CM

## 2018-11-16 DIAGNOSIS — R94.31 NONSPECIFIC ABNORMAL ELECTROCARDIOGRAM (ECG) (EKG): ICD-10-CM

## 2018-11-16 DIAGNOSIS — I73.9 PVD (PERIPHERAL VASCULAR DISEASE): ICD-10-CM

## 2018-11-16 DIAGNOSIS — R06.09 DOE (DYSPNEA ON EXERTION): Primary | ICD-10-CM

## 2018-11-16 PROBLEM — E66.812 CLASS 2 OBESITY DUE TO EXCESS CALORIES WITHOUT SERIOUS COMORBIDITY WITH BODY MASS INDEX (BMI) OF 36.0 TO 36.9 IN ADULT: Status: ACTIVE | Noted: 2018-11-16

## 2018-11-16 PROCEDURE — 93000 ELECTROCARDIOGRAM COMPLETE: CPT | Mod: S$GLB,,, | Performed by: INTERNAL MEDICINE

## 2018-11-16 PROCEDURE — 99244 OFF/OP CNSLTJ NEW/EST MOD 40: CPT | Mod: S$GLB,,, | Performed by: INTERNAL MEDICINE

## 2018-11-16 PROCEDURE — 3008F BODY MASS INDEX DOCD: CPT | Mod: CPTII,S$GLB,, | Performed by: PODIATRIST

## 2018-11-16 PROCEDURE — 99999 PR PBB SHADOW E&M-EST. PATIENT-LVL III: CPT | Mod: PBBFAC,,, | Performed by: INTERNAL MEDICINE

## 2018-11-16 PROCEDURE — 99999 PR PBB SHADOW E&M-EST. PATIENT-LVL III: CPT | Mod: PBBFAC,,, | Performed by: PODIATRIST

## 2018-11-16 PROCEDURE — 99214 OFFICE O/P EST MOD 30 MIN: CPT | Mod: S$GLB,,, | Performed by: PODIATRIST

## 2018-11-16 NOTE — PROGRESS NOTES
Subjective:   Patient ID:  Joy Recinos is a 38 y.o. female who presents for evaluation of Establish Care; Shortness of Breath; Chest Pain; Leg Swelling; Fatigue; Numbness; Excessive Sweating; and Nausea      37 yo female, referred for leg swelling.  PMH fibromyalgia f/u at rheumatology, depression/anxeity.  C/o ARELLANO and leg swelling for few months. Gained 5  To 10 pounds in the past 5 months. ARELLANO with walking 50 yards. No orthopnea and PND.  Leg swelling better in AM and worse in PM.  C/o some chest tightness.   C/o fatigue, dizziness.  Chronic coughing for years due to allergy  Good appetiti.  A lot of syncope at teenage and heart murmur.  No smoking/drinking.  Father had CABG at age of 62. Mother and siblings ok.  EKG NSR and LAE        Past Medical History:   Diagnosis Date    B12 deficiency     Chronic low back pain     oberlander    Endometriosis     Fibromyalgia syndrome     Idiopathic hypersomnia     Irritable bowel syndrome     goldman    Migraines     frances    Shingles     Undifferentiated inflammatory arthritis 2018    Undifferentiated inflammatory arthritis 2018       Past Surgical History:   Procedure Laterality Date     SECTION      x 2     OOPHORECTOMY      PELVIC LAPAROSCOPY      TUBAL LIGATION         Social History     Tobacco Use    Smoking status: Never Smoker    Smokeless tobacco: Never Used   Substance Use Topics    Alcohol use: Yes     Comment: rarely    Drug use: No       Family History   Problem Relation Age of Onset    No Known Problems Mother     Heart disease Father         Bypass    Skin cancer Father     Cataracts Maternal Grandmother     Diabetes Maternal Grandmother     Glaucoma Maternal Grandmother     Skin cancer Maternal Grandmother     Cataracts Maternal Grandfather     Skin cancer Paternal Grandfather     Breast cancer Neg Hx     Ovarian cancer Neg Hx     Deep vein thrombosis Neg Hx        Review of Systems   Constitution:  Negative for decreased appetite, diaphoresis, fever, weakness, malaise/fatigue and night sweats.   HENT: Negative for nosebleeds.    Eyes: Negative for blurred vision and double vision.   Cardiovascular: Positive for chest pain, dyspnea on exertion and leg swelling. Negative for claudication, irregular heartbeat, near-syncope, orthopnea, palpitations, paroxysmal nocturnal dyspnea and syncope.   Respiratory: Positive for shortness of breath. Negative for cough, sleep disturbances due to breathing, snoring, sputum production and wheezing.    Endocrine: Negative for cold intolerance and polyuria.   Hematologic/Lymphatic: Does not bruise/bleed easily.   Skin: Negative for rash.   Musculoskeletal: Negative for back pain, falls, joint pain, joint swelling and neck pain.   Gastrointestinal: Negative for abdominal pain, heartburn, nausea and vomiting.   Genitourinary: Negative for dysuria, frequency and hematuria.   Neurological: Negative for difficulty with concentration, dizziness, focal weakness, headaches, light-headedness, numbness and seizures.   Psychiatric/Behavioral: Positive for depression. Negative for memory loss and substance abuse. The patient does not have insomnia.    Allergic/Immunologic: Negative for HIV exposure and hives.       Objective:   Physical Exam   Constitutional: She is oriented to person, place, and time. She appears well-nourished.   HENT:   Head: Normocephalic.   Eyes: Pupils are equal, round, and reactive to light.   Neck: Normal carotid pulses and no JVD present. Carotid bruit is not present. No thyromegaly present.   Cardiovascular: Normal rate, regular rhythm, normal heart sounds and normal pulses.  No extrasystoles are present. PMI is not displaced. Exam reveals no gallop and no S3.   No murmur heard.  Pulmonary/Chest: Breath sounds normal. No stridor. No respiratory distress.   Abdominal: Soft. Bowel sounds are normal. There is no tenderness. There is no rebound.   Musculoskeletal:  Normal range of motion. She exhibits edema.   1+ edema     Neurological: She is alert and oriented to person, place, and time.   Skin: Skin is intact. No rash noted.   Psychiatric: Her behavior is normal.       Lab Results   Component Value Date    CHOL 185 06/08/2017    CHOL 181 06/13/2015    CHOL 207 (H) 06/20/2014     Lab Results   Component Value Date    HDL 45 06/08/2017    HDL 47 06/13/2015    HDL 49 06/20/2014     Lab Results   Component Value Date    LDLCALC 128.2 06/08/2017    LDLCALC 124.2 06/13/2015    LDLCALC 141.8 06/20/2014     Lab Results   Component Value Date    TRIG 59 06/08/2017    TRIG 49 06/13/2015    TRIG 81 06/20/2014     Lab Results   Component Value Date    CHOLHDL 24.3 06/08/2017    CHOLHDL 26.0 06/13/2015    CHOLHDL 23.7 06/20/2014       Chemistry        Component Value Date/Time     07/24/2018 0943    K 4.1 07/24/2018 0943     07/24/2018 0943    CO2 24 07/24/2018 0943    BUN 16 07/24/2018 0943    CREATININE 0.9 07/24/2018 0943    GLU 91 07/24/2018 0943        Component Value Date/Time    CALCIUM 9.3 07/24/2018 0943    ALKPHOS 126 07/24/2018 0943    AST 14 07/24/2018 0943    ALT 14 07/24/2018 0943    BILITOT 0.3 07/24/2018 0943    ESTGFRAFRICA >60.0 07/24/2018 0943    EGFRNONAA >60.0 07/24/2018 0943          No results found for: LABA1C, HGBA1C  Lab Results   Component Value Date    TSH 1.346 10/13/2017     No results found for: INR, PROTIME  Lab Results   Component Value Date    WBC 6.30 09/17/2018    HGB 13.1 09/17/2018    HCT 40.1 09/17/2018    MCV 90 09/17/2018     09/17/2018     BNP  @LABRCNTIP(BNP,BNPTRIAGEBLO)@  CrCl cannot be calculated (Patient's most recent lab result is older than the maximum 7 days allowed.).  No results found in the last 24 hours.  No results found in the last 24 hours.  No results found in the last 24 hours.    Assessment:      1. ARELLANO (dyspnea on exertion)    2. Nonspecific abnormal electrocardiogram (ECG) (EKG)    3. PVD (peripheral  vascular disease)    4. Class 2 obesity due to excess calories without serious comorbidity with body mass index (BMI) of 36.0 to 36.9 in adult      ARELLANO, leg swelling, fatigue for few months    Plan:   ARELLANO (dyspnea on exertion)  -     SCHEDULED EKG 12-LEAD (to Muse); Future  -     TSH; Future; Expected date: 11/16/2018  -     2D echo with color flow doppler; Future    Nonspecific abnormal electrocardiogram (ECG) (EKG)    PVD (peripheral vascular disease)    Class 2 obesity due to excess calories without serious comorbidity with body mass index (BMI) of 36.0 to 36.9 in adult      Will call for the results  Advise pressure hose.  Weight control  F/u with PCP

## 2018-11-16 NOTE — PROGRESS NOTES
Subjective:       Patient ID: Joy Recinos is a 38 y.o. female.    Chief Complaint: Consult (no c/o pain, c/o bilateral swelling at night after wearing boot, non-diabetic Pt, wears walking boot with socks, PCP Dr. Caceres.)    HPI: Joy Recinos presents to the office today, for follow-up concerning metatarsalgia of the left foot. To this juncture, patient has been immobilized with a walking boot since 11/02/2018.  At this time, she states resolved symptoms.  States no pains with and without the walking boot.  She does have the ancillary complaint, once again, of bilateral lower extremity edema which is pitting.  I did evaluate her concerning this pathology several weeks ago (10/01/2018).  At that point time, she was referred to myself by her Rheumatologist, due to the lower extremity edema, as all of her blood work was negative for pathology.  Patient presents this afternoon with her mother.  Patient denies cough or dyspnea.  Denies calf pains.  Does states slight shortness of breath with walking distances.  No history of CHF.  No cardiac history personally, but it does run in the family.  Patient does state NSAID usage.  She has not purchased the compression stockings that I had advised her to (gave her a prescription) several weeks ago.    Review of patient's allergies indicates:  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       Family History   Problem Relation Age of Onset    No Known Problems Mother     Heart disease Father         Bypass    Skin cancer Father     Cataracts Maternal Grandmother     Diabetes Maternal Grandmother     Glaucoma Maternal  Grandmother     Skin cancer Maternal Grandmother     Cataracts Maternal Grandfather     Skin cancer Paternal Grandfather     Breast cancer Neg Hx     Ovarian cancer Neg Hx     Deep vein thrombosis Neg Hx        Social History     Socioeconomic History    Marital status:      Spouse name: Not on file    Number of children: 2    Years of education: Not on file    Highest education level: Not on file   Social Needs    Financial resource strain: Not on file    Food insecurity - worry: Not on file    Food insecurity - inability: Not on file    Transportation needs - medical: Not on file    Transportation needs - non-medical: Not on file   Occupational History     Employer: [email or web address]   Tobacco Use    Smoking status: Never Smoker    Smokeless tobacco: Never Used   Substance and Sexual Activity    Alcohol use: Yes     Comment: rarely    Drug use: No    Sexual activity: Yes     Partners: Male     Birth control/protection: Injection     Comment:    Other Topics Concern    Are you pregnant or think you may be? No    Breast-feeding No   Social History Narrative    Not on file       Past Surgical History:   Procedure Laterality Date     SECTION      x 2     OOPHORECTOMY      PELVIC LAPAROSCOPY      TUBAL LIGATION         Review of Systems   Constitutional: Negative for chills, fatigue and fever.   HENT: Negative for hearing loss.    Eyes: Negative for photophobia and visual disturbance.   Respiratory: Negative for cough, chest tightness, shortness of breath and wheezing.    Cardiovascular: Positive for leg swelling. Negative for chest pain and palpitations.   Gastrointestinal: Negative for constipation, diarrhea, nausea and vomiting.   Endocrine: Negative for cold intolerance and heat intolerance.   Genitourinary: Negative for flank pain.   Musculoskeletal: Negative for back pain, gait problem, neck pain and neck stiffness.   Skin: Negative for wound.   Neurological:  "Negative for light-headedness, numbness and headaches.   Psychiatric/Behavioral: Negative for sleep disturbance.          Objective:   /76 (BP Location: Left arm, Patient Position: Sitting, BP Method: Large (Automatic))   Resp 16   Ht 5' 8" (1.727 m)   Wt 109.7 kg (241 lb 13.5 oz)   BMI 36.77 kg/m²     X-Ray Foot Complete Left  Narrative: EXAMINATION:  XR FOOT COMPLETE 3 VIEW LEFT    CLINICAL HISTORY:  . Metatarsalgia, left foot    TECHNIQUE:  AP, lateral, and oblique views of the foot were performed.    COMPARISON:  None    FINDINGS:  There is no evidence to suggest acute fracture or dislocation.  The joint spaces appear to be well maintained.  Small dorsal calcaneal enthesophyte noted.  Impression: As above    Electronically signed by: Jv Lindsay DO  Date:    11/02/2018  Time:    10:23       LOWER EXTREMITY PHYSICAL EXAMINATION  NEUROLOGY: Sensation to light touch is intact. Proprioception is intact. Sensation to pin prick is intact. Deep tendon reflexes of the lower extremity are WNL.    VASCULAR: Pulses are palpable to the B/L lower extremity. The right dorsalis pedis pulse is 2/4 and the posterior tibial pulse is 2/4. The left dorsalis pedis pulse is 2/4 and the posterior tibial pulse is 2/4. Hair growth is noted on the dorsal foot and digits. Proximal to distal, warm to warm. Capillary refill time is WNL at less than 3s.  Bilateral lower extremity edema is noted, 1+ pitting.  No overt calf tenderness with palpation and compression. No palpable cords.    DERMATOLOGY: Skin is supple, dry and intact. No ecchymosis is noted. No hypertrophic skin formation. No erythema or cellulitis is noted.     ORTHOPEDIC: Manual Muscle Testing is 5/5 in all planes on the right, without pains, with and without resistance. No pains to palpation of the medial or lateral ankle ligaments. No discomfort to palpation of the posterior tibial tendon, peroneal tendon, Achilles tendon or the anterior ankle tendons.  No " discomfort palpation right 1st metatarsophalangeal joint. Manual Muscle Testing is 5/5 in all planes on the left, without pains, with and without resistance. No pains to palpation of the medial or lateral ankle ligaments. No discomfort to palpation of the posterior tibial tendon, peroneal tendon, Achilles tendon or the anterior ankle tendons.  Substantial discomfort palpation of plantar aspect of the left 2nd through 4th metatarsal heads this was of dorsal surface of the metatarsal diaphysis and distal metaphysis.  Moderate edema is noted. No crepitus noted.  Gait pattern is antalgic.    Assessment:     1. Metatarsalgia of left foot    2. Pain in left foot    3. Venous insufficiency (chronic) (peripheral)        Plan:     Metatarsalgia of left foot  Pain in left foot  Resolved left plantar foot pains.  No discomfort at this time.  Patient may discontinue the walking boot.  Continue NSAIDs as needed.  Did discuss possible initiation of outpatient physical therapy, but patient denies.  Thorough discussion is had with the patient today, concerning the diagnosis, its etiology, and the treatment algorithm at present.  Prior XRAYS are reviewed in detail with the patient. All questions and concerns regarding findings and its/their implications are outlined and discussed.      Venous insufficiency (chronic) (peripheral)  Thorough discussion is had with the patient today, concerning the diagnosis, its etiology, and the treatment algorithm at present.  Most recent labs reviewed in detail with the patient. All questions and concerns regarding findings and its/their implications are outlined and discussed.  Patient's past medical history is thoroughly reviewed today, in light of the fact that surgical intervention is discussed/planned/initiated. Patient is referred to Cardiology for evaluation.             Future Appointments   Date Time Provider Department [place]   11/16/2018  2:20 PM Jose Alberto Vigil MD [facility] CARD[facility] [place]    3/18/2019  4:45 PM Adalberto Kong MD [facility] [place]

## 2018-11-16 NOTE — LETTER
November 16, 2018      John Quiñones, BALTAZAR  [address] [place] [address]           [facility]  [address] [place] [address]  Phone: [phone]  Fax: [phone]          Patient: Joy Recinos   MR Number: [id number]   YOB: 1980   Date of Visit: 11/16/2018       Dear Dr. John Quiñones:    Thank you for referring Joy Recinos to me for evaluation. Attached you will find relevant portions of my assessment and plan of care.    If you have questions, please do not hesitate to call me. I look forward to following Joy Recinos along with you.    Sincerely,    Jose Alberto Vigil MD    Enclosure  CC:  No Recipients    If you would like to receive this communication electronically, please contact [email or web address] or [phone] to request more information on [employer] Link access.    For providers and/or their staff who would like to refer a patient to [facility], please contact us through our one-stop-shop provider referral line, [facility], at [phone].    If you feel you have received this communication in error or would no longer like to receive these types of communications, please e-mail [email or web address]

## 2018-11-17 ENCOUNTER — LAB VISIT (OUTPATIENT)
Dept: LAB | Facility: HOSPITAL | Age: 38
End: 2018-11-17
Attending: INTERNAL MEDICINE
Payer: COMMERCIAL

## 2018-11-17 DIAGNOSIS — R06.09 DOE (DYSPNEA ON EXERTION): ICD-10-CM

## 2018-11-17 LAB — TSH SERPL DL<=0.005 MIU/L-ACNC: 1.54 UIU/ML

## 2018-11-17 PROCEDURE — 36415 COLL VENOUS BLD VENIPUNCTURE: CPT | Mod: PO

## 2018-11-17 PROCEDURE — 84443 ASSAY THYROID STIM HORMONE: CPT

## 2018-11-20 ENCOUNTER — TELEPHONE (OUTPATIENT)
Dept: CARDIOLOGY | Facility: CLINIC | Age: 38
End: 2018-11-20

## 2018-11-20 NOTE — TELEPHONE ENCOUNTER
Attempted without success to contact pt.  Left vm to return call.    ----- Message from Jia Dalton sent at 11/20/2018  9:21 AM CST -----  Contact: pt  PT MISSED CALL BACK   .....[phone] (home)

## 2018-12-07 ENCOUNTER — CLINICAL SUPPORT (OUTPATIENT)
Dept: CARDIOLOGY | Facility: CLINIC | Age: 38
End: 2018-12-07
Attending: INTERNAL MEDICINE
Payer: COMMERCIAL

## 2018-12-07 DIAGNOSIS — R06.09 DOE (DYSPNEA ON EXERTION): ICD-10-CM

## 2018-12-07 LAB
DIASTOLIC DYSFUNCTION: NO
ESTIMATED PA SYSTOLIC PRESSURE: 21.84
RETIRED EF AND QEF - SEE NOTES: 60 (ref 55–65)

## 2018-12-07 PROCEDURE — 93306 TTE W/DOPPLER COMPLETE: CPT | Mod: S$GLB,,, | Performed by: INTERNAL MEDICINE

## 2018-12-10 ENCOUNTER — TELEPHONE (OUTPATIENT)
Dept: CARDIOLOGY | Facility: CLINIC | Age: 38
End: 2018-12-10

## 2018-12-10 NOTE — TELEPHONE ENCOUNTER
Spoke with patient to advise her that 2D ECHO was normal.  Patient denies any questions/concerns.  Instructed to notify office should any questions/concerns arise.  Patient verbalized understanding.

## 2018-12-10 NOTE — TELEPHONE ENCOUNTER
I have attempted without success to contact this patient by phone to advise her that ECHO is normal.  Left message to contact office.

## 2018-12-19 DIAGNOSIS — E55.9 VITAMIN D DEFICIENCY: ICD-10-CM

## 2018-12-19 RX ORDER — VIT C/E/ZN/COPPR/LUTEIN/ZEAXAN 250MG-90MG
1000 CAPSULE ORAL DAILY
Qty: 90 CAPSULE | Refills: 0 | COMMUNITY
Start: 2018-12-19 | End: 2020-08-17

## 2018-12-19 RX ORDER — BUPROPION HYDROCHLORIDE 150 MG/1
150 TABLET, EXTENDED RELEASE ORAL DAILY
Qty: 30 TABLET | Refills: 1 | Status: SHIPPED | OUTPATIENT
Start: 2018-12-19 | End: 2019-02-17 | Stop reason: SDUPTHER

## 2019-01-23 ENCOUNTER — PATIENT MESSAGE (OUTPATIENT)
Dept: OBSTETRICS AND GYNECOLOGY | Facility: CLINIC | Age: 39
End: 2019-01-23

## 2019-01-24 ENCOUNTER — TELEPHONE (OUTPATIENT)
Dept: OBSTETRICS AND GYNECOLOGY | Facility: CLINIC | Age: 39
End: 2019-01-24

## 2019-01-24 NOTE — TELEPHONE ENCOUNTER
----- Message from Sandrita Paige sent at 1/24/2019 11:27 AM CST -----  Contact: pt  Pt returning nurse call, please call pt @ [phone].

## 2019-01-29 RX ORDER — ESCITALOPRAM OXALATE 10 MG/1
20 TABLET ORAL DAILY
Qty: 60 TABLET | Refills: 1 | Status: SHIPPED | OUTPATIENT
Start: 2019-01-29 | End: 2019-03-31 | Stop reason: SDUPTHER

## 2019-01-30 ENCOUNTER — OFFICE VISIT (OUTPATIENT)
Dept: OBSTETRICS AND GYNECOLOGY | Facility: CLINIC | Age: 39
End: 2019-01-30
Payer: COMMERCIAL

## 2019-01-30 ENCOUNTER — LAB VISIT (OUTPATIENT)
Dept: LAB | Facility: HOSPITAL | Age: 39
End: 2019-01-30
Attending: OBSTETRICS & GYNECOLOGY
Payer: COMMERCIAL

## 2019-01-30 VITALS — HEIGHT: 68 IN | WEIGHT: 235.69 LBS | BODY MASS INDEX: 35.72 KG/M2

## 2019-01-30 DIAGNOSIS — N92.0 MENORRHAGIA WITH REGULAR CYCLE: ICD-10-CM

## 2019-01-30 LAB
BASOPHILS # BLD AUTO: 0.04 K/UL
BASOPHILS NFR BLD: 0.5 %
DIFFERENTIAL METHOD: NORMAL
EOSINOPHIL # BLD AUTO: 0.1 K/UL
EOSINOPHIL NFR BLD: 0.9 %
ERYTHROCYTE [DISTWIDTH] IN BLOOD BY AUTOMATED COUNT: 13.2 %
HCT VFR BLD AUTO: 40.5 %
HGB BLD-MCNC: 13.1 G/DL
IMM GRANULOCYTES # BLD AUTO: 0.02 K/UL
IMM GRANULOCYTES NFR BLD AUTO: 0.3 %
LYMPHOCYTES # BLD AUTO: 1.8 K/UL
LYMPHOCYTES NFR BLD: 23.4 %
MCH RBC QN AUTO: 29.4 PG
MCHC RBC AUTO-ENTMCNC: 32.3 G/DL
MCV RBC AUTO: 91 FL
MONOCYTES # BLD AUTO: 0.7 K/UL
MONOCYTES NFR BLD: 8.4 %
NEUTROPHILS # BLD AUTO: 5.2 K/UL
NEUTROPHILS NFR BLD: 66.5 %
NRBC BLD-RTO: 0 /100 WBC
PLATELET # BLD AUTO: 240 K/UL
PMV BLD AUTO: 12.6 FL
RBC # BLD AUTO: 4.46 M/UL
TSH SERPL DL<=0.005 MIU/L-ACNC: 1.83 UIU/ML
WBC # BLD AUTO: 7.85 K/UL

## 2019-01-30 PROCEDURE — 99999 PR PBB SHADOW E&M-EST. PATIENT-LVL III: CPT | Mod: PBBFAC,,, | Performed by: OBSTETRICS & GYNECOLOGY

## 2019-01-30 PROCEDURE — 99213 OFFICE O/P EST LOW 20 MIN: CPT | Mod: S$GLB,,, | Performed by: OBSTETRICS & GYNECOLOGY

## 2019-01-30 PROCEDURE — 99213 PR OFFICE/OUTPT VISIT, EST, LEVL III, 20-29 MIN: ICD-10-PCS | Mod: S$GLB,,, | Performed by: OBSTETRICS & GYNECOLOGY

## 2019-01-30 PROCEDURE — 99999 PR PBB SHADOW E&M-EST. PATIENT-LVL III: ICD-10-PCS | Mod: PBBFAC,,, | Performed by: OBSTETRICS & GYNECOLOGY

## 2019-01-30 PROCEDURE — 84443 ASSAY THYROID STIM HORMONE: CPT

## 2019-01-30 PROCEDURE — 3008F BODY MASS INDEX DOCD: CPT | Mod: CPTII,S$GLB,, | Performed by: OBSTETRICS & GYNECOLOGY

## 2019-01-30 PROCEDURE — 3008F PR BODY MASS INDEX (BMI) DOCUMENTED: ICD-10-PCS | Mod: CPTII,S$GLB,, | Performed by: OBSTETRICS & GYNECOLOGY

## 2019-01-30 PROCEDURE — 85025 COMPLETE CBC W/AUTO DIFF WBC: CPT

## 2019-01-30 PROCEDURE — 36415 COLL VENOUS BLD VENIPUNCTURE: CPT

## 2019-01-30 NOTE — PROGRESS NOTES
CHIEF COMPLAINT:   Chief Complaint   Patient presents with    Menorrhagia       HISTORY OF PRESENT ILLNESS    Joy Recinos 38 y.o.  complains of:   Endometriosis and heavy menses.    Was on OCP since an early teen and was told it was likely due to endometriosis.  Tried several ocps over the years, incl possibly lupron and depo provera.    Had rectal bleeding - has a scope and colonoscopy and was told it was due to endometriosis.  The heavier bleeding stopped, none in years. occ spotting w hard constipation.  Has IBS, constipated often. Fibromyalgia.    On depo provera for past 3y for pain. Stopped this last year due to hair loss. Menses returned 2018. Now they are monthly now but very heavy - lasting 7-8d with 4 days heavy soaking super pads every 3hr and using 2pads at night.  Pain is severe.     Gets mittlesmetz     3rd scope had LSO at 19yo.  4-5 scopes overall. No mention of severe adhesions. Will try to get op note.    Interested in hysterectomy w and w/o RSO.    Feels fatigue but is not weak, there is no headache, dizziness, chest pain, shortness of breath or palpitations.     Lab Results   Component Value Date    WBC 7.85 2019    HGB 13.1 2019    HCT 40.5 2019    MCV 91 2019     2019           HISTORY  Patient Active Problem List   Diagnosis    B12 deficiency    Idiopathic hypersomnia    Migraines    Fibromyalgia syndrome    Breakthrough bleeding on birth control pills    Vitamin D deficiency    Positive DARRION (antinuclear antibody)    Periorbital erythema    Great toe pain, right    DOUG (generalized anxiety disorder)    Depression with anxiety    Pedal edema    ARELLANO (dyspnea on exertion)    PVD (peripheral vascular disease)    Class 2 obesity due to excess calories without serious comorbidity with body mass index (BMI) of 36.0 to 36.9 in adult       Past Medical History:   Diagnosis Date    B12 deficiency     Chronic low back  pain     oberlander    Endometriosis     Fibromyalgia syndrome     Idiopathic hypersomnia     Irritable bowel syndrome     goldman    Migraines     frances    Shingles     Undifferentiated inflammatory arthritis 2018    Undifferentiated inflammatory arthritis 2018       Past Surgical History:   Procedure Laterality Date     SECTION      x 2     OOPHORECTOMY      PELVIC LAPAROSCOPY      TUBAL LIGATION         Family History   Problem Relation Age of Onset    No Known Problems Mother     Heart disease Father         Bypass    Skin cancer Father     Cataracts Maternal Grandmother     Diabetes Maternal Grandmother     Glaucoma Maternal Grandmother     Skin cancer Maternal Grandmother     Cataracts Maternal Grandfather     Skin cancer Paternal Grandfather     Breast cancer Neg Hx     Ovarian cancer Neg Hx     Deep vein thrombosis Neg Hx        Social History     Socioeconomic History    Marital status:      Spouse name: None    Number of children: 2    Years of education: None    Highest education level: None   Social Needs    Financial resource strain: None    Food insecurity - worry: None    Food insecurity - inability: None    Transportation needs - medical: None    Transportation needs - non-medical: None   Occupational History     Employer: [employer]   Tobacco Use    Smoking status: Never Smoker    Smokeless tobacco: Never Used   Substance and Sexual Activity    Alcohol use: Yes     Comment: rarely    Drug use: No    Sexual activity: Yes     Partners: Male     Birth control/protection: Condom     Comment:    Other Topics Concern    Are you pregnant or think you may be? No    Breast-feeding No   Social History Narrative    None       Current Outpatient Medications   Medication Sig Dispense Refill    buPROPion (WELLBUTRIN SR) 150 MG TBSR 12 hr tablet Take 1 tablet (150 mg total) by mouth once daily. 30 tablet 1    cetirizine (ZYRTEC) 10 MG  tablet Take 10 mg by mouth once daily.      cholecalciferol, vitamin D3, (VITAMIN D3) 1,000 unit capsule Take 1 capsule (1,000 Units total) by mouth once daily. 90 capsule 0    crisaborole (EUCRISA) 2 % Oint AAA of eyelid bid prn eczema 60 g 1    efinaconazole 10 % Wu Apply 1 application topically once daily. 8 mL 11    ergocalciferol (ERGOCALCIFEROL) 50,000 unit Cap Take 1 capsule (50,000 Units total) by mouth every 7 days. 12 capsule 3    escitalopram oxalate (LEXAPRO) 10 MG tablet Take 2 tablets (20 mg total) by mouth once daily. 60 tablet 1    omeprazole (PRILOSEC) 20 MG capsule TAKE ONE CAPSULE BY MOUTH DAILY 30 capsule 0    topiramate (TOPAMAX) 50 MG tablet TAKE 1 TABLET EVERY  tablet 3    traMADol (ULTRAM) 50 mg tablet Take 1 tablet (50 mg total) by mouth every 24 hours as needed for Pain. 30 tablet 0     No current facility-administered medications for this visit.        Review of patient's allergies indicates:  No Known Allergies        PHYSICAL EXAM     There were no vitals filed for this visit.    PAIN SCALE: 0/10 None    ROS:  GENERAL: No fever, chills, fatigability or weight loss.  ABDOMEN: Appetite fine. No weight loss. Denies diarrhea, abdominal pain, hematemesis or blood in stool.  URINARY: No flank pain, dysuria or hematuria.     BREASTS: Breasts symmetric, nontender and no lumps detected.    PE:   APPEARANCE: Well nourished, well developed, in no acute distress.  ABDOMEN: Soft. No tenderness or masses. No hepatosplenomegaly. No hernias.  PELVIC:   VULVA: No lesions. Normal female genitalia.  URETHRAL MEATUS: Normal size and location, no lesions, no prolapse.  URETHRA: No masses, tenderness, prolapse or scarring.  VAGINA: Moist and well rugated, no discharge, no significant cystocele or rectocele.  CERVIX: No lesions, normal diameter, no stenosis, no cervical motion tenderness.  UTERUS: 10 week size, regular shape, mobile, non-tender, normal position, good support.  ADNEXA: No  masses, tenderness or CDS nodularity.  ANUS PERINEUM: No lesions, no relaxation, no external hemorrhoids.      DIAGNOSIS:   1. endometriosi  2. menorrhagia      PLAN:   Reviewed with patients the treatment options incl observation,  Medical options, and  Surgical options including minimally invasive options, as well as the risks, benefits and alternatives of each. Pt interested in definitive treatment in form of hysterectomy, specifically robotic approach.    COUNSELING  Discussed with patient the risks of surgery, including but not limited to, risks of anesthesia, infection, bleeding, injury to other organs, such as skin, nerves, arteries, veins, bowel, bladder, ureters, with possible need for reparative or subsequent surgery such as bowel resection/repair - or due to undetected cancer, hernia, colostomy, bladder/ureter surgery, blood transfusion, possible hysterectomy   . HRT with its inherent risks ie MI, PE, DVT, CVA, BRCA was discussed w/ risk of BSO/hysterectomy  There is also risks of development of deep venous thrombosis, pulmonary embolus, myocardial infarction, possible death. The patient verbalizes understanding. Discussed with the Patient the risks/benefits/alternatives of the treatment options.  Consents were signed. Pamphlets given.         FOLLOW-UP: With me u/s and CBC  results to discuss further options.

## 2019-02-07 ENCOUNTER — TELEPHONE (OUTPATIENT)
Dept: RADIOLOGY | Facility: HOSPITAL | Age: 39
End: 2019-02-07

## 2019-02-08 ENCOUNTER — HOSPITAL ENCOUNTER (OUTPATIENT)
Dept: RADIOLOGY | Facility: HOSPITAL | Age: 39
Discharge: HOME | End: 2019-02-08
Attending: OBSTETRICS & GYNECOLOGY
Payer: COMMERCIAL

## 2019-02-08 DIAGNOSIS — N92.0 MENORRHAGIA WITH REGULAR CYCLE: ICD-10-CM

## 2019-02-08 PROCEDURE — 76856 US EXAM PELVIC COMPLETE: CPT | Mod: 26,,, | Performed by: RADIOLOGY

## 2019-02-08 PROCEDURE — 76830 US PELVIS COMP WITH TRANSVAG NON-OB (XPD): ICD-10-PCS | Mod: 26,,, | Performed by: RADIOLOGY

## 2019-02-08 PROCEDURE — 76830 TRANSVAGINAL US NON-OB: CPT | Mod: 26,,, | Performed by: RADIOLOGY

## 2019-02-08 PROCEDURE — 76856 US PELVIS COMP WITH TRANSVAG NON-OB (XPD): ICD-10-PCS | Mod: 26,,, | Performed by: RADIOLOGY

## 2019-02-08 PROCEDURE — 76830 TRANSVAGINAL US NON-OB: CPT | Mod: TC

## 2019-02-13 ENCOUNTER — OFFICE VISIT (OUTPATIENT)
Dept: OBSTETRICS AND GYNECOLOGY | Facility: CLINIC | Age: 39
End: 2019-02-13
Payer: COMMERCIAL

## 2019-02-13 VITALS
BODY MASS INDEX: 35.33 KG/M2 | WEIGHT: 232.38 LBS | SYSTOLIC BLOOD PRESSURE: 118 MMHG | DIASTOLIC BLOOD PRESSURE: 78 MMHG

## 2019-02-13 PROCEDURE — 3008F PR BODY MASS INDEX (BMI) DOCUMENTED: ICD-10-PCS | Mod: CPTII,S$GLB,, | Performed by: OBSTETRICS & GYNECOLOGY

## 2019-02-13 PROCEDURE — 99212 PR OFFICE/OUTPT VISIT, EST, LEVL II, 10-19 MIN: ICD-10-PCS | Mod: S$GLB,,, | Performed by: OBSTETRICS & GYNECOLOGY

## 2019-02-13 PROCEDURE — 3008F BODY MASS INDEX DOCD: CPT | Mod: CPTII,S$GLB,, | Performed by: OBSTETRICS & GYNECOLOGY

## 2019-02-13 PROCEDURE — 99999 PR PBB SHADOW E&M-EST. PATIENT-LVL II: CPT | Mod: PBBFAC,,, | Performed by: OBSTETRICS & GYNECOLOGY

## 2019-02-13 PROCEDURE — 99999 PR PBB SHADOW E&M-EST. PATIENT-LVL II: ICD-10-PCS | Mod: PBBFAC,,, | Performed by: OBSTETRICS & GYNECOLOGY

## 2019-02-13 PROCEDURE — 99212 OFFICE O/P EST SF 10 MIN: CPT | Mod: S$GLB,,, | Performed by: OBSTETRICS & GYNECOLOGY

## 2019-02-13 NOTE — PROGRESS NOTES
CHIEF COMPLAINT:   No chief complaint on file.      HISTORY OF PRESENT ILLNESS    Joy Recinos 38 y.o.  for f/u of her u/s. No new c/.  Here w her  Allen ([facility] maint). Still desires hyst RSO (LSO done in past).  Patient's last menstrual period was 2019 (exact date). was heavy and painful again.      Last encounter  Joy Recinos 38 y.o.  complains of:   Endometriosis and heavy menses.  Was on OCP since an early teen and was told it was likely due to endometriosis.  Tried several ocps over the years, incl possibly lupron and depo provera.  Had rectal bleeding - has a scope and colonoscopy and was told it was due to endometriosis.  The heavier bleeding stopped, none in years. occ spotting w hard constipation.  Has IBS, constipated often. Fibromyalgia.  On depo provera for past 3y for pain. Stopped this last year due to hair loss. Menses returned 2018. Now they are monthly now but very heavy - lasting 7-8d with 4 days heavy soaking super pads every 3hr and using 2pads at night.  Pain is severe.   Gets mittlesmetz   3rd scope had LSO at 19yo.  4-5 scopes overall. No mention of severe adhesions. Will try to get op note.   Interested in hysterectomy w and w/o RSO.  Feels fatigue but is not weak, there is no headache, dizziness, chest pain, shortness of breath or palpitations.             TECHNIQUE:  Transabdominal sonography of the pelvis was performed, followed by transvaginal sonography to better evaluate the uterus and ovaries.    COMPARISON:  Sonogram from 2010    FINDINGS:  The uterus is prominent and measures 12.7 x 3.4 x 5.5 cm.  No myometrial masses.  The endometrial stripe measures 10.9 mm in thickness which is within normal range.  The right ovary measures 3.0 x 2.3 x 1.4 cm.  It demonstrates flow.  No adnexal lesions.  The left ovary could not be visualized.  No free fluid in the cul-de-sac.      Impression       Nonvisualization of  the left ovary.  Prominent uterus measuring up to 12.7 cm.  Right ovary is within normal limits.      Electronically signed by: Camron Curry MD  Date: 2019         HISTORY  Patient Active Problem List   Diagnosis    B12 deficiency    Idiopathic hypersomnia    Migraines    Fibromyalgia syndrome    Breakthrough bleeding on birth control pills    Vitamin D deficiency    Positive DARRION (antinuclear antibody)    Periorbital erythema    Great toe pain, right    DOUG (generalized anxiety disorder)    Depression with anxiety    Pedal edema    ARELLANO (dyspnea on exertion)    PVD (peripheral vascular disease)    Class 2 obesity due to excess calories without serious comorbidity with body mass index (BMI) of 36.0 to 36.9 in adult    Endometriosis of pelvis       Past Medical History:   Diagnosis Date    B12 deficiency     Chronic low back pain     oberlander    Endometriosis     Fibromyalgia syndrome     Idiopathic hypersomnia     Irritable bowel syndrome     goldman    Migraines     frances    Shingles     Undifferentiated inflammatory arthritis 2018    Undifferentiated inflammatory arthritis 2018       Past Surgical History:   Procedure Laterality Date     SECTION      x 2     OOPHORECTOMY      PELVIC LAPAROSCOPY      TUBAL LIGATION         Family History   Problem Relation Age of Onset    No Known Problems Mother     Heart disease Father         Bypass    Skin cancer Father     Cataracts Maternal Grandmother     Diabetes Maternal Grandmother     Glaucoma Maternal Grandmother     Skin cancer Maternal Grandmother     Cataracts Maternal Grandfather     Skin cancer Paternal Grandfather     Breast cancer Neg Hx     Ovarian cancer Neg Hx     Deep vein thrombosis Neg Hx        Social History     Socioeconomic History    Marital status:      Spouse name: None    Number of children: 2    Years of education: None    Highest education level: None   Social Needs     Financial resource strain: None    Food insecurity - worry: None    Food insecurity - inability: None    Transportation needs - medical: None    Transportation needs - non-medical: None   Occupational History     Employer: [employer]   Tobacco Use    Smoking status: Never Smoker    Smokeless tobacco: Never Used   Substance and Sexual Activity    Alcohol use: Yes     Comment: rarely    Drug use: No    Sexual activity: Yes     Partners: Male     Birth control/protection: Condom     Comment:    Other Topics Concern    Are you pregnant or think you may be? No    Breast-feeding No   Social History Narrative    None       Current Outpatient Medications   Medication Sig Dispense Refill    buPROPion (WELLBUTRIN SR) 150 MG TBSR 12 hr tablet Take 1 tablet (150 mg total) by mouth once daily. 30 tablet 1    cetirizine (ZYRTEC) 10 MG tablet Take 10 mg by mouth once daily.      cholecalciferol, vitamin D3, (VITAMIN D3) 1,000 unit capsule Take 1 capsule (1,000 Units total) by mouth once daily. 90 capsule 0    crisaborole (EUCRISA) 2 % Oint AAA of eyelid bid prn eczema 60 g 1    efinaconazole 10 % Wu Apply 1 application topically once daily. 8 mL 11    ergocalciferol (ERGOCALCIFEROL) 50,000 unit Cap Take 1 capsule (50,000 Units total) by mouth every 7 days. 12 capsule 3    escitalopram oxalate (LEXAPRO) 10 MG tablet Take 2 tablets (20 mg total) by mouth once daily. 60 tablet 1    omeprazole (PRILOSEC) 20 MG capsule TAKE ONE CAPSULE BY MOUTH DAILY 30 capsule 0    topiramate (TOPAMAX) 50 MG tablet TAKE 1 TABLET EVERY  tablet 3    traMADol (ULTRAM) 50 mg tablet Take 1 tablet (50 mg total) by mouth every 24 hours as needed for Pain. 30 tablet 0     No current facility-administered medications for this visit.        Review of patient's allergies indicates:  No Known Allergies        PHYSICAL EXAM     There were no vitals filed for this visit.    PAIN SCALE: 0/10 None    ROS:  GENERAL: No  fever, chills, fatigability or weight loss.  ABDOMEN: Appetite fine. No weight loss. Denies diarrhea, abdominal pain, hematemesis or blood in stool.  URINARY: No flank pain, dysuria or hematuria.  BREASTS: Breasts symmetric, nontender and no lumps detected.    PE:   APPEARANCE: Well nourished, well developed, in no acute distress.         DIAGNOSIS:   1. Endometriosis   2. Suspected adenomyosis  3. menorrhagia      PLAN:   To obtain most recent op note - she had a LSC for pain w Fiona at  between her 2 c/s. Then based on this will plan towards hysterectomy.        COUNSELING:  Patient was counseled today on A.C.S. Pap guidelines and recommendations for yearly pelvic exams, mammograms and monthly self breast exams; to see her PCP for other health maintenance.     COUNSELING  Discussed with patient the risks of surgery, including but not limited to, risks of anesthesia, infection, bleeding, injury to other organs, such as skin, nerves, arteries, veins, bowel, bladder, ureters, with possible need for reparative or subsequent surgery such as bowel resection/repair - or due to undetected cancer, hernia, colostomy, bladder/ureter surgery, blood transfusion . HRT with its inherent risks ie MI, PE, DVT, CVA, BRCA was discussed w/ risk of BSO . There is also risks of development of deep venous thrombosis, pulmonary embolus, myocardial infarction, possible death. The patient verbalizes understanding. Discussed with the Patient the risks/benefits/alternatives of the treatment options.  Consents were signed. Pamphlets given.      FOLLOW-UP: With me as above

## 2019-02-18 RX ORDER — BUPROPION HYDROCHLORIDE 150 MG/1
150 TABLET, EXTENDED RELEASE ORAL DAILY
Qty: 30 TABLET | Refills: 1 | Status: SHIPPED | OUTPATIENT
Start: 2019-02-18 | End: 2019-06-04 | Stop reason: SDUPTHER

## 2019-02-28 ENCOUNTER — TELEPHONE (OUTPATIENT)
Dept: OBSTETRICS AND GYNECOLOGY | Facility: CLINIC | Age: 39
End: 2019-02-28

## 2019-02-28 NOTE — TELEPHONE ENCOUNTER
Attempted to contact pt. No answer. Left voicemail.    Need to find out if last surgical procedure was a  from 2008 (scanned into media).  If not, we need to request op records.   Forward answer to Dr. Carpenter.

## 2019-03-02 ENCOUNTER — PATIENT MESSAGE (OUTPATIENT)
Dept: OBSTETRICS AND GYNECOLOGY | Facility: CLINIC | Age: 39
End: 2019-03-02

## 2019-03-06 ENCOUNTER — TELEPHONE (OUTPATIENT)
Dept: OBSTETRICS AND GYNECOLOGY | Facility: CLINIC | Age: 39
End: 2019-03-06

## 2019-03-12 ENCOUNTER — HOSPITAL ENCOUNTER (OUTPATIENT)
Dept: RADIOLOGY | Facility: HOSPITAL | Age: 39
Discharge: HOME | End: 2019-03-12
Attending: NURSE PRACTITIONER
Payer: COMMERCIAL

## 2019-03-12 VITALS — BODY MASS INDEX: 35.16 KG/M2 | HEIGHT: 68 IN | WEIGHT: 232 LBS

## 2019-03-12 DIAGNOSIS — R92.8 FOLLOW-UP EXAMINATION OF ABNORMAL MAMMOGRAM: ICD-10-CM

## 2019-03-12 PROCEDURE — 77065 MAMMO DIGITAL DIAGNOSTIC LEFT WITH TOMOSYNTHESIS_CAD: ICD-10-PCS | Mod: 26,LT,, | Performed by: RADIOLOGY

## 2019-03-12 PROCEDURE — 76642 ULTRASOUND BREAST LIMITED: CPT | Mod: TC,LT

## 2019-03-12 PROCEDURE — G0279 MAMMO DIGITAL DIAGNOSTIC LEFT WITH TOMOSYNTHESIS_CAD: ICD-10-PCS | Mod: 26,,, | Performed by: RADIOLOGY

## 2019-03-12 PROCEDURE — 76642 US BREAST LEFT LIMITED: ICD-10-PCS | Mod: 26,LT,, | Performed by: RADIOLOGY

## 2019-03-12 PROCEDURE — 77065 DX MAMMO INCL CAD UNI: CPT | Mod: 26,LT,, | Performed by: RADIOLOGY

## 2019-03-12 PROCEDURE — G0279 TOMOSYNTHESIS, MAMMO: HCPCS | Mod: 26,,, | Performed by: RADIOLOGY

## 2019-03-12 PROCEDURE — 77065 DX MAMMO INCL CAD UNI: CPT | Mod: TC,LT

## 2019-03-12 PROCEDURE — 77061 BREAST TOMOSYNTHESIS UNI: CPT | Mod: TC,LT

## 2019-03-12 PROCEDURE — 76642 ULTRASOUND BREAST LIMITED: CPT | Mod: 26,LT,, | Performed by: RADIOLOGY

## 2019-03-18 NOTE — PROGRESS NOTES
Patient ID: Joy Recinos is a 38 y.o. female.    Chief Complaint: Abnormal Mammogram (f/u)      HPI: Patient presents for one year f/u evaluation of an abnormal mammogram of the left breast.  In March 2018 Patient reports seeing right breast skin eruption and went in to see GYN was diagnosed with shingles, was placed on antivirals.  The skin eruption led to a bilateral mammogram.  Right breast did not reveal any abnormality.  Left breast revealed in the 8:00 position 2 nodules 1 solid and 1 a complex cyst.  Patient comes in today to to review one year f/u imaging performed .      Review of Systems   Constitutional: Negative for activity change and unexpected weight change (intentional wt loss of 16 #).   HENT: Negative for hearing loss, rhinorrhea and trouble swallowing.    Eyes: Negative for discharge and visual disturbance.   Respiratory: Negative for chest tightness and wheezing.    Cardiovascular: Negative for chest pain and palpitations.   Gastrointestinal: Negative for blood in stool, constipation, diarrhea and vomiting.   Endocrine: Negative for polydipsia and polyuria.   Genitourinary: Positive for menstrual problem. Negative for difficulty urinating, dysuria and hematuria.   Musculoskeletal: Positive for arthralgias and neck pain. Negative for joint swelling.   Neurological: Positive for headaches. Negative for weakness.   Psychiatric/Behavioral: Positive for dysphoric mood. Negative for confusion.   Breast: Pt denies any nipple discharge or palpable mass on the left. Luis breast pain intermittently. Scheduled for hyst in April. No prior trauma or bruising. No breast surgeries.    Current Outpatient Medications   Medication Sig Dispense Refill    buPROPion (WELLBUTRIN SR) 150 MG TBSR 12 hr tablet Take 1 tablet (150 mg total) by mouth once daily. 30 tablet 1    cetirizine (ZYRTEC) 10 MG tablet Take 10 mg by mouth once daily.      cholecalciferol, vitamin D3, (VITAMIN D3) 1,000 unit capsule Take  1 capsule (1,000 Units total) by mouth once daily. 90 capsule 0    crisaborole (EUCRISA) 2 % Oint AAA of eyelid bid prn eczema 60 g 1    efinaconazole 10 % Wu Apply 1 application topically once daily. 8 mL 11    ergocalciferol (ERGOCALCIFEROL) 50,000 unit Cap Take 1 capsule (50,000 Units total) by mouth every 7 days. 12 capsule 3    escitalopram oxalate (LEXAPRO) 10 MG tablet Take 2 tablets (20 mg total) by mouth once daily. 60 tablet 1    multivitamin capsule Take 1 capsule by mouth once daily.      omeprazole (PRILOSEC) 20 MG capsule TAKE ONE CAPSULE BY MOUTH DAILY 30 capsule 0    topiramate (TOPAMAX) 50 MG tablet TAKE 1 TABLET EVERY  tablet 3    traMADol (ULTRAM) 50 mg tablet Take 1 tablet (50 mg total) by mouth every 24 hours as needed for Pain. 30 tablet 0     No current facility-administered medications for this visit.        Review of patient's allergies indicates:  No Known Allergies    Past Medical History:   Diagnosis Date    B12 deficiency     Chronic low back pain     oberlander    Endometriosis     Fibromyalgia syndrome     Idiopathic hypersomnia     Irritable bowel syndrome     goldman    Migraines     frances    Shingles     Undifferentiated inflammatory arthritis 2018    Undifferentiated inflammatory arthritis 2018       Past Surgical History:   Procedure Laterality Date     SECTION      x 2     OOPHORECTOMY      left tube and ovary removed.    PELVIC LAPAROSCOPY      TUBAL LIGATION         Family History   Problem Relation Age of Onset    No Known Problems Mother     Heart disease Father         Bypass    Skin cancer Father     Cataracts Maternal Grandmother     Diabetes Maternal Grandmother     Glaucoma Maternal Grandmother     Skin cancer Maternal Grandmother     Cataracts Maternal Grandfather     Skin cancer Paternal Grandfather     Breast cancer Neg Hx     Ovarian cancer Neg Hx     Deep vein thrombosis Neg Hx        Social History      Socioeconomic History    Marital status:      Spouse name: Not on file    Number of children: 2    Years of education: Not on file    Highest education level: Not on file   Social Needs    Financial resource strain: Not on file    Food insecurity - worry: Not on file    Food insecurity - inability: Not on file    Transportation needs - medical: Not on file    Transportation needs - non-medical: Not on file   Occupational History     Employer: [employer]   Tobacco Use    Smoking status: Never Smoker    Smokeless tobacco: Never Used   Substance and Sexual Activity    Alcohol use: Yes     Comment: rarely    Drug use: No    Sexual activity: Yes     Partners: Male     Birth control/protection: Condom     Comment:    Other Topics Concern    Are you pregnant or think you may be? No    Breast-feeding No   Social History Narrative    Not on file       Vitals:    03/19/19 1436   BP: 98/66   Pulse: 78   Resp: 18   Temp: 97.9 °F (36.6 °C)       Physical Exam   Constitutional: She is oriented to person, place, and time. She appears well-developed and well-nourished.   HENT:   Head: Normocephalic and atraumatic.   Right Ear: External ear normal.   Left Ear: External ear normal.   Mouth/Throat: No oropharyngeal exudate.   Eyes: Conjunctivae and EOM are normal. Pupils are equal, round, and reactive to light. Right eye exhibits no discharge. Left eye exhibits no discharge. No scleral icterus.   Neck: Normal range of motion. Neck supple. No thyromegaly present.   Cardiovascular: Normal rate, regular rhythm and normal heart sounds.   Pulmonary/Chest: Effort normal and breath sounds normal. Right breast exhibits no inverted nipple, no mass, no nipple discharge, no skin change and no tenderness. Left breast exhibits no inverted nipple, no mass, no nipple discharge, no skin change and no tenderness.   No palpable mass appreciated in the left breast   Abdominal: Soft. Bowel sounds are normal.    Musculoskeletal: Normal range of motion. She exhibits no edema.        Right shoulder: She exhibits no crepitus and normal strength.   Lymphadenopathy:        Head (right side): No submental, no submandibular, no tonsillar, no preauricular, no posterior auricular and no occipital adenopathy present.        Head (left side): No submental, no submandibular, no tonsillar, no preauricular, no posterior auricular and no occipital adenopathy present.     She has no cervical adenopathy.        Right cervical: No superficial cervical and no posterior cervical adenopathy present.       Left cervical: No superficial cervical and no posterior cervical adenopathy present.     She has no axillary adenopathy.        Right: No supraclavicular adenopathy present.        Left: No supraclavicular adenopathy present.   Neurological: She is alert and oriented to person, place, and time. She has normal reflexes.   Skin: Skin is warm and dry. No rash noted. No erythema. No pallor.   Psychiatric: She has a normal mood and affect. Her behavior is normal. Judgment and thought content normal.       Imaging: 3/12/19  Result:   Mammo Digital Diagnostic Left w/ Сергей  US Breast Left Limited     History:  Patient is 38 y.o. and is seen for a diagnostic mammogram.     Films Compared:  Compared to: 09/14/2018 Mammo Digital Diagnostic Left with Tomosynthesis_CAD, 09/14/2018 US Breast Left Limited, and 03/16/2018 US Breast Bilateral Limited     Findings:  This procedure was performed using tomosynthesis.  Computer-aided detection was utilized in the interpretation of this examination.  Mammo Digital Diagnostic Left w/ Сергей  The breast is heterogeneously dense, which may obscure small masses.     There is a 10 mm oval mass with circumscribed margins seen in the lower inner quadrant of the left breast anterior depth.  No significant change.      US Breast Left Limited  No significant change in the well-circumscribed solid 10 mm mass at the 8 o'clock  position. No concerning posterior acoustic features. Findings most consistent with fibroadenoma.      Previously described complex cyst at 08:00 o'clock position is not visualized currently.            Impression:  There is no mammographic or sonographic evidence of malignancy.     BI-RADS Category:   Left: 2 - Benign  Overall: 2 - Benign     Recommendation:  Annual mammogram is recommended beginning at age 40.     The patient's estimated lifetime risk of breast cancer (to age 85) based on Tyrer-Cuzick - 7 risk assessment model is: Tyrer-Cuzick: 12.95 %. According to the [employer] Society,  patients with a lifetime breast cancer risk of 20% or higher might benefit from supplemental screening tests.    Menarche at 12  G 2  P 2  Fist pregnancy at 23 y/o  LMP: on depoprovera- no cycle  Estrogen: none  Radiation to the neck or chest wall- none    Prior breast biopsies or atypical hyperplasia- none     FH: negative for breast cancer    Assessment & Plan:  Follow-up examination of abnormal mammogram    1.  Left breast solid mass and complex cyst at 8:00.  Solid mass favors a stable fibroadenoma.  Complex cyst no longer visualized on recent imaging.  2.  Negative clinical exam  3.  Explained to patient imaging and clinical exam results.  Patient and  verbalized understanding.  Patient was encouraged to do monthly breast self-examinations and if she feels any changes she is to let us know.  Otherwise imaging not indicated until age 40 unless develops symptoms- pt will let me know if notes any changes.   4.  Breast tenderness intermittently

## 2019-03-19 ENCOUNTER — OFFICE VISIT (OUTPATIENT)
Dept: HEMATOLOGY/ONCOLOGY | Facility: CLINIC | Age: 39
End: 2019-03-19
Payer: COMMERCIAL

## 2019-03-19 VITALS
HEART RATE: 78 BPM | WEIGHT: 221.81 LBS | BODY MASS INDEX: 33.62 KG/M2 | DIASTOLIC BLOOD PRESSURE: 66 MMHG | HEIGHT: 68 IN | SYSTOLIC BLOOD PRESSURE: 98 MMHG | RESPIRATION RATE: 18 BRPM | OXYGEN SATURATION: 98 % | TEMPERATURE: 98 F

## 2019-03-19 DIAGNOSIS — N64.4 MASTODYNIA: ICD-10-CM

## 2019-03-19 DIAGNOSIS — R92.8 FOLLOW-UP EXAMINATION OF ABNORMAL MAMMOGRAM: Primary | ICD-10-CM

## 2019-03-19 PROCEDURE — 99213 PR OFFICE/OUTPT VISIT, EST, LEVL III, 20-29 MIN: ICD-10-PCS | Mod: S$GLB,,, | Performed by: NURSE PRACTITIONER

## 2019-03-19 PROCEDURE — 99213 OFFICE O/P EST LOW 20 MIN: CPT | Mod: S$GLB,,, | Performed by: NURSE PRACTITIONER

## 2019-03-19 PROCEDURE — 3008F PR BODY MASS INDEX (BMI) DOCUMENTED: ICD-10-PCS | Mod: CPTII,S$GLB,, | Performed by: NURSE PRACTITIONER

## 2019-03-19 PROCEDURE — 3008F BODY MASS INDEX DOCD: CPT | Mod: CPTII,S$GLB,, | Performed by: NURSE PRACTITIONER

## 2019-03-19 PROCEDURE — 99999 PR PBB SHADOW E&M-EST. PATIENT-LVL IV: CPT | Mod: PBBFAC,,, | Performed by: NURSE PRACTITIONER

## 2019-03-19 PROCEDURE — 99999 PR PBB SHADOW E&M-EST. PATIENT-LVL IV: ICD-10-PCS | Mod: PBBFAC,,, | Performed by: NURSE PRACTITIONER

## 2019-04-01 ENCOUNTER — HOSPITAL ENCOUNTER (OUTPATIENT)
Dept: PREADMISSION TESTING | Facility: HOSPITAL | Age: 39
Discharge: HOME | End: 2019-04-01
Attending: OBSTETRICS & GYNECOLOGY
Payer: COMMERCIAL

## 2019-04-01 ENCOUNTER — OFFICE VISIT (OUTPATIENT)
Dept: OBSTETRICS AND GYNECOLOGY | Facility: CLINIC | Age: 39
End: 2019-04-01
Payer: COMMERCIAL

## 2019-04-01 VITALS
SYSTOLIC BLOOD PRESSURE: 107 MMHG | BODY MASS INDEX: 33.65 KG/M2 | SYSTOLIC BLOOD PRESSURE: 108 MMHG | WEIGHT: 222 LBS | DIASTOLIC BLOOD PRESSURE: 64 MMHG | RESPIRATION RATE: 16 BRPM | BODY MASS INDEX: 32.58 KG/M2 | TEMPERATURE: 98 F | WEIGHT: 215 LBS | HEIGHT: 68 IN | HEART RATE: 62 BPM | DIASTOLIC BLOOD PRESSURE: 76 MMHG | HEIGHT: 68 IN

## 2019-04-01 DIAGNOSIS — N92.0 MENORRHAGIA WITH REGULAR CYCLE: ICD-10-CM

## 2019-04-01 PROCEDURE — 99499 NO LOS: ICD-10-PCS | Mod: S$GLB,,, | Performed by: OBSTETRICS & GYNECOLOGY

## 2019-04-01 PROCEDURE — 99999 PR PBB SHADOW E&M-EST. PATIENT-LVL II: ICD-10-PCS | Mod: PBBFAC,,, | Performed by: OBSTETRICS & GYNECOLOGY

## 2019-04-01 PROCEDURE — 99999 PR PBB SHADOW E&M-EST. PATIENT-LVL II: CPT | Mod: PBBFAC,,, | Performed by: OBSTETRICS & GYNECOLOGY

## 2019-04-01 PROCEDURE — 99499 UNLISTED E&M SERVICE: CPT | Mod: S$GLB,,, | Performed by: OBSTETRICS & GYNECOLOGY

## 2019-04-01 NOTE — PROGRESS NOTES
38 y.o. Joy Recinos   For preoperative appointment for a robotically assisted hystrectomy and possible  RSO (LSO done in past; she wants to leave it to intraop findings as to need for RSO - she would like the right ovary resected if has overt lesions or is encapsulated with scar tissue). her  is Allen ([facility]).    Last Encounter Notes:    Joy Recinos 38 y.o.  complains of:   Endometriosis and heavy menses.  Was on OCP since an early teen and was told it was likely due to endometriosis.  Tried several ocps over the years, incl possibly lupron and depo provera.  Had rectal bleeding - has a scope and colonoscopy and was told it was due to endometriosis.  The heavier bleeding stopped, none in years. occ spotting w hard constipation.  Has IBS, constipated often. Fibromyalgia.  On depo provera for past 3y for pain. Stopped this last year due to hair loss. Menses returned 2018. Now they are monthly now but very heavy - lasting 7-8d with 4 days heavy soaking super pads every 3hr and using 2pads at night.  Pain is severe.   Gets mittlesmetz   3rd scope had LSO at 17yo.  4-5 scopes overall. No mention of severe adhesions. Will try to get op note.   Interested in hysterectomy w and w/o RSO.  Feels fatigue but is not weak, there is no headache, dizziness, chest pain, shortness of breath or palpitations.              TECHNIQUE:  Transabdominal sonography of the pelvis was performed, followed by transvaginal sonography to better evaluate the uterus and ovaries.    COMPARISON:  Sonogram from 2010    FINDINGS:  The uterus is prominent and measures 12.7 x 3.4 x 5.5 cm.  No myometrial masses.  The endometrial stripe measures 10.9 mm in thickness which is within normal range.  The right ovary measures 3.0 x 2.3 x 1.4 cm.  It demonstrates flow.  No adnexal lesions.  The left ovary could not be visualized.  No free fluid in the cul-de-sac.       Impression          Nonvisualization of the left ovary.  Prominent uterus measuring up to 12.7 cm.  Right ovary is within normal limits.         No chief complaint on file.      Patient Active Problem List    Diagnosis Date Noted    Endometriosis of pelvis 2019    ARELLANO (dyspnea on exertion) 2018    PVD (peripheral vascular disease) 2018    Class 2 obesity due to excess calories without serious comorbidity with body mass index (BMI) of 36.0 to 36.9 in adult 2018    Pedal edema 2018    DOUG (generalized anxiety disorder) 2018    Depression with anxiety 2018    Great toe pain, right 2018    Positive DARRION (antinuclear antibody) 2017    Periorbital erythema 2017    Vitamin D deficiency 2016    Breakthrough bleeding on birth control pills 10/03/2014    B12 deficiency     Idiopathic hypersomnia     Migraines     Fibromyalgia syndrome        Past Medical History:   Diagnosis Date    B12 deficiency     Chronic low back pain     oberlander    Endometriosis     Fibromyalgia syndrome     Idiopathic hypersomnia     Irritable bowel syndrome     goldman    Migraines     frances    Shingles     Undifferentiated inflammatory arthritis 2018    Undifferentiated inflammatory arthritis 2018       Past Surgical History:   Procedure Laterality Date     SECTION      x 2     OOPHORECTOMY      left tube and ovary removed.    PELVIC LAPAROSCOPY      TUBAL LIGATION         Family History   Problem Relation Age of Onset    No Known Problems Mother     Heart disease Father         Bypass    Skin cancer Father     Cataracts Maternal Grandmother     Diabetes Maternal Grandmother     Glaucoma Maternal Grandmother     Skin cancer Maternal Grandmother     Cataracts Maternal Grandfather     Skin cancer Paternal Grandfather     Breast cancer Neg Hx     Ovarian cancer Neg Hx     Deep vein thrombosis Neg Hx        Social History     Socioeconomic History     Marital status:      Spouse name: Not on file    Number of children: 2    Years of education: Not on file    Highest education level: Not on file   Occupational History     Employer: [employer]   Social Needs    Financial resource strain: Not on file    Food insecurity:     Worry: Not on file     Inability: Not on file    Transportation needs:     Medical: Not on file     Non-medical: Not on file   Tobacco Use    Smoking status: Never Smoker    Smokeless tobacco: Never Used   Substance and Sexual Activity    Alcohol use: Yes     Comment: rarely    Drug use: No    Sexual activity: Yes     Partners: Male     Birth control/protection: Condom     Comment:    Lifestyle    Physical activity:     Days per week: Not on file     Minutes per session: Not on file    Stress: Not on file   Relationships    Social connections:     Talks on phone: Not on file     Gets together: Not on file     Attends [employer] service: Not on file     Active member of club or organization: Not on file     Attends meetings of clubs or organizations: Not on file     Relationship status: Not on file    Intimate partner violence:     Fear of current or ex partner: Not on file     Emotionally abused: Not on file     Physically abused: Not on file     Forced sexual activity: Not on file   Other Topics Concern    Are you pregnant or think you may be? No    Breast-feeding No   Social History Narrative    Not on file       Current Outpatient Medications   Medication Sig Dispense Refill    buPROPion (WELLBUTRIN SR) 150 MG TBSR 12 hr tablet Take 1 tablet (150 mg total) by mouth once daily. 30 tablet 1    cetirizine (ZYRTEC) 10 MG tablet Take 10 mg by mouth once daily.      cholecalciferol, vitamin D3, (VITAMIN D3) 1,000 unit capsule Take 1 capsule (1,000 Units total) by mouth once daily. 90 capsule 0    crisaborole (EUCRISA) 2 % Oint AAA of eyelid bid prn eczema 60 g 1    efinaconazole 10 % Wu Apply 1  "application topically once daily. 8 mL 11    ergocalciferol (ERGOCALCIFEROL) 50,000 unit Cap Take 1 capsule (50,000 Units total) by mouth every 7 days. 12 capsule 3    escitalopram oxalate (LEXAPRO) 10 MG tablet Take 2 tablets (20 mg total) by mouth once daily. 60 tablet 1    multivitamin capsule Take 1 capsule by mouth once daily.      omeprazole (PRILOSEC) 20 MG capsule TAKE ONE CAPSULE BY MOUTH DAILY 30 capsule 0    topiramate (TOPAMAX) 50 MG tablet TAKE 1 TABLET EVERY  tablet 3    traMADol (ULTRAM) 50 mg tablet Take 1 tablet (50 mg total) by mouth every 24 hours as needed for Pain. 30 tablet 0     No current facility-administered medications for this visit.        Review of patient's allergies indicates:  No Known Allergies    ROS:  GENERAL: No fever, chills, fatigability or weight loss.  CHEST: no chest pain, shortness of breath or palpitations.  ABDOMEN: Appetite fine. No weight loss. Denies diarrhea, abdominal pain, hematemesis or blood in stool.  URINARY: No flank pain, dysuria or hematuria.  BREASTS: Breasts symmetric, nontender and no lumps detected.        PHYSICAL EXAM    Vitals:    04/01/19 1404   BP: 108/64   Weight: 100.7 kg (222 lb 0.1 oz)   Height: 5' 8" (1.727 m)   PainSc: 0-No pain      APPEARANCE: Well nourished, well developed, in no acute distress.  CHEST: CTAB   CVS: RRR   EXTREMITIES: no analilia's, calf tenderness  NEURO: DTRs 1-2+, no clonus     ABDOMEN: Soft. No tenderness or masses. No hepatosplenomegaly. No hernias.         ASSESSMENT    preoperative appointment for a robotically assisted hystrectomy and possible  RSO (LSO done in past; she wants to leave it to intraop findings as to need for RSO - she would like the right ovary resected if has overt lesions or is encapsulated with scar tissue).   Plans for Home same night   norco motrin ok        COUNSELING  Discussed with patient the risks of surgery, including but not limited to, risks of anesthesia, infection, bleeding, " injury to other organs, such as skin, nerves, arteries, veins, bowel, bladder, ureters, with possible need for reparative or subsequent surgery such as bowel resection/repair, hernia, colostomy, bladder/ureter surgery, blood transfusion. HRT with its inherent risks ie MI, PE, DVT, CVA, BRCA was discussed w/ risk of BSO/hysterectomy. There is also risks of development of deep venous thrombosis, pulmonary embolus, myocardial infarction, possible death. The patient verbalizes understanding. Discussed with the Patient the risks/benefits/alternatives of the treatment options.  Consents were signed.

## 2019-04-01 NOTE — H&P (VIEW-ONLY)
38 y.o. Joy Recinos   For preoperative appointment for a robotically assisted hystrectomy and possible  RSO (LSO done in past; she wants to leave it to intraop findings as to need for RSO - she would like the right ovary resected if has overt lesions or is encapsulated with scar tissue). her  is Allen ([facility]).    Last Encounter Notes:    Joy Recinos 38 y.o.  complains of:   Endometriosis and heavy menses.  Was on OCP since an early teen and was told it was likely due to endometriosis.  Tried several ocps over the years, incl possibly lupron and depo provera.  Had rectal bleeding - has a scope and colonoscopy and was told it was due to endometriosis.  The heavier bleeding stopped, none in years. occ spotting w hard constipation.  Has IBS, constipated often. Fibromyalgia.  On depo provera for past 3y for pain. Stopped this last year due to hair loss. Menses returned 2018. Now they are monthly now but very heavy - lasting 7-8d with 4 days heavy soaking super pads every 3hr and using 2pads at night.  Pain is severe.   Gets mittlesmetz   3rd scope had LSO at 19yo.  4-5 scopes overall. No mention of severe adhesions. Will try to get op note.   Interested in hysterectomy w and w/o RSO.  Feels fatigue but is not weak, there is no headache, dizziness, chest pain, shortness of breath or palpitations.              TECHNIQUE:  Transabdominal sonography of the pelvis was performed, followed by transvaginal sonography to better evaluate the uterus and ovaries.    COMPARISON:  Sonogram from 2010    FINDINGS:  The uterus is prominent and measures 12.7 x 3.4 x 5.5 cm.  No myometrial masses.  The endometrial stripe measures 10.9 mm in thickness which is within normal range.  The right ovary measures 3.0 x 2.3 x 1.4 cm.  It demonstrates flow.  No adnexal lesions.  The left ovary could not be visualized.  No free fluid in the cul-de-sac.       Impression          Nonvisualization of the left ovary.  Prominent uterus measuring up to 12.7 cm.  Right ovary is within normal limits.         No chief complaint on file.      Patient Active Problem List    Diagnosis Date Noted    Endometriosis of pelvis 2019    ARELLANO (dyspnea on exertion) 2018    PVD (peripheral vascular disease) 2018    Class 2 obesity due to excess calories without serious comorbidity with body mass index (BMI) of 36.0 to 36.9 in adult 2018    Pedal edema 2018    DOUG (generalized anxiety disorder) 2018    Depression with anxiety 2018    Great toe pain, right 2018    Positive DARRION (antinuclear antibody) 2017    Periorbital erythema 2017    Vitamin D deficiency 2016    Breakthrough bleeding on birth control pills 10/03/2014    B12 deficiency     Idiopathic hypersomnia     Migraines     Fibromyalgia syndrome        Past Medical History:   Diagnosis Date    B12 deficiency     Chronic low back pain     oberlander    Endometriosis     Fibromyalgia syndrome     Idiopathic hypersomnia     Irritable bowel syndrome     goldman    Migraines     frances    Shingles     Undifferentiated inflammatory arthritis 2018    Undifferentiated inflammatory arthritis 2018       Past Surgical History:   Procedure Laterality Date     SECTION      x 2     OOPHORECTOMY      left tube and ovary removed.    PELVIC LAPAROSCOPY      TUBAL LIGATION         Family History   Problem Relation Age of Onset    No Known Problems Mother     Heart disease Father         Bypass    Skin cancer Father     Cataracts Maternal Grandmother     Diabetes Maternal Grandmother     Glaucoma Maternal Grandmother     Skin cancer Maternal Grandmother     Cataracts Maternal Grandfather     Skin cancer Paternal Grandfather     Breast cancer Neg Hx     Ovarian cancer Neg Hx     Deep vein thrombosis Neg Hx        Social History     Socioeconomic History     Marital status:      Spouse name: Not on file    Number of children: 2    Years of education: Not on file    Highest education level: Not on file   Occupational History     Employer: [employer]   Social Needs    Financial resource strain: Not on file    Food insecurity:     Worry: Not on file     Inability: Not on file    Transportation needs:     Medical: Not on file     Non-medical: Not on file   Tobacco Use    Smoking status: Never Smoker    Smokeless tobacco: Never Used   Substance and Sexual Activity    Alcohol use: Yes     Comment: rarely    Drug use: No    Sexual activity: Yes     Partners: Male     Birth control/protection: Condom     Comment:    Lifestyle    Physical activity:     Days per week: Not on file     Minutes per session: Not on file    Stress: Not on file   Relationships    Social connections:     Talks on phone: Not on file     Gets together: Not on file     Attends Mormonism service: Not on file     Active member of club or organization: Not on file     Attends meetings of clubs or organizations: Not on file     Relationship status: Not on file    Intimate partner violence:     Fear of current or ex partner: Not on file     Emotionally abused: Not on file     Physically abused: Not on file     Forced sexual activity: Not on file   Other Topics Concern    Are you pregnant or think you may be? No    Breast-feeding No   Social History Narrative    Not on file       Current Outpatient Medications   Medication Sig Dispense Refill    buPROPion (WELLBUTRIN SR) 150 MG TBSR 12 hr tablet Take 1 tablet (150 mg total) by mouth once daily. 30 tablet 1    cetirizine (ZYRTEC) 10 MG tablet Take 10 mg by mouth once daily.      cholecalciferol, vitamin D3, (VITAMIN D3) 1,000 unit capsule Take 1 capsule (1,000 Units total) by mouth once daily. 90 capsule 0    crisaborole (EUCRISA) 2 % Oint AAA of eyelid bid prn eczema 60 g 1    efinaconazole 10 % Wu Apply 1  "application topically once daily. 8 mL 11    ergocalciferol (ERGOCALCIFEROL) 50,000 unit Cap Take 1 capsule (50,000 Units total) by mouth every 7 days. 12 capsule 3    escitalopram oxalate (LEXAPRO) 10 MG tablet Take 2 tablets (20 mg total) by mouth once daily. 60 tablet 1    multivitamin capsule Take 1 capsule by mouth once daily.      omeprazole (PRILOSEC) 20 MG capsule TAKE ONE CAPSULE BY MOUTH DAILY 30 capsule 0    topiramate (TOPAMAX) 50 MG tablet TAKE 1 TABLET EVERY  tablet 3    traMADol (ULTRAM) 50 mg tablet Take 1 tablet (50 mg total) by mouth every 24 hours as needed for Pain. 30 tablet 0     No current facility-administered medications for this visit.        Review of patient's allergies indicates:  No Known Allergies    ROS:  GENERAL: No fever, chills, fatigability or weight loss.  CHEST: no chest pain, shortness of breath or palpitations.  ABDOMEN: Appetite fine. No weight loss. Denies diarrhea, abdominal pain, hematemesis or blood in stool.  URINARY: No flank pain, dysuria or hematuria.  BREASTS: Breasts symmetric, nontender and no lumps detected.        PHYSICAL EXAM    Vitals:    04/01/19 1404   BP: 108/64   Weight: 100.7 kg (222 lb 0.1 oz)   Height: 5' 8" (1.727 m)   PainSc: 0-No pain      APPEARANCE: Well nourished, well developed, in no acute distress.  CHEST: CTAB   CVS: RRR   EXTREMITIES: no analilia's, calf tenderness  NEURO: DTRs 1-2+, no clonus     ABDOMEN: Soft. No tenderness or masses. No hepatosplenomegaly. No hernias.         ASSESSMENT    preoperative appointment for a robotically assisted hystrectomy and possible  RSO (LSO done in past; she wants to leave it to intraop findings as to need for RSO - she would like the right ovary resected if has overt lesions or is encapsulated with scar tissue).   Plans for Home same night   norco motrin ok        COUNSELING  Discussed with patient the risks of surgery, including but not limited to, risks of anesthesia, infection, bleeding, " injury to other organs, such as skin, nerves, arteries, veins, bowel, bladder, ureters, with possible need for reparative or subsequent surgery such as bowel resection/repair, hernia, colostomy, bladder/ureter surgery, blood transfusion. HRT with its inherent risks ie MI, PE, DVT, CVA, BRCA was discussed w/ risk of BSO/hysterectomy. There is also risks of development of deep venous thrombosis, pulmonary embolus, myocardial infarction, possible death. The patient verbalizes understanding. Discussed with the Patient the risks/benefits/alternatives of the treatment options.  Consents were signed.

## 2019-04-01 NOTE — DISCHARGE INSTRUCTIONS
To confirm, Your doctor has instructed you that surgery is scheduled for 4/10/19  at  11:00 am.       Please report to [facility], [address], 1st floor, main lobby by 09:30 am.  Pre admit office will call afternoon prior to surgery with final arrival time    INSTRUCTIONS IMPORTANT!!!   Do not eat, drink, or smoke after 12 midnight. May have water and clear liquid juice until 3 hrs prior to surgery.    OK to brush teeth, no gum, candy or mints!    ¨ Take only these medicines with a small swallow of water-morning of surgery.  None    Pre operative instructions:  Please review the Pre-Operative Instruction booklet that you were given.        Bathing Instructions--See page 6 in the Pre-operative booklet.      Prevention of surgical site infections:     -Keep incisions clean and dry.   -Do not soak/submerge incisions in water until completely healed.   -Do not apply lotions, powders, creams, or deodorants to site.   -Always make sure hands are cleaned with antibacterial soap/ alcohol-based                 prior to touching the surgical site.  (This includes doctors,                 nurses, staff, and yourself.)    Signs and symptoms:   -Redness and pain around the area where you had surgery   -Drainage of cloudy fluid from your surgical wound   -Fever over 100.4       I have read or had read and explained to me, and understand the above information.  Additional comments or instructions:  Received a copy of Pre-operative instructions booklet, FAQ surgical site infection sheet, and packets of hibiclens (if indicated).

## 2019-04-02 RX ORDER — ESCITALOPRAM OXALATE 10 MG/1
20 TABLET ORAL DAILY
Qty: 60 TABLET | Refills: 1 | Status: SHIPPED | OUTPATIENT
Start: 2019-04-02 | End: 2019-06-04 | Stop reason: ALTCHOICE

## 2019-04-10 ENCOUNTER — ANESTHESIA EVENT (OUTPATIENT)
Dept: SURGERY | Facility: HOSPITAL | Age: 39
End: 2019-04-10
Payer: COMMERCIAL

## 2019-04-10 ENCOUNTER — HOSPITAL ENCOUNTER (OUTPATIENT)
Facility: HOSPITAL | Age: 39
Discharge: HOME | End: 2019-04-11
Attending: OBSTETRICS & GYNECOLOGY | Admitting: OBSTETRICS & GYNECOLOGY
Payer: COMMERCIAL

## 2019-04-10 ENCOUNTER — ANESTHESIA (OUTPATIENT)
Dept: SURGERY | Facility: HOSPITAL | Age: 39
End: 2019-04-10
Payer: COMMERCIAL

## 2019-04-10 DIAGNOSIS — N93.9 ABNORMAL VAGINAL BLEEDING: ICD-10-CM

## 2019-04-10 DIAGNOSIS — Z90.710 S/P HYSTERECTOMY: ICD-10-CM

## 2019-04-10 LAB
B-HCG UR QL: NEGATIVE
CTP QC/QA: YES
POCT GLUCOSE: 99 MG/DL (ref 70–110)

## 2019-04-10 PROCEDURE — 63600175 PHARM REV CODE 636 W HCPCS: Performed by: OBSTETRICS & GYNECOLOGY

## 2019-04-10 PROCEDURE — 88307 TISSUE EXAM BY PATHOLOGIST: CPT | Mod: 26,,, | Performed by: PATHOLOGY

## 2019-04-10 PROCEDURE — 63600175 PHARM REV CODE 636 W HCPCS: Performed by: NURSE ANESTHETIST, CERTIFIED REGISTERED

## 2019-04-10 PROCEDURE — 37000008 HC ANESTHESIA 1ST 15 MINUTES: Performed by: OBSTETRICS & GYNECOLOGY

## 2019-04-10 PROCEDURE — 94799 UNLISTED PULMONARY SVC/PX: CPT

## 2019-04-10 PROCEDURE — 25000003 PHARM REV CODE 250: Performed by: OBSTETRICS & GYNECOLOGY

## 2019-04-10 PROCEDURE — 58571 PR LAPAROSCOPY W TOT HYSTERECTUTERUS <=250 GRAM  W TUBE/OVARY: ICD-10-PCS | Mod: 22,,, | Performed by: OBSTETRICS & GYNECOLOGY

## 2019-04-10 PROCEDURE — C1765 ADHESION BARRIER: HCPCS | Performed by: OBSTETRICS & GYNECOLOGY

## 2019-04-10 PROCEDURE — 36000712 HC OR TIME LEV V 1ST 15 MIN: Performed by: OBSTETRICS & GYNECOLOGY

## 2019-04-10 PROCEDURE — 25000003 PHARM REV CODE 250: Performed by: NURSE ANESTHETIST, CERTIFIED REGISTERED

## 2019-04-10 PROCEDURE — 63600175 PHARM REV CODE 636 W HCPCS: Performed by: ANESTHESIOLOGY

## 2019-04-10 PROCEDURE — 37000009 HC ANESTHESIA EA ADD 15 MINS: Performed by: OBSTETRICS & GYNECOLOGY

## 2019-04-10 PROCEDURE — 58571 TLH W/T/O 250 G OR LESS: CPT | Mod: AS,22,, | Performed by: PHYSICIAN ASSISTANT

## 2019-04-10 PROCEDURE — 27201423 OPTIME MED/SURG SUP & DEVICES STERILE SUPPLY: Performed by: OBSTETRICS & GYNECOLOGY

## 2019-04-10 PROCEDURE — 71000039 HC RECOVERY, EACH ADD'L HOUR: Performed by: OBSTETRICS & GYNECOLOGY

## 2019-04-10 PROCEDURE — C2628 CATHETER, OCCLUSION: HCPCS | Performed by: OBSTETRICS & GYNECOLOGY

## 2019-04-10 PROCEDURE — 58571 TLH W/T/O 250 G OR LESS: CPT | Mod: 22,,, | Performed by: OBSTETRICS & GYNECOLOGY

## 2019-04-10 PROCEDURE — 71000033 HC RECOVERY, INTIAL HOUR: Performed by: OBSTETRICS & GYNECOLOGY

## 2019-04-10 PROCEDURE — 36000713 HC OR TIME LEV V EA ADD 15 MIN: Performed by: OBSTETRICS & GYNECOLOGY

## 2019-04-10 PROCEDURE — 88307 TISSUE SPECIMEN TO PATHOLOGY - SURGERY: ICD-10-PCS | Mod: 26,,, | Performed by: PATHOLOGY

## 2019-04-10 PROCEDURE — 88307 TISSUE EXAM BY PATHOLOGIST: CPT | Performed by: PATHOLOGY

## 2019-04-10 PROCEDURE — 58571 PR LAPAROSCOPY W TOT HYSTERECTUTERUS <=250 GRAM  W TUBE/OVARY: ICD-10-PCS | Mod: AS,22,, | Performed by: PHYSICIAN ASSISTANT

## 2019-04-10 PROCEDURE — 81025 URINE PREGNANCY TEST: CPT | Performed by: OBSTETRICS & GYNECOLOGY

## 2019-04-10 DEVICE — BARRIER INTERCEED 3X4 ST DIS: Type: IMPLANTABLE DEVICE | Site: ABDOMEN | Status: FUNCTIONAL

## 2019-04-10 RX ORDER — SODIUM CHLORIDE 9 MG/ML
INJECTION, SOLUTION INTRAVENOUS CONTINUOUS
Status: DISCONTINUED | OUTPATIENT
Start: 2019-04-10 | End: 2019-04-11 | Stop reason: HOSPADM

## 2019-04-10 RX ORDER — ROCURONIUM BROMIDE 10 MG/ML
INJECTION, SOLUTION INTRAVENOUS
Status: DISCONTINUED | OUTPATIENT
Start: 2019-04-10 | End: 2019-04-10

## 2019-04-10 RX ORDER — SODIUM CHLORIDE 0.9 % (FLUSH) 0.9 %
3 SYRINGE (ML) INJECTION
Status: DISCONTINUED | OUTPATIENT
Start: 2019-04-10 | End: 2019-04-10 | Stop reason: HOSPADM

## 2019-04-10 RX ORDER — CHLORHEXIDINE GLUCONATE ORAL RINSE 1.2 MG/ML
10 SOLUTION DENTAL
Status: DISCONTINUED | OUTPATIENT
Start: 2019-04-10 | End: 2019-04-10 | Stop reason: HOSPADM

## 2019-04-10 RX ORDER — SUCCINYLCHOLINE CHLORIDE 20 MG/ML
INJECTION INTRAMUSCULAR; INTRAVENOUS
Status: DISCONTINUED | OUTPATIENT
Start: 2019-04-10 | End: 2019-04-10

## 2019-04-10 RX ORDER — SODIUM CHLORIDE 0.9 % (FLUSH) 0.9 %
3 SYRINGE (ML) INJECTION EVERY 8 HOURS
Status: DISCONTINUED | OUTPATIENT
Start: 2019-04-10 | End: 2019-04-10 | Stop reason: HOSPADM

## 2019-04-10 RX ORDER — HYDROCODONE BITARTRATE AND ACETAMINOPHEN 5; 325 MG/1; MG/1
1 TABLET ORAL EVERY 8 HOURS PRN
Qty: 18 TABLET | Refills: 0 | Status: SHIPPED | OUTPATIENT
Start: 2019-04-10 | End: 2019-05-22

## 2019-04-10 RX ORDER — SODIUM CHLORIDE, SODIUM LACTATE, POTASSIUM CHLORIDE, CALCIUM CHLORIDE 600; 310; 30; 20 MG/100ML; MG/100ML; MG/100ML; MG/100ML
INJECTION, SOLUTION INTRAVENOUS CONTINUOUS
Status: DISCONTINUED | OUTPATIENT
Start: 2019-04-10 | End: 2019-04-11

## 2019-04-10 RX ORDER — GLYCOPYRROLATE 0.2 MG/ML
INJECTION INTRAMUSCULAR; INTRAVENOUS
Status: DISCONTINUED | OUTPATIENT
Start: 2019-04-10 | End: 2019-04-10

## 2019-04-10 RX ORDER — MORPHINE SULFATE 4 MG/ML
3 INJECTION, SOLUTION INTRAMUSCULAR; INTRAVENOUS
Status: DISCONTINUED | OUTPATIENT
Start: 2019-04-10 | End: 2019-04-11 | Stop reason: HOSPADM

## 2019-04-10 RX ORDER — MEPERIDINE HYDROCHLORIDE 50 MG/ML
12.5 INJECTION INTRAMUSCULAR; INTRAVENOUS; SUBCUTANEOUS ONCE AS NEEDED
Status: COMPLETED | OUTPATIENT
Start: 2019-04-10 | End: 2019-04-10

## 2019-04-10 RX ORDER — HYDROCODONE BITARTRATE AND ACETAMINOPHEN 5; 325 MG/1; MG/1
1 TABLET ORAL EVERY 4 HOURS PRN
Status: DISCONTINUED | OUTPATIENT
Start: 2019-04-10 | End: 2019-04-11 | Stop reason: HOSPADM

## 2019-04-10 RX ORDER — CHLORHEXIDINE GLUCONATE ORAL RINSE 1.2 MG/ML
10 SOLUTION DENTAL 2 TIMES DAILY
Status: DISCONTINUED | OUTPATIENT
Start: 2019-04-10 | End: 2019-04-11 | Stop reason: HOSPADM

## 2019-04-10 RX ORDER — PHENYLEPHRINE HYDROCHLORIDE 10 MG/ML
INJECTION INTRAVENOUS
Status: DISCONTINUED | OUTPATIENT
Start: 2019-04-10 | End: 2019-04-10

## 2019-04-10 RX ORDER — FENTANYL CITRATE 50 UG/ML
INJECTION, SOLUTION INTRAMUSCULAR; INTRAVENOUS
Status: DISCONTINUED | OUTPATIENT
Start: 2019-04-10 | End: 2019-04-10

## 2019-04-10 RX ORDER — MIDAZOLAM HYDROCHLORIDE 1 MG/ML
INJECTION, SOLUTION INTRAMUSCULAR; INTRAVENOUS
Status: DISCONTINUED | OUTPATIENT
Start: 2019-04-10 | End: 2019-04-10

## 2019-04-10 RX ORDER — HYDROMORPHONE HYDROCHLORIDE 2 MG/ML
0.2 INJECTION, SOLUTION INTRAMUSCULAR; INTRAVENOUS; SUBCUTANEOUS EVERY 5 MIN PRN
Status: DISCONTINUED | OUTPATIENT
Start: 2019-04-10 | End: 2019-04-10 | Stop reason: HOSPADM

## 2019-04-10 RX ORDER — ONDANSETRON 8 MG/1
8 TABLET, ORALLY DISINTEGRATING ORAL EVERY 8 HOURS PRN
Status: DISCONTINUED | OUTPATIENT
Start: 2019-04-10 | End: 2019-04-11 | Stop reason: HOSPADM

## 2019-04-10 RX ORDER — DEXAMETHASONE SODIUM PHOSPHATE 4 MG/ML
INJECTION, SOLUTION INTRA-ARTICULAR; INTRALESIONAL; INTRAMUSCULAR; INTRAVENOUS; SOFT TISSUE
Status: DISCONTINUED | OUTPATIENT
Start: 2019-04-10 | End: 2019-04-10

## 2019-04-10 RX ORDER — IBUPROFEN 600 MG/1
600 TABLET ORAL EVERY 6 HOURS PRN
Qty: 30 TABLET | Refills: 2 | Status: SHIPPED | OUTPATIENT
Start: 2019-04-10 | End: 2019-05-22

## 2019-04-10 RX ORDER — ONDANSETRON 2 MG/ML
INJECTION INTRAMUSCULAR; INTRAVENOUS
Status: DISCONTINUED | OUTPATIENT
Start: 2019-04-10 | End: 2019-04-10

## 2019-04-10 RX ORDER — SIMETHICONE 80 MG
80 TABLET,CHEWABLE ORAL EVERY 4 HOURS PRN
Status: DISCONTINUED | OUTPATIENT
Start: 2019-04-10 | End: 2019-04-11 | Stop reason: HOSPADM

## 2019-04-10 RX ORDER — LIDOCAINE HCL/PF 100 MG/5ML
SYRINGE (ML) INTRAVENOUS
Status: DISCONTINUED | OUTPATIENT
Start: 2019-04-10 | End: 2019-04-10

## 2019-04-10 RX ORDER — NEOSTIGMINE METHYLSULFATE 1 MG/ML
INJECTION, SOLUTION INTRAVENOUS
Status: DISCONTINUED | OUTPATIENT
Start: 2019-04-10 | End: 2019-04-10

## 2019-04-10 RX ORDER — PROPOFOL 10 MG/ML
VIAL (ML) INTRAVENOUS
Status: DISCONTINUED | OUTPATIENT
Start: 2019-04-10 | End: 2019-04-10

## 2019-04-10 RX ORDER — ACETAMINOPHEN 10 MG/ML
1000 INJECTION, SOLUTION INTRAVENOUS ONCE
Status: COMPLETED | OUTPATIENT
Start: 2019-04-10 | End: 2019-04-10

## 2019-04-10 RX ORDER — CEFAZOLIN SODIUM 2 G/50ML
2 SOLUTION INTRAVENOUS
Status: DISCONTINUED | OUTPATIENT
Start: 2019-04-10 | End: 2019-04-10 | Stop reason: HOSPADM

## 2019-04-10 RX ORDER — DIPHENHYDRAMINE HCL 25 MG
25 CAPSULE ORAL EVERY 4 HOURS PRN
Status: DISCONTINUED | OUTPATIENT
Start: 2019-04-10 | End: 2019-04-11 | Stop reason: HOSPADM

## 2019-04-10 RX ADMIN — PROMETHAZINE HYDROCHLORIDE 12.5 MG: 25 INJECTION INTRAMUSCULAR; INTRAVENOUS at 07:04

## 2019-04-10 RX ADMIN — ROCURONIUM BROMIDE 5 MG: 10 INJECTION, SOLUTION INTRAVENOUS at 11:04

## 2019-04-10 RX ADMIN — ROBINUL 0.8 MG: 0.2 INJECTION INTRAMUSCULAR; INTRAVENOUS at 01:04

## 2019-04-10 RX ADMIN — MEPERIDINE HYDROCHLORIDE 12.5 MG: 50 INJECTION, SOLUTION INTRAMUSCULAR; INTRAVENOUS; SUBCUTANEOUS at 02:04

## 2019-04-10 RX ADMIN — PROPOFOL 150 MG: 10 INJECTION, EMULSION INTRAVENOUS at 11:04

## 2019-04-10 RX ADMIN — MIDAZOLAM 2 MG: 1 INJECTION INTRAMUSCULAR; INTRAVENOUS at 11:04

## 2019-04-10 RX ADMIN — FENTANYL CITRATE 50 MCG: 50 INJECTION, SOLUTION INTRAMUSCULAR; INTRAVENOUS at 01:04

## 2019-04-10 RX ADMIN — CHLORHEXIDINE GLUCONATE 0.12% ORAL RINSE 10 ML: 1.2 LIQUID ORAL at 08:04

## 2019-04-10 RX ADMIN — FENTANYL CITRATE 100 MCG: 50 INJECTION, SOLUTION INTRAMUSCULAR; INTRAVENOUS at 11:04

## 2019-04-10 RX ADMIN — NEOSTIGMINE METHYLSULFATE 5 MG: 1 INJECTION INTRAVENOUS at 01:04

## 2019-04-10 RX ADMIN — CEFAZOLIN 2 G: 1 INJECTION, POWDER, FOR SOLUTION INTRAMUSCULAR; INTRAVENOUS at 11:04

## 2019-04-10 RX ADMIN — ROCURONIUM BROMIDE 30 MG: 10 INJECTION, SOLUTION INTRAVENOUS at 12:04

## 2019-04-10 RX ADMIN — ROCURONIUM BROMIDE 45 MG: 10 INJECTION, SOLUTION INTRAVENOUS at 11:04

## 2019-04-10 RX ADMIN — HYDROCODONE BITARTRATE AND ACETAMINOPHEN 1 TABLET: 5; 325 TABLET ORAL at 07:04

## 2019-04-10 RX ADMIN — PHENYLEPHRINE HYDROCHLORIDE 100 MCG: 10 INJECTION INTRAVENOUS at 01:04

## 2019-04-10 RX ADMIN — SODIUM CHLORIDE, SODIUM LACTATE, POTASSIUM CHLORIDE, AND CALCIUM CHLORIDE: 600; 310; 30; 20 INJECTION, SOLUTION INTRAVENOUS at 11:04

## 2019-04-10 RX ADMIN — ACETAMINOPHEN 1000 MG: 10 INJECTION, SOLUTION INTRAVENOUS at 02:04

## 2019-04-10 RX ADMIN — SUCCINYLCHOLINE CHLORIDE 140 MG: 20 INJECTION, SOLUTION INTRAMUSCULAR; INTRAVENOUS at 11:04

## 2019-04-10 RX ADMIN — LIDOCAINE HYDROCHLORIDE 100 MG: 20 INJECTION, SOLUTION INTRAVENOUS at 11:04

## 2019-04-10 RX ADMIN — ROBINUL 0.2 MG: 0.2 INJECTION INTRAMUSCULAR; INTRAVENOUS at 12:04

## 2019-04-10 RX ADMIN — ONDANSETRON 4 MG: 2 INJECTION, SOLUTION INTRAMUSCULAR; INTRAVENOUS at 01:04

## 2019-04-10 RX ADMIN — DEXAMETHASONE SODIUM PHOSPHATE 8 MG: 4 INJECTION, SOLUTION INTRA-ARTICULAR; INTRALESIONAL; INTRAMUSCULAR; INTRAVENOUS; SOFT TISSUE at 01:04

## 2019-04-10 RX ADMIN — SUCCINYLCHOLINE CHLORIDE 60 MG: 20 INJECTION, SOLUTION INTRAMUSCULAR; INTRAVENOUS at 11:04

## 2019-04-10 NOTE — INTERVAL H&P NOTE
The patient has been examined and the H&P has been reviewed:     I concur with the findings and no changes have occurred since H&P was written.     She confirmed she would like to go home tonight, and made a final decision to proceed with the RSO. Understands this will mean surgical menopause, and require hormone replacement therapy for her long term overall health benefit (more than the risks which were also reviewed).    Anesthesia/Surgery risks, benefits and alternative options discussed and understood by patient/family.          There are no hospital problems to display for this patient.

## 2019-04-10 NOTE — NURSING
Patient to room from PACU via bed. Transferred to bed w assist x 2. Patient very drowsy . Responds to verbal command. Family at bedside. V?S stable. Dermabond to abdominal incisions x 4 dry and intact.

## 2019-04-10 NOTE — NURSING
Patient ambulated to bathroom w staf assist x 2. Voiding Pinki tinged urine. Back to bed . Family at bedside. PAitent requested and received apple juice and jello. Fluids encouraged

## 2019-04-10 NOTE — ANESTHESIA POSTPROCEDURE EVALUATION
Anesthesia Post Evaluation    Patient: Joy Recinos    Procedure(s) Performed: Procedure(s) (LRB):  XI ROBOTIC HYSTERECTOMY (N/A)  XI ROBOTIC SALPINGO-OOPHORECTOMY (Right)  LYSIS, ADHESIONS (N/A)  CYSTOSCOPY (N/A)    Final Anesthesia Type: general  Patient location during evaluation: PACU  Patient participation: Yes- Able to Participate  Level of consciousness: awake and alert and oriented  Post-procedure vital signs: reviewed and stable  Pain management: adequate  Airway patency: patent  PONV status at discharge: No PONV  Anesthetic complications: no      Cardiovascular status: hemodynamically stable  Respiratory status: unassisted, room air and spontaneous ventilation  Hydration status: euvolemic  Follow-up not needed.          Vitals Value Taken Time   /76 4/10/2019  3:53 PM   Temp 37 °C (98.6 °F) 4/10/2019  3:53 PM   Pulse 83 4/10/2019  3:53 PM   Resp 19 4/10/2019  3:53 PM   SpO2 93 % 4/10/2019  3:53 PM         Event Time     Out of Recovery 15:43:43          Pain/Chepe Score: Pain Rating Prior to Med Admin: 0 (4/10/2019  2:09 PM)  Chepe Score: 9 (4/10/2019  3:39 PM)

## 2019-04-10 NOTE — OP NOTE
OPERATIVE NOTE    4/10/2019     PRE-PROCEDURE COUNSELING  Patient counseled on the risks, benefits, and alternatives to procedure.  Please see preoperative consents.                                                    SURGEON:  Gillian Carpenter M.D.                                                                                                                         ASSISTANT:  First Assistant: JORGITO Moreno    Was necessary to assist in performing this case                                                                                                                           PREOPERATIVE DIAGNOSES:  Endometriosis history, pelvic pain, dysmenorrhea, enlarged uterus, history pelvic adhesions                                                                                                          POSTOPERATIVE DIAGNOSES:  same                                                                                                                            PROCEDURE:    Robotic-assisted laparoscopic hysterectomy, right salpingo-oophorectomy, lysis of adhesions 45min, cystoscopy          EBL: 30mL                      FINDINGS: 14wk uterus - entire anterior uterine serosa adhesed to anterior abdominal wall. Adhesions of the omentum to the left uterine cornu.      At the end of the case cystoscopy was performed and showed normal efflux of urine from both ureteral orfices, as noted in a D50 instilled bladder. No bladder injuries. Benign appearing mucosa.                                                                                                                                PROCEDURE IN DETAIL:    After discussion of the risks, benefits and alternatives, the patient understood the potential risks and complications and signed consents were reviewed. Patient was given preoperative sequential compression devices and antibiotics and was taken to the Operating Room where the general endotracheal anesthesia was administered  and found to be adequate.  She was prepped and draped in routine fashion. The PRANAV manipulator was applied in routine fashion.  Attention was turned to the abdomen where the anterior abdominal wall was grasped with towel clamps and elevated.  Veress needle was introduced through the umbilicus and abdomen was insufflated with CO2 gas to 15 mmHg.  An 8mm incision was made in the umbilicus and an 8mm trocar was placed into the abdomen. Under direct visualization, ancillary ports were placed.  This included 8 mm lateral ports and 5 mm left upper quadrant port.  The patient was noted to be in correct anatomical position, placed in Trendelenburg, and robotic operating system was advanced towards the patient. Robotic arms were attached to trocar ends and the operating instruments were placed into the abdomen under direct visualization from the console.This included PK Gyrus for cautery and EndoShears for cold-cut transection.  A survey of the pelvis was made, and adhesions as noted above were noted (that were taken down with cautery, blunt, and sharp dissection). All pedicles were surgically transected in a similar fashion: with PK cautery followed by a cold cut transection.      The path of the ureter was visualized. Next, the right infundibulopelvic ligament was cauterized and transected followed by successive pedicles of the posterior broad ligament, round ligament and carried through the anterior broad ligament to create a bladder flap. Same was performed on the other side and the uterine arteries were skeletonized on both sides. Anterior followed by posterior colpotomies were made.  Next, the left uterine artery was then cauterized and transected followed by successive pedicles of the cardinal ligament to reach the colpotomy site. The same was performed on the other side and the uterus, cervix,  Right tube  and ovary were removed through the vagina and sent to pathology.  The vaginal incision was then reapproximated  with 0 Vicryl in a running locked fashion.  Lapra-Tys were applied at the ends of the incision, and an intracorporal knot was tied in the center of the incision.  Irrigation, desufflation and reinsufflation confirmed hemostasis.      The skin was reapproximated with 4-0 monocryl and supported with Dermabond.     Lap, needle and instrument counts were  correct x2.       Patient was sent to the recovery room in stable condition.

## 2019-04-10 NOTE — TRANSFER OF CARE
"Anesthesia Transfer of Care Note    Patient: Joy Recinos    Procedure(s) Performed: Procedure(s) (LRB):  XI ROBOTIC HYSTERECTOMY (N/A)  XI ROBOTIC SALPINGO-OOPHORECTOMY (Right)  LYSIS, ADHESIONS (N/A)  CYSTOSCOPY (N/A)    Patient location: PACU    Anesthesia Type: general    Transport from OR: Transported from OR on room air with adequate spontaneous ventilation    Post pain: adequate analgesia    Post assessment: no apparent anesthetic complications and tolerated procedure well    Post vital signs: stable    Level of consciousness: awake    Nausea/Vomiting: no nausea/vomiting    Complications: none    Transfer of care protocol was followed      Last vitals:   Visit Vitals  BP (!) 157/93   Pulse 82   Temp 36.1 °C (96.9 °F) (Temporal)   Resp 18   Ht 5' 8" (1.727 m)   Wt 98.7 kg (217 lb 9.5 oz)   LMP 03/22/2019 (Exact Date)   SpO2 100%   Breastfeeding? No   BMI 33.09 kg/m²     "

## 2019-04-10 NOTE — ANESTHESIA PREPROCEDURE EVALUATION
04/10/2019  Joy Recinos is a 38 y.o., female.    Anesthesia Evaluation    I have reviewed the Patient Summary Reports.    I have reviewed the Nursing Notes.   I have reviewed the Medications.     Review of Systems  Anesthesia Hx:  Hx of Anesthetic complications  Denies Family Hx of Anesthesia complications.  Personal Hx of Anesthesia complications, Post-Operative Nausea/Vomiting   Social:  Social Alcohol Use, Non-Smoker    Hematology/Oncology:  Hematology Normal   Oncology Normal     Cardiovascular:   Denies Hypertension.  Denies MI.   Denies CABG/stent.      PVD ARELLANO ECG has been reviewed. ekg 11/2018:  Normal sinus rhythm 79  Possible Left atrial enlargement  Borderline Abnormal ECG  When compared with ECG of 09-OCT-2017 16:52,  No significant change was found    Echo 12/2018:   1 - Concentric remodeling.     2 - No wall motion abnormalities.     3 - Normal left ventricular systolic function (EF 60-65%).     4 - Normal left ventricular diastolic function.     5 - Normal right ventricular systolic function .     6 - The estimated PA systolic pressure is 22 mmHg   Pulmonary:   Denies COPD.  Denies Asthma.  Denies Sleep Apnea.    Renal/:  Renal/ Normal     Hepatic/GI:   Denies GERD. Denies Liver Disease.  Denies Hepatitis. IBS   Musculoskeletal:   Arthritis     Neurological:   Denies CVA. Headaches Denies Seizures. Fibromyalgia syndrome  Chronic Pain Syndrome   Endocrine:  Endocrine Normal    Psych:   anxiety depression          Physical Exam  General:  Obesity    Airway/Jaw/Neck:  Airway Findings: Mouth Opening: Small, but > 3cm Tongue: Normal  General Airway Assessment: Adult  Mallampati: III  Improves to III with phonation.      Dental:  Dental Findings: In tact   Chest/Lungs:  Chest/Lungs Findings: Clear to auscultation, Normal Respiratory Rate     Heart/Vascular:  Heart Findings: Rate:  Normal  Rhythm: Regular Rhythm  Sounds: Normal             Anesthesia Plan  Type of Anesthesia, risks & benefits discussed:  Anesthesia Type:  general  Patient's Preference:   Intra-op Monitoring Plan: standard ASA monitors  Intra-op Monitoring Plan Comments:   Post Op Pain Control Plan: multimodal analgesia  Post Op Pain Control Plan Comments:   Induction:   IV  Beta Blocker:  Patient is not currently on a Beta-Blocker (No further documentation required).       Informed Consent: Patient understands risks and agrees with Anesthesia plan.  Questions answered. Anesthesia consent signed with patient.  ASA Score: 2     Day of Surgery Review of History & Physical: I have interviewed and examined the patient. I have reviewed the patient's H&P dated:  There are no significant changes.  H&P update referred to the surgeon.         Ready For Surgery From Anesthesia Perspective.

## 2019-04-11 VITALS
DIASTOLIC BLOOD PRESSURE: 68 MMHG | RESPIRATION RATE: 18 BRPM | WEIGHT: 217.63 LBS | BODY MASS INDEX: 32.98 KG/M2 | SYSTOLIC BLOOD PRESSURE: 120 MMHG | OXYGEN SATURATION: 94 % | HEIGHT: 68 IN | HEART RATE: 82 BPM | TEMPERATURE: 98 F

## 2019-04-11 PROBLEM — N93.9 ABNORMAL VAGINAL BLEEDING: Status: RESOLVED | Noted: 2019-04-10 | Resolved: 2019-04-11

## 2019-04-11 PROCEDURE — 63600175 PHARM REV CODE 636 W HCPCS: Performed by: OBSTETRICS & GYNECOLOGY

## 2019-04-11 PROCEDURE — 25000003 PHARM REV CODE 250: Performed by: OBSTETRICS & GYNECOLOGY

## 2019-04-11 RX ORDER — BISACODYL 10 MG
10 SUPPOSITORY, RECTAL RECTAL DAILY PRN
Status: DISCONTINUED | OUTPATIENT
Start: 2019-04-11 | End: 2019-04-11 | Stop reason: HOSPADM

## 2019-04-11 RX ORDER — KETOROLAC TROMETHAMINE 30 MG/ML
30 INJECTION, SOLUTION INTRAMUSCULAR; INTRAVENOUS EVERY 6 HOURS
Status: DISCONTINUED | OUTPATIENT
Start: 2019-04-11 | End: 2019-04-11 | Stop reason: HOSPADM

## 2019-04-11 RX ADMIN — KETOROLAC TROMETHAMINE 30 MG: 30 INJECTION, SOLUTION INTRAMUSCULAR; INTRAVENOUS at 05:04

## 2019-04-11 RX ADMIN — CHLORHEXIDINE GLUCONATE 0.12% ORAL RINSE 10 ML: 1.2 LIQUID ORAL at 09:04

## 2019-04-11 RX ADMIN — HYDROCODONE BITARTRATE AND ACETAMINOPHEN 1 TABLET: 5; 325 TABLET ORAL at 01:04

## 2019-04-11 NOTE — ASSESSMENT & PLAN NOTE
POD # 1 s/p robotic assisted laparoscopic hysterectomy and R salpingo-oophorectomy with lysis of adhesions.  Pt doing well, afebrile overnight.  Encouraged ambulation, aware ok to shower.  Pt counseled on management plan and discharge goals including pelvic rest and restrictions. Anticipate discharge today.

## 2019-04-11 NOTE — SUBJECTIVE & OBJECTIVE
Interval History: Patient doing well overnight, denies nausea or vomiting and is currently tolerating po. Abdominal pain is controlled with current medications. No flatus or BM as yet. Emptying bladder well. Slight vaginal spotting. No fever, chest pain, or shortness of breath. Has been up and ambulating, desires discharge home.    Scheduled Meds:   chlorhexidine  10 mL Mouth/Throat BID    ketorolac  30 mg Intravenous Q6H    nozaseptin   Each Nare BID     Continuous Infusions:   sodium chloride 0.9% Stopped (04/10/19 1728)     PRN Meds:bisacodyl, diphenhydrAMINE, HYDROcodone-acetaminophen, morphine, ondansetron, promethazine (PHENERGAN) IVPB, simethicone    Review of patient's allergies indicates:  No Known Allergies    Objective:     Vital Signs (Most Recent):  Temp: 98 °F (36.7 °C) (04/11/19 0713)  Pulse: 82 (04/11/19 0713)  Resp: 18 (04/11/19 0713)  BP: 120/68 (04/11/19 0713)  SpO2: (!) 94 % (04/11/19 0713) Vital Signs (24h Range):  Temp:  [96.9 °F (36.1 °C)-98.6 °F (37 °C)] 98 °F (36.7 °C)  Pulse:  [66-94] 82  Resp:  [13-20] 18  SpO2:  [92 %-100 %] 94 %  BP: (117-158)/(60-98) 120/68     Weight: 98.7 kg (217 lb 9.5 oz)  Body mass index is 33.09 kg/m².  Patient's last menstrual period was 03/22/2019 (exact date).    I&O (Last 24H):    Intake/Output Summary (Last 24 hours) at 4/11/2019 1005  Last data filed at 4/11/2019 0504  Gross per 24 hour   Intake 2330 ml   Output 510 ml   Net 1820 ml       Physical Exam:   Constitutional: She is oriented to person, place, and time. She appears well-developed and well-nourished. No distress.       Cardiovascular: Normal rate, regular rhythm and normal heart sounds.    No murmur heard.   Pulmonary/Chest: Effort normal and breath sounds normal. No respiratory distress. She has no wheezes. She has no rales.        Abdominal: Soft. Bowel sounds are normal. She exhibits abdominal incision (4 lap sites clean, dry, intact with dermabond). She exhibits no distension. There is no  tenderness. There is no guarding.     Genitourinary: Vagina normal.           Musculoskeletal: She exhibits no edema.   No calf tenderness       Neurological: She is alert and oriented to person, place, and time.    Skin: Skin is warm and dry. No rash noted. She is not diaphoretic.        Laboratory:  I have personallly reviewed all pertinent lab results from the last 24 hours.    Diagnostic Results:  Labs: Reviewed

## 2019-04-11 NOTE — HOSPITAL COURSE
4/10/2019      SURGEON:  Gillian Carpenter M.D.                                                                                                                                                PREOPERATIVE DIAGNOSES:  Endometriosis history, pelvic pain, dysmenorrhea, enlarged uterus, history pelvic adhesions                                                                                                          POSTOPERATIVE DIAGNOSES:  same                                                                                                                            PROCEDURE:    Robotic-assisted laparoscopic hysterectomy, right salpingo-oophorectomy, lysis of adhesions 45min, cystoscopy           EBL: 30mL            FINDINGS: 14wk uterus - entire anterior uterine serosa adhesed to anterior abdominal wall. Adhesions of the omentum to the left uterine cornu.      At the end of the case cystoscopy was performed and showed normal efflux of urine from both ureteral orfices, as noted in a D50 instilled bladder. No bladder injuries. Benign appearing mucosa.     Transferred to medical surgical unit for routine post operative care. Patient progressed well postoperatively without complication.  Stable for discharge post op day 1.

## 2019-04-11 NOTE — PLAN OF CARE
Problem: Adult Inpatient Plan of Care  Goal: Plan of Care Review  Outcome: Outcome(s) achieved Date Met: 04/11/19  Fall precautions maintained. Pt free from injuries/falls.  Ambulates and repositions  Adequate for D/C  POC and meds discussed w/ pt. Pt verbalized understanding.  Chart check done.

## 2019-04-11 NOTE — NURSING
D/c orders given to patient and family. Verbalized w teachback. Prescribed meds picked up and in hand by . D/c home in private vehicle.

## 2019-04-11 NOTE — HPI
38 y.o. Joy Recinos   For preoperative appointment for a robotically assisted hystrectomy and possible  RSO (LSO done in past; she wants to leave it to intraop findings as to need for RSO - she would like the right ovary resected if has overt lesions or is encapsulated with scar tissue). her  is [facility] ([facility]).     Last Encounter Notes:     Joy Recinos 38 y.o.  complains of:   Endometriosis and heavy menses.  Was on OCP since an early teen and was told it was likely due to endometriosis.  Tried several ocps over the years, incl possibly lupron and depo provera.  Had rectal bleeding - has a scope and colonoscopy and was told it was due to endometriosis.  The heavier bleeding stopped, none in years. occ spotting w hard constipation.  Has IBS, constipated often. Fibromyalgia.  On depo provera for past 3y for pain. Stopped this last year due to hair loss. Menses returned 2018. Now they are monthly now but very heavy - lasting 7-8d with 4 days heavy soaking super pads every 3hr and using 2pads at night.  Pain is severe.   Gets mittlesmetz   3rd scope had LSO at 19yo.  4-5 scopes overall. No mention of severe adhesions. Will try to get op note.   Interested in hysterectomy w and w/o RSO.  Feels fatigue but is not weak, there is no headache, dizziness, chest pain, shortness of breath or palpitations.

## 2019-04-11 NOTE — DISCHARGE SUMMARY
[facility] -   Obstetrics & Gynecology  Discharge Summary    Patient Name: Joy Recinos  MRN: [id number]  Admission Date: 4/10/2019  Hospital Length of Stay: 0 days  Discharge Date and Time:  2019 10:12 AM  Attending Physician: Gillian Carpenter MD   Discharging Provider: Mar Carreno PA-C  Primary Care Provider: Angus Caceres MD    HPI:  38 y.o. Joy Recinos   For preoperative appointment for a robotically assisted hystrectomy and possible  RSO (LSO done in past; she wants to leave it to intraop findings as to need for RSO - she would like the right ovary resected if has overt lesions or is encapsulated with scar tissue). her  is [facility] ([facility]).     Last Encounter Notes:     Joy Recinos 38 y.o.  complains of:   Endometriosis and heavy menses.  Was on OCP since an early teen and was told it was likely due to endometriosis.  Tried several ocps over the years, incl possibly lupron and depo provera.  Had rectal bleeding - has a scope and colonoscopy and was told it was due to endometriosis.  The heavier bleeding stopped, none in years. occ spotting w hard constipation.  Has IBS, constipated often. Fibromyalgia.  On depo provera for past 3y for pain. Stopped this last year due to hair loss. Menses returned 2018. Now they are monthly now but very heavy - lasting 7-8d with 4 days heavy soaking super pads every 3hr and using 2pads at night.  Pain is severe.   Gets mittlesmetz   3rd scope had LSO at 17yo.  4-5 scopes overall. No mention of severe adhesions. Will try to get op note.   Interested in hysterectomy w and w/o RSO.  Feels fatigue but is not weak, there is no headache, dizziness, chest pain, shortness of breath or palpitations.            Hospital Course:  4/10/2019      SURGEON:  Gillian Carpenter M.D.                                                                                                                                                 PREOPERATIVE DIAGNOSES:  Endometriosis history, pelvic pain, dysmenorrhea, enlarged uterus, history pelvic adhesions                                                                                                          POSTOPERATIVE DIAGNOSES:  same                                                                                                                            PROCEDURE:    Robotic-assisted laparoscopic hysterectomy, right salpingo-oophorectomy, lysis of adhesions 45min, cystoscopy           EBL: 30mL            FINDINGS: 14wk uterus - entire anterior uterine serosa adhesed to anterior abdominal wall. Adhesions of the omentum to the left uterine cornu.      At the end of the case cystoscopy was performed and showed normal efflux of urine from both ureteral orfices, as noted in a D50 instilled bladder. No bladder injuries. Benign appearing mucosa.     Transferred to medical surgical unit for routine post operative care. Patient progressed well postoperatively without complication.  Stable for discharge post op day 1.           Procedure(s) (LRB):  XI ROBOTIC HYSTERECTOMY (N/A)  XI ROBOTIC SALPINGO-OOPHORECTOMY (Right)  XI ROBOTIC LYSIS, ADHESIONS (N/A)  CYSTOSCOPY (N/A)         Significant Diagnostic Studies: Labs: All labs within the past 24 hours have been reviewed    Pending Diagnostic Studies:     None        Final Active Diagnoses:    Diagnosis Date Noted POA    PRINCIPAL PROBLEM:  S/P hysterectomy [Z90.710] 04/10/2019 No      Problems Resolved During this Admission:    Diagnosis Date Noted Date Resolved POA    Abnormal vaginal bleeding [N93.9] 04/10/2019 04/11/2019 Yes        Discharged Condition: good    Disposition: Home or Self Care    Follow Up:  Follow-up Information     Gillian Carpenter MD. Go on 5/22/2019.    Specialties:  Gynecology, Obstetrics and Gynecology, Obstetrics  Why:  Post op visit  Contact information:  [address] [facility] [place]  [address]  [phone]                 Patient Instructions:      Diet Adult Regular     Call MD for:  temperature >100.4     Call MD for:  persistent nausea and vomiting     Call MD for:  severe uncontrolled pain     Call MD for:  difficulty breathing, headache or visual disturbances     Call MD for:  redness, tenderness, or signs of infection (pain, swelling, redness, odor or green/yellow discharge around incision site)     Call MD for:  hives     Call MD for:  persistent dizziness or light-headedness     Call MD for:  extreme fatigue     Call MD for:   Order Comments: Vaginal flow of blood     Lifting restrictions   Order Comments: Limit lifting to 15 lb     Other restrictions (specify):   Order Comments: Pelvic rest: nothing in the vagina, including douching, tampons, or intercourse for 12 weeks  Shower only, no baths until cleared by surgeon     No driving until:   Order Comments: No driving until off of all opiate pain meds and able to brake quickly without pain.     Medications:  Reconciled Home Medications:      Medication List      START taking these medications    HYDROcodone-acetaminophen 5-325 mg per tablet  Commonly known as:  NORCO  Take 1 tablet by mouth every 8 (eight) hours as needed for Pain.     ibuprofen 600 MG tablet  Commonly known as:  ADVIL,MOTRIN  Take 1 tablet (600 mg total) by mouth every 6 (six) hours as needed for Pain. May alternate with the norco        CHANGE how you take these medications    buPROPion 150 MG TBSR 12 hr tablet  Commonly known as:  WELLBUTRIN SR  Take 1 tablet (150 mg total) by mouth once daily.  What changed:  when to take this     omeprazole 20 MG capsule  Commonly known as:  PRILOSEC  TAKE ONE CAPSULE BY MOUTH DAILY  What changed:    · how much to take  · how to take this  · when to take this     topiramate 50 MG tablet  Commonly known as:  TOPAMAX  TAKE 1 TABLET EVERY DAY  What changed:    · how much to take  · how to take this  · when to take this        CONTINUE  taking these medications    cetirizine 10 MG tablet  Commonly known as:  ZYRTEC  Take 10 mg by mouth once daily.     cholecalciferol (vitamin D3) 1,000 unit capsule  Commonly known as:  VITAMIN D3  Take 1 capsule (1,000 Units total) by mouth once daily.     crisaborole 2 % Oint  Commonly known as:  EUCRISA  AAA of eyelid bid prn eczema     efinaconazole 10 % Leslie  Commonly known as:  JUBLIA  Apply 1 application topically once daily.     escitalopram oxalate 10 MG tablet  Commonly known as:  LEXAPRO  Take 2 tablets (20 mg total) by mouth once daily.     multivitamin capsule  Take 1 capsule by mouth once daily.     traMADol 50 mg tablet  Commonly known as:  ULTRAM  Take 1 tablet (50 mg total) by mouth every 24 hours as needed for Pain.     VITAMIN D2 50,000 unit Cap  Generic drug:  ergocalciferol  Take 1 capsule (50,000 Units total) by mouth every 7 days.            Mar Carreno PA-C  Obstetrics & Gynecology  [facility]

## 2019-04-11 NOTE — PROGRESS NOTES
[facility] -   Obstetrics & Gynecology  Progress Note    Patient Name: Joy Recinos  MRN: [id number]  Admission Date: 4/10/2019  Primary Care Provider: Angus Caceres MD  Principal Problem: S/P hysterectomy    Subjective:     HPI:  38 y.o. Joy Recinos   For preoperative appointment for a robotically assisted hystrectomy and possible  RSO (LSO done in past; she wants to leave it to intraop findings as to need for RSO - she would like the right ovary resected if has overt lesions or is encapsulated with scar tissue). her  is Allen ([facility]).     Last Encounter Notes:     Joy Recinos 38 y.o.  complains of:   Endometriosis and heavy menses.  Was on OCP since an early teen and was told it was likely due to endometriosis.  Tried several ocps over the years, incl possibly lupron and depo provera.  Had rectal bleeding - has a scope and colonoscopy and was told it was due to endometriosis.  The heavier bleeding stopped, none in years. occ spotting w hard constipation.  Has IBS, constipated often. Fibromyalgia.  On depo provera for past 3y for pain. Stopped this last year due to hair loss. Menses returned 2018. Now they are monthly now but very heavy - lasting 7-8d with 4 days heavy soaking super pads every 3hr and using 2pads at night.  Pain is severe.   Gets mittlesmetz   3rd scope had LSO at 19yo.  4-5 scopes overall. No mention of severe adhesions. Will try to get op note.   Interested in hysterectomy w and w/o RSO.  Feels fatigue but is not weak, there is no headache, dizziness, chest pain, shortness of breath or palpitations.            Interval History: Patient doing well overnight, denies nausea or vomiting and is currently tolerating po. Abdominal pain is controlled with current medications. No flatus or BM as yet. Emptying bladder well. Slight vaginal spotting. No fever, chest pain, or shortness of breath. Has been up and  ambulating, desires discharge home.    Scheduled Meds:   chlorhexidine  10 mL Mouth/Throat BID    ketorolac  30 mg Intravenous Q6H    nozaseptin   Each Nare BID     Continuous Infusions:   sodium chloride 0.9% Stopped (04/10/19 1728)     PRN Meds:bisacodyl, diphenhydrAMINE, HYDROcodone-acetaminophen, morphine, ondansetron, promethazine (PHENERGAN) IVPB, simethicone    Review of patient's allergies indicates:  No Known Allergies    Objective:     Vital Signs (Most Recent):  Temp: 98 °F (36.7 °C) (04/11/19 0713)  Pulse: 82 (04/11/19 0713)  Resp: 18 (04/11/19 0713)  BP: 120/68 (04/11/19 0713)  SpO2: (!) 94 % (04/11/19 0713) Vital Signs (24h Range):  Temp:  [96.9 °F (36.1 °C)-98.6 °F (37 °C)] 98 °F (36.7 °C)  Pulse:  [66-94] 82  Resp:  [13-20] 18  SpO2:  [92 %-100 %] 94 %  BP: (117-158)/(60-98) 120/68     Weight: 98.7 kg (217 lb 9.5 oz)  Body mass index is 33.09 kg/m².  Patient's last menstrual period was 03/22/2019 (exact date).    I&O (Last 24H):    Intake/Output Summary (Last 24 hours) at 4/11/2019 1005  Last data filed at 4/11/2019 0504  Gross per 24 hour   Intake 2330 ml   Output 510 ml   Net 1820 ml       Physical Exam:   Constitutional: She is oriented to person, place, and time. She appears well-developed and well-nourished. No distress.       Cardiovascular: Normal rate, regular rhythm and normal heart sounds.    No murmur heard.   Pulmonary/Chest: Effort normal and breath sounds normal. No respiratory distress. She has no wheezes. She has no rales.        Abdominal: Soft. Bowel sounds are normal. She exhibits abdominal incision (4 lap sites clean, dry, intact with dermabond). She exhibits no distension. There is no tenderness. There is no guarding.     Genitourinary: Vagina normal.           Musculoskeletal: She exhibits no edema.   No calf tenderness       Neurological: She is alert and oriented to person, place, and time.    Skin: Skin is warm and dry. No rash noted. She is not diaphoretic.         Laboratory:  I have personallly reviewed all pertinent lab results from the last 24 hours.    Diagnostic Results:  Labs: Reviewed    Assessment/Plan:     * S/P hysterectomy  POD # 1 s/p robotic assisted laparoscopic hysterectomy and R salpingo-oophorectomy with lysis of adhesions.  Pt doing well, afebrile overnight.   Encouraged ambulation, aware ok to shower.  Pt counseled on management plan and discharge goals including pelvic rest and restrictions. Anticipate discharge today.                Mar Carreno PA-C  Obstetrics & Gynecology  [facility]

## 2019-05-22 ENCOUNTER — OFFICE VISIT (OUTPATIENT)
Dept: OBSTETRICS AND GYNECOLOGY | Facility: CLINIC | Age: 39
End: 2019-05-22
Payer: COMMERCIAL

## 2019-05-22 VITALS
SYSTOLIC BLOOD PRESSURE: 110 MMHG | WEIGHT: 215.38 LBS | DIASTOLIC BLOOD PRESSURE: 70 MMHG | BODY MASS INDEX: 32.64 KG/M2 | HEIGHT: 68 IN

## 2019-05-22 DIAGNOSIS — Z09 POSTOP CHECK: Primary | ICD-10-CM

## 2019-05-22 PROCEDURE — 99999 PR PBB SHADOW E&M-EST. PATIENT-LVL III: ICD-10-PCS | Mod: PBBFAC,,, | Performed by: OBSTETRICS & GYNECOLOGY

## 2019-05-22 PROCEDURE — 99024 POSTOP FOLLOW-UP VISIT: CPT | Mod: S$GLB,,, | Performed by: OBSTETRICS & GYNECOLOGY

## 2019-05-22 PROCEDURE — 99999 PR PBB SHADOW E&M-EST. PATIENT-LVL III: CPT | Mod: PBBFAC,,, | Performed by: OBSTETRICS & GYNECOLOGY

## 2019-05-22 PROCEDURE — 99024 PR POST-OP FOLLOW-UP VISIT: ICD-10-PCS | Mod: S$GLB,,, | Performed by: OBSTETRICS & GYNECOLOGY

## 2019-05-22 RX ORDER — ESTRADIOL 1 MG/1
1 TABLET ORAL DAILY
Qty: 30 TABLET | Refills: 11 | Status: SHIPPED | OUTPATIENT
Start: 2019-05-22 | End: 2020-05-15 | Stop reason: SDUPTHER

## 2019-05-22 NOTE — PROGRESS NOTES
Joy Recinos is a 38 y.o. female  post-op from a          4/10/2019                  PREOPERATIVE DIAGNOSES:  Endometriosis history, pelvic pain, dysmenorrhea, enlarged uterus, history pelvic adhesions                                                                                                     POSTOPERATIVE DIAGNOSES:  same                                     PROCEDURE:    Robotic-assisted laparoscopic hysterectomy, right salpingo-oophorectomy, lysis of adhesions 45min, cystoscopy        EBL: 30mL               FINDINGS: 14wk uterus - entire anterior uterine serosa adhesed to anterior abdominal wall. Adhesions of the omentum to the left uterine cornu.      At the end of the case cystoscopy was performed and showed normal efflux of urine from both ureteral orfices, as noted in a D50 instilled bladder. No bladder injuries. Benign appearing mucosa.                                                       There are no complaints and the procedure and hospitalization occured without complications.     The operative findings and pathology were reviewed with the patient.     SPECIMEN  1) Uterus, cervix, with right fallopian tube and ovary.  FINAL PATHOLOGIC DIAGNOSIS  Uterus, cervix, right fallopian tube and ovary:  Right fallopian tube: No significant histologic abnormalities.  Right ovary: Benign follicle and hemorrhagic corpus luteum cysts.  Cervix: No significant histologic abnormalities.  Endometrium: Secretory pattern.  Myometrium: Focal adenomyosis.  Serosa: No significant histologic abnormalities.  [facility]  Diagnosed by: Enrique Victoria M.D.  (Electronically Signed: 2019 15:56:01)  Gross Description  1. ID/AP Label: [id number], designated cervix, uterus, right fallopian tube, right ovary  Received fresh labeled with the patient's name and medical record number is a 106 gram    PE:  APPEARANCE: Well nourished, well developed, in no acute distress.   ABDOMEN: Soft. No tenderness or masses. No  hepatosplenomegaly. No hernias. Incisions intact, clean and dry  PELVIC: vaginal cuff intact no masses or bleeding.    Assessment: Routine postoperative follow-up exam  Surgical menopause - hormone replacement counseling.      A full discussion of the benefit-risk ratio of hormonal replacement therapy was carried out. Improvement in vasomotor and other climacteric symptoms is discussed, including possible improvements in sleep and mood. Reduction of risk for osteoporosis was explained. We discussed the study data showing increased risk of thrombo-embolic events such as myocardial infarction, stroke and also possibly breast cancer with estrogen replacement, and how this might affect her. The range of side effects such as breast tenderness, weight gain and including possible increases in lifetime risk of breast cancer and possible thrombotic complications was discussed. We also discussed ACOG's recommendation to use hormone replacement therapy for the relief of hot flashes alone and to be on the lowest dose possible for the shortest amount of time.  Alternative such as herbal and soy-based products were reviewed. All of her questions about this therapy were answered.  If taking hormones, pt knows and understands to discontinue immediately if she develops chest pain, heart problems, MI, DVT, CVA, breast cancer.        Follow-up with me as needed.

## 2019-06-03 RX ORDER — ESCITALOPRAM OXALATE 10 MG/1
20 TABLET ORAL DAILY
Qty: 60 TABLET | Refills: 1 | OUTPATIENT
Start: 2019-06-03 | End: 2020-06-02

## 2019-06-04 ENCOUNTER — OFFICE VISIT (OUTPATIENT)
Dept: PSYCHIATRY | Facility: CLINIC | Age: 39
End: 2019-06-04
Payer: COMMERCIAL

## 2019-06-04 ENCOUNTER — PATIENT MESSAGE (OUTPATIENT)
Dept: OBSTETRICS AND GYNECOLOGY | Facility: CLINIC | Age: 39
End: 2019-06-04

## 2019-06-04 DIAGNOSIS — F41.1 GAD (GENERALIZED ANXIETY DISORDER): Primary | ICD-10-CM

## 2019-06-04 PROCEDURE — 99214 PR OFFICE/OUTPT VISIT, EST, LEVL IV, 30-39 MIN: ICD-10-PCS | Mod: S$GLB,,, | Performed by: PSYCHIATRY & NEUROLOGY

## 2019-06-04 PROCEDURE — 99999 PR PBB SHADOW E&M-EST. PATIENT-LVL I: ICD-10-PCS | Mod: PBBFAC,,, | Performed by: PSYCHIATRY & NEUROLOGY

## 2019-06-04 PROCEDURE — 99214 OFFICE O/P EST MOD 30 MIN: CPT | Mod: S$GLB,,, | Performed by: PSYCHIATRY & NEUROLOGY

## 2019-06-04 PROCEDURE — 99999 PR PBB SHADOW E&M-EST. PATIENT-LVL I: CPT | Mod: PBBFAC,,, | Performed by: PSYCHIATRY & NEUROLOGY

## 2019-06-04 RX ORDER — DULOXETIN HYDROCHLORIDE 30 MG/1
CAPSULE, DELAYED RELEASE ORAL
Qty: 60 CAPSULE | Refills: 2 | Status: SHIPPED | OUTPATIENT
Start: 2019-06-04 | End: 2019-08-30 | Stop reason: ALTCHOICE

## 2019-06-04 RX ORDER — BUPROPION HYDROCHLORIDE 150 MG/1
150 TABLET, EXTENDED RELEASE ORAL DAILY
Qty: 30 TABLET | Refills: 2 | Status: SHIPPED | OUTPATIENT
Start: 2019-06-04 | End: 2019-08-30 | Stop reason: SDUPTHER

## 2019-06-04 NOTE — PROGRESS NOTES
"Outpatient Psychiatry Follow-up Visit (MD/NP)    6/4/2019    Joy Recinos, a 38 y.o. female, presenting for follow-up visit. Met with patient.    Reason for Encounter: f/u, DOUG.     Interval History: Patient is a 38 year old F with DOUG returns for follow-up, last seen about 11 months prior. Moods are mostly good.   Has continued antidepressant medications. Believes they're continuing to help. Describes ongoing symptoms from fibromyalgia + various joint problems. Working.     Background: 37 year old F presents for establishment of care, reports problems with anxiety and depression. From PCP note (3.16.18): Here today for f/u on depression & anxiety. She reports that since medication adjustment she has been less irritable. Her  is here with her today & affirms this. She has a psych appointment scheduled for May 11. She reports it has been some mild improvement in her volition. Reports treatment with cymbalta - for fibromyalgia, anxiety, depression for 5 years. Neither fibromyalgia pain no moods improved.  wellbutrin trial has been perhaps helpful. Describes baseline symptoms as "worried about everything", "worried about worrying", sad, not interested in things, "like I don't have a purpose". Negative thoughts about self. Tired all the time, even when sleeps excessively. Denies SI. "All my life". "stays all the time", though better/worse at times, influenced by stressful situations. Problems with attention/concentration. Reports all of the following daily in past 2 weeks (around which time she was laid off): Feeling nervous, anxious or on edge, Not being able to stop or control worrying  Worrying too much about different things   Trouble relaxing   Being so restless that it is hard to sit still   Becoming easily annoyed or irritable   Feeling afraid as if something awful might happen    DOUG-7 = 21 (estimates a 19 in weeks prior to the layoff)    Sleep Problems - hypersomnia  Sad Mood more than 1/2 " "time  Appetite and weight changes (decreased appetite, small subjective weight loss)  Concentration problems  Guilt   Thoughts of Emptiness  Anhedonia  Anergia  Slowing/PMR    QIDS = 12    Also has decreased sexual interest, pain during intercourse.    PsychHx: first treatment by Obgyn - sertraline(?) when had problems with endometriosis. No AVH, no SI/HI, no delusions. No hospitalizations or self-harm behaviors. Prolonged duloxetine trial, thinks transient or minimal benefit.     MedHx: idiopathic hypersomnia. 2 sleep studies, narcolepsy ruled out. Endometriosis. 2 herniated lumbar discs. Migraines. GERD. Seasonal allergies.   FamHx: mother and dad both take medication for depression. Father diagnosed with adhd in 60's.   SocHx: from [facility]. Heart murmur at birth. "fainted and blacked out school", had several episodes between 5 and 9. Worked up but never medically explained. Still gets dizzy and weak, but doesn't black out. Also had back pain frequently as a child. No developmental problems. Regular classes in school. Ok with going to school. Normal socially. Average student. 3 semesters of college. Went to technical school for 1 year, finished (computer/). 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 Applying for new jobs. Lives with  and 2 kids.  x 14 years. "really good" relationship x decreased sexual interest and pain. Week before  pulled out a tampon - was extremely painful. [place] has been painful ever since. Has had PT recommended for this. 2 kids (13 and 9). Dtr has been bullied most of her life due to being overweight. She also has problems with oppositionality at home (though is a good student, doesn't have discipline problems at school), won't take a bath. Son is "good kid", "very attached to me". Quiet, not many friends. Mom is supportive, lives nearby. "       Review Of Systems:     GENERAL:  No weight gain or loss  SKIN:  No rashes or lacerations  HEAD:  No headaches  CHEST:  No shortness of breath, hyperventilation or cough  CARDIOVASCULAR:  No tachycardia or chest pain  ABDOMEN:  No nausea, vomiting, pain, constipation or diarrhea  URINARY:  No frequency, dysuria or sexual dysfunction  ENDOCRINE:  No polydipsia, polyuria  MUSCULOSKELETAL:  pain and stiffness of the joints  NEUROLOGIC:  No weakness, sensory changes, seizures, confusion, memory loss, tremor or other abnormal movements    Current Evaluation:     Nutritional Screening: Considering the patient's height and weight, medications, medical history and preferences, should a referral be made to the dietitian? no    Constitutional  Vitals:  Most recent vital signs, dated less than 90 days prior to this appointment, were not reviewed.    There were no vitals filed for this visit.     General:  unremarkable, age appropriate     Musculoskeletal  Muscle Strength/Tone:  no tremor, no tic   Gait & Station:  non-ataxic     Psychiatric  Appearance: casually dressed & groomed;   Behavior: calm,   Cooperation: cooperative with assessment  Speech: normal rate, volume, tone  Thought Process: linear, goal-directed  Thought Content: No suicidal or homicidal ideation; no delusions  Affect: anxious  Mood: anxious  Perceptions: No auditory or visual hallucinations  Level of Consciousness: alert throughout interview  Insight: fair  Cognition: Oriented to person, place, time, & situation  Memory: no apparent deficits to general clinical interview; not formally assessed  Attention/Concentration: no apparent deficits to general clinical interview; not formally assessed  Fund of Knowledge: average by vocabulary/education    Laboratory Data  No visits with results within 1 Month(s) from this visit.   Latest known visit with results is:   Admission on 04/10/2019, Discharged on 04/11/2019   Component Date Value Ref Range Status     POCT Glucose 04/10/2019 99  70 - 110 mg/dL Final    POC Preg Test, Ur 04/10/2019 Negative  Negative Final     Acceptable 04/10/2019 Yes   Final     Medications  Outpatient Encounter Medications as of 6/4/2019   Medication Sig Dispense Refill    buPROPion (WELLBUTRIN SR) 150 MG TBSR 12 hr tablet Take 1 tablet (150 mg total) by mouth once daily. (Patient taking differently: Take 150 mg by mouth every evening. ) 30 tablet 1    cetirizine (ZYRTEC) 10 MG tablet Take 10 mg by mouth once daily.      cholecalciferol, vitamin D3, (VITAMIN D3) 1,000 unit capsule Take 1 capsule (1,000 Units total) by mouth once daily. 90 capsule 0    crisaborole (EUCRISA) 2 % Oint AAA of eyelid bid prn eczema 60 g 1    efinaconazole 10 % Wu Apply 1 application topically once daily. 8 mL 11    ergocalciferol (ERGOCALCIFEROL) 50,000 unit Cap Take 1 capsule (50,000 Units total) by mouth every 7 days. 12 capsule 3    escitalopram oxalate (LEXAPRO) 10 MG tablet Take 2 tablets (20 mg total) by mouth once daily. 60 tablet 1    estradiol (ESTRACE) 1 MG tablet Take 1 tablet (1 mg total) by mouth once daily. 30 tablet 11    multivitamin capsule Take 1 capsule by mouth once daily.      omeprazole (PRILOSEC) 20 MG capsule TAKE ONE CAPSULE BY MOUTH DAILY (Patient taking differently: TAKE ONE CAPSULE BY MOUTH NIGHTLY) 30 capsule 0    topiramate (TOPAMAX) 50 MG tablet TAKE 1 TABLET EVERY DAY (Patient taking differently: TAKE 1 TABLET EVERY NIGHT) 120 tablet 3    traMADol (ULTRAM) 50 mg tablet Take 1 tablet (50 mg total) by mouth every 24 hours as needed for Pain. 30 tablet 0     No facility-administered encounter medications on file as of 6/4/2019.      Assessment - Diagnosis - Goals:     Impression: 38 y/o F with generalized anxiety disorder. Previous trials of duloxetine with minimal benefit, low dose bupropion with modest benefit. Recent response to escitalopram, tolerating ok. Working again.     Dx: generalized anxiety  disorder.     Treatment Goals:  Specify outcomes written in observable, behavioral terms:   Reduce anxiety by DOUG-7.     Treatment Plan/Recommendations:   · escitalopram to 20 mg daily as tolerated. Continue bupropion.   · Discussed risks, benefits, and alternatives to treatment plan documented above with patient. I answered all patient questions related to this plan and patient expressed understanding and agreement.     Return to Clinic: 2 months    Counseling time: 5 minutes  Total time: 20 minutes    WINIFRED Nieto MD  Psychiatry  [facility]  [address] , [place] [address] [phone]

## 2019-07-18 RX ORDER — TOPIRAMATE 50 MG/1
50 TABLET, FILM COATED ORAL DAILY
Qty: 120 TABLET | Refills: 3 | Status: SHIPPED | OUTPATIENT
Start: 2019-07-18 | End: 2020-03-03

## 2019-08-12 ENCOUNTER — OFFICE VISIT (OUTPATIENT)
Dept: INTERNAL MEDICINE | Facility: CLINIC | Age: 39
End: 2019-08-12
Payer: COMMERCIAL

## 2019-08-12 VITALS
RESPIRATION RATE: 16 BRPM | DIASTOLIC BLOOD PRESSURE: 82 MMHG | HEART RATE: 80 BPM | WEIGHT: 224.63 LBS | BODY MASS INDEX: 34.04 KG/M2 | TEMPERATURE: 98 F | HEIGHT: 68 IN | OXYGEN SATURATION: 97 % | SYSTOLIC BLOOD PRESSURE: 108 MMHG

## 2019-08-12 DIAGNOSIS — J01.90 ACUTE SINUSITIS, RECURRENCE NOT SPECIFIED, UNSPECIFIED LOCATION: Primary | ICD-10-CM

## 2019-08-12 PROCEDURE — 3008F PR BODY MASS INDEX (BMI) DOCUMENTED: ICD-10-PCS | Mod: CPTII,S$GLB,, | Performed by: PHYSICIAN ASSISTANT

## 2019-08-12 PROCEDURE — 99999 PR PBB SHADOW E&M-EST. PATIENT-LVL V: ICD-10-PCS | Mod: PBBFAC,,, | Performed by: PHYSICIAN ASSISTANT

## 2019-08-12 PROCEDURE — 96372 PR INJECTION,THERAP/PROPH/DIAG2ST, IM OR SUBCUT: ICD-10-PCS | Mod: S$GLB,,, | Performed by: PHYSICIAN ASSISTANT

## 2019-08-12 PROCEDURE — 99214 OFFICE O/P EST MOD 30 MIN: CPT | Mod: 25,S$GLB,, | Performed by: PHYSICIAN ASSISTANT

## 2019-08-12 PROCEDURE — 99214 PR OFFICE/OUTPT VISIT, EST, LEVL IV, 30-39 MIN: ICD-10-PCS | Mod: 25,S$GLB,, | Performed by: PHYSICIAN ASSISTANT

## 2019-08-12 PROCEDURE — 3008F BODY MASS INDEX DOCD: CPT | Mod: CPTII,S$GLB,, | Performed by: PHYSICIAN ASSISTANT

## 2019-08-12 PROCEDURE — 96372 THER/PROPH/DIAG INJ SC/IM: CPT | Mod: S$GLB,,, | Performed by: PHYSICIAN ASSISTANT

## 2019-08-12 PROCEDURE — 99999 PR PBB SHADOW E&M-EST. PATIENT-LVL V: CPT | Mod: PBBFAC,,, | Performed by: PHYSICIAN ASSISTANT

## 2019-08-12 RX ORDER — FEXOFENADINE HCL AND PSEUDOEPHEDRINE HCI 180; 240 MG/1; MG/1
1 TABLET, EXTENDED RELEASE ORAL DAILY PRN
COMMUNITY
End: 2020-08-17

## 2019-08-12 RX ORDER — BETAMETHASONE SODIUM PHOSPHATE AND BETAMETHASONE ACETATE 3; 3 MG/ML; MG/ML
6 INJECTION, SUSPENSION INTRA-ARTICULAR; INTRALESIONAL; INTRAMUSCULAR; SOFT TISSUE
Status: COMPLETED | OUTPATIENT
Start: 2019-08-12 | End: 2019-08-12

## 2019-08-12 RX ORDER — AMOXICILLIN AND CLAVULANATE POTASSIUM 875; 125 MG/1; MG/1
1 TABLET, FILM COATED ORAL 2 TIMES DAILY
Qty: 20 TABLET | Refills: 0 | Status: SHIPPED | OUTPATIENT
Start: 2019-08-12 | End: 2019-08-22

## 2019-08-12 RX ADMIN — BETAMETHASONE SODIUM PHOSPHATE AND BETAMETHASONE ACETATE 6 MG: 3; 3 INJECTION, SUSPENSION INTRA-ARTICULAR; INTRALESIONAL; INTRAMUSCULAR; SOFT TISSUE at 11:08

## 2019-08-12 NOTE — PATIENT INSTRUCTIONS
Over-the-counter supportive tx for colds and cough:   -start flonase nasal spray (fluticasone) 1 spray each nostril once/day. Continue use for 2-3 weeks.   -Try OTC saline nasal spray a few times a day or nedi-pot using warm filtered water    - refrain from smoking, drink plenty of fluids, hot tea with honey, rest, take medications as prescribed, and use a humidifier or steam in the bathroom.   -Shower in the morning and evening to wash away any allergens and help reduce the production of mucus.   - Zinc lozenge, Emergen-C, or Airborne,  to boost immune system.     -No more than 10 in 24 hours.  -Cepacol sore throat/ cough drops  -Take tylenol or Advil for fever, sore throat, and muscle aches.  -warm salt water gargles or Listerine gargles for sore throat frequently throughout the day.  - Try OTC Mucinex (guafenesin) for cough with thick mucous.   -DM is for dextromethorphan. This is a cough suppressant.   -Phenylephrine/pseudophedrine or anything labeled with a D after it is for congestion.   Avoid decongestants if diagnosed with hypertension or arrhythmias. To avoid  rebound congestion, refrain from taking decongestant for longer than 5 day.    -For prevention,extremely important to quit smoking, get annual influenza vaccines, reduce exposure to air pollution, and to frequently wash hands to avoid spread of infection.       What is Atopic Dermatitis?  Atopic dermatitis (also called eczema) causes chronic skin irritation. It is often found in infants, teens, and adults. This disease often runs in families (is genetic). It may also be linked to allergies, such as hay fever and sometimes asthma. Patches of skin become dry, red, itchy, and scaly. In older adults, abnormally dry skin is often called xerosis. Sometimes eczema is only on the hands or feet. It often improves when the skin is well hydrated. It gets worse when the skin is dry. You can help control symptoms by practicing good self-care. Avoid anything that  causes flare-ups (such as sunburn or vigorous scratching).  Where do you have symptoms?  Atopic dermatitis symptoms can appear anywhere on the body. But in most cases they vary based on the persons age. In infants, irritation is often seen on the cheeks, chin, near the mouth, and under the eyelids. In children ages 2 through 10, skin folds, such as the backs of the knees, or in the arm crease, are most often affected. In children 11 and older and in adults, symptoms can affect many areas.  What triggers symptoms?  Symptoms flare because of many things. These include skin dryness, scratching, stress, harsh soaps, and irritants such as dust or wool. Try to avoid anything that causes flare-ups.  Recognizing what causes flare-ups  To figure out what causes atopic dermatitis to flare, keep a list of things that seem to affect your skin. Start by filling in the spaces below. Then keep writing them down in a notebook or diary. The things that affect each person vary. So keep your own list and try to avoid your triggers.    Date Last Reviewed: 2/1/2017  © 1896-4674 [employer]. [address] [place] [address]. All rights reserved. This information is not intended as a substitute for professional medical care. Always follow your healthcare professional's instructions.        Atopic Dermatitis (Adult)  Atopic dermatitis is a dry, itchy, red rash. Its also called eczema. The rash is chronic, or ongoing. It can come and go over time. The disease is often passed down in families. It causes a problem with the skin barrier that makes the skin more sensitive to the environment and other factors. The increased skin sensitivity causes an itch, which causes scratching. Scratching can worsen the itching or also break the skin. This can put the skin at risk of infection.  The condition is most common in people with asthma, hay fever, hives, or dry or sensitive skin. The rash may be caused by extreme heat or  heavy sweating. Skin irritants can cause the rash to flare up. These can include wool or silk clothing, grease, oils, some medicines, and harsh soaps and detergents. Emotional stress can also be a trigger.  Treatment is done to relieve the itching and inflammation of the skin.  Home care  Follow these tips to care for your condition:  · Keep the areas of rash clean by bathing at least every other day. Use lukewarm water to bathe. Dont use hot water, which can dry out the skin.  · Dont use soaps with strong detergents. Use mild soaps made for sensitive skin.  · Apply a cream or ointment to damp skin right after bathing.  · Avoid things that irritate your skin. Wear absorbent, soft fabrics next to the skin rather than rough or scratchy materials.  · Use mild laundry soap free of scents and perfumes. Make sure to rinse all the soap out of your clothes.  · Treat any skin infection as directed.  · Use oral diphenhydramine to help reduce itching. This is an antihistamine you can buy at drug and grocery stores. It can make you sleepy, so use lower doses during the daytime. Or you can use loratadine. This is an antihistamine that will not make you sleepy. Do not use diphenhydramine if you have glaucoma or have trouble urinating due to an enlarged prostate.  Follow-up care  See your healthcare provider, or as advised. If your symptoms dont get better or if they get worse in the next 7 days, make an appointment with your healthcare provider.  When to seek medical advice  Call your healthcare provider right away  if any of these occur:  · Increasing area of redness or pain in the skin  · Yellow crusts or wet drainage from the rash  · Fever of 100.4°F (38°C) or higher, or as directed by your healthcare provider  Date Last Reviewed: 9/1/2016  © 2627-1745 [employer]. [address] [place] [address], [place] [address]. All rights reserved. This information is not intended as a substitute for professional medical care.  Always follow your healthcare professional's instructions.

## 2019-08-12 NOTE — PROGRESS NOTES
Subjective:      Patient ID: Joy Recinos is a 39 y.o. female.    Chief Complaint: Nasal Congestion; Cough; and Sore Throat    URI    This is a new problem. The current episode started in the past 7 days. The problem has been gradually worsening. There has been no fever. Associated symptoms include congestion, coughing, headaches, neck pain, a plugged ear sensation, rhinorrhea, sinus pain, sneezing and a sore throat. Pertinent negatives include no abdominal pain, chest pain, diarrhea, dysuria, ear pain, joint pain, joint swelling, nausea, rash, swollen glands, vomiting or wheezing. Treatments tried: mucinex, allegra D, advil allergy. The treatment provided mild relief.       Review of Systems   Constitutional: Positive for activity change, appetite change, chills and fatigue. Negative for diaphoresis, fever and unexpected weight change.   HENT: Positive for congestion, postnasal drip, rhinorrhea, sinus pressure, sinus pain, sneezing and sore throat. Negative for dental problem, drooling, ear discharge, ear pain, facial swelling, hearing loss, mouth sores, nosebleeds and trouble swallowing.    Eyes: Negative for pain, discharge, redness, itching and visual disturbance.   Respiratory: Positive for cough. Negative for chest tightness, shortness of breath and wheezing.    Cardiovascular: Negative for chest pain, palpitations and leg swelling.   Gastrointestinal: Negative for abdominal pain, constipation, diarrhea, nausea and vomiting.   Endocrine: Negative.    Genitourinary: Negative.  Negative for difficulty urinating and dysuria.   Musculoskeletal: Positive for myalgias and neck pain. Negative for joint pain and neck stiffness.   Skin: Negative for rash.   Allergic/Immunologic: Negative for environmental allergies, food allergies and immunocompromised state.   Neurological: Positive for headaches. Negative for dizziness and weakness.     Objective:   /82 (BP Location: Right arm, Patient Position:  "Sitting, BP Method: Large (Manual))   Pulse 80   Temp 98.1 °F (36.7 °C) (Oral)   Resp 16   Ht 5' 8" (1.727 m)   Wt 101.9 kg (224 lb 10.4 oz)   LMP 03/22/2019 (Exact Date)   SpO2 97%   BMI 34.16 kg/m²     Physical Exam   Constitutional: She is oriented to person, place, and time. She appears well-developed and well-nourished. No distress.   HENT:   Head: Normocephalic and atraumatic.   Right Ear: Hearing, tympanic membrane, external ear and ear canal normal. No tenderness.   Left Ear: Hearing, tympanic membrane, external ear and ear canal normal. No tenderness.   Nose: Mucosal edema and rhinorrhea present. No sinus tenderness. Right sinus exhibits no maxillary sinus tenderness and no frontal sinus tenderness. Left sinus exhibits maxillary sinus tenderness and frontal sinus tenderness.   Mouth/Throat: Uvula is midline and mucous membranes are normal. No oral lesions. Normal dentition. No dental abscesses, uvula swelling or dental caries. Oropharyngeal exudate and posterior oropharyngeal erythema present. No posterior oropharyngeal edema or tonsillar abscesses. No tonsillar exudate.   Eyes: Pupils are equal, round, and reactive to light. Conjunctivae and EOM are normal. Right eye exhibits no discharge. Left eye exhibits no discharge.   Neck: Normal range of motion. Neck supple.   Cardiovascular: Normal rate, regular rhythm and normal heart sounds. Exam reveals no gallop and no friction rub.   No murmur heard.  Pulmonary/Chest: Effort normal and breath sounds normal. No respiratory distress. She has no wheezes. She has no rales.   Lymphadenopathy:     She has no cervical adenopathy.   Neurological: She is alert and oriented to person, place, and time.   Skin: Skin is warm. No rash noted. She is not diaphoretic. No erythema. No pallor.   Psychiatric: She has a normal mood and affect. Her behavior is normal. Judgment and thought content normal.   Nursing note and vitals reviewed.      Assessment:     1. Acute " sinusitis, recurrence not specified, unspecified location      Plan:   Acute sinusitis, recurrence not specified, unspecified location  -     betamethasone acetate-betamethasone sodium phosphate injection 6 mg  -     amoxicillin-clavulanate 875-125mg (AUGMENTIN) 875-125 mg per tablet; Take 1 tablet by mouth 2 (two) times daily. for 10 days  Dispense: 20 tablet; Refill: 0    Educational handout on over-the-counter medications and at-home conservative care, pertinent to the patients diagnosis today, was handed to the patient and discussed in detail.    Follow up if symptoms worsen or fail to improve.

## 2019-08-16 ENCOUNTER — OFFICE VISIT (OUTPATIENT)
Dept: DERMATOLOGY | Facility: CLINIC | Age: 39
End: 2019-08-16
Payer: COMMERCIAL

## 2019-08-16 DIAGNOSIS — L30.9 DERMATITIS: Primary | ICD-10-CM

## 2019-08-16 DIAGNOSIS — L60.3 ONYCHORRHEXIS: ICD-10-CM

## 2019-08-16 PROCEDURE — 99999 PR PBB SHADOW E&M-EST. PATIENT-LVL II: CPT | Mod: PBBFAC,,, | Performed by: STUDENT IN AN ORGANIZED HEALTH CARE EDUCATION/TRAINING PROGRAM

## 2019-08-16 PROCEDURE — 99213 PR OFFICE/OUTPT VISIT, EST, LEVL III, 20-29 MIN: ICD-10-PCS | Mod: S$GLB,,, | Performed by: STUDENT IN AN ORGANIZED HEALTH CARE EDUCATION/TRAINING PROGRAM

## 2019-08-16 PROCEDURE — 99999 PR PBB SHADOW E&M-EST. PATIENT-LVL II: ICD-10-PCS | Mod: PBBFAC,,, | Performed by: STUDENT IN AN ORGANIZED HEALTH CARE EDUCATION/TRAINING PROGRAM

## 2019-08-16 PROCEDURE — 99213 OFFICE O/P EST LOW 20 MIN: CPT | Mod: S$GLB,,, | Performed by: STUDENT IN AN ORGANIZED HEALTH CARE EDUCATION/TRAINING PROGRAM

## 2019-08-16 RX ORDER — HYDROCORTISONE 25 MG/G
CREAM TOPICAL 2 TIMES DAILY
Qty: 28 G | Refills: 1 | Status: SHIPPED | OUTPATIENT
Start: 2019-08-16 | End: 2020-08-17

## 2019-08-16 NOTE — PROGRESS NOTES
Subjective:       Patient ID:  Joy Recinos is a 39 y.o. female who presents for   Chief Complaint   Patient presents with    Eczema     c/o patch of eczema to eyelid x 3-4 months tx eucrisa     Spot     c/o lines to fingernails x 2 yrs tx none      History of Present Illness: The patient presents with chief complaint of eczema and onychorrhexis of the fingernails. Last seen by JORGITO Mcrae in 2018. She reports having continued slight ridging in the finger nails. Denies any major pitting or destruction of the nails. Does have a patch of eczema near the right eyelid, but denies any rash elsewhere on the skin. Currently using Eucrisa on the rash on the eyelid without much improvement in symptoms.            Review of Systems     Objective:    Physical Exam       Diagram Legend     Erythematous scaling macule/papule c/w actinic keratosis       Vascular papule c/w angioma      Pigmented verrucoid papule/plaque c/w seborrheic keratosis      Yellow umbilicated papule c/w sebaceous hyperplasia      Irregularly shaped tan macule c/w lentigo     1-2 mm smooth white papules consistent with Milia      Movable subcutaneous cyst with punctum c/w epidermal inclusion cyst      Subcutaneous movable cyst c/w pilar cyst      Firm pink to brown papule c/w dermatofibroma      Pedunculated fleshy papule(s) c/w skin tag(s)      Evenly pigmented macule c/w junctional nevus     Mildly variegated pigmented, slightly irregular-bordered macule c/w mildly atypical nevus      Flesh colored to evenly pigmented papule c/w intradermal nevus       Pink pearly papule/plaque c/w basal cell carcinoma      Erythematous hyperkeratotic cursted plaque c/w SCC      Surgical scar with no sign of skin cancer recurrence      Open and closed comedones      Inflammatory papules and pustules      Verrucoid papule consistent consistent with wart     Erythematous eczematous patches and plaques     Dystrophic onycholytic nail with subungual debris c/w  onychomycosis     Umbilicated papule    Erythematous-base heme-crusted tan verrucoid plaque consistent with inflamed seborrheic keratosis     Erythematous Silvery Scaling Plaque c/w Psoriasis     See annotation      Assessment / Plan:        Dermatitis  -     hydrocortisone 2.5 % cream; Apply topically 2 (two) times daily.  Dispense: 28 g; Refill: 1    Onychorrhexis - Reassurance. No evidence to suggest psoriasis or other nail related dermatoses. Counseled on use of biotin supplements.      Patient with bright red nail polish over toenails, and complains of discoloration of the nail. Counseled that I cannot effectively evaluate her nails given the polish and that she should follow-up once polish has been removed.        Follow up in about 6 months (around 2/16/2020).

## 2019-08-30 ENCOUNTER — OFFICE VISIT (OUTPATIENT)
Dept: PHYSICAL MEDICINE AND REHAB | Facility: CLINIC | Age: 39
End: 2019-08-30
Payer: COMMERCIAL

## 2019-08-30 ENCOUNTER — OFFICE VISIT (OUTPATIENT)
Dept: PSYCHIATRY | Facility: CLINIC | Age: 39
End: 2019-08-30
Payer: COMMERCIAL

## 2019-08-30 VITALS
HEIGHT: 68 IN | HEART RATE: 98 BPM | WEIGHT: 224 LBS | DIASTOLIC BLOOD PRESSURE: 85 MMHG | SYSTOLIC BLOOD PRESSURE: 138 MMHG | RESPIRATION RATE: 14 BRPM | BODY MASS INDEX: 33.95 KG/M2

## 2019-08-30 VITALS
HEART RATE: 86 BPM | WEIGHT: 224.88 LBS | BODY MASS INDEX: 34.19 KG/M2 | DIASTOLIC BLOOD PRESSURE: 88 MMHG | SYSTOLIC BLOOD PRESSURE: 121 MMHG

## 2019-08-30 DIAGNOSIS — M79.7 FIBROMYALGIA: ICD-10-CM

## 2019-08-30 DIAGNOSIS — F41.1 GAD (GENERALIZED ANXIETY DISORDER): Primary | ICD-10-CM

## 2019-08-30 DIAGNOSIS — M47.816 LUMBAR FACET ARTHROPATHY: ICD-10-CM

## 2019-08-30 DIAGNOSIS — M79.18 DIFFUSE MYOFASCIAL PAIN SYNDROME: Primary | ICD-10-CM

## 2019-08-30 PROCEDURE — 99999 PR PBB SHADOW E&M-EST. PATIENT-LVL III: CPT | Mod: PBBFAC,,, | Performed by: PHYSICAL MEDICINE & REHABILITATION

## 2019-08-30 PROCEDURE — 99204 PR OFFICE/OUTPT VISIT, NEW, LEVL IV, 45-59 MIN: ICD-10-PCS | Mod: S$GLB,,, | Performed by: PHYSICAL MEDICINE & REHABILITATION

## 2019-08-30 PROCEDURE — 99999 PR PBB SHADOW E&M-EST. PATIENT-LVL II: ICD-10-PCS | Mod: PBBFAC,,, | Performed by: PSYCHIATRY & NEUROLOGY

## 2019-08-30 PROCEDURE — 3008F BODY MASS INDEX DOCD: CPT | Mod: CPTII,S$GLB,, | Performed by: PHYSICAL MEDICINE & REHABILITATION

## 2019-08-30 PROCEDURE — 99214 PR OFFICE/OUTPT VISIT, EST, LEVL IV, 30-39 MIN: ICD-10-PCS | Mod: S$GLB,,, | Performed by: PSYCHIATRY & NEUROLOGY

## 2019-08-30 PROCEDURE — 99214 OFFICE O/P EST MOD 30 MIN: CPT | Mod: S$GLB,,, | Performed by: PSYCHIATRY & NEUROLOGY

## 2019-08-30 PROCEDURE — 99204 OFFICE O/P NEW MOD 45 MIN: CPT | Mod: S$GLB,,, | Performed by: PHYSICAL MEDICINE & REHABILITATION

## 2019-08-30 PROCEDURE — 99999 PR PBB SHADOW E&M-EST. PATIENT-LVL III: ICD-10-PCS | Mod: PBBFAC,,, | Performed by: PHYSICAL MEDICINE & REHABILITATION

## 2019-08-30 PROCEDURE — 3008F PR BODY MASS INDEX (BMI) DOCUMENTED: ICD-10-PCS | Mod: CPTII,S$GLB,, | Performed by: PHYSICAL MEDICINE & REHABILITATION

## 2019-08-30 PROCEDURE — 99999 PR PBB SHADOW E&M-EST. PATIENT-LVL II: CPT | Mod: PBBFAC,,, | Performed by: PSYCHIATRY & NEUROLOGY

## 2019-08-30 RX ORDER — KETOROLAC TROMETHAMINE 10 MG/1
10 TABLET, FILM COATED ORAL 2 TIMES DAILY
Qty: 10 TABLET | Refills: 0 | Status: SHIPPED | OUTPATIENT
Start: 2019-08-30 | End: 2019-09-04

## 2019-08-30 RX ORDER — TIZANIDINE 2 MG/1
2 TABLET ORAL NIGHTLY PRN
Qty: 30 TABLET | Refills: 1 | Status: SHIPPED | OUTPATIENT
Start: 2019-08-30 | End: 2020-08-17 | Stop reason: SDUPTHER

## 2019-08-30 RX ORDER — BUPROPION HYDROCHLORIDE 150 MG/1
150 TABLET, EXTENDED RELEASE ORAL DAILY
Qty: 30 TABLET | Refills: 2 | Status: SHIPPED | OUTPATIENT
Start: 2019-08-30 | End: 2019-11-22 | Stop reason: SDUPTHER

## 2019-08-30 NOTE — PATIENT INSTRUCTIONS
General Neck and Back Pain    Both neck and back pain are usually caused by injury to the muscles or ligaments of the spine. Sometimes the disks that separate each bone of the spine may cause pain by pressing on a nearby nerve. Back and neck pain may appear after a sudden twisting or bending force (such as in a car accident), or sometimes after a simple awkward movement. In either case, muscle spasm is often present and adds to the pain.  Acute neck and back pain usually gets better in 1 to 2 weeks. Pain related to disk disease, arthritis in the spinal joints or spinal stenosis (narrowing of the spinal canal) can become chronic and last for months or years.  Back and neck pain are common problems. Most people feel better in 1 or 2 weeks, and most of the rest in 1 to 2 months. Most people can remain active.  People experience and describe pain differently.  · Pain can be sharp, stabbing, shooting, aching, cramping, or burning  · Movement, standing, bending, lifting, sitting, or walking may worsen the pain  · Pain can be localized to one spot or area, or it can be more generalized  · Pain can spread or radiate upwards, downwards, to the front, or go down your arms  · Muscle spasm may occur.  Most of the time mechanical problems with the muscles or spine cause the pain. it is usually caused by an injury, whether known or not, to the muscles or ligaments. While illnesses can cause back pain, it is usually not caused by a serious illness. Pain is usually related to physical activity, whether sports, exercise, work, or normal activity. Sometimes it can occur without an identifiable cause. This can happen simply by stretching or moving wrong, without noting pain at the time. Other causes include:  · Overexertion, lifting, pushing, pulling incorrectly or too aggressively.  · Sudden twisting, bending or stretching from an accident (car or fall), or accidental movement.  · Poor posture  · Poor conditioning, lack of regular  exercise  · Spinal disc disease or arthritis  · Stress  · Pregnancy, or illness like appendicitis, bladder or kidney infection, pelvic infections   Home care  · For neck pain: Use a comfortable pillow that supports the head and keeps the spine in a neutral position. The position of the head should not be tilted forward or backward.  · When in bed, try to find a position of comfort. A firm mattress is best. Try lying flat on your back with pillows under your knees. You can also try lying on your side with your knees bent up towards your chest and a pillow between your knees.  · At first, do not try to stretch out the sore spots. If there is a strain, it is not like the good soreness you get after exercising without an injury. In this case, stretching may make it worse.  · Avoid prolonged sitting, long car rides or travel. This puts more stress on the lower back than standing or walking.  · During the first 24 to 72 hours after an injury, apply an ice pack to the painful area for 20 minutes and then remove it for 20 minutes over a period of 60 to 90 minutes or several times a day.   · You can alternate ice and heat therapies. Talk with your healthcare provider about the best treatment for your back or neck pain. As a safety precaution, do not use a heating pad at bedtime. Sleeping with a heating pad can lead to skin burns or tissue damage.  · Therapeutic massage can help relax the back and neck muscles without stretching them.  · Be aware of safe lifting methods and do not lift anything over 15 pounds until all the pain is gone.  Medications  Talk to your healthcare provider before using medicine, especially if you have other medical problems or are taking other medicines.  · You may use over-the-counter medicine to control pain, unless another pain medicine was prescribed. If you have chronic conditions like diabetes, liver or kidney disease, stomach ulcers,  gastrointestinal bleeding, or are taking blood thinner  medicines.  · Be careful if you are given pain medicines, narcotics, or medicine for muscle spasm. They can cause drowsiness, and can affect your coordination, reflexes, and judgment. Do not drive or operate heavy machinery.  Follow-up care  Follow up with your healthcare provider, or as advised. Physical therapy or further tests may be needed.  If X-rays were taken, you will be notified of any new findings that may affect your care.  Call 911  Seek emergency medical care if any of the following occur:  · Trouble breathing  · Confusion  · Very drowsy or trouble awakening  · Fainting or loss of consciousness  · Rapid or very slow heart rate  · Loss of bowel or bladder control  When to seek medical advice  Call your healthcare provider right away if any of these occur:  · Pain becomes worse or spreads into your arms or legs  · Weakness, numbness or pain in one or both arms or legs  · Numbness in the groin area  · Difficulty walking  · Fever of 100.4ºF (38ºC) or higher, or as directed by your healthcare provider  Date Last Reviewed: 7/1/2016 © 2000-2017 [employer]. [address] [place] [id number]. All rights reserved. This information is not intended as a substitute for professional medical care. Always follow your healthcare professional's instructions.        Back Care Tips    Caring for your back  These are things you can do to prevent a recurrence of acute back pain and to reduce symptoms from chronic back pain:  · Maintain a healthy weight. If you are overweight, losing weight will help most types of back pain.  · Exercise is an important part of recovery from most types of back pain. The muscles behind and in front of the spine support the back. This means strengthening both the back muscles and the abdominal muscles will provide better support for your spine.   · Swimming and brisk walking are good overall exercises to improve your fitness level.  · Practice safe lifting methods  (below).  · Practice good posture when sitting, standing and walking. Avoid prolonged sitting. This puts more stress on the lower back than standing or walking.  · Wear quality shoes with sufficient arch support. Foot and ankle alignment can affect back symptoms. Women should avoid wearing high heels.  · Therapeutic massage can help relax the back muscles without stretching them.  · During the first 24 to 72 hours after an acute injury or flare-up of chronic back pain, apply an ice pack to the painful area for 20 minutes and then remove it for 20 minutes, over a period of 60 to 90 minutes, or several times a day. As a safety precaution, do not use a heating pad at bedtime. Sleeping on a heating pad can lead to skin burns or tissue damage.  · You can alternate ice and heat therapies.  Medications  Talk to your healthcare provider before using medicines, especially if you have other medical problems or are taking other medicines.  · You may use acetaminophen or ibuprofen to control pain, unless your healthcare provider prescribed other pain medicine. If you have chronic conditions like diabetes, liver or kidney disease, stomach ulcers, or gastrointestinal bleeding, or are taking blood thinners, talk with your healthcare provider before taking any medicines.  · Be careful if you are given prescription pain medicines, narcotics, or medicine for muscle spasm. They can cause drowsiness, affect your coordination, reflexes, and judgment. Do not drive or operate heavy machinery while taking these types of medicines. Take prescription pain medicine only as prescribed by your healthcare provider.  Lumbar stretch  Here is a simple stretching exercise that will help relax muscle spasm and keep your back more limber. If exercise makes your back pain worse, dont do it.  · Lie on your back with your knees bent and both feet on the ground.  · Slowly raise your left knee to your chest as you flatten your lower back against the  floor. Hold for 5 seconds.  · Relax and repeat the exercise with your right knee.  · Do 10 of these exercises for each leg.  Safe lifting method  · Dont bend over at the waist to lift an object off the floor.  Instead, bend your knees and hips in a squat.   · Keep your back and head upright  · Hold the object close to your body, directly in front of you.  · Straighten your legs to lift the object.   · Lower the object to the floor in the reverse fashion.  · If you must slide something across the floor, push it.  Posture tips  Sitting  Sit in chairs with straight backs or low-back support. Keep your knees lower than your hips, with your feet flat on the floor.  When driving, sit up straight. Adjust the seat forward so you are not leaning toward the steering wheel.  A small pillow or rolled towel behind your lower back may help if you are driving long distances.   Standing  When standing for long periods, shift most of your weight to one leg at a time. Alternate legs every few minutes.   Sleeping  The best way to sleep is on your side with your knees bent. Put a low pillow under your head to support your neck in a neutral spine position. Avoid thick pillows that bend your neck to one side. Put a pillow between your legs to further relax your lower back. If you sleep on your back, put pillows under your knees to support your legs in a slightly flexed position. Use a firm mattress. If your mattress sags, replace it, or use a 1/2-inch plywood board under the mattress to add support.  Follow-up care  Follow up with your healthcare provider, or as advised.  If X-rays, a CT scan or an MRI scan were taken, they will be reviewed by a radiologist. You will be notified of any new findings that may affect your care.  Call 911  Seek emergency medical care if any of the following occur:  · Trouble breathing  · Confusion  · Very drowsy  · Fainting or loss of consciousness  · Rapid or very slow heart rate  · Loss of  bowel or  bladder control  When to seek medical care  Call your healthcare provider if any of the following occur:  · Pain becomes worse or spreads to your arms or legs  · Weakness or numbness in one or both arms or legs  · Numbness in the groin area  Date Last Reviewed: 6/1/2016  © 9984-1407 [employer]. [address] [place] [address], [place] [address]. All rights reserved. This information is not intended as a substitute for professional medical care. Always follow your healthcare professional's instructions.        Exercises to Strengthen Your Lower Back  Strong lower back and abdominal muscles work together to support your spine. The exercises below will help strengthen the lower back. It is important that you begin exercising slowly and increase levels gradually.  Always begin any exercise program with stretching. If you feel pain while doing any of these exercises, stop and talk to your doctor about a more specific exercise program that better suits your condition.   Low back stretch  The point of stretching is to make you more flexible and increase your range of motion. Stretch only as much as you are able. Stretch slowly. Do not push your stretch to the limit. If at any point you feel pain while stretching, this is your (temporary) limit.  · Lie on your back with your knees bent and both feet on the ground.  · Slowly raise your left knee to your chest as you flatten your lower back against the floor. Hold for 5 seconds.  · Relax and repeat the exercise with your right knee.  · Do 10 of these exercises for each leg.  · Repeat hugging both knees to your chest at the same time.  Building lower back strength  Start your exercise routine with 10 to 30 minutes a day, 1 to 3 times a day.  Initial exercises  Lying on your back:  1. Ankle pumps: Move your foot up and down, towards your head, and then away. Repeat 10 times with each foot.  2. Heel slides: Slowly bend your knee, drawing the heel of your foot towards you.  Then slide your heel/foot from you, straightening your knee. Do not lift your foot off the floor (this is not a leg lift).  3. Abdominal contraction: Bend your knees and put your hands on your stomach. Tighten your stomach muscles. Hold for 5 seconds, then relax. Repeat 10 times.  4. Straight leg raise: Bend one leg at the knee and keep the other leg straight. Tighten your stomach muscles. Slowly lift your straight leg 6 to 12 inches off the floor and hold for up to 5 seconds. Repeat 10 times on each side.  Standin. Wall squats: Stand with your back against the wall. Move your feet about 12 inches away from the wall. Tighten your stomach muscles, and slowly bend your knees until they are at about a 45 degree angle. Do not go down too far. Hold about 5 seconds. Then slowly return to your starting position. Repeat 10 times.  2. Heel raises: Stand facing the wall. Slowly raise the heels of your feet up and down, while keeping your toes on the floor. If you have trouble balancing, you can touch the wall with your hands. Repeat 10 times.  More advanced exercises  When you feel comfortable enough, try these exercises.  1. Kneeling lumbar extension: Begin on your hands and knees. At the same time, raise and straighten your right arm and left leg until they are parallel to the ground. Hold for 2 seconds and come back slowly to a starting position. Repeat with left arm and right leg, alternating 10 times.  2. Prone lumbar extension: Lie face down, arms extended overhead, palms on the floor. At the same time, raise your right arm and left leg as high as comfortably possible. Hold for 10 seconds and slowly return to start. Repeat with left arm and right leg, alternating 10 times. Gradually build up to 20 times. (Advanced: Repeat this exercise raising both arms and both legs a few inches off the floor at the same time. Hold for 5 seconds and release.)  3. Pelvic tilt: Lie on the floor on your back with your knees bent at  90 degrees. Your feet should be flat on the floor. Inhale, exhale, then slowly contract your abdominal muscles bringing your navel toward your spine. Let your pelvis rock back until your lower back is flat on the floor. Hold for 10 seconds while breathing smoothly.  4. Abdominal crunch: Perform a pelvic tilt (above) flattening your lower back against the floor. Holding the tension in your abdominal muscles, take another breath and raise your shoulder blades off the ground (this is not a full sit-up). Keep your head in line with your body (dont bend your neck forward). Hold for 2 seconds, then slowly lower.  Date Last Reviewed: 6/1/2016  © 2406-5946 [employer]. [address] [place] [address]. All rights reserved. This information is not intended as a substitute for professional medical care. Always follow your healthcare professional's instructions.        Back Exercises: Abdominal Lift Brace with Marching    The abdominal lift brace with march strengthens your lower abdominal muscles, helping you keep your pelvis and back stable:  · Lie on the floor with both knees bent. Put your feet flat on the floor and your arms by your sides. Tighten your abdominal muscles. Be sure to continue to breathe.  · Lift one bent knee about 2 inches then return it to the floor and lift the other about 2 inches. Keep your abdominal muscles tight and continue to breathe. These motions should be slow and controlled without your pelvis rocking side to side.  · Repeat 10 times.  Date Last Reviewed: 8/16/2015  © 7436-1053 [employer]. [address] [place] [address]. All rights reserved. This information is not intended as a substitute for professional medical care. Always follow your healthcare professional's instructions.        Back Exercises: Bridge  The bridge exercise strengthens your abdominal, buttock, and hamstring muscles. This helps keep your back stable and aligned when you  walk.  · Lie on the floor with your back and palms flat. Bend your knees. Keep your feet flat on the floor.  · Contract your abdominal and buttock muscles. Slowly lift your buttocks off the floor until there is a straight line from your knees to your shoulders.  · Hold for 5 to 15  seconds. Repeat 5 to10 times.    Date Last Reviewed: 8/31/2015  © 2729-6512 [employer]. [address] [place] [address]. All rights reserved. This information is not intended as a substitute for professional medical care. Always follow your healthcare professional's instructions.        Back Exercises: Leg Pull    To start, lie on your back with your knees bent and feet flat on the floor. Dont press your neck or lower back to the floor. Breathe deeply. You should feel comfortable and relaxed in this position.  · Pull one knee to your chest.  · Hold for 30 to 60 seconds. Return to starting position.  · Repeat 2 times.  · Switch legs.  · For a double leg pull, pull both legs to your chest at the same time. Repeat 2 times.  For your safety, check with your healthcare provider before starting an exercise program.   Date Last Reviewed: 8/16/2015  © 1333-9161 [employer]. [address] [place] [address]. All rights reserved. This information is not intended as a substitute for professional medical care. Always follow your healthcare professional's instructions.        Back Exercises: Leg Reach         Do this exercise on your hands and knees. Keep your knees under your hips and your hands under your shoulders. Keep your spine in a neutral position (not arched or sagging). Be sure to maintain your necks natural curve:  · Extend one leg straight back. Dont arch your back or let your head or body sag.  · Hold for 5 seconds. Return to starting position.  · Repeat 5 times.  · Switch legs.   Date Last Reviewed: 8/16/2015  © 3751-4641 [employer]. [address] [place] [address]  [facility]. All rights reserved. This information is not intended as a substitute for professional medical care. Always follow your healthcare professional's instructions.        Back Exercises: Lower Back Rotation    To start, lie on your back with your knees bent and feet flat on the floor. Dont press your neck or lower back to the floor. Breathe deeply. You should feel comfortable and relaxed in this position.  · Drop both knees to one side. Turn your head to the other side. Keep your shoulders flat on the floor.  · Do not push through pain.  · Hold for 20 seconds.  · Slowly switch sides.  · Repeat 2 to 5 times.  Date Last Reviewed: 10/11/2015  © 8097-1179 [employer]. [address] [place] [address]. All rights reserved. This information is not intended as a substitute for professional medical care. Always follow your healthcare professional's instructions.        Back Exercises: Lower Back Stretch    To start, sit in a chair with your feet flat on the floor. Shift your weight slightly forward. Relax, and keep your ears, shoulders, and hips aligned.  · Sit with your feet well apart.  · Bend forward and touch the floor with the backs of your hands. Relax and let your body drop.  · Hold for 20 seconds. Return to starting position.  · Repeat 2 times.   Date Last Reviewed: 8/16/2015  © 9546-5437 [employer]. [address] [place] [address]. All rights reserved. This information is not intended as a substitute for professional medical care. Always follow your healthcare professional's instructions.        Back Exercises: Seated Rotation    To start, sit in a chair with your feet flat on the floor. Shift your weight slightly forward to avoid rounding your back. Relax, and keep your ears, shoulders, and hips aligned:  · Fold your arms and elbows just below shoulder height.  · Turn from the waist with hips forward. Turn your head last. Do not push through the pain.  · Hold for  a count of 10 to 30. Return to starting position.  · Repeat 3 to 5 times on one side. Then switch sides.  Date Last Reviewed: 10/11/2015  © 1279-5806 [employer]. [address] [place] [address]. All rights reserved. This information is not intended as a substitute for professional medical care. Always follow your healthcare professional's instructions.        Hip Abduction (Strength)    1. Lie on your right side on the floor with your legs straight.  2. Raise your left leg about 6 to 8 inches. Keep your legs and hips straight. Dont roll back onto your hip. Hold for 5 seconds, then lower your leg.  3. Repeat 10 times, or as instructed.  4. Switch legs and repeat.     Challenge yourself  Put an elastic band or tubing around both ankles. Hold the band to the floor with your bottom ankle. Raise and lower your top leg slowly and steadily.   Date Last Reviewed: 3/10/2016  © 1074-6555 [employer]. [address] [place] [address]. All rights reserved. This information is not intended as a substitute for professional medical care. Always follow your healthcare professional's instructions.        Hip Abduction with External Rotation (Strength)    These instructions are for your right knee. Switch sides for your left  knee.  5. Get down on the floor on your hands and knees.  6. Lift your right leg up and out to the side. Keep the knee bent. Raise the leg as high as is comfortable. Hold for 3 seconds.  7. Slowly lower your leg back to the floor.  8. Repeat 5 times, or as instructed.  Date Last Reviewed: 3/10/2016  © 9118-1035 [employer]. [address] [place] [address]. All rights reserved. This information is not intended as a substitute for professional medical care. Always follow your healthcare professional's instructions.        Hip Adduction (Strength)    These instructions are for your right foot. Switch sides for your left foot.  9. Lie on your  right side on the floor. Keep your right leg straight. Bend your left leg and put your left foot flat on the floor behind your right knee.  10. Raise your right leg as high as you comfortably can. Hold for 5 seconds, then lower it back down.  11. Repeat 10 times, or as instructed.  12. Switch legs and repeat.  Date Last Reviewed: 3/10/2016  © 8486-8241 [employer]. [address] [place] [address]. All rights reserved. This information is not intended as a substitute for professional medical care. Always follow your healthcare professional's instructions.        Side Lying Hip Abduction (Strength)    13. Lie down on the floor on your side. Rest your head on your arm. Bend your legs at the knees.  14. Keep your feet together and lift your top leg up so that your knees are . Keep your hips steady.     15. Slowly lower your leg back down.  16. Repeat 10 times, or as instructed.  17. Switch sides if instructed.     Challenge yourself  Put an elastic band or tubing around your thighs. Raise and lower your top leg slowly and steadily.      Date Last Reviewed: 3/29/2016  © 2792-1309 [employer]. [address] [place] [address]. All rights reserved. This information is not intended as a substitute for professional medical care. Always follow your healthcare professional's instructions.        Myofascial Pain Syndrome: Fibrositis  Your pain is caused by a state of chronic muscle tension. This condition is called by various names: myofascial pain, fibrositis and trigger point pain. This can also be due to mechanical stress (such as working at a computer terminal for long periods; or work that requires repetitive motions of the arms or hands) or emotional stress (such as problems on the job or in your personal life). Sometimes there is no obvious cause. The pain can occur in the area of the muscle spasm or at a site distant to it. For example, spasm of a neck muscle can  cause headache. Spasm of the muscle near the shoulder blade can cause pain shooting down the arm.  Home Care:  · Try to identify the factors that may be causing your problem and change them:  ¨ If you feel that emotional stress is a cause of your pain, learn methods to deal more effectively with the stress in your life. These may include regular exercise, muscle relaxation techniques, meditation or simply taking time out for yourself. Consult your doctor or go to a local bookstore and review the many books and tapes available on the subject of stress reduction.  ¨ If you feel that physical stress is a cause for your pain, try to modify any poor work habits.  · You may use acetaminophen (Tylenol) or ibuprofen (Motrin, Advil) to control pain, unless another medicine was prescribed. [NOTE: If you have chronic liver or kidney disease or ever had a stomach ulcer or GI bleeding, talk with your doctor before using these medicines.]  · The use of heat to the muscle (hot compress or heating pad) will be helpful to reduce muscle spasm. Some persons get relief with ice packs. Apply an ice pack (crushed or cubed ice in a plastic bag, wrapped in a towel) for 20 minutes at a time as needed. Use the method that feels best to you.  · Massaging the trigger point and stretching out the muscle are an important parts of prevention and treatment. Trigger point massage can be done by first applying heat to the area to warm and prepare the muscle. Have someone apply steady thumb pressure directly on the knot in the muscle (the most tender point) for 30 seconds. Release the pressure, then massage the surrounding muscle. Repeat the process, applying more pressure to the trigger point each time. Do this up to the limit of pain. With each treatment, the trigger point should become less tender and the pain should decrease. You can apply local pressure to trigger points in the back by lying on the floor with a tennis ball under the trigger  point.  Follow Up  with your doctor as advised or if not improving within the next week. It may be necessary for you to receive physical therapy if you do not respond to home treatment alone.  Get Prompt Medical Attention  if any of the following occur:  · If your trigger point is in the chest muscles, observe for pain that becomes more severe, lasts longer, or spreads into your shoulder/arm, neck or back; you develop trouble breathing, sweating, nausea or vomiting in association with chest pain  · If you develop weakness or numbness in an extremity  · If your pain worsens, regardless of its location  © 5758-0078 [employer]. [address] [place] [address]. All rights reserved. This information is not intended as a substitute for professional medical care. Always follow your healthcare professional's instructions.          Trigger Point Injection  The cause of your muscle pain or spasms may be one or more trigger points. Your health care provider may decide to inject the painful spots to relax the muscle. This can help relieve your pain. Relaxing the muscle can also make movement easier. You may then be able to exercise to strengthen the muscle and help it heal.    What is a trigger point?  A trigger point is a tight, painful knot of muscle fiber. It can form where a muscle is strained or injured. The knot can sometimes be felt under the skin. A trigger point is very tender to the touch. Pain may also spread to other parts of the affected muscle. Muscles around a knee, shoulder blade, or other bones are prone to trigger points. This is because these muscles are more likely to be injured.    About the injections  Any muscle in the body can have one or more trigger points. Several injections may be needed in each trigger point to best relieve pain. These injections may be given in sessions about 2 weeks apart, depending on the preference of your health care provider. In some cases, you may  not feel much change in your symptoms until after the third injection.     © 8928-4145 [employer]. [address] [place] [address]. All rights reserved. This information is not intended as a substitute for professional medical care. Always follow your healthcare professional's instructions.            Your Neck Muscles  The muscles in the neck and shoulders need to be strong to hold the neck and head in place. These muscles also help move the neck and shoulders. Your health care provider can recommend exercises to help stretch and strengthen your neck muscles.    © 5886-1773 [employer]. [address] [place] [address]. All rights reserved. This information is not intended as a substitute for professional medical care. Always follow your healthcare professional's instructions.          Neck Problems: Relieving Your Symptoms  The first goal of treatment is to relieve your symptoms. Your health care provider may recommend self-care treatments. These include resting, applying ice and heat, taking medication, and doing exercises. Your health care provider may also recommend that you see a physical therapist, who can teach you ways to care for and strengthen your neck.    Self-Care Treatments  Pain can end quickly or last awhile. Either way, youll want relief as soon as possible. Your health care provider can tell you which treatments to do at home to help relieve your pain.  · Lying down for a short time takes pressure from the head off the neck.  · Ice and heat can help reduce pain. To bring down swelling, rest an ice pack wrapped in a thin towel on your neck for 15 minutes. To relax sore muscles, apply a warm, wet towel to the area. Or take a warm bath or shower.  · Over-the-counter medications, such as ibuprofen, naproxen, and aspirin, can help reduce pain and swelling. Acetaminophen can help relieve pain. Use these only as directed.  · Exercises can relax muscles and  prevent stiffness. To prepare, drape a warm, wet towel around your neck and shoulders for 5 minutes. Remove the towel. Then do any exercises recommended to you by your health care provider.  Physical Therapy  If self-care treatments arent helping relieve neck pain, your health care provider may suggest one or more sessions of physical therapy. Physical therapy is performed by a specialist trained to treat injuries. Your physical therapist (PT) will teach you how to strengthen muscles, improve the spines alignment, and help you move properly. Treatment methods used in physical therapy may include:  · Heat. A special heating pad called a neck pack may be applied to your neck.  · Exercises. Your PT will teach you exercises to help strengthen your neck and improve its range of motion.  · Joint mobilization. The PT gently moves your vertebrae to help restore motion in your neck joints and reduce neck pain.  · Soft tissue mobilization. The PT massages and stretches the muscles in your neck and shoulders.  · Electrical stimulation. Electrical impulses are sent into your neck. This helps reduce soreness and inflammation.  · Education in body mechanics. The PT shows you ways to position and move your body that protect the neck.  Other Treatments  If physical therapy doesnt relieve your neck pain, your health care provider may suggest other treatments. For example, medications or injections can help relieve pain and swelling. In some cases, surgery may be needed to treat neck problems.  © 8586-8044 [employer] [address] [place] [address], [place] [address]. All rights reserved. This information is not intended as a substitute for professional medical care. Always follow your healthcare professional's instructions.          Understanding Neck Problems       If you suffer from neck pain, youre not alone. Many people have neck pain at some point in their lives. Problems such as poor posture, injury, and wear and  tear can lead to neck pain. Your health care provider will work with you to find the treatment thats best for your neck.  Types of Neck Problems  The following problems can cause pain or injury in your neck:  · Strains and sprains: Strains (stretched or torn muscles) and sprains (stretched or torn ligaments) can cause neck pain. Strains and sprains can occur during an accident, or when you overuse your neck through repetitive motion. They can also cause your muscles and ligaments to become inflamed (swollen and painful).  · Whiplash and other injuries: Whiplash can result when an impact throws your head, forcing your neck too far forward (hyperflexion), then too far backward (hyperextension). When combined, the two motions can cause a painful injury to different parts of your neck, such as muscles, ligaments, or joints. The most common cause of whiplash is a car accident. But it can also happen during a fall or sports injury.  · Weakened disks: A simple action, such as a sneeze or a cough, can cause one of your disks to bulge (herniate). A herniated disk can put pressure on your nerve and cause pain. Over time, disks can also thin out (degenerate). Flattened disks dont cushion vertebrae well and can cause vertebrae to rub together. Rubbing vertebrae can pinch nerves and cause pain.  · Weakened joints: Aging and injury can cause joints to slowly degenerate. Thinned joints can also cause vertebrae to rub together. This can cause abnormal growths of bone (bone spurs) to form on vertebrae. Bone spurs put pressure on nerves, causing pain.  Common Symptoms  If you have a neck problem, you may have one or more of the following symptoms:  · Muscle tension and spasm: You may not be able to move your neck, arms, or shoulders comfortably if you have muscle tension or stiffness in your neck. If your symptoms arent relieved, you may experience muscle spasms, or knots of contracted tissue (trigger points) in areas of your neck  and shoulders.  · Aches and pains: Dull aches in your head or neck, sharp pains, and swelling of the soft tissue of your neck and shoulders are common symptoms. If theres pressure on the nerves in your neck, you may feel pain in your arms or hands (referred pain).  · Numbness or weakness: If you injure the nerves in your neck, you may experience numbness, tingling, or weakness in your shoulders, arms, or hands. These symptoms arise when disks or bone spurs press on the nerves in your neck.  © 0404-9253 [employer]. [address] [place] [address], [place] [address]. All rights reserved. This information is not intended as a substitute for professional medical care. Always follow your healthcare professional's instructions.          Neck Spasm [No Trauma]    Spasm of the neck muscles can occur after a sudden awkward neck movement. Sleeping with your neck in a crooked position can also cause spasm. Some persons respond to emotional stress by tensing the muscles of their neck, shoulders and upper back. If neck spasm lasts long enough, it can cause headache.  The treatment described below will usually help the pain to go away in 5-7 days. Pain that continues may require further evaluation or other types of treatment such as physical therapy.  Home Care:  18. Rest and relax the muscles. Use a comfortable pillow that supports the head and keeps the spine in a neutral position. The position of the head should not be tilted forward or backward. A rolled up towel may help for a custom fit.  19. Some persons find relief with heat (hot shower, hot bath or heating pad) and massage, while others prefer cold packs (crushed or cubed ice in a plastic bag, wrapped in a towel). Try both and use the method that feels best for 20 minutes several times a day.  20. You may use acetaminophen (Tylenol) or ibuprofen (Motrin, Advil) to control pain, unless another medicine was prescribed. [NOTE: If you have chronic liver or kidney  disease or ever had a stomach ulcer or GI bleeding, talk with your doctor before using these medicines.]  Follow Up  with your doctor or this facility if your symptoms do not show signs of improvement after one week. Physical therapy or further evaluation may be needed.  [NOTE: If x-rays were taken, they will be reviewed by a radiologist. You will be notified of any new findings that may affect your care.]  Return Promptly  or contact your doctor if any of the following occurs:  · Pain becomes worse or spreads into one or both arms  · Weakness or numbness in one or both arms  · Increasing headache with nausea or vomiting  · Fever over 100.4ºF (38.0ºC)  © 0620-9869 [employer]. [address] [place] [address]. All rights reserved. This information is not intended as a substitute for professional medical care. Always follow your healthcare professional's instructions.          Know Your Neck: The Cervical Spine  By learning about the parts of the neck, you can better understand your neck problem. The bones of the neck are called cervical vertebrae, commonly identified as C1 through C7. Together, they form a bony column called the spine. Vertebrae also protect the spinal cord, a pathway for messages to reach the brain. Surrounding the spine are soft tissues such as muscles, tendons, and nerves.        Flexibility Is Key  For the neck to function normally, it has to be flexible enough to move without discomfort. A healthy neck can move easily in six different directions.    © 8197-3587 [employer]. [address] [place] [address]. All rights reserved. This information is not intended as a substitute for professional medical care. Always follow your healthcare professional's instructions.          Protecting Your Neck: Posture and Body Mechanics  Protecting your neck from injuries and pain involves practicing good posture and body mechanics. This may mean correcting bad  habits you have related to the way you hold and move your body. The tips below can help you improve your posture and body mechanics.    What Is Posture and Why Does It Matter?  Posture is the way you hold your body. For many of us, this means hunching over, thrusting the chin forward, and slouching the shoulders. But this kind of poor posture keeps muscles from properly supporting the neck and puts stress on muscles, disks, ligaments, and joints in your neck. As a result, injury and pain can occur.  How Is Your Posture?  Use a full-length mirror to check your posture. To begin, stand normally. Then slowly back up against a wall. Is there space between your head and the wall? Do you slouch your shoulders? Is your chin pointing up or down? All these can cause neck pain and injury.  Improving Your Posture  Follow these steps to improve your posture:  · Pull your shoulders back.  · Think of the ears, shoulders, and hips as a series of dots. Now, adjust your body to connect the dots in a straight line.  · Keep your chin level.  What Are Body Mechanics and Why Do They Matter?  The way you move and position your body during daily activities is called body mechanics. Good body mechanics help protect the neck. This means learning the right ways to stand, sit, and even sleep. So do whats best for your neck and practice good body mechanics.  Standing   To protect your neck while standing:  · Carry objects close to your body.  · Keep your ears and shoulders in a line while standing or walking.  · To lower yourself, bend at the knees with a straight back. Do this instead of looking down and reaching for objects.  · Work at eye level. Dont reach above your head or tilt your head back.  Sitting   To protect your neck while sitting:  · Set up your workstation so your monitor is at eye level. Also, use a document koenig when viewing papers or books.  · Keep your knees at or slightly below the level of your hips.  · Sit up  straight, with feet flat on the floor. If your feet dont touch the floor, use a footrest.  · Avoid sitting or driving for long periods. Take frequent breaks.  Sleeping   To protect your neck while sleeping:  · Sleep on your back with a pillow under your knees, or on your side with a pillow between bent knees. This helps align the spine.  · Avoid using pillows that are too high or too low. Instead, use a neck roll or pillow under your neck while you sleep to keep the neck straight.  · Sleep on a mattress that supports you, with a pillow under your neck.  © 6595-4499 [employer]. [address] [place] [address], [place] [address]. All rights reserved. This information is not intended as a substitute for professional medical care. Always follow your healthcare professional's instructions.          Exercises at Your Workstation: Eyes, Neck, and Head     Tired eyes? Stiff neck? A few easy moves can help prevent these kinds of problems. Take a few minutes during your day to do these exercises--right at your desk. They'll loosen up your muscles, keep you more alert, and make a big difference in how you work and feel.    For your eyes  Eye cup  · Lean forward with your elbows on your desk.  · Cup your hands and place them lightly over your closed eyes. Hold for a minute, while breathing deeply in and out.  · Slowly uncover and open your eyes. Repeat 2 times.  Eye roll  · Close your eyes. Slowly roll your eyeballs clockwise all the way around. Repeat 3 times.  · Now slowly roll them all the way around counterclockwise. Repeat 3 times.  Eye rest  · Every 20 minutes, look away from the computer screen. Focus on an object at least 20 feet away. Stay focused on this object for a full 20 seconds.    For your neck and head  Warm-up  · Drop your head gently to your chest. While breathing in, slowly roll your head up to your left shoulder. While breathing out, slowly roll your head back to center. Repeat to the  right.  · Repeat 3 times on each side.  Head tilt  · Sit up straight. Tuck in your chin.  · Slowly tip your head to the left. Return to the center. Then, tip your head to the right.  · Repeat 3 times on each side.    Head turn  · Sit up straight.  · Slowly turn your head and look over your left shoulder. Hold for a few seconds. Go back to the center, then repeat to your right.  · Repeat 3 times on each side.  © 3245-2394 [employer]. [address] [place] [address]. All rights reserved. This information is not intended as a substitute for professional medical care. Always follow your healthcare professional's instructions.          Reach and Hold Exercise    Do this exercise on your hands and knees. Keep your knees under your hips and your hands under your shoulders. Keep your spine in a neutral position (not arched or sagging). Keep your ears in line with your shoulders. Hold for a few seconds before starting the exercise:  21. Tighten your abdominal muscles and raise one arm straight in front of you, palm down. Hold for 5 seconds, then lower. Repeat 5 times.  22. Do the exercise again, this time lifting your arm to the side. Repeat 5 times.  23. Do the exercise again, this time lifting your arm backward, palm up. Repeat 5 times.  Switch sides and do each exercise with the other arm.  © 7786-8297 [employer]. [address] [place] [address]. All rights reserved. This information is not intended as a substitute for professional medical care. Always follow your healthcare professional's instructions.        Shoulder and Upper Back Stretch  To start, stand tall with your ears, shoulders, and hips in line. Your feet should be slightly apart, positioned just under your hips. Focus your eyes directly in front of you.  this position for a few seconds before starting your exercise. This helps increase your awareness of proper posture.  Reach overhead and slightly back  with both arms. Keep your shoulders and neck aligned and your elbows behind your shoulders:  · With your palms facing the ceiling, turn your fingers inward.  · Take a deep breath. Breathe out, and lower your elbows toward your buttocks. Hold for 5 seconds, then return to starting position.  · Repeat 3 times.    © 3904-0397 [employer]. [address] [place] [address]. All rights reserved. This information is not intended as a substitute for professional medical care. Always follow your healthcare professional's instructions.          Shoulder Clock Exercise  To start, stand tall with your ears, shoulders, and hips in line. Your feet should be slightly apart, positioned just under your hips. Focus your eyes directly in front of you.  this position for a few seconds before starting your exercise. This helps increase your awareness of proper posture.  · Imagine that your right shoulder is the center of a clock. With the outer point of your shoulder, roll it around to slowly trace the outer edge of the clock.  · Move clockwise first, then counterclockwise.  · Repeat 3 times. Switch shoulders.   © 1869-8419 [employer]. [address] [place] [address]. All rights reserved. This information is not intended as a substitute for professional medical care. Always follow your healthcare professional's instructions.          Shoulder Girdle Stretch     To start, sit in a chair with your feet flat on the floor. Your weight should be slightly forward so that youre balanced evenly on your buttocks. Relax your shoulders and keep your head level. Using a chair with arms may help you keep your balance:  · Place 1 hand on the outside elbow of the other arm.  · Pull the arm across your body. Hold for 30 to 60 seconds. Repeat once.  · Switch sides.    © 4054-2248 [employer]. [address] [place] [address]. All rights reserved. This information is not  intended as a substitute for professional medical care. Always follow your healthcare professional's instructions.          Shoulder Exercises      To start, sit in a chair with your feet flat on the floor. Your weight should be slightly forward so that youre balanced evenly on your buttocks. Relax your shoulders and keep your head level. Avoid arching your back or rounding your shoulders. Using a chair with arms may help you keep your balance.  · Raise your arms, elbows bent, to shoulder height.  · Slowly move your forearms together. Hold for 5 seconds.  · Return to starting position. Repeat 5 times.  © 2310-2819 [employer]. [address] [place] [address]. All rights reserved. This information is not intended as a substitute for professional medical care. Always follow your healthcare professional's instructions.        Shoulder Shrug Exercise  To start, sit in a chair with your feet flat on the floor. Shift your weight slightly forward to avoid rounding your back. Relax. Keep your ears, shoulders, and hips aligned:  · Raise both of your shoulders as high as you can, as if you were trying to touch them to your ears. Keep your head and neck still and relaxed.  · Hold for a count of 10. Release.  · Repeat 5 times.    © 0012-9853 [employer]. [address] [place] [address]. All rights reserved. This information is not intended as a substitute for professional medical care. Always follow your healthcare professional's instructions.          Shoulder Squeeze Exercise     To start, sit in a chair with your feet flat on the floor. Shift your weight slightly forward to avoid rounding your back. Relax. Keep your ears, shoulders, and hips aligned:  · Raise your arms to shoulder height, elbows bent and palms forward.  · Move your arms back, squeezing your shoulder blades together.  · Hold for 10 seconds. Return to starting position.   · Repeat 5 times.     © 2000-2015 [employer]. [address] [place] [address], [place] [address]. All rights reserved. This information is not intended as a substitute for professional medical care. Always follow your healthcare professional's instructions.

## 2019-08-30 NOTE — PROGRESS NOTES
PM&R NEW PATIENT HISTORY & PHYSICAL :    Referring Physician:    Chief Complaint   Patient presents with    Neck Pain    Back Pain     upper and lower    Headache     to the face       HPI: This is a 39 y.o.  female being seen in clinic today for evaluation of chronic neck and low back achy pain with spasms in her neck/shoulders to the head at times.  She has a history of fibromyalgia as well and hasn't been sleeping well.  She denies numbness, tingling, or weakness into her extremities.  Rest and heat provide some relief.     History obtained from patient    Functional History:  Walking: Not limited  Transfers: Independent  Assistive devices: No  Power mobility: No  Falls: None     Needs help with:  Nothing - all ADLS normal    Cooking   Cleaning  Bathing   Dressing   Toileting     Past family, medical, social, and surgical history reviewed in chart    Review of Systems:     General- denies lethargy, weight change, fever, chills +insomnia  Head/neck- denies swallowing difficulties  ENT- denies hearing changes  Cardiovascular-denies chest pain  Pulmonary- denies shortness of breath  GI- denies constipation or bowel incontinence  - denies bladder incontinence  Skin- denies wounds or rashes  Musculoskeletal- denies weakness, +pain  Neurologic- denies numbness and tingling  Psychiatric-+depression and anxiety  Lymphatic-denies swelling  Endocrine- denies hypoglycemic symptoms/DM history  All other pertinent systems negative     Physical Examination:  General: Well developed, well nourished female, NAD  HEENT:NCAT EOMI bilaterally   Pulmonary:Normal respirations    Spinal Examination: CERVICAL  Active ROM is within normal limits.  Inspection: No deformity of spinal alignment.  Palpation: No vertebral tenderness to percussion.  Tight and tender at bilateral trapezius, paraspinals, rhomboids  Spurling test: neg    Spinal Examination: LUMBAR or THORACIC  Active ROM is within normal limits.  Inspection: No deformity of  spinal alignment.  No palpable olisthesis.  Palpation: No vertebral tenderness to percussion.  ttp at si joints, gt bursas, paraspinals   Facet loading +Bilaterally  SLR Test (seated ):negative bilaterally  Able to stand on heels and toes    Musculoskeletal Tests:    Elbow compression (ulnar): neg  Tinels at wrist: neg  Phalen: neg    Bilateral Upper and Lower Extremities:  Pulses are 2+ at radial,bilaterally.  Shoulder/Elbow/Wrist/Hand ROM wnl  Hip/Knee/Ankle ROM wnl  Bilateral Extremities show normal capillary refill.  No signs of cyanosis, rubor, edema, skin changes, or dysvascular changes of appendages.  Nails appear intact.    Neurological Exam:  Cranial Nerves:  II-XII grossly intact    Manual Muscle Testing: (Motor 5=normal)    RIGHT Upper extremity: Shoulder abduction 5/5, Biceps 5/5, Triceps 5/5, Wrist extension 5/5, Abductor pollicis brevis 5/5, Ulnar hand intrinsics 5/5,  LEFT Upper extremity: Shoulder abduction 5/5, Biceps 5/5, Triceps 5/5, Wrist extension 5/5, Abductor pollicis brevis 5/5, Ulnar hand intrinsics 5/5,  RIGHT Lower extremity: Hip flexion 5/5, Hip Abduction 4/5, Hip Adduction 4/5, Knee extension 5/5, Knee flexion 5/5, Ankle dorsiflexion 5/5, Extensor hallucis longus 5/5, Ankle plantarflexion 5/5  LEFT Lower extremity:  Hip flexion 5/5, Hip Abduction 4/5,Hip Adduction 4/5, Knee extension 5/5, Knee flexion 5/5, Ankle dorsiflexion 5/5, Extensor hallucis longus 5/5, Ankle plantarflexion 5/5    No focal atrophy is noted of either upper or lower extremity.    Bilateral Reflexes:1+patellar, hypo at bic tric br  Ko's response is absent bilaterally.  No clonus at knee or ankle.    Sensation: tested to light touch  - intact in all four limbs.    Gait: Narrow base and good arm swing.      IMPRESSION/PLAN: This is a 39 y.o.  female with fibromyalgia and myofascial pain, lumbar facet arthroapthy    1. Handouts on back and hip exercises, neck/shoulder stretch and exercise  2. Ice instead of  overusing heat  3. Massage therapy rx, theracane  4. Ketorolac and start zanaflex 2mg QHS. Natural anti-inflammatories and supplements recommended  5. If not improving, will order formal PT    Myrna Lynch M.D.  Physical Medicine and Rehab

## 2019-08-30 NOTE — PROGRESS NOTES
"Outpatient Psychiatry Follow-up Visit (MD/NP)    8/30/2019    Joy Recinos, a 39 y.o. female, presenting for follow-up visit. Met with patient.    Reason for Encounter: f/u, DOUG.     Interval History: Patient is a 38 year old F with DOUG returns for follow-up, last seen about 3 months ago.   Moods are mostly good. Duloxetine hasn't helped fibromyalgia. Having ongoing back pain. Migraines as well. Some sedation with duloxetine. Didn't try the higher dose. Adherent to wellbutrin daily. Ongoing family stress and health stress. Family not all that sensitive to that. Longstanding problem with attention. And concentration. Distractable. Problems with grades in HS. No discipline problems in school. Never identified.     Background: 37 year old F presents for establishment of care, reports problems with anxiety and depression. From PCP note (3.16.18): Here today for f/u on depression & anxiety. She reports that since medication adjustment she has been less irritable. Her  is here with her today & affirms this. She has a psych appointment scheduled for May 11. She reports it has been some mild improvement in her volition. Reports treatment with cymbalta - for fibromyalgia, anxiety, depression for 5 years. Neither fibromyalgia pain no moods improved.  wellbutrin trial has been perhaps helpful. Describes baseline symptoms as "worried about everything", "worried about worrying", sad, not interested in things, "like I don't have a purpose". Negative thoughts about self. Tired all the time, even when sleeps excessively. Denies SI. "All my life". "stays all the time", though better/worse at times, influenced by stressful situations. Problems with attention/concentration. Reports all of the following daily in past 2 weeks (around which time she was laid off): Feeling nervous, anxious or on edge, Not being able to stop or control worrying  Worrying too much about different things   Trouble relaxing   Being so " "restless that it is hard to sit still   Becoming easily annoyed or irritable   Feeling afraid as if something awful might happen    DOUG-7 = 21 (estimates a 19 in weeks prior to the layoff)    Sleep Problems - hypersomnia  Sad Mood more than 1/2 time  Appetite and weight changes (decreased appetite, small subjective weight loss)  Concentration problems  Guilt   Thoughts of Emptiness  Anhedonia  Anergia  Slowing/PMR    QIDS = 12    Also has decreased sexual interest, pain during intercourse.    PsychHx: first treatment by Obgyn - sertraline(?) when had problems with endometriosis. No AVH, no SI/HI, no delusions. No hospitalizations or self-harm behaviors. Prolonged duloxetine trial, thinks transient or minimal benefit.     MedHx: idiopathic hypersomnia. 2 sleep studies, narcolepsy ruled out. Endometriosis. 2 herniated lumbar discs. Migraines. GERD. Seasonal allergies.   FamHx: mother and dad both take medication for depression. Father diagnosed with adhd in 60's.   SocHx: from [facility]. Heart murmur at birth. "fainted and blacked out school", had several episodes between 5 and 9. Worked up but never medically explained. Still gets dizzy and weak, but doesn't black out. Also had back pain frequently as a child. No developmental problems. Regular classes in school. Ok with going to school. Normal socially. Average student. 3 semesters of college. Went to technical school for 1 year, finished (computer/). 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 Applying for new jobs. Lives with  and 2 kids.  x 14 years. "really good" relationship x decreased sexual interest and pain. Week before  pulled out a tampon - was extremely painful. [place] has been painful ever since. Has had PT recommended for this. 2 kids (13 and 9). Dtr has been bullied most of her life due to being overweight. She " "also has problems with oppositionality at home (though is a good student, doesn't have discipline problems at school), won't take a bath. Son is "good kid", "very attached to me". Quiet, not many friends. Mom is supportive, lives nearby.       Review Of Systems:     GENERAL:  No weight gain or loss  SKIN:  No rashes or lacerations  HEAD:  No headaches  CHEST:  No shortness of breath, hyperventilation or cough  CARDIOVASCULAR:  No tachycardia or chest pain  ABDOMEN:  No nausea, vomiting, pain, constipation or diarrhea  URINARY:  No frequency, dysuria or sexual dysfunction  ENDOCRINE:  No polydipsia, polyuria  MUSCULOSKELETAL:  pain and stiffness of the joints  NEUROLOGIC:  No weakness, sensory changes, seizures, confusion, memory loss, tremor or other abnormal movements    Current Evaluation:     Nutritional Screening: Considering the patient's height and weight, medications, medical history and preferences, should a referral be made to the dietitian? no    Constitutional  Vitals:  Most recent vital signs, dated less than 90 days prior to this appointment, were not reviewed.    There were no vitals filed for this visit.     General:  unremarkable, age appropriate     Musculoskeletal  Muscle Strength/Tone:  no tremor, no tic   Gait & Station:  non-ataxic     Psychiatric  Appearance: casually dressed & groomed;   Behavior: calm,   Cooperation: cooperative with assessment  Speech: normal rate, volume, tone  Thought Process: linear, goal-directed  Thought Content: No suicidal or homicidal ideation; no delusions  Affect: anxious  Mood: anxious  Perceptions: No auditory or visual hallucinations  Level of Consciousness: alert throughout interview  Insight: fair  Cognition: Oriented to person, place, time, & situation  Memory: no apparent deficits to general clinical interview; not formally assessed  Attention/Concentration: no apparent deficits to general clinical interview; not formally assessed  Fund of Knowledge: average " by vocabulary/education    Laboratory Data  No visits with results within 1 Month(s) from this visit.   Latest known visit with results is:   Admission on 04/10/2019, Discharged on 04/11/2019   Component Date Value Ref Range Status    POCT Glucose 04/10/2019 99  70 - 110 mg/dL Final    POC Preg Test, Ur 04/10/2019 Negative  Negative Final     Acceptable 04/10/2019 Yes   Final     Medications  Outpatient Encounter Medications as of 8/30/2019   Medication Sig Dispense Refill    buPROPion (WELLBUTRIN SR) 150 MG TBSR 12 hr tablet Take 1 tablet (150 mg total) by mouth once daily. 30 tablet 2    cetirizine (ZYRTEC) 10 MG tablet Take 10 mg by mouth once daily.      chlorphen/phenyleph/ibuprofen (ADVIL ALLERGY-CONGESTION RLF ORAL) Take by mouth as needed.      cholecalciferol, vitamin D3, (VITAMIN D3) 1,000 unit capsule Take 1 capsule (1,000 Units total) by mouth once daily. 90 capsule 0    crisaborole (EUCRISA) 2 % Oint AAA of eyelid bid prn eczema 60 g 1    dextromethorphan-guaifenesin  mg (MUCINEX DM)  mg per 12 hr tablet Take 1 tablet by mouth as needed.      DULoxetine (CYMBALTA) 30 MG capsule Take 1 capsule daily for 7 days then 2 daily thereafter 60 capsule 2    ergocalciferol (ERGOCALCIFEROL) 50,000 unit Cap Take 1 capsule (50,000 Units total) by mouth every 7 days. 12 capsule 3    estradiol (ESTRACE) 1 MG tablet Take 1 tablet (1 mg total) by mouth once daily. 30 tablet 11    fexofenadine-pseudoephedrine (ALLEGRA-D 24) 180-240 mg per 24 hr tablet Take 1 tablet by mouth daily as needed.      hydrocortisone 2.5 % cream Apply topically 2 (two) times daily. 28 g 1    multivitamin capsule Take 1 capsule by mouth once daily.      omeprazole (PRILOSEC) 20 MG capsule TAKE ONE CAPSULE BY MOUTH DAILY (Patient taking differently: TAKE ONE CAPSULE BY MOUTH NIGHTLY) 30 capsule 0    topiramate (TOPAMAX) 50 MG tablet Take 1 tablet (50 mg total) by mouth once daily. 120 tablet 3     traMADol (ULTRAM) 50 mg tablet Take 1 tablet (50 mg total) by mouth every 24 hours as needed for Pain. 30 tablet 0     No facility-administered encounter medications on file as of 8/30/2019.      Assessment - Diagnosis - Goals:     Impression: 38 y/o F with generalized anxiety disorder. Previous trials of duloxetine with minimal benefit, low dose bupropion with modest benefit. Recent response to escitalopram, tolerating ok. Working again.     Dx: generalized anxiety disorder.     Treatment Goals:  Specify outcomes written in observable, behavioral terms:   Reduce anxiety by DOUG-7.     Treatment Plan/Recommendations:   · Trial of savella in place of duloxetine. Continue bupropion.   · Discussed risks, benefits, and alternatives to treatment plan documented above with patient. I answered all patient questions related to this plan and patient expressed understanding and agreement.     Return to Clinic: 2 months    Counseling time: 10 minutes  Total time: 25 minutes    WINIFRED Nieto MD  Psychiatry  [facility]  [address] , [place], [place] [address] [phone]

## 2019-09-04 ENCOUNTER — PATIENT MESSAGE (OUTPATIENT)
Dept: PSYCHIATRY | Facility: CLINIC | Age: 39
End: 2019-09-04

## 2019-09-05 RX ORDER — DULOXETIN HYDROCHLORIDE 30 MG/1
CAPSULE, DELAYED RELEASE ORAL
Qty: 60 CAPSULE | Refills: 2 | Status: SHIPPED | OUTPATIENT
Start: 2019-09-05 | End: 2019-11-22 | Stop reason: ALTCHOICE

## 2019-11-22 ENCOUNTER — OFFICE VISIT (OUTPATIENT)
Dept: PSYCHIATRY | Facility: CLINIC | Age: 39
End: 2019-11-22
Payer: COMMERCIAL

## 2019-11-22 VITALS
HEART RATE: 87 BPM | WEIGHT: 228.81 LBS | DIASTOLIC BLOOD PRESSURE: 90 MMHG | BODY MASS INDEX: 34.79 KG/M2 | SYSTOLIC BLOOD PRESSURE: 114 MMHG

## 2019-11-22 DIAGNOSIS — F41.1 GAD (GENERALIZED ANXIETY DISORDER): Primary | ICD-10-CM

## 2019-11-22 DIAGNOSIS — F90.9 ATTENTION DEFICIT HYPERACTIVITY DISORDER (ADHD), UNSPECIFIED ADHD TYPE: ICD-10-CM

## 2019-11-22 PROCEDURE — 99999 PR PBB SHADOW E&M-EST. PATIENT-LVL II: CPT | Mod: PBBFAC,,, | Performed by: PSYCHIATRY & NEUROLOGY

## 2019-11-22 PROCEDURE — 99999 PR PBB SHADOW E&M-EST. PATIENT-LVL II: ICD-10-PCS | Mod: PBBFAC,,, | Performed by: PSYCHIATRY & NEUROLOGY

## 2019-11-22 PROCEDURE — 99214 OFFICE O/P EST MOD 30 MIN: CPT | Mod: S$GLB,,, | Performed by: PSYCHIATRY & NEUROLOGY

## 2019-11-22 PROCEDURE — 99214 PR OFFICE/OUTPT VISIT, EST, LEVL IV, 30-39 MIN: ICD-10-PCS | Mod: S$GLB,,, | Performed by: PSYCHIATRY & NEUROLOGY

## 2019-11-22 RX ORDER — BUPROPION HYDROCHLORIDE 150 MG/1
150 TABLET, EXTENDED RELEASE ORAL DAILY
Qty: 30 TABLET | Refills: 2 | Status: SHIPPED | OUTPATIENT
Start: 2019-11-22 | End: 2020-01-17

## 2019-11-22 RX ORDER — DULOXETIN HYDROCHLORIDE 20 MG/1
CAPSULE, DELAYED RELEASE ORAL
Qty: 8 CAPSULE | Refills: 0 | Status: SHIPPED | OUTPATIENT
Start: 2019-11-22 | End: 2020-01-17

## 2019-11-22 RX ORDER — ATOMOXETINE 40 MG/1
CAPSULE ORAL
Qty: 60 CAPSULE | Refills: 2 | Status: SHIPPED | OUTPATIENT
Start: 2019-11-22 | End: 2020-01-17

## 2019-11-22 NOTE — PROGRESS NOTES
"Outpatient Psychiatry Follow-up Visit (MD/NP)    11/22/2019    Joy Recinos, a 39 y.o. female, presenting for follow-up visit. Met with patient.    Reason for Encounter: f/u, DOUG.     Interval History: Patient seen and interviewed for follow-up, last seen about 3 months ago. Reports that moods are about the same as previously.   Feeling tired. Experienced side effects with savella, intolerable. Ongoing problems with fibromyalgia pain. No new symptoms since last visit. Returns with completed wender [place] & asrs screens - with significant reports of inattention, functional disturbance related to inattention. Endorses chronic attention & concentration problems back to childhood, reported "very much" a problem as confirmed by patient's mother in recent conversations to get her recollections. Denies disciplinary problems in school.      Background: 37 year old F presents for establishment of care, reports problems with anxiety and depression. From PCP note (3.16.18): Here today for f/u on depression & anxiety. She reports that since medication adjustment she has been less irritable. Her  is here with her today & affirms this. She has a psych appointment scheduled for May 11. She reports it has been some mild improvement in her volition. Reports treatment with cymbalta - for fibromyalgia, anxiety, depression for 5 years. Neither fibromyalgia pain no moods improved. wellbutrin trial has been perhaps helpful. Describes baseline symptoms as "worried about everything", "worried about worrying", sad, not interested in things, "like I don't have a purpose". Negative thoughts about self. Tired all the time, even when sleeps excessively. Denies SI. "All my life". "stays all the time", though better/worse at times, influenced by stressful situations. Problems with attention/concentration. Reports all of the following daily in past 2 weeks (around which time she was laid off): Feeling nervous, anxious or on edge, " "Not being able to stop or control worrying  Worrying too much about different things   Trouble relaxing   Being so restless that it is hard to sit still   Becoming easily annoyed or irritable   Feeling afraid as if something awful might happen    DOUG-7 = 21 (estimates a 19 in weeks prior to the layoff)    Sleep Problems - hypersomnia  Sad Mood more than 1/2 time  Appetite and weight changes (decreased appetite, small subjective weight loss)  Concentration problems  Guilt   Thoughts of Emptiness  Anhedonia  Anergia  Slowing/PMR    QIDS = 12    Also has decreased sexual interest, pain during intercourse.    PsychHx: first treatment by Obgyn - sertraline(?) when had problems with endometriosis. No AVH, no SI/HI, no delusions. No hospitalizations or self-harm behaviors. Prolonged duloxetine trial, thinks transient or minimal benefit.     MedHx: idiopathic hypersomnia. 2 sleep studies, narcolepsy ruled out. Endometriosis. 2 herniated lumbar discs. Migraines. GERD. Seasonal allergies.   FamHx: mother and dad both take medication for depression. Father diagnosed with adhd in 60's.   SocHx: from [facility]. Heart murmur at birth. "fainted and blacked out school", had several episodes between 5 and 9. Worked up but never medically explained. Still gets dizzy and weak, but doesn't black out. Also had back pain frequently as a child. No developmental problems. Regular classes in school. Ok with going to school. Normal socially. Average student. 3 semesters of college. Went to technical school for 1 year, finished (computer/). 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 Applying for new jobs. Lives with  and 2 kids.  x 14 years. "really good" relationship x decreased sexual interest and pain. Week before  pulled out a tampon - was extremely painful. [place] has been painful ever since. Has " "had PT recommended for this. 2 kids (13 and 9). Dtr has been bullied most of her life due to being overweight. She also has problems with oppositionality at home (though is a good student, doesn't have discipline problems at school), won't take a bath. Son is "good kid", "very attached to me". Quiet, not many friends. Mom is supportive, lives nearby.     Review Of Systems:     GENERAL:  No weight gain or loss  SKIN:  No rashes or lacerations  HEAD:  No headaches  CHEST:  No shortness of breath, hyperventilation or cough  CARDIOVASCULAR:  No tachycardia or chest pain  ABDOMEN:  No nausea, vomiting, pain, constipation or diarrhea  URINARY:  No frequency, dysuria or sexual dysfunction  ENDOCRINE:  No polydipsia, polyuria  MUSCULOSKELETAL:  pain and stiffness of the joints  NEUROLOGIC:  No weakness, sensory changes, seizures, confusion, memory loss, tremor or other abnormal movements    Current Evaluation:     Nutritional Screening: Considering the patient's height and weight, medications, medical history and preferences, should a referral be made to the dietitian? no    Constitutional  Vitals:  Most recent vital signs, dated less than 90 days prior to this appointment, were not reviewed.    There were no vitals filed for this visit.     General:  unremarkable, age appropriate     Musculoskeletal  Muscle Strength/Tone:  no tremor, no tic   Gait & Station:  non-ataxic     Psychiatric  Appearance: casually dressed & groomed;   Behavior: calm,   Cooperation: cooperative with assessment  Speech: normal rate, volume, tone  Thought Process: linear, goal-directed  Thought Content: No suicidal or homicidal ideation; no delusions  Affect: anxious  Mood: anxious  Perceptions: No auditory or visual hallucinations  Level of Consciousness: alert throughout interview  Insight: fair  Cognition: Oriented to person, place, time, & situation  Memory: no apparent deficits to general clinical interview; not formally " assessed  Attention/Concentration: no apparent deficits to general clinical interview; not formally assessed  Fund of Knowledge: average by vocabulary/education    Laboratory Data  No visits with results within 1 Month(s) from this visit.   Latest known visit with results is:   Admission on 04/10/2019, Discharged on 04/11/2019   Component Date Value Ref Range Status    POCT Glucose 04/10/2019 99  70 - 110 mg/dL Final    POC Preg Test, Ur 04/10/2019 Negative  Negative Final     Acceptable 04/10/2019 Yes   Final     Medications  Outpatient Encounter Medications as of 11/22/2019   Medication Sig Dispense Refill    buPROPion (WELLBUTRIN SR) 150 MG TBSR 12 hr tablet Take 1 tablet (150 mg total) by mouth once daily. 30 tablet 2    cetirizine (ZYRTEC) 10 MG tablet Take 10 mg by mouth once daily.      cholecalciferol, vitamin D3, (VITAMIN D3) 1,000 unit capsule Take 1 capsule (1,000 Units total) by mouth once daily. 90 capsule 0    dextromethorphan-guaifenesin  mg (MUCINEX DM)  mg per 12 hr tablet Take 1 tablet by mouth as needed.      DULoxetine (CYMBALTA) 30 MG capsule Take 1 capsule daily for 7 days, then take 2 capsules daily 60 capsule 2    ergocalciferol (ERGOCALCIFEROL) 50,000 unit Cap Take 1 capsule (50,000 Units total) by mouth every 7 days. 12 capsule 3    estradiol (ESTRACE) 1 MG tablet Take 1 tablet (1 mg total) by mouth once daily. 30 tablet 11    fexofenadine-pseudoephedrine (ALLEGRA-D 24) 180-240 mg per 24 hr tablet Take 1 tablet by mouth daily as needed.      hydrocortisone 2.5 % cream Apply topically 2 (two) times daily. 28 g 1    multivitamin capsule Take 1 capsule by mouth once daily.      omeprazole (PRILOSEC) 20 MG capsule TAKE ONE CAPSULE BY MOUTH DAILY (Patient taking differently: TAKE ONE CAPSULE BY MOUTH NIGHTLY) 30 capsule 0    tiZANidine (ZANAFLEX) 2 MG tablet Take 1 tablet (2 mg total) by mouth nightly as needed. 30 tablet 1    topiramate (TOPAMAX) 50 MG  tablet Take 1 tablet (50 mg total) by mouth once daily. 120 tablet 3    traMADol (ULTRAM) 50 mg tablet Take 1 tablet (50 mg total) by mouth every 24 hours as needed for Pain. 30 tablet 0     No facility-administered encounter medications on file as of 11/22/2019.      Assessment - Diagnosis - Goals:     Impression: 40 y/o F with generalized anxiety disorder. Previous trials of duloxetine with minimal benefit, low dose bupropion with modest benefit. adhd by clinical history supplemented by family input, screening tools. Has had difficulty with discontinuation of duloxetine.      Dx: generalized anxiety disorder. adhd    Treatment Goals:  Specify outcomes written in observable, behavioral terms:   Reduce anxiety by DOUG-7.     Treatment Plan/Recommendations:   · Wean duloxetine. Start atomoxetine for attention. Continue bupropion.   · Discussed risks, benefits, and alternatives to treatment plan documented above with patient. I answered all patient questions related to this plan and patient expressed understanding and agreement.     Return to Clinic: 2 months    Counseling time: 10 minutes  Total time: 25 minutes    WINIFRED Nieto MD  Psychiatry  [facility]  [address] , [place] [address]  [phone]

## 2019-12-18 ENCOUNTER — OFFICE VISIT (OUTPATIENT)
Dept: INTERNAL MEDICINE | Facility: CLINIC | Age: 39
End: 2019-12-18
Payer: COMMERCIAL

## 2019-12-18 VITALS
SYSTOLIC BLOOD PRESSURE: 126 MMHG | OXYGEN SATURATION: 100 % | BODY MASS INDEX: 34.65 KG/M2 | HEART RATE: 99 BPM | DIASTOLIC BLOOD PRESSURE: 74 MMHG | WEIGHT: 228.63 LBS | HEIGHT: 68 IN | TEMPERATURE: 98 F

## 2019-12-18 DIAGNOSIS — J06.9 VIRAL URI WITH COUGH: Primary | ICD-10-CM

## 2019-12-18 PROCEDURE — 3008F PR BODY MASS INDEX (BMI) DOCUMENTED: ICD-10-PCS | Mod: CPTII,S$GLB,, | Performed by: PHYSICIAN ASSISTANT

## 2019-12-18 PROCEDURE — 99214 OFFICE O/P EST MOD 30 MIN: CPT | Mod: 25,S$GLB,, | Performed by: PHYSICIAN ASSISTANT

## 2019-12-18 PROCEDURE — 99214 PR OFFICE/OUTPT VISIT, EST, LEVL IV, 30-39 MIN: ICD-10-PCS | Mod: 25,S$GLB,, | Performed by: PHYSICIAN ASSISTANT

## 2019-12-18 PROCEDURE — 99999 PR PBB SHADOW E&M-EST. PATIENT-LVL V: CPT | Mod: PBBFAC,,, | Performed by: PHYSICIAN ASSISTANT

## 2019-12-18 PROCEDURE — 99999 PR PBB SHADOW E&M-EST. PATIENT-LVL V: ICD-10-PCS | Mod: PBBFAC,,, | Performed by: PHYSICIAN ASSISTANT

## 2019-12-18 PROCEDURE — 96372 THER/PROPH/DIAG INJ SC/IM: CPT | Mod: S$GLB,,, | Performed by: PHYSICIAN ASSISTANT

## 2019-12-18 PROCEDURE — 3008F BODY MASS INDEX DOCD: CPT | Mod: CPTII,S$GLB,, | Performed by: PHYSICIAN ASSISTANT

## 2019-12-18 PROCEDURE — 96372 PR INJECTION,THERAP/PROPH/DIAG2ST, IM OR SUBCUT: ICD-10-PCS | Mod: S$GLB,,, | Performed by: PHYSICIAN ASSISTANT

## 2019-12-18 RX ORDER — PROMETHAZINE HYDROCHLORIDE AND DEXTROMETHORPHAN HYDROBROMIDE 6.25; 15 MG/5ML; MG/5ML
5 SYRUP ORAL NIGHTLY
Qty: 118 ML | Refills: 0 | Status: SHIPPED | OUTPATIENT
Start: 2019-12-18 | End: 2019-12-23

## 2019-12-18 RX ORDER — BETAMETHASONE SODIUM PHOSPHATE AND BETAMETHASONE ACETATE 3; 3 MG/ML; MG/ML
9 INJECTION, SUSPENSION INTRA-ARTICULAR; INTRALESIONAL; INTRAMUSCULAR; SOFT TISSUE
Status: COMPLETED | OUTPATIENT
Start: 2019-12-18 | End: 2019-12-18

## 2019-12-18 RX ADMIN — BETAMETHASONE SODIUM PHOSPHATE AND BETAMETHASONE ACETATE 9 MG: 3; 3 INJECTION, SUSPENSION INTRA-ARTICULAR; INTRALESIONAL; INTRAMUSCULAR; SOFT TISSUE at 08:12

## 2019-12-18 NOTE — PROGRESS NOTES
"  Subjective:      Patient ID: Joy Recinos is a 39 y.o. female.    Chief Complaint: Cough; Sore Throat; and Headache    URI    This is a new problem. Episode onset: 2 days. The problem has been rapidly worsening. There has been no fever. Associated symptoms include congestion, ear pain, headaches, a plugged ear sensation, rhinorrhea, sinus pain and a sore throat. Pertinent negatives include no abdominal pain, chest pain, coughing, diarrhea, dysuria, joint pain, joint swelling, nausea, neck pain, rash, sneezing, swollen glands, vomiting or wheezing. She has tried antihistamine and decongestant (allegra D, sudafed) for the symptoms. The treatment provided no relief.       Review of Systems   HENT: Positive for congestion, ear pain, rhinorrhea, sinus pain and sore throat. Negative for sneezing.    Respiratory: Negative for cough and wheezing.    Cardiovascular: Negative for chest pain.   Gastrointestinal: Negative for abdominal pain, diarrhea, nausea and vomiting.   Genitourinary: Negative for dysuria.   Musculoskeletal: Negative for joint pain and neck pain.   Skin: Negative for rash.   Neurological: Positive for headaches.     Objective:   /74 (BP Location: Right arm, Patient Position: Sitting, BP Method: Large (Manual))   Pulse 99   Temp 98 °F (36.7 °C) (Tympanic)   Ht 5' 8" (1.727 m)   Wt 103.7 kg (228 lb 9.9 oz)   LMP 03/22/2019 (Exact Date)   SpO2 100%   BMI 34.76 kg/m²     Physical Exam   Constitutional: She is oriented to person, place, and time. She appears well-developed and well-nourished. No distress.   HENT:   Head: Normocephalic and atraumatic.   Right Ear: Hearing, tympanic membrane, external ear and ear canal normal. No tenderness.   Left Ear: Hearing, tympanic membrane, external ear and ear canal normal. No tenderness.   Nose: Mucosal edema and rhinorrhea present. No sinus tenderness. Right sinus exhibits no maxillary sinus tenderness and no frontal sinus tenderness. Left sinus " exhibits no maxillary sinus tenderness and no frontal sinus tenderness.   Mouth/Throat: Uvula is midline and mucous membranes are normal. No oral lesions. Normal dentition. No dental abscesses, uvula swelling or dental caries. No oropharyngeal exudate, posterior oropharyngeal edema, posterior oropharyngeal erythema or tonsillar abscesses. No tonsillar exudate.   Eyes: Pupils are equal, round, and reactive to light. Conjunctivae and EOM are normal. Right eye exhibits no discharge. Left eye exhibits no discharge.   Neck: Normal range of motion. Neck supple.   Cardiovascular: Normal rate, regular rhythm and normal heart sounds. Exam reveals no gallop and no friction rub.   No murmur heard.  Pulmonary/Chest: Effort normal and breath sounds normal. No respiratory distress. She has no wheezes. She has no rales.   Lymphadenopathy:     She has no cervical adenopathy.   Neurological: She is alert and oriented to person, place, and time.   Skin: Skin is warm. No rash noted. She is not diaphoretic. No erythema. No pallor.   Psychiatric: She has a normal mood and affect. Her behavior is normal. Judgment and thought content normal.   Nursing note and vitals reviewed.      Assessment:     1. Viral URI with cough      Plan:   Viral URI with cough  -     betamethasone acetate-betamethasone sodium phosphate injection 9 mg  -     promethazine-dextromethorphan (PROMETHAZINE-DM) 6.25-15 mg/5 mL Syrp; Take 5 mLs by mouth every evening. for 5 days  Dispense: 118 mL; Refill: 0    -cepachol cough drops otc  -multi symptom relief meds otc    Follow up if symptoms worsen or fail to improve.

## 2019-12-18 NOTE — PATIENT INSTRUCTIONS
-start cepacol sore throat/cough drops over the counter  -beba seltzer cold plus over the counter  -cough syrup at night because can make you drowsy. Ok to take during the day if tolerable      Viral Upper Respiratory Illness (Adult)  You have a viral upper respiratory illness (URI), which is another term for the common cold. This illness is contagious during the first few days. It is spread through the air by coughing and sneezing. It may also be spread by direct contact (touching the sick person and then touching your own eyes, nose, or mouth). Frequent handwashing will decrease risk of spread. Most viral illnesses go away within 7 to 10 days with rest and simple home remedies. Sometimes the illness may last for several weeks. Antibiotics will not kill a virus, and they are generally not prescribed for this condition.    Home care  · If symptoms are severe, rest at home for the first 2 to 3 days. When you resume activity, don't let yourself get too tired.  · Avoid being exposed to cigarette smoke (yours or others).  · You may use acetaminophen or ibuprofen to control pain and fever, unless another medicine was prescribed. (Note: If you have chronic liver or kidney disease, have ever had a stomach ulcer or gastrointestinal bleeding, or are taking blood-thinning medicines, talk with your healthcare provider before using these medicines.) Aspirin should never be given to anyone under 18 years of age who is ill with a viral infection or fever. It may cause severe liver or brain damage.  · Your appetite may be poor, so a light diet is fine. Avoid dehydration by drinking 6 to 8 glasses of fluids per day (water, soft drinks, juices, tea, or soup). Extra fluids will help loosen secretions in the nose and lungs.  · Over-the-counter cold medicines will not shorten the length of time youre sick, but they may be helpful for the following symptoms: cough, sore throat, and nasal and sinus congestion. (Note: Do not use  decongestants if you have high blood pressure.)  Follow-up care  Follow up with your healthcare provider, or as advised.  When to seek medical advice  Call your healthcare provider right away if any of these occur:  · Cough with lots of colored sputum (mucus)  · Severe headache; face, neck, or ear pain  · Difficulty swallowing due to throat pain  · Fever of 100.4°F (38°C)  Call 911, or get immediate medical care  Call emergency services right away if any of these occur:  · Chest pain, shortness of breath, wheezing, or difficulty breathing  · Coughing up blood  · Inability to swallow due to throat pain  Date Last Reviewed: 9/13/2015  © 3142-8943 [email or web address]. [address] [place] [address], [place] [address]. All rights reserved. This information is not intended as a substitute for professional medical care. Always follow your healthcare professional's instructions.

## 2019-12-23 ENCOUNTER — PATIENT MESSAGE (OUTPATIENT)
Dept: INTERNAL MEDICINE | Facility: CLINIC | Age: 39
End: 2019-12-23

## 2019-12-23 DIAGNOSIS — J01.90 ACUTE SINUSITIS, RECURRENCE NOT SPECIFIED, UNSPECIFIED LOCATION: Primary | ICD-10-CM

## 2019-12-23 RX ORDER — DOXYCYCLINE 100 MG/1
100 CAPSULE ORAL 2 TIMES DAILY
Qty: 20 CAPSULE | Refills: 0 | Status: SHIPPED | OUTPATIENT
Start: 2019-12-23 | End: 2020-01-02

## 2020-01-06 ENCOUNTER — OFFICE VISIT (OUTPATIENT)
Dept: OPHTHALMOLOGY | Facility: CLINIC | Age: 40
End: 2020-01-06
Payer: COMMERCIAL

## 2020-01-06 DIAGNOSIS — H52.13 MYOPIA OF BOTH EYES WITH ASTIGMATISM: ICD-10-CM

## 2020-01-06 DIAGNOSIS — H04.129 DRY EYE: Primary | ICD-10-CM

## 2020-01-06 DIAGNOSIS — R51.9 FREQUENT HEADACHES: ICD-10-CM

## 2020-01-06 DIAGNOSIS — H52.203 MYOPIA OF BOTH EYES WITH ASTIGMATISM: ICD-10-CM

## 2020-01-06 PROCEDURE — 99999 PR PBB SHADOW E&M-EST. PATIENT-LVL II: ICD-10-PCS | Mod: PBBFAC,,, | Performed by: OPTOMETRIST

## 2020-01-06 PROCEDURE — 92015 PR REFRACTION: ICD-10-PCS | Mod: S$GLB,,, | Performed by: OPTOMETRIST

## 2020-01-06 PROCEDURE — 92004 PR EYE EXAM, NEW PATIENT,COMPREHESV: ICD-10-PCS | Mod: S$GLB,,, | Performed by: OPTOMETRIST

## 2020-01-06 PROCEDURE — 92015 DETERMINE REFRACTIVE STATE: CPT | Mod: S$GLB,,, | Performed by: OPTOMETRIST

## 2020-01-06 PROCEDURE — 99999 PR PBB SHADOW E&M-EST. PATIENT-LVL II: CPT | Mod: PBBFAC,,, | Performed by: OPTOMETRIST

## 2020-01-06 PROCEDURE — 92004 COMPRE OPH EXAM NEW PT 1/>: CPT | Mod: S$GLB,,, | Performed by: OPTOMETRIST

## 2020-01-06 NOTE — PROGRESS NOTES
HPI     headaches:  For the past weeks they have been daily  Onset:   Headaches started about a month ago  The patient states her eyes are dry and feel warm to the touch  Itch:   yes  Redness:   At times  Photophobia:   Only when a migraine is present  Foreign body sensation:   burning  Previous occurrence:   no  Drops:  Artificial tears did not help  The patient updated her glasses RX about 5 years ago       Last edited by Sumi Gorman on 1/6/2020  3:14 PM. (History)            Assessment /Plan     For exam results, see Encounter Report.    Dry eye  Recommended Theratears bid OU  Discussed Rx options   PT to rtc prn if symptoms persist    Frequent headaches  Not likely ocular in origin  Minimal change in refractive error  Normal, well-perfused optic nerves bilaterally with no edema  Pt will follow up with neurologist    Myopia of both eyes with astigmatism  Eyeglass Final Rx     Eyeglass Final Rx       Sphere Cylinder Axis    Right -2.75 +0.75 015    Left -3.00 +0.75 170    Expiration Date:  1/6/2021                RTC 1 yr for dilated eye exam or PRN if any problems.   Discussed above and answered questions.

## 2020-01-10 ENCOUNTER — OFFICE VISIT (OUTPATIENT)
Dept: PSYCHIATRY | Facility: CLINIC | Age: 40
End: 2020-01-10
Payer: COMMERCIAL

## 2020-01-10 DIAGNOSIS — F41.1 GENERALIZED ANXIETY DISORDER WITH PANIC ATTACKS: Primary | ICD-10-CM

## 2020-01-10 DIAGNOSIS — F33.1 MODERATE EPISODE OF RECURRENT MAJOR DEPRESSIVE DISORDER: ICD-10-CM

## 2020-01-10 DIAGNOSIS — F41.0 GENERALIZED ANXIETY DISORDER WITH PANIC ATTACKS: Primary | ICD-10-CM

## 2020-01-10 PROCEDURE — 90791 PR PSYCHIATRIC DIAGNOSTIC EVALUATION: ICD-10-PCS | Mod: S$GLB,,, | Performed by: SOCIAL WORKER

## 2020-01-10 PROCEDURE — 90791 PSYCH DIAGNOSTIC EVALUATION: CPT | Mod: S$GLB,,, | Performed by: SOCIAL WORKER
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
## 2020-01-17 ENCOUNTER — OFFICE VISIT (OUTPATIENT)
Dept: PSYCHIATRY | Facility: CLINIC | Age: 40
End: 2020-01-17
Payer: COMMERCIAL

## 2020-01-17 VITALS
BODY MASS INDEX: 34.29 KG/M2 | WEIGHT: 225.5 LBS | SYSTOLIC BLOOD PRESSURE: 120 MMHG | DIASTOLIC BLOOD PRESSURE: 84 MMHG | HEART RATE: 85 BPM

## 2020-01-17 DIAGNOSIS — F41.1 GAD (GENERALIZED ANXIETY DISORDER): Primary | ICD-10-CM

## 2020-01-17 DIAGNOSIS — F90.9 ATTENTION DEFICIT HYPERACTIVITY DISORDER (ADHD), UNSPECIFIED ADHD TYPE: ICD-10-CM

## 2020-01-17 PROCEDURE — 99999 PR PBB SHADOW E&M-EST. PATIENT-LVL II: CPT | Mod: PBBFAC,,, | Performed by: PSYCHIATRY & NEUROLOGY

## 2020-01-17 PROCEDURE — 99214 OFFICE O/P EST MOD 30 MIN: CPT | Mod: S$GLB,,, | Performed by: PSYCHIATRY & NEUROLOGY

## 2020-01-17 PROCEDURE — 99214 PR OFFICE/OUTPT VISIT, EST, LEVL IV, 30-39 MIN: ICD-10-PCS | Mod: S$GLB,,, | Performed by: PSYCHIATRY & NEUROLOGY

## 2020-01-17 PROCEDURE — 99999 PR PBB SHADOW E&M-EST. PATIENT-LVL II: ICD-10-PCS | Mod: PBBFAC,,, | Performed by: PSYCHIATRY & NEUROLOGY

## 2020-01-17 RX ORDER — DEXTROAMPHETAMINE SACCHARATE, AMPHETAMINE ASPARTATE MONOHYDRATE, DEXTROAMPHETAMINE SULFATE AND AMPHETAMINE SULFATE 7.5; 7.5; 7.5; 7.5 MG/1; MG/1; MG/1; MG/1
30 CAPSULE, EXTENDED RELEASE ORAL EVERY MORNING
Qty: 30 CAPSULE | Refills: 0 | Status: SHIPPED | OUTPATIENT
Start: 2020-01-17 | End: 2020-02-16

## 2020-01-17 RX ORDER — DEXTROAMPHETAMINE SACCHARATE, AMPHETAMINE ASPARTATE MONOHYDRATE, DEXTROAMPHETAMINE SULFATE AND AMPHETAMINE SULFATE 7.5; 7.5; 7.5; 7.5 MG/1; MG/1; MG/1; MG/1
30 CAPSULE, EXTENDED RELEASE ORAL EVERY MORNING
Qty: 30 CAPSULE | Refills: 0 | Status: SHIPPED | OUTPATIENT
Start: 2020-02-17 | End: 2020-03-13

## 2020-01-17 RX ORDER — FLUOXETINE HYDROCHLORIDE 20 MG/1
20 CAPSULE ORAL DAILY
Qty: 30 CAPSULE | Refills: 2 | Status: SHIPPED | OUTPATIENT
Start: 2020-01-17 | End: 2020-03-13 | Stop reason: SDUPTHER
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
## 2020-01-24 ENCOUNTER — OFFICE VISIT (OUTPATIENT)
Dept: PSYCHIATRY | Facility: CLINIC | Age: 40
End: 2020-01-24
Payer: COMMERCIAL

## 2020-01-24 DIAGNOSIS — F41.1 GENERALIZED ANXIETY DISORDER WITH PANIC ATTACKS: Primary | ICD-10-CM

## 2020-01-24 DIAGNOSIS — F33.1 MODERATE EPISODE OF RECURRENT MAJOR DEPRESSIVE DISORDER: ICD-10-CM

## 2020-01-24 DIAGNOSIS — F41.0 GENERALIZED ANXIETY DISORDER WITH PANIC ATTACKS: Primary | ICD-10-CM

## 2020-01-24 DIAGNOSIS — F90.9 ATTENTION DEFICIT HYPERACTIVITY DISORDER (ADHD), UNSPECIFIED ADHD TYPE: ICD-10-CM

## 2020-01-24 PROCEDURE — 90834 PSYTX W PT 45 MINUTES: CPT | Mod: S$GLB,,, | Performed by: SOCIAL WORKER

## 2020-01-24 PROCEDURE — 90834 PR PSYCHOTHERAPY W/PATIENT, 45 MIN: ICD-10-PCS | Mod: S$GLB,,, | Performed by: SOCIAL WORKER

## 2020-01-24 NOTE — PROGRESS NOTES
"  Ashley Andrade, MSW, LCSW  Outpatient Psychiatry  [facility]  [id number] [place] [id number]  [phone]            Individual Psychotherapy Progress Note (PhD/LCSW)     Outpatient - Insight oriented psychotherapy 45 min - CPT code 88565    1/24/2020  MRN: [id number]  Primary Care Provider: Angus Caceres MD    Joy Recinos is a 39 y.o. female who presents today for follow-up of depression and anxiety. Met with patient.        Subjective:       Patient report: Pt c/o feeling very tired, cannot get to sleep until at least 11pm, then has difficulty getting out of bed. Started fluoxetine and Adderall a week ago; attention and focus were much improved on 1/19/2020, but not since then. Feels "crazy" and that providers sometimes reinforce this belief about herself because they cannot determine the causes for some of her physical symptoms. Struggling with low self esteem, feeling distant from b, doesn't trust him.      Current symptoms:   · Depression: depressed mood, diminished interest, hypersomnia, fatigue, worthlessness/guilt, poor concentration, tearfulness and social isolation  · Anxiety: decreased memory, excessive anxiety/worry, restlessness/keyed up, irritability and muscle tension  · Substance abuse: denied  · Cognitive functioning: denied  · Mirna: none noted  · Psychosis: none noted    Psychosocial stressors and topics discussed: identifying feelings, symptom recognition/mgmt, self care, goals, coping strategies, conflict with partner, physical health issues, financial issues, relationship patterns, managing anxiety/panic/stress, identifying triggers, motivation for change and challenging thought distortions      Objective:       Mental Status Evaluation  Appearance: unremarkable, age appropriate  Behavior: normal, cooperative  Speech: normal tone, normal rate, normal pitch, normal volume  Mood: anxious, depressed  Affect: blunted  Thought Process: " normal and logical  Thought Content: normal, no suicidality, no homicidality, delusions, or paranoia  Sensorium: grossly intact  Cognition: grossly intact  Insight: fair  Judgment: adequate to circumstances    Risk parameters:  Patient reports no suicidal ideation  Patient reports no homicidal ideation  Patient reports no self-injurious behavior  Patient reports no violent behavior      Assessment & Plan:       Therapeutic interventions used: Educated pt re: how anxious fears are maintained by a cycle of unwarranted fear and avoidance that precludes positive, corrective experiences with the feared object/situation  Discussed how treatment targets worry, anxiety symptoms, and avoidance to help pt manage worry effectively  The reduction of overarousal and unnecessary avoidance were emphasized as treatment targets  Encouraged pt to share feelings of depression to clarify them and gain insight into their causes  Assessed pt's current and past mood episodes, including the features, frequency, intensity and duration  Assessed pt's level of insight toward the presenting problems  Monitored the effectiveness and side effects of antidepressant medication prescribed by physician/NP/MP  Discussed cognitive, behavioral, interpersonal and other factors that contribute to depression  Educated pt re: the connection between cognition, depressive feelings and actions   Consistent eye contact, active listening, unconditional positive regard and warm acceptance were used to help build trust  Assisted pt in becoming more aware of his/her fear of rejection and how that fear is connected with past experiences of rejection or abandonment     The patient's response to the interventions is accepting    The patient's progress toward treatment goals is fair     Homework assigned: daily journaling     Treatment plan:   A. Target symptoms: Depression, Anxiety and Poor Coping Skills   B. Therapeutic modalities: insight oriented psychotherapy,  supportive psychotherapy  C. Why chosen therapy is appropriate versus another modality: relevant to diagnosis, evidence based practice   D. Outcome monitoring methods: self-report, observation, feedback from clinical staff     Visit Diagnosis:   1. Generalized anxiety disorder with panic attacks    2. Moderate episode of recurrent major depressive disorder    3. Attention deficit hyperactivity disorder (ADHD), unspecified ADHD type        Follow-up: individual psychotherapy and medication management by physician    Return to Clinic: 2 weeks    Length of Service (minutes): 45

## 2020-02-14 ENCOUNTER — OFFICE VISIT (OUTPATIENT)
Dept: PSYCHIATRY | Facility: CLINIC | Age: 40
End: 2020-02-14
Payer: COMMERCIAL

## 2020-02-14 DIAGNOSIS — F41.1 GENERALIZED ANXIETY DISORDER WITH PANIC ATTACKS: Primary | ICD-10-CM

## 2020-02-14 DIAGNOSIS — F90.9 ATTENTION DEFICIT HYPERACTIVITY DISORDER (ADHD), UNSPECIFIED ADHD TYPE: ICD-10-CM

## 2020-02-14 DIAGNOSIS — F33.1 MODERATE EPISODE OF RECURRENT MAJOR DEPRESSIVE DISORDER: ICD-10-CM

## 2020-02-14 DIAGNOSIS — F41.0 GENERALIZED ANXIETY DISORDER WITH PANIC ATTACKS: Primary | ICD-10-CM

## 2020-02-14 PROCEDURE — 90834 PSYTX W PT 45 MINUTES: CPT | Mod: S$GLB,,, | Performed by: SOCIAL WORKER

## 2020-02-14 PROCEDURE — 90834 PR PSYCHOTHERAPY W/PATIENT, 45 MIN: ICD-10-PCS | Mod: S$GLB,,, | Performed by: SOCIAL WORKER

## 2020-02-14 NOTE — PROGRESS NOTES
"  Ashley Andrade, MSW, LCSW  Outpatient Psychiatry  [facility]  [address] [place] [address]  [phone]            Individual Psychotherapy Progress Note (PhD/LCSW)     Outpatient - Insight oriented psychotherapy 45 min - CPT code 09407    2/14/2020  MRN: [id number]  Primary Care Provider: Angus Caceres MD    Joy Recinos is a 39 y.o. female who presents today for follow-up of depression and anxiety. Met with patient.        Subjective:       Patient report: "I think I've figured out that a mid-life crisis is a real thing. Maybe that's a lot of what I'm going through right now. Something's been missing in my life for a long time. I don't do anything fun; I feel like something is missing." Doesn't feel connected to her old friends; best friend doesn't initiate contact. Going on a marriage retreat with [facility] next weekend at mother's urging. Not feeling particularly enthusiastic about it.     Current symptoms:   · Depression: depressed mood, diminished interest, hypersomnia, fatigue, worthlessness/guilt, poor concentration, tearfulness and social isolation  · Anxiety: decreased memory, excessive anxiety/worry, restlessness/keyed up, irritability and muscle tension  · Substance abuse: denied  · Cognitive functioning: denied  · Mirna: none noted  · Psychosis: none noted    Psychosocial stressors and topics discussed: identifying feelings, symptom recognition/mgmt, self care, goals, coping strategies, conflict with partner, physical health issues, financial issues, relationship patterns, managing anxiety/panic/stress, identifying triggers, motivation for change and challenging thought distortions      Objective:       Mental Status Evaluation  Appearance: unremarkable, age appropriate  Behavior: normal, cooperative  Speech: normal tone, normal rate, normal pitch, normal volume  Mood: anxious, depressed  Affect: blunted  Thought Process: normal and " "logical  Thought Content: normal, no suicidality, no homicidality, delusions, or paranoia  Sensorium: grossly intact  Cognition: grossly intact  Insight: fair  Judgment: adequate to circumstances    Risk parameters:  Patient reports no suicidal ideation  Patient reports no homicidal ideation  Patient reports no self-injurious behavior  Patient reports no violent behavior      Assessment & Plan:       Therapeutic interventions used: Assigned psychoeducational reading: "The Midlife Unraveling" by Blade Damon  Assessed pt's current and past mood episodes, including the features, frequency, intensity and duration  Assessed pt's level of insight toward the presenting problems  Monitored the effectiveness and side effects of antidepressant medication prescribed by physician/NP/MP  Assisted pt in developing an awareness of automatic thoughts that reflect depressogenic schemas  Helped pt to develop an awareness of distorted cognitive messages that reinforce hopelessness and helplessness   Helped pt to identify distorted negative beliefs about himself/herself and the world which foster low self esteem  Assigned pt to engage in activities that are congruent with his/her values     The patient's response to the interventions is accepting    The patient's progress toward treatment goals is good    Homework assigned: daily journaling and complete values clarification handouts     Treatment plan:   A. Target symptoms: Depression, Anxiety and Poor Coping Skills   B. Therapeutic modalities: insight oriented psychotherapy, supportive psychotherapy  C. Why chosen therapy is appropriate versus another modality: relevant to diagnosis, evidence based practice   D. Outcome monitoring methods: self-report, observation, feedback from clinical staff     Visit Diagnosis:   1. Generalized anxiety disorder with panic attacks    2. Moderate episode of recurrent major depressive disorder    3. Attention deficit hyperactivity disorder (ADHD), " unspecified ADHD type        Follow-up: individual psychotherapy and medication management by physician    Return to Clinic: 2 weeks    Length of Service (minutes): 45

## 2020-02-24 PROBLEM — F90.9 ATTENTION DEFICIT HYPERACTIVITY DISORDER (ADHD): Status: ACTIVE | Noted: 2020-02-24

## 2020-03-02 ENCOUNTER — PATIENT OUTREACH (OUTPATIENT)
Dept: ADMINISTRATIVE | Facility: OTHER | Age: 40
End: 2020-03-02

## 2020-03-03 ENCOUNTER — OFFICE VISIT (OUTPATIENT)
Dept: NEUROLOGY | Facility: CLINIC | Age: 40
End: 2020-03-03
Payer: COMMERCIAL

## 2020-03-03 VITALS
DIASTOLIC BLOOD PRESSURE: 80 MMHG | SYSTOLIC BLOOD PRESSURE: 120 MMHG | HEIGHT: 68 IN | WEIGHT: 229.94 LBS | BODY MASS INDEX: 34.85 KG/M2 | HEART RATE: 62 BPM

## 2020-03-03 DIAGNOSIS — G43.009 MIGRAINE WITHOUT AURA AND WITHOUT STATUS MIGRAINOSUS, NOT INTRACTABLE: Primary | ICD-10-CM

## 2020-03-03 PROCEDURE — 99213 OFFICE O/P EST LOW 20 MIN: CPT | Mod: S$GLB,,, | Performed by: PSYCHIATRY & NEUROLOGY

## 2020-03-03 PROCEDURE — 99213 PR OFFICE/OUTPT VISIT, EST, LEVL III, 20-29 MIN: ICD-10-PCS | Mod: S$GLB,,, | Performed by: PSYCHIATRY & NEUROLOGY

## 2020-03-03 PROCEDURE — 99999 PR PBB SHADOW E&M-EST. PATIENT-LVL III: ICD-10-PCS | Mod: PBBFAC,,, | Performed by: PSYCHIATRY & NEUROLOGY

## 2020-03-03 PROCEDURE — 99999 PR PBB SHADOW E&M-EST. PATIENT-LVL III: CPT | Mod: PBBFAC,,, | Performed by: PSYCHIATRY & NEUROLOGY

## 2020-03-03 PROCEDURE — 3008F PR BODY MASS INDEX (BMI) DOCUMENTED: ICD-10-PCS | Mod: CPTII,S$GLB,, | Performed by: PSYCHIATRY & NEUROLOGY

## 2020-03-03 PROCEDURE — 3008F BODY MASS INDEX DOCD: CPT | Mod: CPTII,S$GLB,, | Performed by: PSYCHIATRY & NEUROLOGY

## 2020-03-03 NOTE — PROGRESS NOTES
Subjective:      Patient ID: Joy Recinos is a 39 y.o. female.    Chief Complaint:   I am having headache daily or almost daily     The  patient returns indicating that she is experiencing headache on an almost daily basis however, she can identify at least 2 days out of the month that are much more severe than other days.  The daily headache is felt in multiple areas including in the TMJ but also felt in the forehead as well as back of the head.  The pain is described as a pounding throbbing headache at times and when severe can be associated with nausea, photophobia, and phonophobia.  As a matter of fact on today's visit, the patient is experiencing headache with photophobia.    The patient has been on Topamax 50 mg daily but has had significant side effects from the Topamax now with impairment of recent memory as well as impairment of speech.  The patient states that she would like to get off that medication because of being ineffective and also because of side effects.    The patient denies any visual changes.  She denies any difficulty with strength in her arms or legs.  She is not aware of any sensation of imbalance.  She denies any numbness, tingling, or other paresthesia.  She does report that she has been seen by her dentist who does indicate that she has a component of TMJ.          ROS:  GENERAL: NO FEVER, CHILLS, FATIGABILITY OR WEIGHT LOSS.  SKIN: NO RASHES, ITCHING OR CHANGES IN COLOR OR TEXTURE OF SKIN.  HEAD: NO  RECENT HEAD TRAUMA.  EYES: VISUAL ACUITY FINE. NO PHOTOPHOBIA, OCULAR PAIN OR DIPLOPIA.  EARS: DENIES EAR PAIN, DISCHARGE OR VERTIGO.  NOSE: NO LOSS OF SMELL, NO EPISTAXIS OR POSTNASAL DRIP.  MOUTH & THROAT: NO HOARSENESS OR CHANGE IN VOICE. NO EXCESSIVE GUM BLEEDING.  NODES: DENIES SWOLLEN GLANDS.  CHEST: DENIES ARELLANO, CYANOSIS, WHEEZING, COUGH AND SPUTUM PRODUCTION.  CARDIOVASCULAR: DENIES CHEST PAIN, PND, ORTHOPNEA OR REDUCED EXERCISE TOLERANCE.  ABDOMEN: APPETITE FINE. NO WEIGHT  LOSS. DENIES DIARRHEA, ABDOMINAL PAIN, HEMATEMESIS OR BLOOD IN STOOL.  URINARY: NO FLANK PAIN, DYSURIA OR HEMATURIA.  PERIPHERAL VASCULAR: NO CLAUDICATION OR CYANOSIS.  MUSCULOSKELETAL: NO JOINT STIFFNESS OR SWELLING. DENIES BACK PAIN.  NEUROLOGIC: NO HISTORY OF SEIZURES, PARALYSIS, ALTERATION OF GAIT OR COORDINATION.    Past Medical History:   Diagnosis Date    ADHD (attention deficit hyperactivity disorder)     B12 deficiency     Chronic low back pain     oberlander    Endometriosis     Fibromyalgia syndrome     Idiopathic hypersomnia     Irritable bowel syndrome     goldman    Migraines     frances    PONV (postoperative nausea and vomiting)     Shingles     Undifferentiated inflammatory arthritis 2018    Undifferentiated inflammatory arthritis 2018     Past Surgical History:   Procedure Laterality Date     SECTION      x 2     CYSTOSCOPY N/A 4/10/2019    Procedure: CYSTOSCOPY;  Surgeon: Gillian Carpenter MD;  Location: [facility];  Service: OB/GYN;  Laterality: N/A;    HYSTERECTOMY      HYSTERECTOMY  2019    OOPHORECTOMY      left tube and ovary removed. later RSO with hyst    PELVIC LAPAROSCOPY      ROBOT-ASSISTED LAPAROSCOPIC ABDOMINAL HYSTERECTOMY USING DA LONI XI N/A 4/10/2019    Procedure: XI ROBOTIC HYSTERECTOMY;  Surgeon: Gillian Carpenter MD;  Location: [facility] OR;  Service: OB/GYN;  Laterality: N/A;    ROBOT-ASSISTED LAPAROSCOPIC LYSIS OF ADHESIONS USING DA LONI XI N/A 4/10/2019    Procedure: XI ROBOTIC LYSIS, ADHESIONS;  Surgeon: Gillian Carpenter MD;  Location: [facility];  Service: OB/GYN;  Laterality: N/A;    ROBOT-ASSISTED LAPAROSCOPIC SALPINGO-OOPHORECTOMY USING DA LONI XI Right 4/10/2019    Procedure: XI ROBOTIC SALPINGO-OOPHORECTOMY;  Surgeon: Gillian Carpenter MD;  Location: [facility];  Service: OB/GYN;  Laterality: Right;    TUBAL LIGATION       Family History   Problem Relation Age of Onset    No Known Problems Mother     Heart disease Father          Bypass    Skin cancer Father     Cataracts Maternal Grandmother     Diabetes Maternal Grandmother     Glaucoma Maternal Grandmother     Skin cancer Maternal Grandmother     Cataracts Maternal Grandfather     Skin cancer Paternal Grandfather     Breast cancer Neg Hx     Ovarian cancer Neg Hx     Deep vein thrombosis Neg Hx      Social History     Socioeconomic History    Marital status:      Spouse name: Not on file    Number of children: 2    Years of education: Not on file    Highest education level: Not on file   Occupational History     Employer: [employer]   Social Needs    Financial resource strain: Not on file    Food insecurity:     Worry: Not on file     Inability: Not on file    Transportation needs:     Medical: Not on file     Non-medical: Not on file   Tobacco Use    Smoking status: Never Smoker    Smokeless tobacco: Never Used   Substance and Sexual Activity    Alcohol use: Yes     Comment: rarely    Drug use: No    Sexual activity: Yes     Partners: Male     Birth control/protection: Condom     Comment:    Lifestyle    Physical activity:     Days per week: Not on file     Minutes per session: Not on file    Stress: Not on file   Relationships    Social connections:     Talks on phone: Not on file     Gets together: Not on file     Attends [employer] service: Not on file     Active member of club or organization: Not on file     Attends meetings of clubs or organizations: Not on file     Relationship status: Not on file   Other Topics Concern    Are you pregnant or think you may be? No    Breast-feeding No   Social History Narrative    No smokers in household, 1 dog.         Objective:   PE:   VITAL SIGNS:   Height 5 ft 8 in, weight 104.3 kg, BMI 34.96  Vitals:    03/03/20 1532   BP: 120/80   Pulse: 62     APPEARANCE: WELL NOURISHED, WELL DEVELOPED,  WHO APPEARS TO BE UNCOMFORTABLE AS SHE SITS IN A DARKENED EXAM ROOM.  HEAD: NORMOCEPHALIC, ATRAUMATIC.   MILD BILATERAL TEMPORALIS MUSCLE TENDERNESS.  EYES: PERRL. EOMI.  NON-ICTERIC SCLERAE.    NOSE: MUCOSA PINK. AIRWAY CLEAR.  MOUTH & THROAT: NO TONSILLAR ENLARGEMENT. NO PHARYNGEAL ERYTHEMA OR EXUDATE. NO STRIDOR.  NECK: SUPPLE. NO BRUITS.  CHEST: LUNGS CLEAR TO AUSCULTATION.  CARDIOVASCULAR: REGULAR RHYTHM WITHOUT SIGNIFICANT MURMURS.  ABDOMEN: BOWEL SOUNDS NORMAL. NOT DISTENDED  MUSCULOSKELETAL:  NO BONY DEFORMITY SEEN.  MUSCLE TONE   AND MUSCLE MASS ARE NORMAL IN BOTH UPPER AND BOTH LOWER EXTREMITIES.    NEUROLOGIC:   MENTAL STATUS:  THE PATIENT IS WELL ORIENTED TO PERSON, TIME, PLACE, AND SITUATION.  THE PATIENT IS ATTENTIVE TO THE ENVIRONMENT AND COOPERATIVE FOR THE EXAM.  CRANIAL NERVES: II-XII GROSSLY INTACT. FUNDOSCOPIC EXAM IS NORMAL.  NO HEMORRHAGE, EXUDATE OR PAPILLEDEMA IS PRESENT. THE EXTRAOCULAR MUSCLES ARE INTACT IN THE CARDINAL DIRECTIONS OF GAZE.  NO PTOSIS IS PRESENT. FACIAL FEATURES ARE SYMMETRICAL.  SPEECH IS NORMAL IN FLUENCY, DICTION, AND PHRASING.  TONGUE PROTRUDES IN THE MIDLINE.    GAIT AND STATION:  ROMBERG IS NEGATIVE.  GOOD ALTERNATE ARMSWING WITH NORMAL GAIT.  MOTOR:  NO DOWNDRIFT OF EITHER ARM WHEN HELD AT SHOULDER LEVEL.  MANUAL MUSCLE TESTING OF PROXIMAL AND DISTAL MUSCLES OF BOTH UPPER AND LOWER EXTREMITIES IS NORMAL. MUSCLE MASS IS NORMAL.  MUSCLE TONE IS NORMAL.  SENSORY:  INTACT BOTH UPPER AND LOWER EXTREMITIES TO PIN PRICK, TOUCH, AND VIBRATION.  CEREBELLAR:  FINGER TO NOSE DONE WELL.  ALTERNATING MOVEMENTS INTACT.  NO INVOLUNTARY MOVEMENTS OR TREMOR SEEN.  REFLEXES:  STRETCH REFLEXES ARE 2+ BOTH UPPER AND LOWER EXTREMITIES.  PLANTAR STIMULATION IS FLEXOR BILATERALLY AND NO PATHOLOGICAL REFLEXES ARE SEEN              Assessment:     Encounter Diagnosis   Name Primary?    Migraine without aura and without status migrainosus, not intractable Yes       Discussion:  The patient has tried and failed now at least 2 different migraine preventives including Topamax most recently.   Unfortunately she cannot tolerate the Topamax any longer because of speech difficulty and memory impairment.  In addition, the patient continues to experience her headache on an almost daily basis indicating that the Topamax is not effective.  She states that she is not over utilizing over-the-counter medication and would only take Excedrin perhaps 2 days out of the month.      Plan:     1. Trial of Emgality 240 mg subcutaneously the 1st month than 120 mg subcutaneously every month thereafter as a means of migraine prevention  2.  May continue other medications as previously prescribed  3.  Return to Neurology for follow-up in 6 months.      This was a 25 min face-to-face visit with the patient with over 50% of the time spent counseling the patient regarding the management of migraine headache and introduction of Emgality.  The demonstration syringe was shown to the patient.  This note is generated with speech recognition software and is subject to transcription error and sound alike phrases that may be missed by proofreading.

## 2020-03-13 ENCOUNTER — OFFICE VISIT (OUTPATIENT)
Dept: PSYCHIATRY | Facility: CLINIC | Age: 40
End: 2020-03-13
Payer: COMMERCIAL

## 2020-03-13 VITALS
SYSTOLIC BLOOD PRESSURE: 138 MMHG | BODY MASS INDEX: 35.22 KG/M2 | HEART RATE: 75 BPM | HEIGHT: 68 IN | DIASTOLIC BLOOD PRESSURE: 89 MMHG | WEIGHT: 232.38 LBS

## 2020-03-13 DIAGNOSIS — F41.1 GAD (GENERALIZED ANXIETY DISORDER): Primary | ICD-10-CM

## 2020-03-13 DIAGNOSIS — F90.9 ATTENTION DEFICIT HYPERACTIVITY DISORDER (ADHD), UNSPECIFIED ADHD TYPE: ICD-10-CM

## 2020-03-13 PROCEDURE — 99214 OFFICE O/P EST MOD 30 MIN: CPT | Mod: S$GLB,,, | Performed by: PSYCHIATRY & NEUROLOGY

## 2020-03-13 PROCEDURE — 99999 PR PBB SHADOW E&M-EST. PATIENT-LVL II: CPT | Mod: PBBFAC,,, | Performed by: PSYCHIATRY & NEUROLOGY

## 2020-03-13 PROCEDURE — 99214 PR OFFICE/OUTPT VISIT, EST, LEVL IV, 30-39 MIN: ICD-10-PCS | Mod: S$GLB,,, | Performed by: PSYCHIATRY & NEUROLOGY

## 2020-03-13 PROCEDURE — 99999 PR PBB SHADOW E&M-EST. PATIENT-LVL II: ICD-10-PCS | Mod: PBBFAC,,, | Performed by: PSYCHIATRY & NEUROLOGY

## 2020-03-13 RX ORDER — FLUOXETINE HYDROCHLORIDE 20 MG/1
20 CAPSULE ORAL DAILY
Qty: 30 CAPSULE | Refills: 2 | Status: SHIPPED | OUTPATIENT
Start: 2020-03-13 | End: 2020-06-12

## 2020-05-18 RX ORDER — ESTRADIOL 1 MG/1
1 TABLET ORAL DAILY
Qty: 30 TABLET | Refills: 11 | Status: SHIPPED | OUTPATIENT
Start: 2020-05-18 | End: 2021-05-11 | Stop reason: SDUPTHER

## 2020-06-12 ENCOUNTER — OFFICE VISIT (OUTPATIENT)
Dept: PSYCHIATRY | Facility: CLINIC | Age: 40
End: 2020-06-12
Payer: COMMERCIAL

## 2020-06-12 DIAGNOSIS — F41.1 GAD (GENERALIZED ANXIETY DISORDER): Primary | ICD-10-CM

## 2020-06-12 DIAGNOSIS — F90.9 ATTENTION DEFICIT HYPERACTIVITY DISORDER (ADHD), UNSPECIFIED ADHD TYPE: ICD-10-CM

## 2020-06-12 PROCEDURE — 99213 PR OFFICE/OUTPT VISIT, EST, LEVL III, 20-29 MIN: ICD-10-PCS | Mod: S$GLB,,, | Performed by: PSYCHIATRY & NEUROLOGY

## 2020-06-12 PROCEDURE — 99213 OFFICE O/P EST LOW 20 MIN: CPT | Mod: S$GLB,,, | Performed by: PSYCHIATRY & NEUROLOGY

## 2020-06-12 PROCEDURE — 99999 PR PBB SHADOW E&M-EST. PATIENT-LVL I: ICD-10-PCS | Mod: PBBFAC,,, | Performed by: PSYCHIATRY & NEUROLOGY

## 2020-06-12 PROCEDURE — 99999 PR PBB SHADOW E&M-EST. PATIENT-LVL I: CPT | Mod: PBBFAC,,, | Performed by: PSYCHIATRY & NEUROLOGY

## 2020-06-12 RX ORDER — FLUOXETINE HYDROCHLORIDE 20 MG/1
20 CAPSULE ORAL DAILY
Qty: 60 CAPSULE | Refills: 2 | Status: SHIPPED | OUTPATIENT
Start: 2020-06-12 | End: 2020-07-21 | Stop reason: SDUPTHER

## 2020-06-12 NOTE — PROGRESS NOTES
"Outpatient Psychiatry Follow-up Visit (MD/NP)    6/12/2020    Joy Recinos, a 39 y.o. female, presenting for follow-up visit. Met with patient.    Reason for Encounter: f/u, DOUG.     Interval History: Patient seen and interviewed for follow-up, last seen about 3 months ago. Stressed by working at home (doesn't like this). No new illnesses. Family is ok. Reports that she's coping a little better, less distressed than at time of last visit. Adherent to meds x hs medication. Some HA's. Question of side effects or symptoms. "jaw has been tight". "starting to ease up now". No other new HA's. No new medications. Off topiramate. Will start a migraine injectable pending approval. Working through the finances problems & relationship issues.     Past med List:   Sertraline  Duloxetine  escitalopram    Background: 37 year old F presents for establishment of care, reports problems with anxiety and depression. From PCP note (3.16.18): Here today for f/u on depression & anxiety. She reports that since medication adjustment she has been less irritable. Her  is here with her today & affirms this. She has a psych appointment scheduled for May 11. She reports it has been some mild improvement in her volition. Reports treatment with cymbalta - for fibromyalgia, anxiety, depression for 5 years. Neither fibromyalgia pain no moods improved. wellbutrin trial has been perhaps helpful. Describes baseline symptoms as "worried about everything", "worried about worrying", sad, not interested in things, "like I don't have a purpose". Negative thoughts about self. Tired all the time, even when sleeps excessively. Denies SI. "All my life". "stays all the time", though better/worse at times, influenced by stressful situations. Problems with attention/concentration. Reports all of the following daily in past 2 weeks (around which time she was laid off): Feeling nervous, anxious or on edge, Not being able to stop or control " "worrying  Worrying too much about different things   Trouble relaxing   Being so restless that it is hard to sit still   Becoming easily annoyed or irritable   Feeling afraid as if something awful might happen    DOUG-7 = 21 (estimates a 19 in weeks prior to the layoff)    Sleep Problems - hypersomnia  Sad Mood more than 1/2 time  Appetite and weight changes (decreased appetite, small subjective weight loss)  Concentration problems  Guilt   Thoughts of Emptiness  Anhedonia  Anergia  Slowing/PMR    QIDS = 12    Also has decreased sexual interest, pain during intercourse.    PsychHx: first treatment by Obgyn - sertraline(?) when had problems with endometriosis. No AVH, no SI/HI, no delusions. No hospitalizations or self-harm behaviors. Prolonged duloxetine trial, thinks transient or minimal benefit. Past treatment with sertraline.     MedHx: idiopathic hypersomnia. 2 sleep studies, narcolepsy ruled out. Endometriosis. 2 herniated lumbar discs. Migraines. GERD. Seasonal allergies.   FamHx: mother and dad both take medication for depression. Father diagnosed with adhd in 60's.   SocHx: from [facility]. Heart murmur at birth. "fainted and blacked out school", had several episodes between 5 and 9. Worked up but never medically explained. Still gets dizzy and weak, but doesn't black out. Also had back pain frequently as a child. No developmental problems. Regular classes in school. Ok with going to school. Normal socially. Average student. 3 semesters of college. Went to technical school for 1 year, finished (computer/). 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 Applying for new jobs. Lives with  and 2 kids.  x 14 years. "really good" relationship x decreased sexual interest and pain. Week before  pulled out a tampon - was extremely painful. Miranda has been painful ever since. Has " "had PT recommended for this. 2 kids (13 and 9). Dtr has been bullied most of her life due to being overweight. She also has problems with oppositionality at home (though is a good student, doesn't have discipline problems at school), won't take a bath. Son is "good kid", "very attached to me". Quiet, not many friends. Mom is supportive, lives nearby.     Review Of Systems:     GENERAL:  No weight gain or loss  SKIN:  No rashes or lacerations  HEAD:  No headaches  CHEST:  No shortness of breath, hyperventilation or cough  CARDIOVASCULAR:  No tachycardia or chest pain  ABDOMEN:  No nausea, vomiting, pain, constipation or diarrhea  URINARY:  No frequency, dysuria or sexual dysfunction  ENDOCRINE:  No polydipsia, polyuria  MUSCULOSKELETAL:  pain and stiffness of the joints  NEUROLOGIC:  No weakness, sensory changes, seizures, confusion, memory loss, tremor or other abnormal movements    Current Evaluation:     Nutritional Screening: Considering the patient's height and weight, medications, medical history and preferences, should a referral be made to the dietitian? no    Constitutional  Vitals:  Most recent vital signs, dated less than 90 days prior to this appointment, were not reviewed.    There were no vitals filed for this visit.     General:  unremarkable, age appropriate     Musculoskeletal  Muscle Strength/Tone:  no tremor, no tic   Gait & Station:  non-ataxic     Psychiatric  Appearance: casually dressed & groomed;   Behavior: calm,   Cooperation: cooperative with assessment  Speech: normal rate, volume, tone  Thought Process: linear, goal-directed  Thought Content: No suicidal or homicidal ideation; no delusions  Affect: anxious  Mood: anxious  Perceptions: No auditory or visual hallucinations  Level of Consciousness: alert throughout interview  Insight: fair  Cognition: Oriented to person, place, time, & situation  Memory: no apparent deficits to general clinical interview; not formally " assessed  Attention/Concentration: no apparent deficits to general clinical interview; not formally assessed  Fund of Knowledge: average by vocabulary/education    Laboratory Data  No visits with results within 1 Month(s) from this visit.   Latest known visit with results is:   Admission on 04/10/2019, Discharged on 04/11/2019   Component Date Value Ref Range Status    POCT Glucose 04/10/2019 99  70 - 110 mg/dL Final    POC Preg Test, Ur 04/10/2019 Negative  Negative Final     Acceptable 04/10/2019 Yes   Final     Medications  Outpatient Encounter Medications as of 6/12/2020   Medication Sig Dispense Refill    cetirizine (ZYRTEC) 10 MG tablet Take 10 mg by mouth once daily.      cholecalciferol, vitamin D3, (VITAMIN D3) 1,000 unit capsule Take 1 capsule (1,000 Units total) by mouth once daily. 90 capsule 0    dextromethorphan-guaifenesin  mg (MUCINEX DM)  mg per 12 hr tablet Take 1 tablet by mouth as needed.      ergocalciferol (ERGOCALCIFEROL) 50,000 unit Cap Take 1 capsule (50,000 Units total) by mouth every 7 days. 12 capsule 3    estradioL (ESTRACE) 1 MG tablet Take 1 tablet (1 mg total) by mouth once daily. 30 tablet 11    fexofenadine-pseudoephedrine (ALLEGRA-D 24) 180-240 mg per 24 hr tablet Take 1 tablet by mouth daily as needed.      FLUoxetine 20 MG capsule Take 1 capsule (20 mg total) by mouth once daily. 30 capsule 2    galcanezumab-gnlm (EMGALITY PEN) 120 mg/mL PnIj Inject 240 mg into the skin the first month only. 2 mL 0    galcanezumab-gnlm (EMGALITY PEN) 120 mg/mL PnIj Inject 120 mg into the skin every 28 days. 1 mL 5    hydrocortisone 2.5 % cream Apply topically 2 (two) times daily. 28 g 1    multivitamin capsule Take 1 capsule by mouth once daily.      omeprazole (PRILOSEC) 20 MG capsule TAKE ONE CAPSULE BY MOUTH DAILY (Patient taking differently: TAKE ONE CAPSULE BY MOUTH NIGHTLY) 30 capsule 0    tiZANidine (ZANAFLEX) 2 MG tablet Take 1 tablet (2 mg  total) by mouth nightly as needed. (Patient not taking: Reported on 12/18/2019) 30 tablet 1    traMADol (ULTRAM) 50 mg tablet Take 1 tablet (50 mg total) by mouth every 24 hours as needed for Pain. 30 tablet 0     No facility-administered encounter medications on file as of 6/12/2020.      Assessment - Diagnosis - Goals:     Impression: 39 y/o F with generalized anxiety disorder & adhd. Previous trials of duloxetine with minimal benefit, low dose bupropion with modest benefit. adhd by clinical history supplemented by family input, screening tools. Ongoing symptoms despite treatment & anxious in context of worse stressors. Failed atomoxetine.     Dx: generalized anxiety disorder. adhd    Treatment Goals:  Specify outcomes written in observable, behavioral terms:   Reduce anxiety by DOUG-7.     Treatment Plan/Recommendations:   · Fluoxetine for mood. Ok to skip medication & observe for resolution of possible side effects.   · Encouraged therapy. Info for accessing therapy.   · Discussed risks, benefits, and alternatives to treatment plan documented above with patient. I answered all patient questions related to this plan and patient expressed understanding and agreement.     Return to Clinic: 3 months    WINIFRED Nieto MD  Psychiatry  [facility]  [address] , [place] [address] [phone]

## 2020-08-15 ENCOUNTER — HOSPITAL ENCOUNTER (EMERGENCY)
Facility: HOSPITAL | Age: 40
Discharge: HOME | End: 2020-08-15
Attending: FAMILY MEDICINE
Payer: COMMERCIAL

## 2020-08-15 VITALS
RESPIRATION RATE: 18 BRPM | WEIGHT: 230 LBS | HEIGHT: 68 IN | OXYGEN SATURATION: 96 % | SYSTOLIC BLOOD PRESSURE: 125 MMHG | DIASTOLIC BLOOD PRESSURE: 78 MMHG | HEART RATE: 94 BPM | TEMPERATURE: 99 F | BODY MASS INDEX: 34.86 KG/M2

## 2020-08-15 DIAGNOSIS — M54.50 CHRONIC BILATERAL LOW BACK PAIN WITHOUT SCIATICA: Primary | ICD-10-CM

## 2020-08-15 DIAGNOSIS — G89.29 CHRONIC BILATERAL LOW BACK PAIN WITHOUT SCIATICA: Primary | ICD-10-CM

## 2020-08-15 LAB
BILIRUB UR QL STRIP: NEGATIVE
CLARITY UR REFRACT.AUTO: CLEAR
COLOR UR AUTO: YELLOW
GLUCOSE UR QL STRIP: NEGATIVE
HGB UR QL STRIP: NEGATIVE
KETONES UR QL STRIP: NEGATIVE
LEUKOCYTE ESTERASE UR QL STRIP: NEGATIVE
NITRITE UR QL STRIP: NEGATIVE
PH UR STRIP: 6 [PH] (ref 5–8)
PROT UR QL STRIP: NEGATIVE
SP GR UR STRIP: 1.02 (ref 1–1.03)
URN SPEC COLLECT METH UR: NORMAL
UROBILINOGEN UR STRIP-ACNC: NEGATIVE EU/DL

## 2020-08-15 PROCEDURE — 63600175 PHARM REV CODE 636 W HCPCS: Mod: ER | Performed by: FAMILY MEDICINE

## 2020-08-15 PROCEDURE — 99284 EMERGENCY DEPT VISIT MOD MDM: CPT | Mod: 25,ER

## 2020-08-15 PROCEDURE — 96372 THER/PROPH/DIAG INJ SC/IM: CPT | Mod: ER

## 2020-08-15 PROCEDURE — 81003 URINALYSIS AUTO W/O SCOPE: CPT | Mod: ER

## 2020-08-15 RX ORDER — DEXAMETHASONE SODIUM PHOSPHATE 4 MG/ML
8 INJECTION, SOLUTION INTRA-ARTICULAR; INTRALESIONAL; INTRAMUSCULAR; INTRAVENOUS; SOFT TISSUE
Status: COMPLETED | OUTPATIENT
Start: 2020-08-15 | End: 2020-08-15

## 2020-08-15 RX ORDER — KETOROLAC TROMETHAMINE 30 MG/ML
60 INJECTION, SOLUTION INTRAMUSCULAR; INTRAVENOUS
Status: COMPLETED | OUTPATIENT
Start: 2020-08-15 | End: 2020-08-15

## 2020-08-15 RX ORDER — DICLOFENAC SODIUM 75 MG/1
75 TABLET, DELAYED RELEASE ORAL 2 TIMES DAILY
Qty: 20 TABLET | Refills: 0 | Status: SHIPPED | OUTPATIENT
Start: 2020-08-15 | End: 2022-07-22

## 2020-08-15 RX ADMIN — KETOROLAC TROMETHAMINE 60 MG: 30 INJECTION, SOLUTION INTRAMUSCULAR at 04:08

## 2020-08-15 RX ADMIN — DEXAMETHASONE SODIUM PHOSPHATE 8 MG: 4 INJECTION, SOLUTION INTRAMUSCULAR; INTRAVENOUS at 04:08

## 2020-08-15 NOTE — ED PROVIDER NOTES
Encounter Date: 8/15/2020       History     Chief Complaint   Patient presents with    Back Pain     I always have back pain and a month ago i fell and it has been worse on the left side since TODAY It is worse across lower back and into my legs.      40-year-old female with history of chronic low back pain has been aggravated after her fall 2 months ago on her left side.  She was having little more than normal pain on her left side.  But since yesterday she started having pain in hand right side radiating to her right lower leg.  Patient is able to bear weight and walk.  No tingling numbness or weakness.  Patient has not been taking anything for pain.  No bladder or bowel incontinence- no saddle anesthesia     The history is provided by the patient.     Review of patient's allergies indicates:  No Known Allergies  Past Medical History:   Diagnosis Date    ADHD (attention deficit hyperactivity disorder)     B12 deficiency     Chronic low back pain     oberlander    Endometriosis     Fibromyalgia syndrome     Idiopathic hypersomnia     Irritable bowel syndrome     goldman    Migraines     frances    PONV (postoperative nausea and vomiting)     Shingles     Undifferentiated inflammatory arthritis 2018    Undifferentiated inflammatory arthritis 2018     Past Surgical History:   Procedure Laterality Date     SECTION      x 2     CYSTOSCOPY N/A 4/10/2019    Procedure: CYSTOSCOPY;  Surgeon: Gillian Carpenter MD;  Location: [facility] OR;  Service: OB/GYN;  Laterality: N/A;    HYSTERECTOMY      HYSTERECTOMY  2019    OOPHORECTOMY      left tube and ovary removed. later RSO with hyst    PELVIC LAPAROSCOPY      ROBOT-ASSISTED LAPAROSCOPIC ABDOMINAL HYSTERECTOMY USING DA LONI XI N/A 4/10/2019    Procedure: XI ROBOTIC HYSTERECTOMY;  Surgeon: Gillian Carpenter MD;  Location: [facility] OR;  Service: OB/GYN;  Laterality: N/A;    ROBOT-ASSISTED LAPAROSCOPIC LYSIS OF ADHESIONS USING DA LONI XI N/A  4/10/2019    Procedure: XI ROBOTIC LYSIS, ADHESIONS;  Surgeon: Gillian Carpenter MD;  Location: [facility] OR;  Service: OB/GYN;  Laterality: N/A;    ROBOT-ASSISTED LAPAROSCOPIC SALPINGO-OOPHORECTOMY USING DA LONI XI Right 4/10/2019    Procedure: XI ROBOTIC SALPINGO-OOPHORECTOMY;  Surgeon: Gillian Carpenter MD;  Location: [facility] OR;  Service: OB/GYN;  Laterality: Right;    TUBAL LIGATION       Family History   Problem Relation Age of Onset    No Known Problems Mother     Heart disease Father         Bypass    Skin cancer Father     Cataracts Maternal Grandmother     Diabetes Maternal Grandmother     Glaucoma Maternal Grandmother     Skin cancer Maternal Grandmother     Cataracts Maternal Grandfather     Skin cancer Paternal Grandfather     Breast cancer Neg Hx     Ovarian cancer Neg Hx     Deep vein thrombosis Neg Hx      Social History     Tobacco Use    Smoking status: Never Smoker    Smokeless tobacco: Never Used   Substance Use Topics    Alcohol use: Yes     Comment: rarely    Drug use: No     Review of Systems   Constitutional: Negative for activity change, appetite change, chills and fever.   HENT: Negative for congestion, ear discharge, rhinorrhea, sinus pressure, sinus pain, sore throat and trouble swallowing.    Eyes: Negative for photophobia, pain, discharge, redness, itching and visual disturbance.   Respiratory: Negative for cough, chest tightness, shortness of breath and wheezing.    Cardiovascular: Negative for chest pain, palpitations and leg swelling.   Gastrointestinal: Negative for abdominal distention, abdominal pain, constipation, diarrhea, nausea and vomiting.   Genitourinary: Negative for dysuria, flank pain, frequency and hematuria.   Musculoskeletal: Positive for back pain. Negative for gait problem, neck pain and neck stiffness.   Skin: Negative for rash and wound.   Neurological: Negative for dizziness, tremors, seizures, syncope, speech difficulty, weakness, light-headedness,  numbness and headaches.   Psychiatric/Behavioral: Negative for behavioral problems, confusion, hallucinations and sleep disturbance. The patient is not nervous/anxious.    All other systems reviewed and are negative.      Physical Exam     Initial Vitals [08/15/20 1525]   BP Pulse Resp Temp SpO2   (!) 172/74 105 16 98.9 °F (37.2 °C) 97 %      MAP       --         Physical Exam    Nursing note and vitals reviewed.  Constitutional: Vital signs are normal. She appears well-developed and well-nourished. She is not diaphoretic. She is active. No distress.   HENT:   Head: Normocephalic and atraumatic.   Right Ear: Tympanic membrane normal.   Left Ear: Tympanic membrane normal.   Nose: Nose normal.   Mouth/Throat: Oropharynx is clear and moist.   Eyes: Conjunctivae and lids are normal.   Neck: Trachea normal, normal range of motion and full passive range of motion without pain. Neck supple. Normal range of motion present. No neck rigidity.   Cardiovascular: Normal rate, regular rhythm, S1 normal, S2 normal, normal heart sounds, intact distal pulses and normal pulses.   Pulmonary/Chest: Breath sounds normal. No respiratory distress. She has no wheezes. She has no rhonchi. She has no rales. She exhibits no tenderness.   Abdominal: Soft. Normal appearance and bowel sounds are normal. She exhibits no distension. There is no abdominal tenderness.   Musculoskeletal: Normal range of motion.      Lumbar back: She exhibits pain.   Lymphadenopathy:     She has no cervical adenopathy.   Neurological: She is alert and oriented to person, place, and time. She has normal strength and normal reflexes. No cranial nerve deficit or sensory deficit. GCS score is 15. GCS eye subscore is 4. GCS verbal subscore is 5. GCS motor subscore is 6.   Skin: Skin is warm and intact. Capillary refill takes less than 2 seconds. No abrasion, no bruising and no rash noted.   Psychiatric: She has a normal mood and affect. Her speech is normal and behavior is  normal. Judgment and thought content normal. She is not actively hallucinating. Cognition and memory are normal. She is attentive.         ED Course   Procedures  Labs Reviewed   URINALYSIS, REFLEX TO URINE CULTURE    Narrative:     Specimen Source->Urine          Imaging Results          X-Ray Lumbar Spine Ap And Lateral (Final result)  Result time 08/15/20 16:20:23    Final result by Louie Grider Jr., MD (08/15/20 16:20:23)                 Impression:      1. Multilevel degenerative change with no acute findings lumbar spine.      Electronically signed by: Louie Grider Jr., MD  Date:    08/15/2020  Time:    16:20             Narrative:    EXAMINATION:  XR LUMBAR SPINE AP AND LATERAL    CLINICAL HISTORY:  Back pain or radiculopathy, > 6 wks;    COMPARISON:  01/11/2010    FINDINGS:  Disc space narrowing L4-L5 and L5-S1 with endplate sclerosis and anterior spurring.  No acute fracture or dislocation.  Remaining vertebral body heights and alignment are well maintained.  SI joints are normal.                                 Medical Decision Making:   Initial Assessment:   Low back pain radiating to right side.  Differential Diagnosis:   Lumbar radiculopathy, diskitis, herniation,  Clinical Tests:   Radiological Study: Ordered and Reviewed  ED Management:  On review of old MRI from 2012 patient has lumbar disc herniation and-L4-L5.  L5-S1.  X-rays consistent with similar findings.  Patient is treated with Toradol and Decadron in ER along with diclofenac prescription.  She is advised to follow-up for PCP and may require Neurology and Pain Management.  Follow-up ED with saddle anesthesia, weakness, worsening pain.                                 Clinical Impression:       ICD-10-CM ICD-9-CM   1. Chronic bilateral low back pain without sciatica  M54.5 724.2    G89.29 338.29         Disposition:   Disposition: Discharged  Condition: Stable                        Jv Nuñez MD  08/16/20 0618

## 2020-08-17 ENCOUNTER — TELEPHONE (OUTPATIENT)
Dept: INTERNAL MEDICINE | Facility: CLINIC | Age: 40
End: 2020-08-17

## 2020-08-17 ENCOUNTER — OFFICE VISIT (OUTPATIENT)
Dept: INTERNAL MEDICINE | Facility: CLINIC | Age: 40
End: 2020-08-17
Payer: COMMERCIAL

## 2020-08-17 ENCOUNTER — LAB VISIT (OUTPATIENT)
Dept: LAB | Facility: HOSPITAL | Age: 40
End: 2020-08-17
Attending: INTERNAL MEDICINE
Payer: COMMERCIAL

## 2020-08-17 VITALS
DIASTOLIC BLOOD PRESSURE: 76 MMHG | HEART RATE: 62 BPM | SYSTOLIC BLOOD PRESSURE: 108 MMHG | OXYGEN SATURATION: 97 % | TEMPERATURE: 99 F

## 2020-08-17 DIAGNOSIS — R39.11 HESITANCY OF MICTURITION: ICD-10-CM

## 2020-08-17 DIAGNOSIS — M54.50 ACUTE LEFT-SIDED LOW BACK PAIN WITHOUT SCIATICA: ICD-10-CM

## 2020-08-17 DIAGNOSIS — G89.29 CHRONIC BILATERAL LOW BACK PAIN WITHOUT SCIATICA: ICD-10-CM

## 2020-08-17 DIAGNOSIS — R33.9 INCOMPLETE EMPTYING OF BLADDER: ICD-10-CM

## 2020-08-17 DIAGNOSIS — Z00.00 ROUTINE GENERAL MEDICAL EXAMINATION AT A HEALTH CARE FACILITY: Primary | ICD-10-CM

## 2020-08-17 DIAGNOSIS — M54.50 CHRONIC BILATERAL LOW BACK PAIN WITHOUT SCIATICA: ICD-10-CM

## 2020-08-17 DIAGNOSIS — Z00.00 ROUTINE GENERAL MEDICAL EXAMINATION AT A HEALTH CARE FACILITY: ICD-10-CM

## 2020-08-17 LAB
BASOPHILS # BLD AUTO: 0.03 K/UL (ref 0–0.2)
BASOPHILS NFR BLD: 0.4 % (ref 0–1.9)
DIFFERENTIAL METHOD: ABNORMAL
EOSINOPHIL # BLD AUTO: 0.1 K/UL (ref 0–0.5)
EOSINOPHIL NFR BLD: 1.5 % (ref 0–8)
ERYTHROCYTE [DISTWIDTH] IN BLOOD BY AUTOMATED COUNT: 13.2 % (ref 11.5–14.5)
HCT VFR BLD AUTO: 43.1 % (ref 37–48.5)
HGB BLD-MCNC: 13.2 G/DL (ref 12–16)
IMM GRANULOCYTES # BLD AUTO: 0.01 K/UL (ref 0–0.04)
IMM GRANULOCYTES NFR BLD AUTO: 0.1 % (ref 0–0.5)
LYMPHOCYTES # BLD AUTO: 2.2 K/UL (ref 1–4.8)
LYMPHOCYTES NFR BLD: 32.5 % (ref 18–48)
MCH RBC QN AUTO: 29 PG (ref 27–31)
MCHC RBC AUTO-ENTMCNC: 30.6 G/DL (ref 32–36)
MCV RBC AUTO: 95 FL (ref 82–98)
MONOCYTES # BLD AUTO: 0.4 K/UL (ref 0.3–1)
MONOCYTES NFR BLD: 5.6 % (ref 4–15)
NEUTROPHILS # BLD AUTO: 4.1 K/UL (ref 1.8–7.7)
NEUTROPHILS NFR BLD: 59.9 % (ref 38–73)
NRBC BLD-RTO: 0 /100 WBC
PLATELET # BLD AUTO: 216 K/UL (ref 150–350)
PMV BLD AUTO: 12.4 FL (ref 9.2–12.9)
RBC # BLD AUTO: 4.55 M/UL (ref 4–5.4)
WBC # BLD AUTO: 6.81 K/UL (ref 3.9–12.7)

## 2020-08-17 PROCEDURE — 99396 PREV VISIT EST AGE 40-64: CPT | Mod: S$GLB,,, | Performed by: INTERNAL MEDICINE

## 2020-08-17 PROCEDURE — 85025 COMPLETE CBC W/AUTO DIFF WBC: CPT

## 2020-08-17 PROCEDURE — 99999 PR PBB SHADOW E&M-EST. PATIENT-LVL V: ICD-10-PCS | Mod: PBBFAC,,, | Performed by: INTERNAL MEDICINE

## 2020-08-17 PROCEDURE — 84443 ASSAY THYROID STIM HORMONE: CPT

## 2020-08-17 PROCEDURE — 84439 ASSAY OF FREE THYROXINE: CPT

## 2020-08-17 PROCEDURE — 99396 PR PREVENTIVE VISIT,EST,40-64: ICD-10-PCS | Mod: S$GLB,,, | Performed by: INTERNAL MEDICINE

## 2020-08-17 PROCEDURE — 99999 PR PBB SHADOW E&M-EST. PATIENT-LVL V: CPT | Mod: PBBFAC,,, | Performed by: INTERNAL MEDICINE

## 2020-08-17 PROCEDURE — 86803 HEPATITIS C AB TEST: CPT

## 2020-08-17 PROCEDURE — 86703 HIV-1/HIV-2 1 RESULT ANTBDY: CPT

## 2020-08-17 PROCEDURE — 80053 COMPREHEN METABOLIC PANEL: CPT

## 2020-08-17 PROCEDURE — 80061 LIPID PANEL: CPT

## 2020-08-17 PROCEDURE — 36415 COLL VENOUS BLD VENIPUNCTURE: CPT

## 2020-08-17 RX ORDER — CYCLOBENZAPRINE HCL 10 MG
TABLET ORAL
Qty: 30 TABLET | Refills: 0 | Status: CANCELLED | OUTPATIENT
Start: 2020-08-17

## 2020-08-17 RX ORDER — TIZANIDINE 2 MG/1
TABLET ORAL
Qty: 30 TABLET | Refills: 1 | Status: SHIPPED | OUTPATIENT
Start: 2020-08-17 | End: 2020-08-20

## 2020-08-17 NOTE — PROGRESS NOTES
Subjective:      Patient ID: Joy Recinos is a 40 y.o. female.    Chief Complaint: Hospital Follow Up (back and hip pain)    39 yo with   Patient Active Problem List   Diagnosis    B12 deficiency    Idiopathic hypersomnia    Migraines    Fibromyalgia syndrome    Breakthrough bleeding on birth control pills    Vitamin D deficiency    Positive DARRION (antinuclear antibody)    Periorbital erythema    Great toe pain, right    DOUG (generalized anxiety disorder)    Depression with anxiety    Pedal edema    ARELLANO (dyspnea on exertion)    PVD (peripheral vascular disease)    Class 2 obesity due to excess calories without serious comorbidity with body mass index (BMI) of 36.0 to 36.9 in adult    Endometriosis of pelvis    S/P hysterectomy    Attention deficit hyperactivity disorder (ADHD)    Chronic bilateral low back pain without sciatica     Past Medical History:   Diagnosis Date    ADHD (attention deficit hyperactivity disorder)     B12 deficiency     Chronic low back pain     oberlander    Endometriosis     Fibromyalgia syndrome     Idiopathic hypersomnia     Irritable bowel syndrome     goldman    Migraines     frances    PONV (postoperative nausea and vomiting)     Shingles     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 The current episode started more than 1 year ago. The problem occurs constantly. The problem has been rapidly worsening since onset. The pain is present in the gluteal, lumbar spine, sacro-iliac, thoracic spine and costovertebral angle. The quality of the pain is described as aching, cramping, shooting and stabbing. The pain radiates to the left knee, left thigh, right knee and right thigh. The pain is at a severity of 9/10. The pain is severe. The pain is the same all the time. The symptoms are aggravated by bending, position, sitting, standing and twisting. Stiffness  is present all day. Associated symptoms include abdominal pain and headaches. Pertinent negatives include no bladder incontinence, bowel incontinence, chest pain, dysuria, fever, leg pain, numbness, paresis, paresthesias, pelvic pain, perianal numbness, tingling, weakness or weight loss. Risk factors include lack of exercise, obesity, poor posture, recent trauma and sedentary lifestyle. She has tried analgesics, NSAIDs, bed rest, heat, home exercises, ice, muscle relaxant and walking for the symptoms. The treatment provided mild relief.   Physical Exam  Constitutional:       Appearance: Normal appearance. She is well-developed. She is not ill-appearing, toxic-appearing or diaphoretic.   HENT:      Head: Normocephalic and atraumatic.      Right Ear: External ear normal.      Left Ear: External ear normal.      Mouth/Throat:      Mouth: Mucous membranes are moist.      Pharynx: Oropharynx is clear.   Eyes:      General: No scleral icterus.     Pupils: Pupils are equal, round, and reactive to light.   Neck:      Musculoskeletal: Neck supple.      Thyroid: No thyromegaly.   Cardiovascular:      Rate and Rhythm: Normal rate and regular rhythm.   Pulmonary:      Breath sounds: Normal breath sounds. No wheezing or rales.   Abdominal:      General: Bowel sounds are normal.      Palpations: Abdomen is soft.      Tenderness: There is no abdominal tenderness.   Musculoskeletal:      Lumbar back: She exhibits decreased range of motion and tenderness. She exhibits no bony tenderness, no swelling and no edema.   Lymphadenopathy:      Cervical: No cervical adenopathy.   Skin:     General: Skin is warm and dry.   Neurological:      General: No focal deficit present.      Mental Status: She is alert and oriented to person, place, and time.      Motor: No weakness.      Gait: Gait abnormal (due to pain).      Comments: slt neg albaro   Psychiatric:         Mood and Affect: Mood normal.         Behavior: Behavior normal.         Thought  Content: Thought content normal.     Here today for annual prev exam.  Compliant with meds without significant side effects. Energy and appetite are good.     Pt also c/o back pain.     Review of Systems   Constitutional: Negative for fever and weight loss.   Cardiovascular: Negative for chest pain.   Gastrointestinal: Positive for abdominal pain. Negative for bowel incontinence.   Genitourinary: Negative for bladder incontinence, dysuria, hematuria and pelvic pain.   Musculoskeletal: Positive for back pain.   Skin: Negative for rash and wound.   Neurological: Positive for headaches. Negative for tingling, weakness, numbness and paresthesias.     Objective:   /76 (BP Location: Right arm, Patient Position: Sitting, BP Method: Large (Manual))   Pulse 62   Temp 98.6 °F (37 °C) (Tympanic)   LMP 03/22/2019 (Exact Date)   SpO2 97%     Physical Exam  Constitutional:       Appearance: Normal appearance. She is well-developed. She is not ill-appearing, toxic-appearing or diaphoretic.   HENT:      Head: Normocephalic and atraumatic.      Right Ear: External ear normal.      Left Ear: External ear normal.      Mouth/Throat:      Mouth: Mucous membranes are moist.      Pharynx: Oropharynx is clear.   Eyes:      General: No scleral icterus.     Pupils: Pupils are equal, round, and reactive to light.   Neck:      Musculoskeletal: Neck supple.      Thyroid: No thyromegaly.   Cardiovascular:      Rate and Rhythm: Normal rate and regular rhythm.   Pulmonary:      Breath sounds: Normal breath sounds. No wheezing or rales.   Abdominal:      General: Bowel sounds are normal.      Palpations: Abdomen is soft.      Tenderness: There is no abdominal tenderness.   Musculoskeletal:      Lumbar back: She exhibits decreased range of motion and tenderness. She exhibits no bony tenderness, no swelling and no edema.   Lymphadenopathy:      Cervical: No cervical adenopathy.   Skin:     General: Skin is warm and dry.   Neurological:       General: No focal deficit present.      Mental Status: She is alert and oriented to person, place, and time.      Motor: No weakness.      Gait: Gait abnormal (due to pain).      Comments: slt neg albaro   Psychiatric:         Mood and Affect: Mood normal.         Behavior: Behavior normal.         Thought Content: Thought content normal.       X-Ray Lumbar Spine Ap And Lateral  Order: [id number]  Status:  Final result   Visible to patient:  Yes (Patient Portal)   Next appt:  08/19/2020 at 09:00 AM in Psychiatry (Lenin Willett MD)  Details    Reading Physician Reading Date Result Priority   Louie Grider Jr., MD  [phone] 8/15/2020 STAT      Narrative & Impression     EXAMINATION:  XR LUMBAR SPINE AP AND LATERAL     CLINICAL HISTORY:  Back pain or radiculopathy, > 6 wks;     COMPARISON:  01/11/2010     FINDINGS:  Disc space narrowing L4-L5 and L5-S1 with endplate sclerosis and anterior spurring.  No acute fracture or dislocation.  Remaining vertebral body heights and alignment are well maintained.  SI joints are normal.     Impression:     1. Multilevel degenerative change with no acute findings lumbar spine.        Electronically signed by: Louie Grider Jr., MD  Date:                                            08/15/2020  Time:                                           16:20                Assessment:     1. Routine general medical examination at a health care facility    2. Acute left-sided low back pain without sciatica    3. Incomplete emptying of bladder    4. Hesitancy of micturition    5. Chronic bilateral low back pain without sciatica      Plan:   Routine general medical examination at a health care facility  Heart healthy diet and reg exercise  HM reviewed  -     Comprehensive metabolic panel; Future; Expected date: 08/17/2020  -     CBC auto differential; Future; Expected date: 08/17/2020  -     TSH; Future; Expected date: 08/17/2020  -     Lipid Panel; Future; Expected date: 08/17/2020  -      Hepatitis C Antibody; Future; Expected date: 08/17/2020  -     HIV 1/2 Ag/Ab (4th Gen); Future; Expected date: 08/17/2020  -     Mammo Digital Screening Bilat w/ Сергей; Future; Expected date: 08/17/2020    Acute left-sided low back pain without sciatica  -     Ambulatory referral/consult to Physiatry; Future; Expected date: 08/24/2020  -     tiZANidine (ZANAFLEX) 2 MG tablet; Take 1 to 2 tablets by mouth every 8 hours as needed for muscle spasm  Dispense: 30 tablet; Refill: 1    Incomplete emptying of bladder  Chronic. Did not begin with current episode of back pain.   -     Ambulatory referral/consult to Urology; Future; Expected date: 08/24/2020    Hesitancy of micturition  Chronic. Did not begin with current episode of back pain.  -     Ambulatory referral/consult to Urology; Future; Expected date: 08/24/2020    Chronic bilateral low back pain without sciatica  -     Ambulatory referral/consult to Physiatry; Future; Expected date: 08/24/2020        Lab Frequency Next Occurrence   C3 complement     C4 complement     C-reactive protein     Sedimentation rate     Protein electrophoresis, serum     Immunofixation electrophoresis     Immunoglobulins (IgG, IgA, IgM) Quantitative         Problem List Items Addressed This Visit     None      Visit Diagnoses     Routine general medical examination at a health care facility    -  Primary    Relevant Orders    Comprehensive metabolic panel (Completed)    CBC auto differential (Completed)    TSH (Completed)    Lipid Panel (Completed)    Hepatitis C Antibody (Completed)    HIV 1/2 Ag/Ab (4th Gen) (Completed)    Mammo Digital Screening Bilat w/ Сергей    Acute left-sided low back pain without sciatica        Relevant Orders    Ambulatory referral/consult to Physiatry    Incomplete emptying of bladder        Relevant Orders    Ambulatory referral/consult to Urology    Hesitancy of micturition        Relevant Orders    Ambulatory referral/consult to Urology    Chronic bilateral  low back pain without sciatica        Relevant Orders    Ambulatory referral/consult to Physiatry          No follow-ups on file.  Answers for HPI/ROS submitted by the patient on 8/16/2020   Back pain  genital pain: No

## 2020-08-17 NOTE — TELEPHONE ENCOUNTER
S/w pt mom and informed her that they can come early, but they may have to wait longer than usual in the car. She verbalized understanding.

## 2020-08-17 NOTE — TELEPHONE ENCOUNTER
----- Message from Salena Story sent at 8/17/2020  9:10 AM CDT -----  Regarding: Patient  The patient is requesting to come in earlier for their appt today. She stated that she is in extreme pain. Please call back at [phone]

## 2020-08-18 ENCOUNTER — OFFICE VISIT (OUTPATIENT)
Dept: NEUROSURGERY | Facility: CLINIC | Age: 40
End: 2020-08-18
Payer: COMMERCIAL

## 2020-08-18 ENCOUNTER — OFFICE VISIT (OUTPATIENT)
Dept: PHYSICAL MEDICINE AND REHAB | Facility: CLINIC | Age: 40
End: 2020-08-18
Payer: COMMERCIAL

## 2020-08-18 ENCOUNTER — TELEPHONE (OUTPATIENT)
Dept: NEUROSURGERY | Facility: CLINIC | Age: 40
End: 2020-08-18

## 2020-08-18 ENCOUNTER — HOSPITAL ENCOUNTER (OUTPATIENT)
Dept: RADIOLOGY | Facility: HOSPITAL | Age: 40
Discharge: HOME | End: 2020-08-18
Attending: PHYSICAL MEDICINE & REHABILITATION
Payer: COMMERCIAL

## 2020-08-18 ENCOUNTER — HOSPITAL ENCOUNTER (OUTPATIENT)
Dept: RADIOLOGY | Facility: HOSPITAL | Age: 40
Discharge: HOME | End: 2020-08-18
Attending: NEUROLOGICAL SURGERY
Payer: COMMERCIAL

## 2020-08-18 VITALS — HEIGHT: 68 IN | RESPIRATION RATE: 18 BRPM | BODY MASS INDEX: 35.18 KG/M2 | WEIGHT: 232.13 LBS

## 2020-08-18 VITALS
HEART RATE: 68 BPM | RESPIRATION RATE: 14 BRPM | HEIGHT: 68 IN | BODY MASS INDEX: 34.86 KG/M2 | SYSTOLIC BLOOD PRESSURE: 125 MMHG | WEIGHT: 230 LBS | DIASTOLIC BLOOD PRESSURE: 87 MMHG

## 2020-08-18 DIAGNOSIS — M48.062 LUMBAR STENOSIS WITH NEUROGENIC CLAUDICATION: ICD-10-CM

## 2020-08-18 DIAGNOSIS — M54.9 DORSALGIA, UNSPECIFIED: ICD-10-CM

## 2020-08-18 DIAGNOSIS — M54.16 LUMBAR RADICULOPATHY: ICD-10-CM

## 2020-08-18 DIAGNOSIS — M54.16 LUMBAR RADICULOPATHY: Primary | ICD-10-CM

## 2020-08-18 DIAGNOSIS — M51.36 DDD (DEGENERATIVE DISC DISEASE), LUMBAR: ICD-10-CM

## 2020-08-18 DIAGNOSIS — M51.36 DEGENERATIVE DISC DISEASE, LUMBAR: Primary | ICD-10-CM

## 2020-08-18 LAB
ALBUMIN SERPL BCP-MCNC: 3.5 G/DL (ref 3.5–5.2)
ALP SERPL-CCNC: 113 U/L (ref 55–135)
ALT SERPL W/O P-5'-P-CCNC: 15 U/L (ref 10–44)
ANION GAP SERPL CALC-SCNC: 12 MMOL/L (ref 8–16)
AST SERPL-CCNC: 17 U/L (ref 10–40)
BILIRUB SERPL-MCNC: 0.3 MG/DL (ref 0.1–1)
BUN SERPL-MCNC: 13 MG/DL (ref 6–20)
CALCIUM SERPL-MCNC: 9 MG/DL (ref 8.7–10.5)
CHLORIDE SERPL-SCNC: 106 MMOL/L (ref 95–110)
CHOLEST SERPL-MCNC: 208 MG/DL (ref 120–199)
CHOLEST/HDLC SERPL: 3.4 {RATIO} (ref 2–5)
CO2 SERPL-SCNC: 25 MMOL/L (ref 23–29)
CREAT SERPL-MCNC: 1 MG/DL (ref 0.5–1.4)
EST. GFR  (AFRICAN AMERICAN): >60 ML/MIN/1.73 M^2
EST. GFR  (NON AFRICAN AMERICAN): >60 ML/MIN/1.73 M^2
GLUCOSE SERPL-MCNC: 65 MG/DL (ref 70–110)
HCV AB SERPL QL IA: NEGATIVE
HDLC SERPL-MCNC: 62 MG/DL (ref 40–75)
HDLC SERPL: 29.8 % (ref 20–50)
HIV 1+2 AB+HIV1 P24 AG SERPL QL IA: NEGATIVE
LDLC SERPL CALC-MCNC: 121.2 MG/DL (ref 63–159)
NONHDLC SERPL-MCNC: 146 MG/DL
POTASSIUM SERPL-SCNC: 4.3 MMOL/L (ref 3.5–5.1)
PROT SERPL-MCNC: 6.8 G/DL (ref 6–8.4)
SODIUM SERPL-SCNC: 143 MMOL/L (ref 136–145)
T4 FREE SERPL-MCNC: 0.92 NG/DL (ref 0.71–1.51)
TRIGL SERPL-MCNC: 124 MG/DL (ref 30–150)
TSH SERPL DL<=0.005 MIU/L-ACNC: 4.45 UIU/ML (ref 0.4–4)

## 2020-08-18 PROCEDURE — 3008F BODY MASS INDEX DOCD: CPT | Mod: CPTII,S$GLB,, | Performed by: NEUROLOGICAL SURGERY

## 2020-08-18 PROCEDURE — 99203 PR OFFICE/OUTPT VISIT, NEW, LEVL III, 30-44 MIN: ICD-10-PCS | Mod: S$GLB,,, | Performed by: NEUROLOGICAL SURGERY

## 2020-08-18 PROCEDURE — 99999 PR PBB SHADOW E&M-EST. PATIENT-LVL IV: ICD-10-PCS | Mod: PBBFAC,,, | Performed by: PHYSICAL MEDICINE & REHABILITATION

## 2020-08-18 PROCEDURE — 72131 CT LUMBAR SPINE W/O DYE: CPT | Mod: 26,,, | Performed by: RADIOLOGY

## 2020-08-18 PROCEDURE — 3008F PR BODY MASS INDEX (BMI) DOCUMENTED: ICD-10-PCS | Mod: CPTII,S$GLB,, | Performed by: PHYSICAL MEDICINE & REHABILITATION

## 2020-08-18 PROCEDURE — 72148 MRI LUMBAR SPINE W/O DYE: CPT | Mod: TC

## 2020-08-18 PROCEDURE — 99203 OFFICE O/P NEW LOW 30 MIN: CPT | Mod: S$GLB,,, | Performed by: NEUROLOGICAL SURGERY

## 2020-08-18 PROCEDURE — 99214 OFFICE O/P EST MOD 30 MIN: CPT | Mod: S$GLB,,, | Performed by: PHYSICAL MEDICINE & REHABILITATION

## 2020-08-18 PROCEDURE — 99999 PR PBB SHADOW E&M-EST. PATIENT-LVL IV: CPT | Mod: PBBFAC,,, | Performed by: PHYSICAL MEDICINE & REHABILITATION

## 2020-08-18 PROCEDURE — 72131 CT LUMBAR SPINE W/O DYE: CPT | Mod: TC

## 2020-08-18 PROCEDURE — 72148 MRI LUMBAR SPINE WITHOUT CONTRAST: ICD-10-PCS | Mod: 26,,, | Performed by: RADIOLOGY

## 2020-08-18 PROCEDURE — 72120 XR LUMBAR SPINE FLEXION AND EXTENSION ONLY: ICD-10-PCS | Mod: 26,,, | Performed by: RADIOLOGY

## 2020-08-18 PROCEDURE — 72120 X-RAY BEND ONLY L-S SPINE: CPT | Mod: TC

## 2020-08-18 PROCEDURE — 72148 MRI LUMBAR SPINE W/O DYE: CPT | Mod: 26,,, | Performed by: RADIOLOGY

## 2020-08-18 PROCEDURE — 99999 PR PBB SHADOW E&M-EST. PATIENT-LVL III: ICD-10-PCS | Mod: PBBFAC,,, | Performed by: NEUROLOGICAL SURGERY

## 2020-08-18 PROCEDURE — 72131 CT LUMBAR SPINE WITHOUT CONTRAST: ICD-10-PCS | Mod: 26,,, | Performed by: RADIOLOGY

## 2020-08-18 PROCEDURE — 99214 PR OFFICE/OUTPT VISIT, EST, LEVL IV, 30-39 MIN: ICD-10-PCS | Mod: S$GLB,,, | Performed by: PHYSICAL MEDICINE & REHABILITATION

## 2020-08-18 PROCEDURE — 3008F PR BODY MASS INDEX (BMI) DOCUMENTED: ICD-10-PCS | Mod: CPTII,S$GLB,, | Performed by: NEUROLOGICAL SURGERY

## 2020-08-18 PROCEDURE — 99999 PR PBB SHADOW E&M-EST. PATIENT-LVL III: CPT | Mod: PBBFAC,,, | Performed by: NEUROLOGICAL SURGERY

## 2020-08-18 PROCEDURE — 3008F BODY MASS INDEX DOCD: CPT | Mod: CPTII,S$GLB,, | Performed by: PHYSICAL MEDICINE & REHABILITATION

## 2020-08-18 PROCEDURE — 72120 X-RAY BEND ONLY L-S SPINE: CPT | Mod: 26,,, | Performed by: RADIOLOGY

## 2020-08-18 RX ORDER — DIAZEPAM 5 MG/1
5 TABLET ORAL ONCE
Qty: 1 TABLET | Refills: 0 | Status: SHIPPED | OUTPATIENT
Start: 2020-08-18 | End: 2021-04-15

## 2020-08-18 RX ORDER — METHYLPREDNISOLONE 4 MG/1
TABLET ORAL
Qty: 1 PACKAGE | Refills: 0 | Status: SHIPPED | OUTPATIENT
Start: 2020-08-18 | End: 2020-09-08

## 2020-08-18 NOTE — LETTER
August 18, 2020      Angus Caceres MD  [address]  [place] [address]           [facility]  [address] [address]  Phone: [phone]  Fax: [phone]          Patient: Joy Recinos   MR Number: [id number]   YOB: 1980   Date of Visit: 8/18/2020       Dear Dr. Angus Caceres:    Thank you for referring Joy Recinos to me for evaluation. Attached you will find relevant portions of my assessment and plan of care.    If you have questions, please do not hesitate to call me. I look forward to following Joy Recinos along with you.    Sincerely,    Myrna Lynch MD    Enclosure  CC:  No Recipients    If you would like to receive this communication electronically, please contact [email or web address] or [phone] to request more information on [employer] Link access.    For providers and/or their staff who would like to refer a patient to [facility], please contact us through our one-stop-shop provider referral line, [facility], at [phone].    If you feel you have received this communication in error or would no longer like to receive these types of communications, please e-mail [email or web address]

## 2020-08-18 NOTE — PROGRESS NOTES
Subjective:      Patient ID: Joy Recinos is a 41 y.o. female.    Chief Complaint: Lumbar Spine Pain (L-Spine)    Patient seen today for evaluation of lower back pain  Long history of symptoms on and off without any precipitating event  Has taking Ultram for these pains in the past  Ambulates unassisted  Denies any bowel bladder symptoms  Has tried physical therapy and MYLES in the past      Review of Systems   Constitutional: Negative for activity change, appetite change and chills.   HENT: Negative for hearing loss, sore throat and tinnitus.    Respiratory: Negative for cough and shortness of breath.    Cardiovascular: Negative for chest pain.   Gastrointestinal: Negative for abdominal pain, nausea and vomiting.   Endocrine: Negative for cold intolerance and heat intolerance.   Genitourinary: Negative for difficulty urinating and dysuria.   Musculoskeletal: Positive for back pain and gait problem.   Allergic/Immunologic: Negative for environmental allergies.   Neurological: Positive for weakness and numbness. Negative for dizziness, tremors, light-headedness and headaches.   Hematological: Negative for adenopathy.   Psychiatric/Behavioral: Negative for agitation, behavioral problems and confusion.            Objective:       Neurosurgery Physical Exam  Ortho/SPM Exam    Nursing note and vitals reviewed  Gen:Oriented to person, place, and time.             Appears stated age   Skin: no rashes or lesions identified   Head:Normocephalic and atraumatic.  Nose: Nose normal.    Eyes: EOM are normal. Pupils are equal, round, and reactive to light.   Neck: Neck supple. No masses or lesions palpated  Cardiovascular: Intact distal pulses.    Abdominal: Soft.   Neurological: Alert and oriented to person, place, and time.  No cranial nerve deficit.  Coordination normal. Normal muscle tone  Psychiatric: Normal mood and affect. Behavior is normal.      Back:  None  Paraspinal muscle spasms   None  Pain with flexion and  extention   WNL  Range of motion    Neg  Straight leg raise     Motor   Right Right Left Left  Level Group   5  5  L2 Hip flexor (Psoas)   5  5  L3 Leg extension (Quads)   5  5  L4 Dorsiflexion & foot inversion (Tibialis Anterior)   5  5  L5 Great toe extension ( EHL)   5  5  S1 Foot eversion (Gastroc, PL & PB)     Sensation  NL Decreased (R/L/BL) Level Sensation    X  L2 Anterio-medial thigh   X  L3 Medial thigh around knee   X  L4 Medial foot   X  L5 Dorsum foot   X  S1 Lateral foot     Reflex  2+  Patellar tendon (L4)   2+  Achilles tendon (S1)       Results for orders placed during the hospital encounter of 05/06/16   MRI Lumbar Spine Without Contrast    Narrative MRI LUMBAR SPINE WITHOUT CONTRAST, 05/06/16 07:46:37    History:   low back pain., b/l leg and foot pain along ~ L5    Standard multiplanar noncontrast MRI sequences of the lumbar spine.    The distal cord and conus reveal normal signal and morphology. The lumbar vertebra reveal normal alignment, shape and signal intensity.    L1-2: Unremarkable    L2-3: Unremarkable    L3-4: Unremarkable    L4-5: Mild to moderate degenerative disc disease with disc narrowing, desiccation and endplate marrow signal changes.  Mild disc bulge with minimal ventral sac compression.  Minor hypertrophic and ligamentum flavum.  Mild right and moderate left foraminal stenosis.    L5-S1:  Mild to moderate degenerative disc disease with disc desiccation with disc bulge.  Small right paracentral superimposed disc protrusion with minor right sac impingement with slight displacement to the right S1 nerve root, as best seen on axial image 26.  Mild foraminal stenosis.    Impression     L4-L5 and L5-S1 degenerative disc disease with small right paracentral L5-S1 disc protrusion.  No significant central canal stenosis.  Right lateral recess and foraminal stenosis as described above.  ______________________________________     Electronically signed by: GERA BATEMAN MD  Date:      05/06/16  Time:    09:40       No results found for this or any previous visit.  Results for orders placed during the hospital encounter of 08/15/20   X-Ray Lumbar Spine Ap And Lateral    Narrative EXAMINATION:  XR LUMBAR SPINE AP AND LATERAL    CLINICAL HISTORY:  Back pain or radiculopathy, > 6 wks;    COMPARISON:  01/11/2010    FINDINGS:  Disc space narrowing L4-L5 and L5-S1 with endplate sclerosis and anterior spurring.  No acute fracture or dislocation.  Remaining vertebral body heights and alignment are well maintained.  SI joints are normal.      Impression 1. Multilevel degenerative change with no acute findings lumbar spine.      Electronically signed by: Louie Grider Jr., MD  Date:    08/15/2020  Time:    16:20        Assessment:     1. Degenerative disc disease, lumbar    2. Lumbar stenosis with neurogenic claudication      Plan:     Degenerative disc disease, lumbar    Lumbar stenosis with neurogenic claudication    Continue conservative therapies for now  Will order CT scan for evaluation the bone anatomy  She will follow-up with my partner Dr. Lebron for evaluation after as I will be  Traveling out of town in patient with question of urinary urgency    Thank you for the referral   Please call with any questions    Kar Roberts MD  Neurosurgery     Disclaimer: This note was prepared using a voice recognition system and is likely to have sound alike errors within the text.

## 2020-08-18 NOTE — PROGRESS NOTES
PM&R CLINIC NOTE    Chief Complaint   Patient presents with    Back Pain     to the groin bilaterally    Weakness     in the legs       HPI: This is a 40 y.o.  female being seen in clinic today for increased low back pain with radiation into her groin and leg weakness that began after falling in June.  She feels issues with bowel/bladder retention as well.   Her symptoms have worsened over the past few weeks and she is in significant discomfort and feels unsteady standing.  She has know DJD/DDD with extruded disc at S1 back in 2016.    History obtained from patient    Functional History:  Walking: limited  Transfers: Independent  Assistive devices: using clinic wc  Power mobility: No  Falls: None     Needs help with:  Nothing - all ADLS normal    Cooking   Cleaning  Bathing   Dressing   Toileting     Past family, medical, social, and surgical history reviewed in chart    Review of Systems:     General- denies lethargy, weight change, fever, chills +insomnia  Head/neck- denies swallowing difficulties  ENT- denies hearing changes  Cardiovascular-denies chest pain  Pulmonary- denies shortness of breath  GI- denies constipation or bowel incontinence  - denies bladder incontinence  Skin- denies wounds or rashes  Musculoskeletal- denies weakness, +pain  Neurologic- denies numbness and tingling  Psychiatric-+depression and anxiety  Lymphatic-denies swelling  Endocrine- denies hypoglycemic symptoms/DM history  All other pertinent systems negative     Physical Examination:  General: Well developed, well nourished female, NAD  HEENT:NCAT EOMI bilaterally   Pulmonary:Normal respirations    Spinal Examination: CERVICAL  Active ROM is within normal limits.  Inspection: No deformity of spinal alignment.    Spinal Examination: LUMBAR or THORACIC  Active ROM is limited in all planes due to discomfort  Inspection: No deformity of spinal alignment.  No palpable olisthesis.  Palpation:  ttp at si joints, gt bursas, paraspinals    Facet loading +Bilaterally  SLR Test (seated ):negative bilaterally    Bilateral Upper and Lower Extremities:  Pulses are 2+ at radial,bilaterally.  Shoulder/Elbow/Wrist/Hand ROM wnl  Hip/Knee/Ankle ROM wnl  Bilateral Extremities show normal capillary refill.  No signs of cyanosis, rubor, edema, skin changes, or dysvascular changes of appendages.  Nails appear intact.    Neurological Exam:  Cranial Nerves:  II-XII grossly intact    Manual Muscle Testing: (Motor 5=normal)  RIGHT Lower extremity: Hip flexion 3+/5, Hip Abduction 4/5, Hip Adduction 4/5, Knee extension 4/5, Knee flexion 5/5, Ankle dorsiflexion 4+/5, Extensor hallucis longus 5/5, Ankle plantarflexion 4/5  LEFT Lower extremity:  Hip flexion  3+/5, Hip Abduction 4/5,Hip Adduction 4/5, Knee extension 4/5, Knee flexion 5/5, Ankle dorsiflexion 4+/5, Extensor hallucis longus 5/5, Ankle plantarflexion 4/5    No focal atrophy is noted of either upper or lower extremity.    Bilateral Reflexes:2+patellar  No clonus at knee or ankle.    Sensation: tested to light touch  - intact in legs except dec at bilateral thighs    Gait: using clinic wc due to discomfort     IMPRESSION/PLAN: This is a 40 y.o.  female with lumbar DJD/DDD, radiculopathy with concern for stenosis due to progressive weakness and b/b retention    1. Lspine MRI  2. [place] [facility] [place]  3. Medrol dose pack     Myrna Lynch M.D.  Physical Medicine and Rehab

## 2020-08-19 ENCOUNTER — OFFICE VISIT (OUTPATIENT)
Dept: NEUROSURGERY | Facility: CLINIC | Age: 40
End: 2020-08-19
Payer: COMMERCIAL

## 2020-08-19 VITALS — BODY MASS INDEX: 35.3 KG/M2 | DIASTOLIC BLOOD PRESSURE: 81 MMHG | SYSTOLIC BLOOD PRESSURE: 119 MMHG | HEIGHT: 68 IN

## 2020-08-19 DIAGNOSIS — M54.50 CHRONIC BILATERAL LOW BACK PAIN WITHOUT SCIATICA: Primary | Chronic | ICD-10-CM

## 2020-08-19 DIAGNOSIS — G89.29 CHRONIC BILATERAL LOW BACK PAIN WITHOUT SCIATICA: Primary | Chronic | ICD-10-CM

## 2020-08-19 PROCEDURE — 99202 OFFICE O/P NEW SF 15 MIN: CPT | Mod: S$GLB,,, | Performed by: NEUROLOGICAL SURGERY

## 2020-08-19 PROCEDURE — 3008F PR BODY MASS INDEX (BMI) DOCUMENTED: ICD-10-PCS | Mod: CPTII,S$GLB,, | Performed by: NEUROLOGICAL SURGERY

## 2020-08-19 PROCEDURE — 99999 PR PBB SHADOW E&M-EST. PATIENT-LVL IV: ICD-10-PCS | Mod: PBBFAC,,, | Performed by: NEUROLOGICAL SURGERY

## 2020-08-19 PROCEDURE — 99202 PR OFFICE/OUTPT VISIT, NEW, LEVL II, 15-29 MIN: ICD-10-PCS | Mod: S$GLB,,, | Performed by: NEUROLOGICAL SURGERY

## 2020-08-19 PROCEDURE — 3008F BODY MASS INDEX DOCD: CPT | Mod: CPTII,S$GLB,, | Performed by: NEUROLOGICAL SURGERY

## 2020-08-19 PROCEDURE — 99999 PR PBB SHADOW E&M-EST. PATIENT-LVL IV: CPT | Mod: PBBFAC,,, | Performed by: NEUROLOGICAL SURGERY

## 2020-08-19 NOTE — LETTER
August 19, 2020      [facility]  [address]  [place] [address]  Phone: [phone]  Fax: [phone]       Patient: Joy Recinos   YOB: 1980  Date of Visit: 08/19/2020    To Whom It May Concern:    Park Recinos  was at [facility] on 08/19/2020. She may return to work on 8/20/20 without restrictions.     If you have any questions or concerns, or if I can be of further assistance, please do not hesitate to contact me.    Sincerely,    Albert Lebron MD

## 2020-08-19 NOTE — PATIENT INSTRUCTIONS
Back Care Tips    Caring for your back  These are things you can do to prevent a recurrence of acute back pain and to reduce symptoms from chronic back pain:  · Maintain a healthy weight. If you are overweight, losing weight will help most types of back pain.  · Exercise is an important part of recovery from most types of back pain. The muscles behind and in front of the spine support the back. This means strengthening both the back muscles and the abdominal muscles will provide better support for your spine.   · Swimming and brisk walking are good overall exercises to improve your fitness level.  · Practice safe lifting methods (below).  · Practice good posture when sitting, standing and walking. Avoid prolonged sitting. This puts more stress on the lower back than standing or walking.  · Wear quality shoes with sufficient arch support. Foot and ankle alignment can affect back symptoms. Women should avoid wearing high heels.  · Therapeutic massage can help relax the back muscles without stretching them.  · During the first 24 to 72 hours after an acute injury or flare-up of chronic back pain, apply an ice pack to the painful area for 20 minutes and then remove it for 20 minutes, over a period of 60 to 90 minutes, or several times a day. As a safety precaution, do not use a heating pad at bedtime. Sleeping on a heating pad can lead to skin burns or tissue damage.  · You can alternate ice and heat therapies.  Medications  Talk to your healthcare provider before using medicines, especially if you have other medical problems or are taking other medicines.  · You may use acetaminophen or ibuprofen to control pain, unless your healthcare provider prescribed other pain medicine. If you have chronic conditions like diabetes, liver or kidney disease, stomach ulcers, or gastrointestinal bleeding, or are taking blood thinners, talk with your healthcare provider before taking any medicines.  · Be careful if you are given  prescription pain medicines, narcotics, or medicine for muscle spasm. They can cause drowsiness, affect your coordination, reflexes, and judgment. Do not drive or operate heavy machinery while taking these types of medicines. Take prescription pain medicine only as prescribed by your healthcare provider.  Lumbar stretch  Here is a simple stretching exercise that will help relax muscle spasm and keep your back more limber. If exercise makes your back pain worse, dont do it.  · Lie on your back with your knees bent and both feet on the ground.  · Slowly raise your left knee to your chest as you flatten your lower back against the floor. Hold for 5 seconds.  · Relax and repeat the exercise with your right knee.  · Do 10 of these exercises for each leg.  Safe lifting method  · Dont bend over at the waist to lift an object off the floor.  Instead, bend your knees and hips in a squat.   · Keep your back and head upright  · Hold the object close to your body, directly in front of you.  · Straighten your legs to lift the object.   · Lower the object to the floor in the reverse fashion.  · If you must slide something across the floor, push it.  Posture tips  Sitting  Sit in chairs with straight backs or low-back support. Keep your knees lower than your hips, with your feet flat on the floor.  When driving, sit up straight. Adjust the seat forward so you are not leaning toward the steering wheel.  A small pillow or rolled towel behind your lower back may help if you are driving long distances.   Standing  When standing for long periods, shift most of your weight to one leg at a time. Alternate legs every few minutes.   Sleeping  The best way to sleep is on your side with your knees bent. Put a low pillow under your head to support your neck in a neutral spine position. Avoid thick pillows that bend your neck to one side. Put a pillow between your legs to further relax your lower back. If you sleep on your back, put pillows  under your knees to support your legs in a slightly flexed position. Use a firm mattress. If your mattress sags, replace it, or use a 1/2-inch plywood board under the mattress to add support.  Follow-up care  Follow up with your healthcare provider, or as advised.  If X-rays, a CT scan or an MRI scan were taken, they will be reviewed by a radiologist. You will be notified of any new findings that may affect your care.  Call 911  Seek emergency medical care if any of the following occur:  · Trouble breathing  · Confusion  · Very drowsy  · Fainting or loss of consciousness  · Rapid or very slow heart rate  · Loss of  bowel or bladder control  When to seek medical care  Call your healthcare provider if any of the following occur:  · Pain becomes worse or spreads to your arms or legs  · Weakness or numbness in one or both arms or legs  · Numbness in the groin area  Date Last Reviewed: 6/1/2016  © 7629-0644 [employer]. [address] [place] [address], [place] [address]. All rights reserved. This information is not intended as a substitute for professional medical care. Always follow your healthcare professional's instructions.

## 2020-08-19 NOTE — PROGRESS NOTES
Subjective:      Patient ID: Joy Recinos is a 40 y.o. female.    Chief Complaint: Lumbar Spine Pain (L-Spine) (LBP )    This 40 year year old woman with a history of anxiety and depression with chronic pain syndrome and diagnosis of fibromyalgia apparently fell in  of this year.  Approximately 5 days ago she began having increasing low back pain with radiation into both hips.  She has had a great deal of difficulty getting around and doing things.  She also has a sense of urinary urgency.  On specific questioning there is not a good history for radiculopathy.  There is no true incontinence.  There is no history of prior back surgeries or significant back injuries other than the fall in .  She thinks she may have had epidural steroid injection in the past without significant relief.  She did physical therapy after her pelvic floor surgery, however not specific spine physical therapy.  She does not use anti arthritic medications, only muscle relaxant.  She has never used Ultram.    Past Medical History:   Diagnosis Date    ADHD (attention deficit hyperactivity disorder)     B12 deficiency     Chronic low back pain     oberlander    Endometriosis     Fibromyalgia syndrome     Idiopathic hypersomnia     Irritable bowel syndrome     goldman    Migraines     frances    PONV (postoperative nausea and vomiting)     Shingles     Undifferentiated inflammatory arthritis 2018    Undifferentiated inflammatory arthritis 2018     Past Surgical History:   Procedure Laterality Date     SECTION      x 2     CYSTOSCOPY N/A 4/10/2019    Procedure: CYSTOSCOPY;  Surgeon: Gillian Carpenter MD;  Location: [facility];  Service: OB/GYN;  Laterality: N/A;    HYSTERECTOMY      HYSTERECTOMY  2019    OOPHORECTOMY      left tube and ovary removed. later RSO with hyst    PELVIC LAPAROSCOPY      ROBOT-ASSISTED LAPAROSCOPIC ABDOMINAL HYSTERECTOMY USING DA LONI XI N/A 4/10/2019    Procedure: XI  ROBOTIC HYSTERECTOMY;  Surgeon: Gillian Carpenter MD;  Location: [facility] OR;  Service: OB/GYN;  Laterality: N/A;    ROBOT-ASSISTED LAPAROSCOPIC LYSIS OF ADHESIONS USING DA LONI XI N/A 4/10/2019    Procedure: XI ROBOTIC LYSIS, ADHESIONS;  Surgeon: Gillian Carpenter MD;  Location: [facility] OR;  Service: OB/GYN;  Laterality: N/A;    ROBOT-ASSISTED LAPAROSCOPIC SALPINGO-OOPHORECTOMY USING DA LONI XI Right 4/10/2019    Procedure: XI ROBOTIC SALPINGO-OOPHORECTOMY;  Surgeon: Gillian Carpenter MD;  Location: [facility] OR;  Service: OB/GYN;  Laterality: Right;    TUBAL LIGATION       Family History   Problem Relation Age of Onset    No Known Problems Mother     Heart disease Father         Bypass    Skin cancer Father     Cataracts Maternal Grandmother     Diabetes Maternal Grandmother     Glaucoma Maternal Grandmother     Skin cancer Maternal Grandmother     Cataracts Maternal Grandfather     Skin cancer Paternal Grandfather     Breast cancer Neg Hx     Ovarian cancer Neg Hx     Deep vein thrombosis Neg Hx      Social History     Tobacco Use    Smoking status: Never Smoker    Smokeless tobacco: Never Used   Substance Use Topics    Alcohol use: Yes     Frequency: Monthly or less     Drinks per session: 1 or 2     Binge frequency: Never     Comment: rarely    Drug use: No     Current Outpatient Medications on File Prior to Visit   Medication Sig Dispense Refill    cetirizine (ZYRTEC) 10 MG tablet Take 10 mg by mouth once daily.      diclofenac (VOLTAREN) 75 MG EC tablet Take 1 tablet (75 mg total) by mouth 2 (two) times daily. 20 tablet 0    estradioL (ESTRACE) 1 MG tablet Take 1 tablet (1 mg total) by mouth once daily. 30 tablet 11    FLUoxetine 20 MG capsule Take 2 capsules by mouth daily 60 capsule 1    galcanezumab-gnlm (EMGALITY PEN) 120 mg/mL PnIj Inject 120 mg into the skin every 28 days. 1 mL 5    methylPREDNISolone (MEDROL DOSEPACK) 4 mg tablet use as directed 1 Package 0    omeprazole (PRILOSEC)  20 MG capsule TAKE ONE CAPSULE BY MOUTH DAILY (Patient taking differently: TAKE ONE CAPSULE BY MOUTH NIGHTLY) 30 capsule 0    tiZANidine (ZANAFLEX) 2 MG tablet Take 1 to 2 tablets by mouth every 8 hours as needed for muscle spasm 30 tablet 1    diazePAM (VALIUM) 5 MG tablet Take 1 tablet (5 mg total) by mouth once. for 1 dose (Patient not taking: Reported on 8/19/2020) 1 tablet 0    traMADol (ULTRAM) 50 mg tablet Take 1 tablet (50 mg total) by mouth every 24 hours as needed for Pain. 30 tablet 0     No current facility-administered medications on file prior to visit.      Review of Systems   Constitutional: Positive for fatigue. Negative for diaphoresis and unexpected weight change.   HENT: Negative for hearing loss, rhinorrhea, trouble swallowing and voice change.    Eyes: Negative for photophobia and visual disturbance.   Respiratory: Negative for chest tightness and shortness of breath.    Cardiovascular: Negative for chest pain.   Gastrointestinal: Negative for constipation, nausea and vomiting.   Endocrine: Negative for cold intolerance, heat intolerance, polydipsia, polyphagia and polyuria.   Genitourinary: Negative for difficulty urinating.   Musculoskeletal: Positive for arthralgias, back pain, myalgias, neck pain and neck stiffness.   Skin: Negative.    Neurological: Negative for dizziness, tremors, seizures, syncope, facial asymmetry, speech difficulty, weakness, numbness and headaches.   Hematological: Negative for adenopathy. Does not bruise/bleed easily.   Psychiatric/Behavioral: Positive for sleep disturbance. Negative for behavioral problems, confusion and decreased concentration. The patient is nervous/anxious.          Objective:     Vitals:    08/19/20 1113   BP: 119/81      Review of patient's allergies indicates:  No Known Allergies   Body mass index is 35.3 kg/m².     Physical Exam:  Nursing note and vitals reviewed.    Constitutional: She appears well-developed and well-nourished.     Eyes:  Pupils are equal, round, and reactive to light. Conjunctivae and EOM are normal.     Cardiovascular: Normal rate, regular rhythm and normal distal pulses.     Abdominal: Soft. Bowel sounds are normal.     Skin: Skin displays no rash on trunk and no rash on extremities.     Psych/Behavior: She is alert. She is oriented to person, place, and time.     Musculoskeletal: Gait is normal.        Neck: Range of motion is full. There is tenderness. Muscle strength is 4/5. Tone is normal.        Back: Range of motion is limited. ROM severity is Painful mainly inflection rated 6/10. There is tenderness. Tenderness is located Bilateral para midline. Muscle strength is 4/5. Tone is normal.        Right Upper Extremities: Range of motion is full. Muscle strength is 5/5. Tone is normal.        Left Upper Extremities: Range of motion is full. Muscle strength is 5/5. Tone is normal.       Right Lower Extremities: Range of motion is full. Muscle strength is 5/5. Tone is normal.        Left Lower Extremities: Range of motion is full. Muscle strength is 5/5. Tone is normal.     Neurological:        Coordination: She has a normal Romberg Test, normal finger to nose coordination and normal tandem walking coordination.        Sensory: There is no sensory deficit in the trunk. There is sensory deficit in the extremities. Sensory deficit is located The patient describes a numbness and left arm and leg about 75% in stockinette distribution.        DTRs: DTRs are DTRS NORMAL AND SYMMETRICnormal and symmetric. Tricep reflexes are 2+ on the right side and 2+ on the left side. Bicep reflexes are 2+ on the right side and 2+ on the left side. Brachioradialis reflexes are 2+ on the right side and 2+ on the left side. Patellar reflexes are 2+ on the right side and 2+ on the left side. Achilles reflexes are 2+ on the right side and 2+ on the left side. She displays no Babinski's sign on the right side. She displays no Babinski's sign on the left  side.        Cranial nerves: Cranial nerve(s) II, III, IV, V, VI, VII, VIII, IX, X, XI and XII are intact.     Neurologic Exam     Mental Status   Oriented to person, place, and time.     Cranial Nerves     CN III, IV, VI   Pupils are equal, round, and reactive to light.  Extraocular motions are normal.     CN VIII   CN VIII normal.     CN IX, X   CN IX normal.   CN X normal.     CN XI   CN XI normal.     CN XII   CN XII normal.     Gait, Coordination, and Reflexes     Coordination   Romberg: negative    Reflexes   Right brachioradialis: 2+  Left brachioradialis: 2+  Right biceps: 2+  Left biceps: 2+  Right triceps: 2+  Left triceps: 2+  Right patellar: 2+  Left patellar: 2+  Right achilles: 2+  Left achilles: 2+Straight leg raise testing and Jose sign negative     I  reviewed and interpreted all pertinent imaging regarding this case.  An MRI done this week of the lumbar spine demonstrates degenerative changes at the L4-5 and L5-S1 levels.  At the at L5-S1 level there is mild sub annular disc bulge to the right of midline with very mild displacement of the right S1 nerve root.  At the L4-5 level there is a mild left-sided sub annular disc bulge with mild displacement of the left L5 nerve root.  There is no major neural foraminal compromise at any level.  There is no loss of vertebral body or disc space height.  There is no evidence of infectious process.  Assessment:     1. Chronic bilateral low back pain without sciatica     The patient demonstrates no evidence of radiculopathy by history or physical exam.  Her complaints and findings are most consistent with fibromyalgia.  There are no good surgical options in my opinion.  I think she would be best managed with physical therapy several times a week with instructions on stretching exercises.  Referral to pain management may also give her brief improvement while she is doing physical therapy.  I discussed my findings at length with the patient and her  who  understand fully and a agree to proceed as outlined above after having all their questions answered.  I will see her on an as-needed basis in the future.  Plan:     Chronic bilateral low back pain without sciatica        Thank you for the referral   Please call with any questions    Albert Lebron MD  Neurosurgery

## 2020-08-20 ENCOUNTER — OFFICE VISIT (OUTPATIENT)
Dept: PAIN MEDICINE | Facility: CLINIC | Age: 40
End: 2020-08-20
Payer: COMMERCIAL

## 2020-08-20 VITALS
SYSTOLIC BLOOD PRESSURE: 121 MMHG | HEART RATE: 69 BPM | DIASTOLIC BLOOD PRESSURE: 84 MMHG | HEIGHT: 68 IN | BODY MASS INDEX: 34.89 KG/M2 | WEIGHT: 230.19 LBS

## 2020-08-20 DIAGNOSIS — M54.50 ACUTE LEFT-SIDED LOW BACK PAIN WITHOUT SCIATICA: ICD-10-CM

## 2020-08-20 DIAGNOSIS — G89.29 CHRONIC MYOFASCIAL PAIN: ICD-10-CM

## 2020-08-20 DIAGNOSIS — M79.18 CHRONIC MYOFASCIAL PAIN: ICD-10-CM

## 2020-08-20 DIAGNOSIS — M46.1 SACROILIITIS: Primary | ICD-10-CM

## 2020-08-20 DIAGNOSIS — M54.50 CHRONIC BILATERAL LOW BACK PAIN WITHOUT SCIATICA: Chronic | ICD-10-CM

## 2020-08-20 DIAGNOSIS — E66.9 OBESITY (BMI 35.0-39.9 WITHOUT COMORBIDITY): ICD-10-CM

## 2020-08-20 DIAGNOSIS — G89.29 CHRONIC BILATERAL LOW BACK PAIN WITHOUT SCIATICA: Chronic | ICD-10-CM

## 2020-08-20 PROCEDURE — 99999 PR PBB SHADOW E&M-EST. PATIENT-LVL IV: ICD-10-PCS | Mod: PBBFAC,,, | Performed by: PHYSICAL MEDICINE & REHABILITATION

## 2020-08-20 PROCEDURE — 99244 OFF/OP CNSLTJ NEW/EST MOD 40: CPT | Mod: S$GLB,,, | Performed by: PHYSICAL MEDICINE & REHABILITATION

## 2020-08-20 PROCEDURE — 99244 PR OFFICE CONSULTATION,LEVEL IV: ICD-10-PCS | Mod: S$GLB,,, | Performed by: PHYSICAL MEDICINE & REHABILITATION

## 2020-08-20 PROCEDURE — 99999 PR PBB SHADOW E&M-EST. PATIENT-LVL IV: CPT | Mod: PBBFAC,,, | Performed by: PHYSICAL MEDICINE & REHABILITATION

## 2020-08-20 RX ORDER — TIZANIDINE 2 MG/1
4-6 TABLET ORAL NIGHTLY PRN
Qty: 30 TABLET | Refills: 1 | Status: SHIPPED | OUTPATIENT
Start: 2020-08-20 | End: 2023-12-27

## 2020-08-20 NOTE — LETTER
August 20, 2020      Albert Lebron MD  [address] [address] [facility] [address]             [address] [address] [facility] [address]  Phone: [phone]  Fax: [phone]          Patient: Joy Recinos   MR Number: [id number]   YOB: 1980   Date of Visit: 8/20/2020       Dear Dr. Albert Lebron:    Thank you for referring Joy Recinos to me for evaluation. Attached you will find relevant portions of my assessment and plan of care.    If you have questions, please do not hesitate to call me. I look forward to following Joy Recinos along with you.    Sincerely,    Bryan Antonio MD    Enclosure  CC:  No Recipients    If you would like to receive this communication electronically, please contact [email or web address] or [phone] to request more information on [employer] access.    For providers and/or their staff who would like to refer a patient to [facility], please contact us through our one-stop-shop provider referral line, [facility], at [phone].    If you feel you have received this communication in error or would no longer like to receive these types of communications, please e-mail [email or web address]

## 2020-08-20 NOTE — PROGRESS NOTES
New Patient Chronic Pain Note (Initial Visit)    Referring Physician: Albert Lebron MD    PCP: Angus Caceres MD    Chief Complaint:   Chief Complaint   Patient presents with    Low-back Pain        SUBJECTIVE:    Joy Recinos is a 40 y.o. female who presents to the clinic for the evaluation of lower back pain.  She was referred by the neurosurgery department for further evaluation and management of this pain.  Of note, patient has past medical history of migraines, ADHD, generalized anxiety disorder, depression, PVD, endometriosis status post hysterectomy, positive DARRION, IBS, fibromyalgia, and multiple other medical comorbidities as listed below.  The pain started to worsen about 8 weeks ago ago following a fall off of a camper step and symptoms have been worsening.  She reports that she has been dealing with chronic lower back pain for several years however.  The pain is located in the lower lumbosacral area and radiates to the hip and buttocks.  The pain is described as Sharp, stabbing, aching, numbness, tingling and is rated as 8/10. The pain is rated with a score of  6/10 on the BEST day and a score of 10/10 on the WORST day.  Symptoms interfere with daily activity, sleeping and work. The pain is exacerbated by prolonged sitting, standing, moving into certain positions.  The pain is mitigated by ice packs, finding the right physician. The patient reports spending 4-6 hours per day reclining. The patient reports 6-8 hours of uninterrupted sleep per night.  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  She reports that she does have at home desk and desk chair.    Patient denies night fever/night sweats, urinary incontinence, bowel incontinence, significant weight loss, significant motor weakness and loss of sensations.    Pain Disability Index Review:   No flowsheet data found.    Non-Pharmacologic Treatments:  Physical Therapy/Home Exercise:  yes, but has been over 2 years since last therapy  Ice/Heat:yes  TENS: no  Acupuncture: no  Massage: no  Chiropractic: no    Other: no      Pain Medications:  - Opioids: Norco, tramadol  - Adjuvant Medications: Valium, Voltaren, fluoxetine, Medrol Dosepak, tizanidine, emgality  - Anti-Coagulants: None     report:  Reviewed and consistent with medication use as prescribed.        Pain Procedures:   Lumbar epidural x2 without much relief        Imaging:   CT lumbar spine 08/18/2020:  Alignment: Normal.     Vertebrae: Normal heights.  No fracture.     Posterior elements: No fractures. Facet articulations appear within normal limits.     Discs: There is moderate disc height loss at L5-S1.  Mild-to-moderate disc height loss at L4-5.  Prominent degenerative endplate spurring is present at L4-5 and L5-S1.     Degenerative findings:     L4-L5: Left paracentral disc protrusion suspected.  Suspected effacement of the left lateral recess.  Mild neural foraminal narrowing as better demonstrated on prior MRI     L5-S1: Posterior disc osteophyte complex with probable posterior central disc protrusion.  No definite spinal canal stenosis.  Suspect moderate left and mild right neural foraminal narrowing, better demonstrated on prior MRI.     Paraspinal muscles & soft tissues: Unremarkable.       X-ray lumbar spine 08/18/2020:  No acute fracture or listhesis.  No instability demonstrated with flexion/extension maneuvers.  Discogenic degenerative changes at L4-L5 and L5-S1 are seen similar to the recent radiographs.  No new abnormality.       MRI lumbar spine 08/18/2020:  Alignment: Normal.     Vertebrae: Degenerative sub endplate marrow signal changes at L4-5 and L5-S1.  No compression fracture.     Discs: Degenerative disc space narrowing and desiccation at L4-5 and L5-S1.     Cord: Normal.  Conus terminates at L1.     Degenerative findings:     T12-L1: No significant foraminal narrowing or spinal canal stenosis.     L1-L2: No  significant foraminal narrowing or spinal canal stenosis.     L2-L3: No significant foraminal narrowing or spinal canal stenosis.     L3-L4: No significant foraminal narrowing or spinal canal stenosis.     L4-L5: Circumferential disc bulge and asymmetric left paracentral/foraminal disc protrusion, progressed compared to prior examination.  Mild bilateral facet arthropathy and ligamentum flavum hypertrophy.  Resultant moderate left and mild right foraminal narrowing and slight left lateral recess encroachment.  Disc contact with bilateral descending L5 nerve roots.  Slight anterior thecal sac effacement without significant spinal canal stenosis.     L5-S1: Circumferential disc bulge and large central disc protrusion similar to prior.  Slight indentation of the anterior thecal sac and minimal central canal stenosis with minimum AP canal diameter of 9.5 mm.  Mild bilateral foraminal narrowing and mild right lateral recess encroachment.  Disc contact with descending S1 nerve root on the right.  Mild facet arthropathy.     Paraspinal muscles & soft tissues: Unremarkable.         Past Medical History:   Diagnosis Date    ADHD (attention deficit hyperactivity disorder)     B12 deficiency     Chronic low back pain     oberlander    Endometriosis     Fibromyalgia syndrome     Idiopathic hypersomnia     Irritable bowel syndrome     goldman    Migraines     frances    PONV (postoperative nausea and vomiting)     Shingles     Undifferentiated inflammatory arthritis 2018    Undifferentiated inflammatory arthritis 2018     Past Surgical History:   Procedure Laterality Date     SECTION      x 2     CYSTOSCOPY N/A 4/10/2019    Procedure: CYSTOSCOPY;  Surgeon: Gillian Carpenter MD;  Location: [facility];  Service: OB/GYN;  Laterality: N/A;    HYSTERECTOMY      HYSTERECTOMY  2019    OOPHORECTOMY      left tube and ovary removed. later RSO with hyst    PELVIC LAPAROSCOPY      ROBOT-ASSISTED  LAPAROSCOPIC ABDOMINAL HYSTERECTOMY USING DA LONI XI N/A 4/10/2019    Procedure: XI ROBOTIC HYSTERECTOMY;  Surgeon: Gillian Carpenter MD;  Location: [facility] OR;  Service: OB/GYN;  Laterality: N/A;    ROBOT-ASSISTED LAPAROSCOPIC LYSIS OF ADHESIONS USING DA LONI XI N/A 4/10/2019    Procedure: XI ROBOTIC LYSIS, ADHESIONS;  Surgeon: Gillian Carpenter MD;  Location: [facility] OR;  Service: OB/GYN;  Laterality: N/A;    ROBOT-ASSISTED LAPAROSCOPIC SALPINGO-OOPHORECTOMY USING DA LONI XI Right 4/10/2019    Procedure: XI ROBOTIC SALPINGO-OOPHORECTOMY;  Surgeon: Gillian Carpenter MD;  Location: [facility] OR;  Service: OB/GYN;  Laterality: Right;    TUBAL LIGATION       Social History     Socioeconomic History    Marital status:      Spouse name: Not on file    Number of children: 2    Years of education: Not on file    Highest education level: Not on file   Occupational History     Employer: [employer]   Social Needs    Financial resource strain: Not very hard    Food insecurity     Worry: Never true     Inability: Never true    Transportation needs     Medical: No     Non-medical: No   Tobacco Use    Smoking status: Never Smoker    Smokeless tobacco: Never Used   Substance and Sexual Activity    Alcohol use: Yes     Frequency: Monthly or less     Drinks per session: 1 or 2     Binge frequency: Never     Comment: rarely    Drug use: No    Sexual activity: Yes     Partners: Male     Birth control/protection: Condom     Comment:    Lifestyle    Physical activity     Days per week: Not on file     Minutes per session: Not on file    Stress: Rather much   Relationships    Social connections     Talks on phone: Twice a week     Gets together: Patient refused     Attends Congregational service: Not on file     Active member of club or organization: No     Attends meetings of clubs or organizations: 1 to 4 times per year     Relationship status:    Other Topics Concern    Are you pregnant or think  you may be? No    Breast-feeding No   Social History Narrative    No smokers in household, 1 dog.     Family History   Problem Relation Age of Onset    No Known Problems Mother     Heart disease Father         Bypass    Skin cancer Father     Cataracts Maternal Grandmother     Diabetes Maternal Grandmother     Glaucoma Maternal Grandmother     Skin cancer Maternal Grandmother     Cataracts Maternal Grandfather     Skin cancer Paternal Grandfather     Breast cancer Neg Hx     Ovarian cancer Neg Hx     Deep vein thrombosis Neg Hx        Review of patient's allergies indicates:  No Known Allergies    Current Outpatient Medications   Medication Sig    cetirizine (ZYRTEC) 10 MG tablet Take 10 mg by mouth once daily.    estradioL (ESTRACE) 1 MG tablet Take 1 tablet (1 mg total) by mouth once daily.    FLUoxetine 20 MG capsule Take 2 capsules by mouth daily    galcanezumab-gnlm (EMGALITY PEN) 120 mg/mL PnIj Inject 120 mg into the skin every 28 days.    methylPREDNISolone (MEDROL DOSEPACK) 4 mg tablet use as directed    omeprazole (PRILOSEC) 20 MG capsule TAKE ONE CAPSULE BY MOUTH DAILY (Patient taking differently: TAKE ONE CAPSULE BY MOUTH NIGHTLY)    tiZANidine (ZANAFLEX) 2 MG tablet Take 2-3 tablets (4-6 mg total) by mouth nightly as needed.    traMADol (ULTRAM) 50 mg tablet Take 1 tablet (50 mg total) by mouth every 24 hours as needed for Pain.    diazePAM (VALIUM) 5 MG tablet Take 1 tablet (5 mg total) by mouth once. for 1 dose (Patient not taking: Reported on 8/19/2020)    diclofenac (VOLTAREN) 75 MG EC tablet Take 1 tablet (75 mg total) by mouth 2 (two) times daily. (Patient not taking: Reported on 8/20/2020)     No current facility-administered medications for this visit.        Review of Systems     GENERAL:  No weight loss, malaise or fevers.  HEENT:   No recent changes in vision or hearing  NECK:  Negative for lumps, no difficulty with swallowing.  RESPIRATORY:  Negative for cough, wheezing  "or shortness of breath, patient denies any recent URI.  CARDIOVASCULAR:  Negative for chest pain, leg swelling or palpitations.  GI:  Negative for abdominal discomfort, blood in stools or black stools or change in bowel habits.  MUSCULOSKELETAL:  See HPI.  SKIN:  Negative for lesions, rash, and itching.  PSYCH:  No mood disorder or recent psychosocial stressors.  Patients sleep is not disturbed secondary to pain.  HEMATOLOGY/LYMPHOLOGY:  Negative for prolonged bleeding, bruising easily or swollen nodes.  Patient is not currently taking any anti-coagulants  NEURO:   No history of headaches, syncope, paralysis, seizures or tremors.  All other reviewed and negative other than HPI.    OBJECTIVE:    /84   Pulse 69   Ht 5' 8" (1.727 m)   Wt 104.4 kg (230 lb 2.6 oz)   LMP 03/22/2019 (Exact Date)   BMI 35.00 kg/m²         Physical Exam    GENERAL: Well appearing, in no acute distress, alert and oriented x3.  Obese  PSYCH:  Mood and affect appropriate.  SKIN: Skin color, texture, turgor normal, no rashes or lesions.  HEAD/FACE:  Normocephalic, atraumatic. Cranial nerves grossly intact.  NECK:  Mild pain to palpation over the cervical paraspinous muscles. Spurling Negative.  Minimal pain with neck flexion, extension, and lateral flexion.   CV: RRR with palpation of the radial artery.  PULM: No evidence of respiratory difficulty, symmetric chest rise.  GI:  Soft and non-tender.  BACK: Straight leg raising in the sitting and supine positions is negative to radicular pain.  Mild pain to palpation over the facet joints of the lumbar spine and lumbar paraspinals, mostly left-sided.  Limited range of motion secondary to pain reproduction.  EXTREMITIES: Peripheral joint ROM is full and pain free without obvious instability or laxity in all four extremities. No deformities, edema, or skin discoloration. Good capillary refill.  MUSCULOSKELETAL: Shoulder, hip, and knee provocative maneuvers are negative.  There is moderate " pain with palpation over the sacroiliac joints bilaterally.  FABERs test is positive bilaterally.  FADIRs test is equivocal.   Bilateral upper and lower extremity strength is normal and symmetric.  No atrophy or tone abnormalities are noted.  NEURO: Bilateral upper and lower extremity coordination and muscle stretch reflexes are physiologic and symmetric.  Plantar response are downgoing. No clonus.  No loss of sensation is noted.  GAIT:  Slow, antalgic.      LABS:  Lab Results   Component Value Date    WBC 6.81 08/17/2020    HGB 13.2 08/17/2020    HCT 43.1 08/17/2020    MCV 95 08/17/2020     08/17/2020       CMP  Sodium   Date Value Ref Range Status   08/17/2020 143 136 - 145 mmol/L Final     Potassium   Date Value Ref Range Status   08/17/2020 4.3 3.5 - 5.1 mmol/L Final     Chloride   Date Value Ref Range Status   08/17/2020 106 95 - 110 mmol/L Final     CO2   Date Value Ref Range Status   08/17/2020 25 23 - 29 mmol/L Final     Glucose   Date Value Ref Range Status   08/17/2020 65 (L) 70 - 110 mg/dL Final     BUN, Bld   Date Value Ref Range Status   08/17/2020 13 6 - 20 mg/dL Final     Creatinine   Date Value Ref Range Status   08/17/2020 1.0 0.5 - 1.4 mg/dL Final     Calcium   Date Value Ref Range Status   08/17/2020 9.0 8.7 - 10.5 mg/dL Final     Total Protein   Date Value Ref Range Status   08/17/2020 6.8 6.0 - 8.4 g/dL Final     Albumin   Date Value Ref Range Status   08/17/2020 3.5 3.5 - 5.2 g/dL Final     Total Bilirubin   Date Value Ref Range Status   08/17/2020 0.3 0.1 - 1.0 mg/dL Final     Comment:     For infants and newborns, interpretation of results should be based  on gestational age, weight and in agreement with clinical  observations.  Premature Infant recommended reference ranges:  Up to 24 hours.............<8.0 mg/dL  Up to 48 hours............<12.0 mg/dL  3-5 days..................<15.0 mg/dL  6-29 days.................<15.0 mg/dL       Alkaline Phosphatase   Date Value Ref Range Status    08/17/2020 113 55 - 135 U/L Final     AST   Date Value Ref Range Status   08/17/2020 17 10 - 40 U/L Final     ALT   Date Value Ref Range Status   08/17/2020 15 10 - 44 U/L Final     Anion Gap   Date Value Ref Range Status   08/17/2020 12 8 - 16 mmol/L Final     eGFR if    Date Value Ref Range Status   08/17/2020 >60.0 >60 mL/min/1.73 m^2 Final     eGFR if non    Date Value Ref Range Status   08/17/2020 >60.0 >60 mL/min/1.73 m^2 Final     Comment:     Calculation used to obtain the estimated glomerular filtration  rate (eGFR) is the CKD-EPI equation.          No results found for: LABA1C, HGBA1C          ASSESSMENT: 40 y.o. year old female with chronic lower back pain, consistent with     1. Sacroiliitis     2. Chronic bilateral low back pain without sciatica  Ambulatory referral/consult to Pain Clinic   3. Acute left-sided low back pain without sciatica  tiZANidine (ZANAFLEX) 2 MG tablet   4. Chronic myofascial pain     5. Obesity (BMI 35.0-39.9 without comorbidity)           PLAN:   - Interventions: None at this time, may consider bilateral sacroiliac joint injections in the future if warranted..    - Anticoagulation use: no     - Medications: I have stressed the importance of physical activity and a home exercise plan to help with pain and improve health. and Patient can continue with medications for now since they are providing benefits, using them appropriately, and without side effects.  Advised to increase tizanidine as tolerated to 6 mg nightly to aid with muscle spasm/sleep disturbance.    - Therapy:  Agree physical therapy prescription for chronic lower back pain.  Discussed that she may want to use a lumbar support roll while seated at her desk for prolonged times.  Advised patient get up every 30-40 minutes and walk around for general health and well-being    - Psychological:  Discussed coping mechanisms to help address chronic pain issues    - Labs:  Reviewed    -  Imaging: Reviewed available imaging with patient and answered any questions they had regarding study.    - Consults/Referrals:  None at this time, physical therapy order has already been placed    - Records:  Reviewed/Obtain old records from outside physicians and imaging    - Follow up visit: return to clinic in 8 weeks or as needed    - Counseled patient regarding the importance of activity modification, constant sleeping habits and physical therapy    - This condition does not require this patient to take time off of work, and the primary goal of our Pain Management services is to improve the patient's functional capacity.    - Patient Questions: Answered all of the patient's questions regarding diagnosis, therapy, and treatment        The above plan and management options were discussed at length with patient. Patient is in agreement with the above and verbalized understanding.    I discussed the goals of interventional chronic pain management with the patient on today's visit.  I explained the utility of injections for diagnostic and therapeutic purposes.  We discussed a multimodal approach to pain including treating the patient's given worst pain at any given time.  We will use a systematic approach to addressing pain.  We will also adopt a multimodal approach that includes injections, adjuvant medications, physical therapy, at times psychiatry.  There may be a limited role for opioid use intermittently in the treatment of pain, more particularly for acute pain although no one approach can be used as a sole treatment modality.    I emphasized the importance of regular exercise, core strengthening and stretching, diet and weight loss as a cornerstone of long-term pain management.      Bryan Antonio MD  Interventional Pain Management  [facility]    Disclaimer:  This note was prepared using voice recognition system and is likely to have sound alike errors that may have been overlooked even after  proof reading.  Please call me with any questions

## 2020-08-20 NOTE — PATIENT INSTRUCTIONS
- Start physical therapy as prescribed.  - Increase zanaflex to 4-6mg nightly as needed.  - Complete steroid pack and then transition to the diclofenac as needed after  - Continue other current medications as prescribed.  - Continue home based exercises and discussed the importance of a healthy and active lifestyle  - Return for follow up in 8 weeks.    Consider lumbar support roll: [email or web address]-Lul-[email or web address]/[email or web address]/ref=sr_1_[email or web address]=lumbar+support+pillow+for+[email or web address]=[email or web address]%[email or web address]&sr=[email or web address]    Please do not hesitate to contact the clinic [phone] if you have any questions regarding your treatment plan.     Bryan Antonio MD  Interventional Pain Medicine  [facility] [place]

## 2020-09-04 ENCOUNTER — HOSPITAL ENCOUNTER (OUTPATIENT)
Dept: RADIOLOGY | Facility: HOSPITAL | Age: 40
Discharge: HOME | End: 2020-09-04
Attending: INTERNAL MEDICINE
Payer: COMMERCIAL

## 2020-09-04 VITALS — BODY MASS INDEX: 34.75 KG/M2 | WEIGHT: 229.25 LBS | HEIGHT: 68 IN

## 2020-09-04 DIAGNOSIS — Z00.00 ROUTINE GENERAL MEDICAL EXAMINATION AT A HEALTH CARE FACILITY: ICD-10-CM

## 2020-09-04 PROCEDURE — 77067 SCR MAMMO BI INCL CAD: CPT | Mod: TC

## 2020-09-04 PROCEDURE — 77067 SCR MAMMO BI INCL CAD: CPT | Mod: 26,,, | Performed by: RADIOLOGY

## 2020-09-04 PROCEDURE — 77067 MAMMO DIGITAL SCREENING BILAT WITH TOMOSYNTHESIS_CAD: ICD-10-PCS | Mod: 26,,, | Performed by: RADIOLOGY

## 2020-09-04 PROCEDURE — 77063 MAMMO DIGITAL SCREENING BILAT WITH TOMOSYNTHESIS_CAD: ICD-10-PCS | Mod: 26,,, | Performed by: RADIOLOGY

## 2020-09-04 PROCEDURE — 77063 BREAST TOMOSYNTHESIS BI: CPT | Mod: 26,,, | Performed by: RADIOLOGY

## 2020-09-25 ENCOUNTER — OFFICE VISIT (OUTPATIENT)
Dept: UROLOGY | Facility: CLINIC | Age: 40
End: 2020-09-25
Payer: COMMERCIAL

## 2020-09-25 ENCOUNTER — CLINICAL SUPPORT (OUTPATIENT)
Dept: REHABILITATION | Facility: HOSPITAL | Age: 40
End: 2020-09-25
Attending: NEUROLOGICAL SURGERY
Payer: COMMERCIAL

## 2020-09-25 VITALS
BODY MASS INDEX: 35.11 KG/M2 | WEIGHT: 230.94 LBS | TEMPERATURE: 97 F | DIASTOLIC BLOOD PRESSURE: 80 MMHG | SYSTOLIC BLOOD PRESSURE: 132 MMHG

## 2020-09-25 DIAGNOSIS — N31.9 NEUROGENIC BLADDER: Primary | ICD-10-CM

## 2020-09-25 DIAGNOSIS — G89.29 CHRONIC BILATERAL LOW BACK PAIN WITHOUT SCIATICA: Chronic | ICD-10-CM

## 2020-09-25 DIAGNOSIS — R53.1 DECREASED STRENGTH: ICD-10-CM

## 2020-09-25 DIAGNOSIS — M54.50 CHRONIC BILATERAL LOW BACK PAIN WITHOUT SCIATICA: Chronic | ICD-10-CM

## 2020-09-25 PROBLEM — R33.9 INCOMPLETE EMPTYING OF BLADDER: Status: ACTIVE | Noted: 2020-09-25

## 2020-09-25 LAB
BILIRUB SERPL-MCNC: NORMAL MG/DL
BLOOD URINE, POC: NORMAL
CLARITY, POC UA: CLEAR
COLOR, POC UA: YELLOW
GLUCOSE UR QL STRIP: NORMAL
KETONES UR QL STRIP: NORMAL
LEUKOCYTE ESTERASE URINE, POC: NORMAL
NITRITE, POC UA: NORMAL
PH, POC UA: 5
POC RESIDUAL URINE VOLUME: 321 ML (ref 0–100)
PROTEIN, POC: NORMAL
SPECIFIC GRAVITY, POC UA: 1.01
UROBILINOGEN, POC UA: NORMAL

## 2020-09-25 PROCEDURE — 51798 US URINE CAPACITY MEASURE: CPT | Mod: S$GLB,,, | Performed by: UROLOGY

## 2020-09-25 PROCEDURE — 97162 PT EVAL MOD COMPLEX 30 MIN: CPT | Mod: PN | Performed by: PHYSICAL THERAPIST

## 2020-09-25 PROCEDURE — 99203 PR OFFICE/OUTPT VISIT, NEW, LEVL III, 30-44 MIN: ICD-10-PCS | Mod: 25,S$GLB,, | Performed by: UROLOGY

## 2020-09-25 PROCEDURE — 3008F PR BODY MASS INDEX (BMI) DOCUMENTED: ICD-10-PCS | Mod: CPTII,S$GLB,, | Performed by: UROLOGY

## 2020-09-25 PROCEDURE — 99999 PR PBB SHADOW E&M-EST. PATIENT-LVL IV: ICD-10-PCS | Mod: PBBFAC,,, | Performed by: UROLOGY

## 2020-09-25 PROCEDURE — 99999 PR PBB SHADOW E&M-EST. PATIENT-LVL IV: CPT | Mod: PBBFAC,,, | Performed by: UROLOGY

## 2020-09-25 PROCEDURE — 99203 OFFICE O/P NEW LOW 30 MIN: CPT | Mod: 25,S$GLB,, | Performed by: UROLOGY

## 2020-09-25 PROCEDURE — 51798 POCT BLADDER SCAN: ICD-10-PCS | Mod: S$GLB,,, | Performed by: UROLOGY

## 2020-09-25 PROCEDURE — 81002 POCT URINE DIPSTICK WITHOUT MICROSCOPE: ICD-10-PCS | Mod: S$GLB,,, | Performed by: UROLOGY

## 2020-09-25 PROCEDURE — 81002 URINALYSIS NONAUTO W/O SCOPE: CPT | Mod: S$GLB,,, | Performed by: UROLOGY

## 2020-09-25 PROCEDURE — 3008F BODY MASS INDEX DOCD: CPT | Mod: CPTII,S$GLB,, | Performed by: UROLOGY

## 2020-09-25 NOTE — PLAN OF CARE
[facility]  Physical Therapy Initial Evaluation    Name: Joy Recinos  Clinic Number: [id number]    Therapy Diagnosis:  Decreased Strength, Decreased Range of Motion, Back Pain  Physician: Albert Lebron MD    Physician Orders: PT Eval and Treat  Medical Diagnosis from Referral: Chronic Low Back Pain without Sciatica  Evaluation Date: 2020  Authorization Period Expiration: 2020  Plan of Care Expiration: 2020  Visit # / Visits authorized:     Precautions: Standard    Time In: 0730  Time Out: 0815  Total Billable Time: 5 minutes    SUBJECTIVE   Date of onset: Years - has always had back pain from childhood  History of current condition - Joy is a 40 y.o. female whom reports that she fell on her parent's steps of their camper in , hitting her left buttock and back.  Patient reports she has TMJ pain.  She has been working from home since March and is possible that she is sitting more as she was able to get up and walk from desk when working on site.     Medical History:   Past Medical History:   Diagnosis Date    ADHD (attention deficit hyperactivity disorder)     B12 deficiency     Chronic low back pain     oberlander    Endometriosis     Fibromyalgia syndrome     Idiopathic hypersomnia     Irritable bowel syndrome     goldman    Migraines     frances    PONV (postoperative nausea and vomiting)     Shingles     Undifferentiated inflammatory arthritis 2018    Undifferentiated inflammatory arthritis 2018     Surgical History:   Joy Recinos  has a past surgical history that includes Pelvic laparoscopy; Tubal ligation;  section; Robot-assisted laparoscopic abdominal hysterectomy using da Tom Xi (N/A, 4/10/2019); Robot-assisted laparoscopic salpingo-oophorectomy using da Tom Xi (Right, 4/10/2019); Robot-assisted laparoscopic lysis of adhesions using da Tom Xi (N/A, 4/10/2019); Cystoscopy (N/A, 4/10/2019);  Hysterectomy; Oophorectomy; and Hysterectomy (04/11/2019).    Medications:   Joy has a current medication list which includes the following prescription(s): cetirizine, diazepam, diclofenac, estradiol, fluoxetine, galcanezumab-gnlm, omeprazole, tizanidine, and tramadol.    Allergies:   Review of patient's allergies indicates:  No Known Allergies     Imaging: MRI studies on 8/18/2020 Impression:     L4-5 and L5-S1 spondylosis and degenerative disc disease as detailed.    CT scan films on 8/18/2020 Impression:     Degenerative disc disease at L4-5 and L5-S1 as above.     Lumbar Spine X-Ray on 8/18/2020  FINDINGS:  No acute fracture or listhesis.  No instability demonstrated with flexion/extension maneuvers.  Discogenic degenerative changes at L4-L5 and L5-S1 are seen similar to the recent radiographs.  No new abnormality.    Prior Therapy: Yes  Social History: Pt lives with their family  Occupation: Pt is a  and reports that she sits most of the time.  Prior Level of Function: Patient reports there are some times she cannot get comfortable, cannot exercise.  Current Level of Function: Patient reports that she cannot exercise and cannot get comfortable at times, but does what she needs to although it is painful.    Pain:  Current 5/10, worst 10/10, best 4/10   Location: back - tailbone, left flank, mid back, neck, shoulders, back of head, left more than right TMJ  Radiation?: no.   Loss of bowel/urine control?  no  Description: Aching and sometimes stabbing, very sore, tight  Aggravating Factors: Sitting, Standing, Bending and moving certain ways  Easing Factors: ice and heating pad    Dominant Extremity: Right    Pts goals: Pt reported goals are to help relieve some pain.    OBJECTIVE   (x = not tested due to pain and/or inability to obtain test position)    RANGE OF MOTION:    Lumbar ROM Right  (spine)  9/25/2020 Left    9/25/2020 Pain/Dysfunction with Movement Goal   Lumbar Flexion (60) WFL ---      Lumbar Extension (30) WFL ---     Lumbar Side Bending (25) 20 15  20+   Lumbar Rotation 25% 25%  50%     STRENGTH:    L/E MMT Right  9/25/2020 Left  9/25/2020 Pain/Dysfunction with Movement Goal   Hip Flexion  4/5 4/5  5/5 B    Hip Extension  4/5 4/5  5/5 B   Hip Abduction  4/5 4/5  5/5 B   Knee Extension 5/5 5/5  5/5 B   Knee Flexion 5/5 5/5  5/5 B   Hip IR 4/5 4/5  5/5 B   Hip ER 4/5 4/5  5/5 B   Ankle DF 5/5 5/5 Right great toe pain 5/5 B   Ankle PF 5/5 5/5  5/5 B     MUSCLE LENGTH:     Muscle Tested  Right  9/25/2020 Left   9/25/2020 Goal   Quadratus Lumborum decreased decreased Normal B   Hip Flexors  decreased decreased Normal B   Quadriceps normal normal Normal B   Hamstrings  decreased decreased Normal B   Piriformis  deccreased decreased Normal B   Gastrocnemius  normal normal Normal B   Soleus  normal normal Normal B     JOINT MOBILITY:     Joint Motion Tested Right  (spine)  9/25/2020 Left   9/25/2020 Goal   SI joint - all directions Hypermobile Hypermobile Normal B     Sensation:  Sensation is impaired to light touch throughout Lt lower extremity    Palpation: Increased tone and tenderness noted with palpation of bilateral quadratus lumborum muscles and thoracic parapsinals. Increased tenderness noted with palpation of bilateral PSIS.     Posture:  Pt presents with postural abnormalities which include: Pelvic obliquity    Gait Analysis: The patient ambulated with the following assistive device: none; the pt presents with the following gait abnormalities: unremarkable    Balance: Single leg stance WNL for bilateral lower extremities    FUNCTION:   MODIFIED OSWESTRY LOW BACK PAIN DISABILITY QUESTIONNAIRE - The following scores are patient-reported and range from 0-5, with 0 being least impaired and 5 being most impaired.    Section 1- Pain intensity    Score 4/5   Section 2- Person care  Score 1/5   Section 3 Lifting- Optional  Score 4/5     Section 4  Walking  Score 1/5  Section 5 Sitting   Score  2/5   Section 6 Standing  Score 2/5  Section 7 Sleeping  Score 3/5   Section 8 Social Life   Score 2/5  Section 9 Traveling  Score 2/5   Section 10 Employ/home  Score 2/5    Patient reports 46% disability on the Modified Oswestry Low Back Pain Disability Questionnaire.    TREATMENT   Treatment Time In: 0810  Treatment Time Out: 0815  Total Treatment time separate from Evaluation: 5 minutes    Joy received therapeutic exercises to develop posture and core stabilization for 5 minutes including:    Exercise 9/25/2020   Alternating hip abduction and adduction isometric in hooklying 10 x 10 seconds                               x = exercise details same as prior session    Home Exercises and Patient Education Provided    Education/Self-Care provided:    Patient educated on the impairments noted above and the effects of physical therapy intervention to improve overall condition and QOL.      Written Home Exercises Provided: yes.  Exercises were reviewed and Joy was able to demonstrate them prior to the end of the session.  Joy demonstrated good  understanding of the education provided.     See EMR under Patient Instructions for exercises provided 9/25/2020.    ASSESSMENT   Joy is a 40 y.o. female referred to outpatient Physical Therapy with a medical diagnosis of chronic low back pain without sciatica. Pt presents with impairments including: decreased ROM, decreased strength, decreased muscle length, postural abnormalities and decreased overall function.    Pt prognosis is Good.   Pt will benefit from skilled outpatient Physical Therapy to address the deficits stated above and in the chart below, provide pt/family education, and to maximize pt's level of independence.     Plan of care discussed with patient: Yes  Pt's spiritual, cultural and educational needs considered and patient is agreeable to the plan of care and goals as stated below:     Anticipated Barriers for therapy: co-morbidities, sedentary  "lifestyle and motivation to improve condition    Medical Necessity is demonstrated by the following  History  Co-morbidities and personal factors that may impact the plan of care Co-morbidities:   high BMI and history of fibromyalgia, chronic back pain    Personal Factors:   coping style     moderate   Examination  Body Structures and Functions, activity limitations and participation restrictions that may impact the plan of care Body Regions:   back  lower extremities  trunk    Body Systems:    ROM  strength    Participation Restrictions:   See above in "Current Level of Function"     Activity limitations:   Learning and applying knowledge  no deficits    General Tasks and Commands  no deficits    Communication  no deficits    Mobility  no deficits    Self care  no deficits    Domestic Life  doing house work (cleaning house, washing dishes, laundry)    Interactions/Relationships  no deficits    Life Areas  no deficits    Community and Social Life  community life  recreation and leisure         moderate   Clinical Presentation evolving clinical presentation with changing clinical characteristics moderate   Decision Making/ Complexity Score: moderate       GOALS:    Short Term Goals:  4 weeks    1. Pain: Pt will demonstrate improved pain by reports of less than or equal to 9/10 worst pain on the verbal rating scale in order to progress toward maximal functional ability and improve QOL.    2. Function: Patient will demonstrate improved function as indicated by a functional limitation score of less than or equal to 40% on the Modified Oswestry Low Back Pain Disability Questionnaire.    3. Mobility: Patient will improve AROM to 50% of stated goals, listed in objective measures above, in order to progress towards independence with functional activities.     4. Strength: Patient will improve strength to 50% of stated goals, listed in objective measures above, in order to progress towards independence with functional " activities.     5. HEP: Patient will demonstrate independence with HEP in order to progress toward functional independence.      Long Term Goals:  8 weeks    1. Pain: Pt will demonstrate improved pain by reports of less than or equal to 8/10 worst pain on the verbal rating scale in order to progress toward maximal functional ability and improve QOL.      2. Function: Patient will demonstrate improved function as indicated by a functional limitation score of less than or equal to 38% on the Modified Oswestry Low Back Pain Disability Questionnaire.    3. Mobility: Patient will improve AROM to stated goals, listed in objective measures above, in order to return to maximal functional potential and improve quality of life.    4. Strength: Patient will improve strength to stated goals, listed in objective measures above, in order to improve functional independence and quality of life.    5. Patient will return to normal ADL's, IADL's, community involvement, recreational activities, and work-related activities with less than or equal to 7/10 pain and maximal function.         PLAN   Plan of care Certification: 9/25/2020 to 11/24/2020.    Outpatient Physical Therapy 2 times weekly for 8 weeks to include any combination of the following interventions: virtual visits, dry needling, modalities, electrical stimulation (IFC, Pre-Mod, Attended with Functional Dry Needling), Manual Therapy, Neuromuscular Re-ed, Patient Education, Self Care, Therapeutic Activites and Therapeutic Exercise     Thank you for this referral.    These services are reasonable and necessary for the conditions set forth above while under my care.    Sofya Miller, PT, DPT, CSCS, PPCES

## 2020-09-25 NOTE — PROGRESS NOTES
Chief Complaint:   Encounter Diagnosis   Name Primary?    Neurogenic bladder Yes       HPI:  40-year-old female with possible neurogenic bladder.  Patient has definite urinary retention.  Patient states that she has had some back pain for some time, she has been evaluated by the Pain Clinic and neuro surgery, currently no need for surgical intervention.  Patient states that she feels a lot of this started back in April of 2019 after a total abdominal hysterectomy with bilateral salpingo oophorectomy.  This was done for chronic pelvic pain, which was not improved after doing pelvic floor physical therapy for 2 years.  Patient states she has had no other pelvic surgeries, no slings.  Patient states that she has transient symptoms to where it time she has extreme urgency than other times she has to push to void.  She is only getting up 1 time per evening to void, no evidence of increased frequency during the daytime.  She does have intermittent urgency as stated.  No gross hematuria, no microscopic hematuria, she has never been a smoker.  She has had no other urological gynecological history, except for that which was stated.  She has a brother with stones, no other family history of urological cancers.  Patient has had no dysuria, she does state she has constipation which seems to be worse consistent with all the other symptoms.  Patient feels like she voided to completion today, but does have an elevated PVR.    Allergies:  Patient has no known allergies.    Medications:  has a current medication list which includes the following prescription(s): cetirizine, estradiol, fluoxetine, galcanezumab-gnlm, omeprazole, tizanidine, tramadol, diazepam, and diclofenac.    Review of Systems:  General: No fever, chills, fatigability, or weight loss.  Skin: No rashes, itching, or changes in color or texture of skin.  Chest: Denies ARELLANO, cyanosis, wheezing, cough, and sputum production.  Abdomen: Appetite fine. No weight loss.  Denies diarrhea, abdominal pain, hematemesis, or blood in stool.  Musculoskeletal: No joint stiffness or swelling. Denies back pain.  : As above.  All other review of systems negative.    PMH:   has a past medical history of ADHD (attention deficit hyperactivity disorder), B12 deficiency, Chronic low back pain, Endometriosis, Fibromyalgia syndrome, Idiopathic hypersomnia, Irritable bowel syndrome, Migraines, PONV (postoperative nausea and vomiting), Shingles, Undifferentiated inflammatory arthritis (2018), and Undifferentiated inflammatory arthritis (2018).    PSH:   has a past surgical history that includes Pelvic laparoscopy; Tubal ligation;  section; Robot-assisted laparoscopic abdominal hysterectomy using da Tom Xi (N/A, 4/10/2019); Robot-assisted laparoscopic salpingo-oophorectomy using da Tom Xi (Right, 4/10/2019); Robot-assisted laparoscopic lysis of adhesions using da Tom Xi (N/A, 4/10/2019); Cystoscopy (N/A, 4/10/2019); Hysterectomy; Oophorectomy; and Hysterectomy (2019).    FamHx: family history includes Cataracts in her maternal grandfather and maternal grandmother; Diabetes in her maternal grandmother; Glaucoma in her maternal grandmother; Heart disease in her father; No Known Problems in her mother; Skin cancer in her father, maternal grandmother, and paternal grandfather.    SocHx:  reports that she has never smoked. She has never used smokeless tobacco. She reports current alcohol use. She reports that she does not use drugs.      Physical Exam:  Vitals:    20 1115   BP: 132/80   Temp: 97.3 °F (36.3 °C)     General: A&Ox3, no apparent distress, no deformities  Neck: No masses, normal ROM  Lungs: normal inspiration, no use of accessory muscles  Heart: normal pulse, no arrhythmias  Abdomen: Soft, NT, ND, no masses, no hernias, no hepatosplenomegaly  Skin: The skin is warm and dry. No jaundice.  Ext: No c/c/e.    Labs/Studies:    Urinalysis normal    mL  9/20    Impression/Plan:     Neurogenic bladder- based on elevated PVR and neurological issues which are being evaluated, I would like to obtain some more diagnostic studies.  Therefore I have scheduled her for urodynamics and will follow up after this.

## 2020-09-25 NOTE — LETTER
September 25, 2020      Angus Caceres MD  [address]  [place] [address]           [facility]  [address] [place] [id number]  Phone: [phone]  Fax: [phone]          Patient: Joy Recinos   MR Number: [id number]   YOB: 1980   Date of Visit: 9/25/2020       Dear Dr. Angus Caceres:    Thank you for referring Joy Recinos to me for evaluation. Attached you will find relevant portions of my assessment and plan of care.    If you have questions, please do not hesitate to call me. I look forward to following Joy Recinos along with you.    Sincerely,    Bryan Trevino MD    Enclosure  CC:  No Recipients    If you would like to receive this communication electronically, please contact [email or web address] or [phone] to request more information on [employer] [facility] access.    For providers and/or their staff who would like to refer a patient to [facility], please contact us through our one-stop-shop provider referral line, [facility], at [phone].    If you feel you have received this communication in error or would no longer like to receive these types of communications, please e-mail [email or web address]

## 2020-09-30 ENCOUNTER — DOCUMENTATION ONLY (OUTPATIENT)
Dept: REHABILITATION | Facility: HOSPITAL | Age: 40
End: 2020-09-30

## 2020-09-30 ENCOUNTER — CLINICAL SUPPORT (OUTPATIENT)
Dept: REHABILITATION | Facility: HOSPITAL | Age: 40
End: 2020-09-30
Payer: COMMERCIAL

## 2020-09-30 DIAGNOSIS — G89.29 CHRONIC BILATERAL LOW BACK PAIN WITHOUT SCIATICA: Primary | ICD-10-CM

## 2020-09-30 DIAGNOSIS — M54.50 CHRONIC BILATERAL LOW BACK PAIN WITHOUT SCIATICA: Primary | ICD-10-CM

## 2020-09-30 PROBLEM — R53.1 DECREASED STRENGTH: Status: ACTIVE | Noted: 2020-09-30

## 2020-09-30 PROCEDURE — 97140 MANUAL THERAPY 1/> REGIONS: CPT | Mod: PN,CQ

## 2020-09-30 PROCEDURE — 97110 THERAPEUTIC EXERCISES: CPT | Mod: PN,CQ

## 2020-09-30 NOTE — PROGRESS NOTES
"  Physical Therapy Assistant Treatment Note     Name: Joy Mckenzie\[facility]\""  Clinic Number: [id number]    Therapy Diagnosis:   Encounter Diagnosis   Name Primary?    Chronic bilateral low back pain without sciatica Yes     Physician: Albert Lebron MD    Visit Date: 9/30/2020    Physician Orders: PT Eval and Treat  Medical Diagnosis from Referral: Chronic Low Back Pain without Sciatica  Evaluation Date: 9/25/2020  Authorization Period Expiration: 12/31/2020  Plan of Care Expiration: 11/24/2020  Visit #/Visits authorized: 2/ 20     Time In: 10:00  Time Out: 10:55  Total Billable Time: 45 minutes    Precautions: Standard    Subjective     Pt reports: new complaints of upper back pain. Low back hurts more with sitting and rotating towards left.  She was compliant with home exercise program.  Response to previous treatment: good  Functional change: nothing significant    Pain: 4/10  Location: bilateral low back      Objective     Joy received therapeutic exercises to develop strength, posture and core stabilization for 35 minutes including:  PPT 2'  SLR with PPT 10x each  Bridges 10x - pain  Prone hip extension 2x10  SL hip abduction 2x10  Isometric hip abd/add 2x10 10" holds  Open book 2x10    Joy received the following manual therapy techniques: Myofacial release and Soft tissue Mobilization were applied to the: lumbar paraspinals, quadratus lumborum  for 10 minutes prior to exercises.    Joy received hot pack for 10 minutes to low back and cervical spine.    Home Exercises and Patient Education Provided     Education/Self-Care provided:   · Patient educated on the impairments noted above and the effects of physical therapy intervention to improve overall condition and QOL.     Written Home Exercises Provided: yes.  Exercises were reviewed and Joy was able to demonstrate them prior to the end of the session.  Joy demonstrated good  understanding of the education provided.      See EMR under Patient " Instructions for exercises provided 9/25/2020.    Assessment     Pt. Presents to therapy with reports of minimal low back pain L sided worse than R. Performed manual STM to lumbar paraspinals and passive stretching to quadratus lumborum. Increased muscle tightness noted in L. Educated patient on exercises for hip and core stability. She has increased pain with bridges therefore discontinued. Decreased strength noted in glutes shown with difficulty completing prone hip extension.    Joy is progressing well towards her goals.   Pt prognosis is Good.     Pt will continue to benefit from skilled outpatient physical therapy to address the deficits listed in the problem list box on initial evaluation, provide pt/family education and to maximize pt's level of independence in the home and community environment.     Pt's spiritual, cultural and educational needs considered and pt agreeable to plan of care and goals.     Anticipated barriers to physical therapy:  co-morbidities, sedentary lifestyle and motivation to improve condition    GOALS:     Short Term Goals:  4 weeks     1. Pain: Pt will demonstrate improved pain by reports of less than or equal to 9/10 worst pain on the verbal rating scale in order to progress toward maximal functional ability and improve QOL.  progressing, not met   2. Function: Patient will demonstrate improved function as indicated by a functional limitation score of less than or equal to 40% on the Modified Oswestry Low Back Pain Disability Questionnaire.  progressing, not met   3. Mobility: Patient will improve AROM to 50% of stated goals, listed in objective measures above, in order to progress towards independence with functional activities.   progressing, not met   4. Strength: Patient will improve strength to 50% of stated goals, listed in objective measures above, in order to progress towards independence with functional activities.   progressing, not met   5. HEP: Patient will  demonstrate independence with HEP in order to progress toward functional independence.  progressing, not met      Long Term Goals:  8 weeks     1. Pain: Pt will demonstrate improved pain by reports of less than or equal to 8/10 worst pain on the verbal rating scale in order to progress toward maximal functional ability and improve QOL.       2. Function: Patient will demonstrate improved function as indicated by a functional limitation score of less than or equal to 38% on the Modified Oswestry Low Back Pain Disability Questionnaire.     3. Mobility: Patient will improve AROM to stated goals, listed in objective measures above, in order to return to maximal functional potential and improve quality of life.     4. Strength: Patient will improve strength to stated goals, listed in objective measures above, in order to improve functional independence and quality of life.     5. Patient will return to normal ADL's, IADL's, community involvement, recreational activities, and work-related activities with less than or equal to 7/10 pain and maximal function.          Plan     Plan of care Certification: 9/25/2020 to 11/24/2020.     Outpatient Physical Therapy 2 times weekly for 8 weeks to include any combination of the following interventions: virtual visits, dry needling, modalities, electrical stimulation (IFC, Pre-Mod, Attended with Functional Dry Needling), Manual Therapy, Neuromuscular Re-ed, Patient Education, Self Care, Therapeutic Activites and Therapeutic Exercise     Cher Banerjee, PTA

## 2020-09-30 NOTE — PROGRESS NOTES
PT/PTA met face to face to discuss pt's treatment plan and progress towards established goals. Pt will be seen by a physical therapist minimally every 6th visit or every 30 days.    Cher Banerjee PTA

## 2020-10-02 ENCOUNTER — PATIENT MESSAGE (OUTPATIENT)
Dept: UROLOGY | Facility: CLINIC | Age: 40
End: 2020-10-02

## 2020-10-02 ENCOUNTER — CLINICAL SUPPORT (OUTPATIENT)
Dept: REHABILITATION | Facility: HOSPITAL | Age: 40
End: 2020-10-02
Payer: COMMERCIAL

## 2020-10-02 DIAGNOSIS — R53.1 DECREASED STRENGTH: ICD-10-CM

## 2020-10-02 PROCEDURE — 97110 THERAPEUTIC EXERCISES: CPT | Mod: PN | Performed by: PHYSICAL THERAPIST

## 2020-10-02 PROCEDURE — 97140 MANUAL THERAPY 1/> REGIONS: CPT | Mod: PN | Performed by: PHYSICAL THERAPIST

## 2020-10-02 NOTE — PROGRESS NOTES
"  Physical Therapy Assistant Treatment Note     Name: Joy Huntley [facility]  Clinic Number: [id number]    Therapy Diagnosis:   Encounter Diagnosis   Name Primary?    Decreased strength      Physician: Albert Lebron MD    Visit Date: 10/2/2020    Physician Orders: PT Eval and Treat  Medical Diagnosis from Referral: Chronic Low Back Pain without Sciatica  Evaluation Date: 9/25/2020  Authorization Period Expiration: 12/31/2020  Plan of Care Expiration: 11/24/2020  Visit #/Visits authorized: 3/ 20     Time In: 0735  Time Out: 0840  Total Billable Time: 55 minutes    Precautions: Standard    Subjective     Pt reports she feels better today.  She was compliant with home exercise program.  Response to previous treatment: good  Functional change: nothing significant    Pain: 4/10 Post treatment Pain: 2-3/10  Location: left and mid low back, mid back      Objective     Joy received hot pack for 10 minutes to low back and cervical spine.    Joy received the following manual therapy techniques: Myofacial release and Soft tissue Mobilization were applied to the: left lumbar paraspinals, quadratus lumborum, along the left iliac crest, to the left gluteus medius and left piriformis for 25 minutes prior to exercises.    Joy received therapeutic exercises to develop strength, posture and core stabilization for 30 minutes including:  Isometric hip abd/add 2x10 10" holds  PPT 2'  SLR with PPT 10x each deferred  Bridges 2x10  Pirifomis stretch 3x30" each  Glute stretch 3x30" Left  Prone hip extension 2x10  Sidelying hip abduction 2x10  Sidelying clams 2x10  Open book 2x10  Thoracic extension stretch 10 x 10 seconds    Home Exercises and Patient Education Provided     Education/Self-Care provided:   · Patient educated on the impairments noted above and the effects of physical therapy intervention to improve overall condition and QOL.     Written Home Exercises Provided: yes.  Exercises were reviewed and oJy was able to " demonstrate them prior to the end of the session.  Joy demonstrated good  understanding of the education provided.      See EMR under Patient Instructions for exercises provided 9/25/2020.    Assessment     Patient had minimal left-sided hypertonicity and pelvic obliquity which normalized with manual therapy and isometrics.  Patient fatigued quickly with low level strengthening, but had decrease in pain symptoms post therapy.      Joy is progressing well towards her goals.   Pt prognosis is Good.     Pt will continue to benefit from skilled outpatient physical therapy to address the deficits listed in the problem list box on initial evaluation, provide pt/family education and to maximize pt's level of independence in the home and community environment.     Pt's spiritual, cultural and educational needs considered and pt agreeable to plan of care and goals.     Anticipated barriers to physical therapy:  co-morbidities, sedentary lifestyle and motivation to improve condition    GOALS:     Short Term Goals:  4 weeks     1. Pain: Pt will demonstrate improved pain by reports of less than or equal to 9/10 worst pain on the verbal rating scale in order to progress toward maximal functional ability and improve QOL.  progressing, not met   2. Function: Patient will demonstrate improved function as indicated by a functional limitation score of less than or equal to 40% on the Modified Oswestry Low Back Pain Disability Questionnaire.  progressing, not met   3. Mobility: Patient will improve AROM to 50% of stated goals, listed in objective measures above, in order to progress towards independence with functional activities.   progressing, not met   4. Strength: Patient will improve strength to 50% of stated goals, listed in objective measures above, in order to progress towards independence with functional activities.   progressing, not met   5. HEP: Patient will demonstrate independence with HEP in order to progress  toward functional independence.  progressing, not met      Long Term Goals:  8 weeks     1. Pain: Pt will demonstrate improved pain by reports of less than or equal to 8/10 worst pain on the verbal rating scale in order to progress toward maximal functional ability and improve QOL.       2. Function: Patient will demonstrate improved function as indicated by a functional limitation score of less than or equal to 38% on the Modified Oswestry Low Back Pain Disability Questionnaire.     3. Mobility: Patient will improve AROM to stated goals, listed in objective measures above, in order to return to maximal functional potential and improve quality of life.     4. Strength: Patient will improve strength to stated goals, listed in objective measures above, in order to improve functional independence and quality of life.     5. Patient will return to normal ADL's, IADL's, community involvement, recreational activities, and work-related activities with less than or equal to 7/10 pain and maximal function.          Plan     Plan of care Certification: 9/25/2020 to 11/24/2020.     Outpatient Physical Therapy 2 times weekly for 8 weeks to include any combination of the following interventions: virtual visits, dry needling, modalities, electrical stimulation (IFC, Pre-Mod, Attended with Functional Dry Needling), Manual Therapy, Neuromuscular Re-ed, Patient Education, Self Care, Therapeutic Activites and Therapeutic Exercise     Sofya Miller, PT, DPT, CSCS, PPCES

## 2020-10-07 ENCOUNTER — CLINICAL SUPPORT (OUTPATIENT)
Dept: REHABILITATION | Facility: HOSPITAL | Age: 40
End: 2020-10-07
Payer: COMMERCIAL

## 2020-10-07 DIAGNOSIS — R53.1 DECREASED STRENGTH: ICD-10-CM

## 2020-10-07 PROCEDURE — 97110 THERAPEUTIC EXERCISES: CPT | Mod: PN,CQ

## 2020-10-07 PROCEDURE — 97140 MANUAL THERAPY 1/> REGIONS: CPT | Mod: PN,CQ

## 2020-10-07 NOTE — PROGRESS NOTES
"  Physical Therapy Assistant Treatment Note     Name: Joy Recinos  Clinic Number: [id number]    Therapy Diagnosis:   Encounter Diagnosis   Name Primary?    Decreased strength      Physician: Albert Lebron MD    Visit Date: 10/7/2020    Physician Orders: PT Eval and Treat  Medical Diagnosis from Referral: Chronic Low Back Pain without Sciatica  Evaluation Date: 9/25/2020  Authorization Period Expiration: 12/31/2020  Plan of Care Expiration: 11/24/2020  Visit #/Visits authorized: 4/ 20     Time In: 1010  Time Out: 1115  Total Billable Time: 50 minutes    Precautions: Standard    Subjective     Pt reports her mid-low back was hurting more on Saturday. She also noticed her upper L shoulder blade feels tighter.  She was compliant with home exercise program.  Response to previous treatment: good   Functional change: nothing significant    Pain: 5/10  Location: left and mid low back, mid back      Objective     Joy received hot pack for 10 minutes to low back and cervical spine.    Joy received the following manual therapy techniques: Myofacial release and Soft tissue Mobilization were applied to the: left lumbar paraspinals, quadratus lumborum, along the left iliac crest, to the left gluteus medius and left piriformis for 10 minutes prior to exercises.    Joy received therapeutic exercises to develop strength, posture and core stabilization for 40 minutes including:  Isometric hip abd/add 2x10 10" holds  PPT 2' added UE flexion/ extension  Bridges 2x10  Pirifomis stretch 3x30" each  Glute stretch 3x30" Left  Prone hip extension 2x10  Sidelying hip abduction 2x10  Sidelying clams 2x10  Open book 2x10  Thoracic extension over foam roll 10 x 10 seconds   Thread the needle 10x each    Home Exercises and Patient Education Provided     Education/Self-Care provided:   · Patient educated on the impairments noted above and the effects of physical therapy intervention to improve overall condition and QOL. "     Written Home Exercises Provided: yes.  Exercises were reviewed and Joy was able to demonstrate them prior to the end of the session.  Joy demonstrated good  understanding of the education provided.      See EMR under Patient Instructions for exercises provided 9/25/2020.    Assessment     Patient presents with increased middle back tightness and pain today. Worked on more thoracic mobility exercises which patient would benefit from to increase rotational motion. Added UE movement with holding PPT for more core stability. She reports soreness at end of session with general strengthening.    Joy is progressing well towards her goals.   Pt prognosis is Good.     Pt will continue to benefit from skilled outpatient physical therapy to address the deficits listed in the problem list box on initial evaluation, provide pt/family education and to maximize pt's level of independence in the home and community environment.     Pt's spiritual, cultural and educational needs considered and pt agreeable to plan of care and goals.     Anticipated barriers to physical therapy:  co-morbidities, sedentary lifestyle and motivation to improve condition    GOALS:     Short Term Goals:  4 weeks     1. Pain: Pt will demonstrate improved pain by reports of less than or equal to 9/10 worst pain on the verbal rating scale in order to progress toward maximal functional ability and improve QOL.  progressing, not met   2. Function: Patient will demonstrate improved function as indicated by a functional limitation score of less than or equal to 40% on the Modified Oswestry Low Back Pain Disability Questionnaire.  progressing, not met   3. Mobility: Patient will improve AROM to 50% of stated goals, listed in objective measures above, in order to progress towards independence with functional activities.   progressing, not met   4. Strength: Patient will improve strength to 50% of stated goals, listed in objective measures above, in  order to progress towards independence with functional activities.   progressing, not met   5. HEP: Patient will demonstrate independence with HEP in order to progress toward functional independence.  progressing, not met      Long Term Goals:  8 weeks     1. Pain: Pt will demonstrate improved pain by reports of less than or equal to 8/10 worst pain on the verbal rating scale in order to progress toward maximal functional ability and improve QOL.       2. Function: Patient will demonstrate improved function as indicated by a functional limitation score of less than or equal to 38% on the Modified Oswestry Low Back Pain Disability Questionnaire.     3. Mobility: Patient will improve AROM to stated goals, listed in objective measures above, in order to return to maximal functional potential and improve quality of life.     4. Strength: Patient will improve strength to stated goals, listed in objective measures above, in order to improve functional independence and quality of life.     5. Patient will return to normal ADL's, IADL's, community involvement, recreational activities, and work-related activities with less than or equal to 7/10 pain and maximal function.          Plan     Plan of care Certification: 9/25/2020 to 11/24/2020.     Outpatient Physical Therapy 2 times weekly for 8 weeks to include any combination of the following interventions: virtual visits, dry needling, modalities, electrical stimulation (IFC, Pre-Mod, Attended with Functional Dry Needling), Manual Therapy, Neuromuscular Re-ed, Patient Education, Self Care, Therapeutic Activites and Therapeutic Exercise     Cher Banerjee, PTA

## 2020-10-14 ENCOUNTER — CLINICAL SUPPORT (OUTPATIENT)
Dept: REHABILITATION | Facility: HOSPITAL | Age: 40
End: 2020-10-14
Payer: COMMERCIAL

## 2020-10-14 ENCOUNTER — TELEPHONE (OUTPATIENT)
Dept: PHARMACY | Facility: CLINIC | Age: 40
End: 2020-10-14

## 2020-10-14 DIAGNOSIS — R53.1 DECREASED STRENGTH: ICD-10-CM

## 2020-10-14 PROCEDURE — 97140 MANUAL THERAPY 1/> REGIONS: CPT | Mod: PN,CQ

## 2020-10-14 PROCEDURE — 97110 THERAPEUTIC EXERCISES: CPT | Mod: PN,CQ

## 2020-10-14 NOTE — TELEPHONE ENCOUNTER
The prior authorization for Joy Recinos's Em[employer]ality has been submitted on 10/14/2020 @ 11:36am.  It can take up to 72 hours for a decision to be rendered from the insurance.  Patient is aware of PA and process.  Please let me know if you have any questions.    Patient indicates that migraines have decreased in severity and in number since being on Emgality.  Patient is having one or two migraines a month now.    Thank You!   Ninfa Conner CPhT, B.A  Patient Care Advocate   [facility]  Phone: [phone] Ext [phone]  Fax: [phone]

## 2020-10-14 NOTE — PROGRESS NOTES
"  Physical Therapy Assistant Treatment Note     [facility]  [id number]    Therapy Diagnosis:   Encounter Diagnosis   Name Primary?    Decreased strength      Physician: Albert Lebron MD    Visit Date: 10/14/2020    Physician Orders: PT Eval and Treat  Medical Diagnosis from Referral: Chronic Low Back Pain without Sciatica  Evaluation Date: 9/25/2020  Authorization Period Expiration: 12/31/2020  Plan of Care Expiration: 11/24/2020  Visit #/Visits authorized: 5/ 20     Time In: 1015  Time Out: 1110   Total Billable Time: 45 minutes    Precautions: Standard    Subjective     Pt reports she is having a little less pain in low back today. Same pain remains in between shoulder blades. She reports the hot pack was too hot last visit.  She was not compliant with home exercise program. She is looking for a belt to do hip abduction isometric  Response to previous treatment: good   Functional change: nothing significant    Pain: 4/10   Location: left and mid low back, mid back      Objective     Joy received hot pack with two towels for 10 minutes to low back and cervical spine in supine.    Joy received the following manual therapy techniques: Myofacial release and Soft tissue Mobilization were applied to the: left lumbar paraspinals, quadratus lumborum, along the left iliac crest, to the left gluteus medius and left piriformis for 10 minutes prior to exercises.  STM to bernadine-scapular region    Joy received therapeutic exercises to develop strength, posture and core stabilization for 35 minutes including:  Isometric hip abd/add 2x10 10" holds  PPT 2' added UE flexion/ extension, marching  Bridges 2x10  Pirifomis stretch 3x30" passive today  Glute stretch 3x30" passive today  Prone hip extension 2x10  Sidelying hip abduction 2x10  Thread the needle 10x each    Deferred:   Thoracic extension over foam roll 10 x 10 seconds   Rhomboid stretch in doorway 3x30"    Home Exercises and " Patient Education Provided     Education/Self-Care provided:   · Patient educated on the impairments noted above and the effects of physical therapy intervention to improve overall condition and QOL.   -Sidelying clams, Open book, PPT and Bridges added to HEP    Written Home Exercises Provided: yes.  Exercises were reviewed and Joy was able to demonstrate them prior to the end of the session.  Joy demonstrated good  understanding of the education provided.      See EMR under Patient Instructions for exercises provided 9/25/2020.    Assessment     Patient presents with decreased low back pain and continued tightness in mid-back today. Performed manual STM to decrease subjective reports on pain. Continued to work on thoracic mobility and core stability during therex. She was able to hold PPT with marching, unable to hold with knee extension. Updated HEP to further progress patient in therapy. She is still limited in core and hip strength and will benefit from continued skilled therapy to address deficits.    Joy is progressing well towards her goals.   Pt prognosis is Good.     Pt will continue to benefit from skilled outpatient physical therapy to address the deficits listed in the problem list box on initial evaluation, provide pt/family education and to maximize pt's level of independence in the home and community environment.     Pt's spiritual, cultural and educational needs considered and pt agreeable to plan of care and goals.     Anticipated barriers to physical therapy:  co-morbidities, sedentary lifestyle and motivation to improve condition    GOALS:     Short Term Goals:  4 weeks     1. Pain: Pt will demonstrate improved pain by reports of less than or equal to 9/10 worst pain on the verbal rating scale in order to progress toward maximal functional ability and improve QOL.  progressing, not met   2. Function: Patient will demonstrate improved function as indicated by a functional limitation score  of less than or equal to 40% on the Modified Oswestry Low Back Pain Disability Questionnaire.  progressing, not met   3. Mobility: Patient will improve AROM to 50% of stated goals, listed in objective measures above, in order to progress towards independence with functional activities.   progressing, not met   4. Strength: Patient will improve strength to 50% of stated goals, listed in objective measures above, in order to progress towards independence with functional activities.   progressing, not met   5. HEP: Patient will demonstrate independence with HEP in order to progress toward functional independence.  progressing, not met      Long Term Goals:  8 weeks     1. Pain: Pt will demonstrate improved pain by reports of less than or equal to 8/10 worst pain on the verbal rating scale in order to progress toward maximal functional ability and improve QOL.       2. Function: Patient will demonstrate improved function as indicated by a functional limitation score of less than or equal to 38% on the Modified Oswestry Low Back Pain Disability Questionnaire.     3. Mobility: Patient will improve AROM to stated goals, listed in objective measures above, in order to return to maximal functional potential and improve quality of life.     4. Strength: Patient will improve strength to stated goals, listed in objective measures above, in order to improve functional independence and quality of life.     5. Patient will return to normal ADL's, IADL's, community involvement, recreational activities, and work-related activities with less than or equal to 7/10 pain and maximal function.          Plan     Plan of care Certification: 9/25/2020 to 11/24/2020.     Outpatient Physical Therapy 2 times weekly for 8 weeks to include any combination of the following interventions: virtual visits, dry needling, modalities, electrical stimulation (IFC, Pre-Mod, Attended with Functional Dry Needling), Manual Therapy, Neuromuscular Re-ed,  Patient Education, Self Care, Therapeutic Activites and Therapeutic Exercise     Cher Banerjee, PTA

## 2020-10-14 NOTE — PROGRESS NOTES
"  Physical Therapy Assistant Treatment Note     [facility]  [id number]    Therapy Diagnosis:   Encounter Diagnosis   Name Primary?    Decreased strength      Physician: Albert Lebron MD    Visit Date: 10/16/2020    Physician Orders: PT Eval and Treat  Medical Diagnosis from Referral: Chronic Low Back Pain without Sciatica  Evaluation Date: 9/25/2020  Authorization Period Expiration: 12/31/2020  Plan of Care Expiration: 11/24/2020  Visit #/Visits authorized:  6/ 20     Time In: 8:15 am   Time Out: 9:00 am    Total Billable Time: 45 minutes    Precautions: Standard    Subjective     Pt reports :that she was very sore after her last session and feels that maybe she did too much.   She was not compliant with home exercise program. She is looking for a belt to do hip abduction isometric  Response to previous treatment: good   Functional change: nothing significant    Pain: 5/10   Location: left and mid low back, mid back      Objective     Joy received hot pack with two towels for 10 minutes to low back and cervical spine in supine.    Joy received the following manual therapy techniques: Myofacial release and Soft tissue Mobilization were applied to the: left lumbar paraspinals, quadratus lumborum, along the left iliac crest, to the left gluteus medius and left piriformis for 10 minutes prior to exercises.  STM to bernadine-scapular region    Joy received therapeutic exercises to develop strength, posture and core stabilization for 35 minutes including:    Isometric hip abd/add 2x10 10" holds  PPT 2 x 1'   +TA activation in hooklying 3" holds x 1'   Bridges 2x10 (cues to perform glute set prior to lifting)   Pirifomis stretch 3x30"   Glute stretch 3x30"   Prone hip extension 2x10  Clamshells 2 x 10   Sidelying hip abduction 2x10  Open books x 10 each side     Deferred:   Thoracic extension over foam roll 10 x 10 seconds   Rhomboid stretch in doorway 3x30"    Home Exercises and " Patient Education Provided     Education/Self-Care provided:   · Patient educated on the impairments noted above and the effects of physical therapy intervention to improve overall condition and QOL.   - educated pt on the need to be mindful of maintaining good core recruitment through performance of daily tasks such as walking or lifting     Written Home Exercises Provided: yes.  Exercises were reviewed and Joy was able to demonstrate them prior to the end of the session.  Joy demonstrated good  understanding of the education provided.      See EMR under Patient Instructions for exercises provided 9/25/2020.    Assessment     Pt with fair tolerance of today' session. She has a slight increased in pain reports today and generally reports feeling stiff in her lumbar spine. Pt with noted poor core recruitments and requires cues for proper TA contraction performance and to not hold her breath. Pt with good response to manual therapy today. She will continue to benefit from skilled PT intervention in order to further progress core stability and postural control needed for improved QOL and return to PLOF.     Joy is progressing well towards her goals.   Pt prognosis is Good.     Pt will continue to benefit from skilled outpatient physical therapy to address the deficits listed in the problem list box on initial evaluation, provide pt/family education and to maximize pt's level of independence in the home and community environment.     Pt's spiritual, cultural and educational needs considered and pt agreeable to plan of care and goals.     Anticipated barriers to physical therapy:  co-morbidities, sedentary lifestyle and motivation to improve condition    GOALS:     Short Term Goals:  4 weeks     1. Pain: Pt will demonstrate improved pain by reports of less than or equal to 9/10 worst pain on the verbal rating scale in order to progress toward maximal functional ability and improve QOL.  progressing, not met    2. Function: Patient will demonstrate improved function as indicated by a functional limitation score of less than or equal to 40% on the Modified Oswestry Low Back Pain Disability Questionnaire.  progressing, not met   3. Mobility: Patient will improve AROM to 50% of stated goals, listed in objective measures above, in order to progress towards independence with functional activities.   progressing, not met   4. Strength: Patient will improve strength to 50% of stated goals, listed in objective measures above, in order to progress towards independence with functional activities.   progressing, not met   5. HEP: Patient will demonstrate independence with HEP in order to progress toward functional independence.  progressing, not met      Long Term Goals:  8 weeks     1. Pain: Pt will demonstrate improved pain by reports of less than or equal to 8/10 worst pain on the verbal rating scale in order to progress toward maximal functional ability and improve QOL.       2. Function: Patient will demonstrate improved function as indicated by a functional limitation score of less than or equal to 38% on the Modified Oswestry Low Back Pain Disability Questionnaire.     3. Mobility: Patient will improve AROM to stated goals, listed in objective measures above, in order to return to maximal functional potential and improve quality of life.     4. Strength: Patient will improve strength to stated goals, listed in objective measures above, in order to improve functional independence and quality of life.     5. Patient will return to normal ADL's, IADL's, community involvement, recreational activities, and work-related activities with less than or equal to 7/10 pain and maximal function.          Plan     Continue PT per POC, assess tolerance to today's session,  progress exercise as tolerated and appropriate    Plan of care Certification: 9/25/2020 to 11/24/2020.     Outpatient Physical Therapy 2 times weekly for 8 weeks to  include any combination of the following interventions: virtual visits, dry needling, modalities, electrical stimulation (IFC, Pre-Mod, Attended with Functional Dry Needling), Manual Therapy, Neuromuscular Re-ed, Patient Education, Self Care, Therapeutic Activites and Therapeutic Exercise     Ines Angela, PT,DPT  10/16/2020

## 2020-10-15 ENCOUNTER — TELEPHONE (OUTPATIENT)
Dept: PHARMACY | Facility: CLINIC | Age: 40
End: 2020-10-15

## 2020-10-15 NOTE — TELEPHONE ENCOUNTER
Good Morning,     The prior authorization for Jyo Recinos's [employer] prescription has been APPROVED FROM 10/14/2020 TO 10/13/2021 with copayment of $0.00.       We were unable to reach patient on 10/15/2020.  A voicemail was left for the patient of the prior authorization status along with an appropriate pharmacy phone number should he/she have questions.    If there are any additional questions or concerns, please contact me.    Thank You!   Ninfa Conner CPhT, B.A  Patient Care Advocate   [facility]  [email or web address]  Phone: [phone] Ext [phone]  Fax: [phone]

## 2020-10-16 ENCOUNTER — CLINICAL SUPPORT (OUTPATIENT)
Dept: REHABILITATION | Facility: HOSPITAL | Age: 40
End: 2020-10-16
Payer: COMMERCIAL

## 2020-10-16 DIAGNOSIS — R53.1 DECREASED STRENGTH: ICD-10-CM

## 2020-10-16 PROCEDURE — 97110 THERAPEUTIC EXERCISES: CPT | Mod: PN

## 2020-10-16 PROCEDURE — 97140 MANUAL THERAPY 1/> REGIONS: CPT | Mod: PN

## 2020-10-21 ENCOUNTER — CLINICAL SUPPORT (OUTPATIENT)
Dept: REHABILITATION | Facility: HOSPITAL | Age: 40
End: 2020-10-21
Payer: COMMERCIAL

## 2020-10-21 DIAGNOSIS — R53.1 DECREASED STRENGTH: ICD-10-CM

## 2020-10-21 PROCEDURE — 97110 THERAPEUTIC EXERCISES: CPT | Mod: PN | Performed by: PHYSICAL THERAPIST

## 2020-10-21 PROCEDURE — 97140 MANUAL THERAPY 1/> REGIONS: CPT | Mod: PN | Performed by: PHYSICAL THERAPIST

## 2020-10-21 PROCEDURE — 97112 NEUROMUSCULAR REEDUCATION: CPT | Mod: PN | Performed by: PHYSICAL THERAPIST

## 2020-10-21 NOTE — PROGRESS NOTES
"  Physical Therapy Assistant Treatment Note     [facility]  [facility] [id number]    Therapy Diagnosis:   Encounter Diagnosis   Name Primary?    Decreased strength      Physician: Albert Lebron MD    Visit Date: 10/21/2020    Physician Orders: PT Eval and Treat  Medical Diagnosis from Referral: Chronic Low Back Pain without Sciatica  Evaluation Date: 9/25/2020  Authorization Period Expiration: 12/31/2020  Plan of Care Expiration: 11/24/2020  Visit #/Visits authorized:  7/ 20     Time In: 1048   Time Out: 1145    Total Billable Time: 57 minutes    Precautions: Standard    Subjective     Pt reports that she was very sore after her last session and feels that maybe she did too much.   She was not compliant with home exercise program. She is looking for a belt to do hip abduction isometric  Response to previous treatment: good   Functional change: nothing significant    Pain: 5/10   Location: left and mid low back, mid back      Objective     Joy received the following manual therapy techniques: Myofacial release and Soft tissue Mobilization were applied to bilateral thoracic and lumbar paraspinals, quadratus lumborum, bilateral upper trapezius muscles and left rhomboids for 30 minutes prior to exercises.  STM to bernadine-scapular region    Joy received neuromuscular re-education techniques to facilitate right gluteal muscle contraction and to coordinate posterior firing patterns for 8 minutes.    Joy received therapeutic exercises to develop strength, posture and core stabilization for 19 minutes including:    Isometric hip abd/add 2x10 10" holds  PPT 2 x 1'   TA activation in hooklying 3" holds x 1'   Bridges 2x10 (cues to perform glute set prior to lifting and to breath out on raising her pelvis up)   Pirifomis stretch 3x30"   Glute stretch 3x30"   Prone triple extension 2 x 10 each  Prayer stretch 3 x 30 seconds forward, lateral, both sides    Deferred:   Prone hip extension " "2x10  Clamshells 2 x 10   Sidelying hip abduction 2x10   Open books x 10 each side   Thoracic extension over foam roll 10 x 10 seconds   Rhomboid stretch in doorway 3x30"    Home Exercises and Patient Education Provided     Education/Self-Care provided:   · Patient educated on the impairments noted above and the effects of physical therapy intervention to improve overall condition and QOL.   - educated pt on the need to be mindful of maintaining good core recruitment through performance of daily tasks such as walking or lifting     Written Home Exercises Provided: yes.  Exercises were reviewed and Joy was able to demonstrate them prior to the end of the session.  Joy demonstrated good  understanding of the education provided.      See EMR under Patient Instructions for exercises provided 9/25/2020.    Assessment     Patient had poor gluteal firing pattern, with compensation at the upper trapezius, thoracic paraspinals, and rhomboids, which improved with manual and neuromuscular re-education.  Encouraged diaphragmatic breathing with cue of slow, full breath filling the abdomen, followed by the chest.  Patient required verbal cues throughout therapeutic exercises today.    Joy is progressing well towards her goals.   Pt prognosis is Good.     Pt will continue to benefit from skilled outpatient physical therapy to address the deficits listed in the problem list box on initial evaluation, provide pt/family education and to maximize pt's level of independence in the home and community environment.     Pt's spiritual, cultural and educational needs considered and pt agreeable to plan of care and goals.     Anticipated barriers to physical therapy:  co-morbidities, sedentary lifestyle and motivation to improve condition    GOALS:     Short Term Goals:  4 weeks     1. Pain: Pt will demonstrate improved pain by reports of less than or equal to 9/10 worst pain on the verbal rating scale in order to progress toward " maximal functional ability and improve QOL.  progressing, not met   2. Function: Patient will demonstrate improved function as indicated by a functional limitation score of less than or equal to 40% on the Modified Oswestry Low Back Pain Disability Questionnaire.  progressing, not met   3. Mobility: Patient will improve AROM to 50% of stated goals, listed in objective measures above, in order to progress towards independence with functional activities.   progressing, not met   4. Strength: Patient will improve strength to 50% of stated goals, listed in objective measures above, in order to progress towards independence with functional activities.   progressing, not met   5. HEP: Patient will demonstrate independence with HEP in order to progress toward functional independence.  progressing, not met      Long Term Goals:  8 weeks     1. Pain: Pt will demonstrate improved pain by reports of less than or equal to 8/10 worst pain on the verbal rating scale in order to progress toward maximal functional ability and improve QOL.       2. Function: Patient will demonstrate improved function as indicated by a functional limitation score of less than or equal to 38% on the Modified Oswestry Low Back Pain Disability Questionnaire.     3. Mobility: Patient will improve AROM to stated goals, listed in objective measures above, in order to return to maximal functional potential and improve quality of life.     4. Strength: Patient will improve strength to stated goals, listed in objective measures above, in order to improve functional independence and quality of life.     5. Patient will return to normal ADL's, IADL's, community involvement, recreational activities, and work-related activities with less than or equal to 7/10 pain and maximal function.          Plan     Continue PT per POC, assess tolerance to today's session,  progress exercise as tolerated and appropriate    Plan of care Certification: 9/25/2020 to  11/24/2020.     Outpatient Physical Therapy 2 times weekly for 8 weeks to include any combination of the following interventions: virtual visits, dry needling, modalities, electrical stimulation (IFC, Pre-Mod, Attended with Functional Dry Needling), Manual Therapy, Neuromuscular Re-ed, Patient Education, Self Care, Therapeutic Activites and Therapeutic Exercise     Sofya Miller, PT, DPT, CSCS, PPCES  10/21/2020

## 2020-10-22 NOTE — PROGRESS NOTES
"  Physical Therapy Assistant Treatment Note     [facility]  [id number]    Therapy Diagnosis:   Encounter Diagnosis   Name Primary?    Decreased strength      Physician: Albert Lebron MD    Visit Date: 10/23/2020    Physician Orders: PT Eval and Treat  Medical Diagnosis from Referral: Chronic Low Back Pain without Sciatica  Evaluation Date: 9/25/2020  Authorization Period Expiration: 12/31/2020  Plan of Care Expiration: 11/24/2020  Visit #/Visits authorized:  8/ 20     Time In: 8:15 am    Time Out: 9:00 am     Total Billable Time: 45 minutes    Precautions: Standard    Subjective     Pt reports that she is very sore today. She states that she is having a lot more pain from between her shoulder blades to down her back. States that she was very sore after her last session.   She was not compliant with home exercise program. She is looking for a belt to do hip abduction isometric  Response to previous treatment: good   Functional change: nothing significant    Pain: 5/10   Location: left and mid low back, mid back      Objective     Joy received the following manual therapy techniques: Myofacial release and Soft tissue Mobilization were applied to bilateral thoracic and lumbar paraspinals, quadratus lumborum, bilateral upper trapezius muscles and left rhomboids for 30 minutes prior to exercises.  STM to bernadine-scapular region  Long axis distraction of B hips    Joy received neuromuscular re-education techniques to facilitate right gluteal muscle contraction and to coordinate posterior firing patterns for 0 minutes.    Joy received therapeutic exercises (exercises performed today are bolded)  to develop strength, posture and core stabilization for 15 minutes including:    LTR 2 x 1'   Open books x 10 each side   Isometric hip abd/add 2x10 10" holds  PPT 2 x 1'   TA activation in hooklying 3" holds x 1'   Bridges 2x10 (cues to perform glute set prior to lifting and to breath out " "on raising her pelvis up)   Pirifomis stretch 3x30"   Glute stretch 3x30"   Prone triple extension 2 x 10 each  Prayer stretch 3 x 30 seconds forward, lateral, both sides    Deferred:   Prone hip extension 2x10  Clamshells 2 x 10   Sidelying hip abduction 2x10   Thoracic extension over foam roll 10 x 10 seconds   Rhomboid stretch in doorway 3x30"    Home Exercises and Patient Education Provided     Education/Self-Care provided:   · Patient educated on the impairments noted above and the effects of physical therapy intervention to improve overall condition and QOL.   - educated pt on the need to be mindful of maintaining good core recruitment through performance of daily tasks such as walking or lifting     Written Home Exercises Provided: yes.  Exercises were reviewed and Joy was able to demonstrate them prior to the end of the session.  Joy demonstrated good  understanding of the education provided.      See EMR under Patient Instructions for exercises provided 9/25/2020.    Assessment     Pt with fair tolerance of today's session. She continues to report high complaints of pain today, however, does report some reduction of pain following manual therapy. Pt educated in the need for compliance with HEP in order to achieve optimal outcomes. Pt will continue to benefit from skilled PT intervention in order to further progress core stability and postural control.   Joy is progressing well towards her goals.   Pt prognosis is Good.     Pt will continue to benefit from skilled outpatient physical therapy to address the deficits listed in the problem list box on initial evaluation, provide pt/family education and to maximize pt's level of independence in the home and community environment.     Pt's spiritual, cultural and educational needs considered and pt agreeable to plan of care and goals.     Anticipated barriers to physical therapy:  co-morbidities, sedentary lifestyle and motivation to improve " condition    GOALS:     Short Term Goals:  4 weeks     1. Pain: Pt will demonstrate improved pain by reports of less than or equal to 9/10 worst pain on the verbal rating scale in order to progress toward maximal functional ability and improve QOL.  progressing, not met   2. Function: Patient will demonstrate improved function as indicated by a functional limitation score of less than or equal to 40% on the Modified Oswestry Low Back Pain Disability Questionnaire.  progressing, not met   3. Mobility: Patient will improve AROM to 50% of stated goals, listed in objective measures above, in order to progress towards independence with functional activities.   progressing, not met   4. Strength: Patient will improve strength to 50% of stated goals, listed in objective measures above, in order to progress towards independence with functional activities.   progressing, not met   5. HEP: Patient will demonstrate independence with HEP in order to progress toward functional independence.  progressing, not met      Long Term Goals:  8 weeks     1. Pain: Pt will demonstrate improved pain by reports of less than or equal to 8/10 worst pain on the verbal rating scale in order to progress toward maximal functional ability and improve QOL.       2. Function: Patient will demonstrate improved function as indicated by a functional limitation score of less than or equal to 38% on the Modified Oswestry Low Back Pain Disability Questionnaire.     3. Mobility: Patient will improve AROM to stated goals, listed in objective measures above, in order to return to maximal functional potential and improve quality of life.     4. Strength: Patient will improve strength to stated goals, listed in objective measures above, in order to improve functional independence and quality of life.     5. Patient will return to normal ADL's, IADL's, community involvement, recreational activities, and work-related activities with less than or equal to 7/10  pain and maximal function.          Plan     Continue PT per POC, assess tolerance to today's session,  progress exercise as tolerated and appropriate    Plan of care Certification: 9/25/2020 to 11/24/2020.     Outpatient Physical Therapy 2 times weekly for 8 weeks to include any combination of the following interventions: virtual visits, dry needling, modalities, electrical stimulation (IFC, Pre-Mod, Attended with Functional Dry Needling), Manual Therapy, Neuromuscular Re-ed, Patient Education, Self Care, Therapeutic Activites and Therapeutic Exercise     Ines Angela, PT, DPT  10/23/2020

## 2020-10-23 ENCOUNTER — CLINICAL SUPPORT (OUTPATIENT)
Dept: REHABILITATION | Facility: HOSPITAL | Age: 40
End: 2020-10-23
Payer: COMMERCIAL

## 2020-10-23 DIAGNOSIS — R53.1 DECREASED STRENGTH: ICD-10-CM

## 2020-10-23 PROCEDURE — 97140 MANUAL THERAPY 1/> REGIONS: CPT | Mod: PN

## 2020-10-23 PROCEDURE — 97110 THERAPEUTIC EXERCISES: CPT | Mod: PN

## 2020-10-28 ENCOUNTER — CLINICAL SUPPORT (OUTPATIENT)
Dept: REHABILITATION | Facility: HOSPITAL | Age: 40
End: 2020-10-28
Payer: COMMERCIAL

## 2020-10-28 DIAGNOSIS — R53.1 DECREASED STRENGTH: ICD-10-CM

## 2020-10-28 PROCEDURE — 97140 MANUAL THERAPY 1/> REGIONS: CPT | Mod: PN | Performed by: PHYSICAL THERAPIST

## 2020-10-28 PROCEDURE — 97110 THERAPEUTIC EXERCISES: CPT | Mod: PN | Performed by: PHYSICAL THERAPIST

## 2020-10-28 NOTE — PROGRESS NOTES
"  Physical Therapy Assistant Treatment Note     [facility]  [id number]    Therapy Diagnosis:   Encounter Diagnosis   Name Primary?    Decreased strength      Physician: Albert Lebron MD    Visit Date: 10/28/2020    Physician Orders: PT Eval and Treat  Medical Diagnosis from Referral: Chronic Low Back Pain without Sciatica  Evaluation Date: 9/25/2020  Authorization Period Expiration: 12/31/2020  Plan of Care Expiration: 11/24/2020  Visit #/Visits authorized:  9/ 20     Time In: 0750    Time Out: 0830     Total Billable Time: 40 minutes    Precautions: Standard    Subjective     Pt reports that she remains sore, more so in her upper back and upper trapezius muscles.   She was not compliant with home exercise program.   Response to previous treatment: temporary relief   Functional change: nothing significant    Pain: 4/10   Location: left and mid low back, mid back, upper trapezius muscles    Objective     Joy received the following manual therapy techniques: Myofacial release and Soft tissue Mobilization (STM) were applied to bilateral thoracic and lumbar paraspinals, quadratus lumborum, bilateral upper trapezius muscles and left rhomboids for 25 minutes prior to exercises.  STM to bernadine-scapular region      Joy received neuromuscular re-education techniques to facilitate right gluteal muscle contraction and to coordinate posterior firing patterns for 0 minutes.    Joy received therapeutic exercises (exercises performed today are bolded)  to develop strength, posture and core stabilization for 15 minutes including:    Upper bike ergometer x 2 minutes  Recumbent bike x 2 minutes   LTR 2 x 1'   Open books x 10 each side   Isometric hip abd/add 2x10 10" holds  PPT 2 x 1'   TA activation in hooklying 3" holds x 1'       Deferred:   Bridges 2x10 (cues to perform glute set prior to lifting and to breathe out on raising her pelvis up)   Pirifomis stretch 3x30"   Glute stretch 3x30" " "  Prone triple extension 2 x 10 each  Prayer stretch 3 x 30 seconds forward, lateral, both sides  Prone hip extension 2x10  Clamshells 2 x 10   Sidelying hip abduction 2x10   Thoracic extension over foam roll 10 x 10 seconds   Rhomboid stretch in doorway 3x30"    Home Exercises and Patient Education Provided     Education/Self-Care provided:   · Patient educated on the impairments noted above and the effects of physical therapy intervention to improve overall condition and QOL.   - educated pt on the need to be mindful of maintaining good core recruitment through performance of daily tasks such as walking or lifting     Written Home Exercises Provided: yes.  Exercises were reviewed and Joy was able to demonstrate them prior to the end of the session.  Joy demonstrated good  understanding of the education provided.      See EMR under Patient Instructions for exercises provided 9/25/2020.    Assessment     Patient had hypertonicity of the left thoracic paraspinals, bilateral upper trapezius muscles, and tenderness over the right greater than left PSIS, which improved with manual therapy.  Low level mobility exercises performed today to improve joint nutrition and range of motion.  Reviewed with patient previous education regarding the need for compliance with HEP in order to achieve optimal outcomes.   Joy is progressing well towards her goals.   Pt prognosis is Good.     Pt will continue to benefit from skilled outpatient physical therapy to address the deficits listed in the problem list box on initial evaluation, provide pt/family education and to maximize pt's level of independence in the home and community environment.     Pt's spiritual, cultural and educational needs considered and pt agreeable to plan of care and goals.     Anticipated barriers to physical therapy:  co-morbidities, sedentary lifestyle and motivation to improve condition    GOALS:     Short Term Goals:  4 weeks     1. Pain: Pt will " demonstrate improved pain by reports of less than or equal to 9/10 worst pain on the verbal rating scale in order to progress toward maximal functional ability and improve QOL.  progressing, not met   2. Function: Patient will demonstrate improved function as indicated by a functional limitation score of less than or equal to 40% on the Modified Oswestry Low Back Pain Disability Questionnaire.  progressing, not met   3. Mobility: Patient will improve AROM to 50% of stated goals, listed in objective measures above, in order to progress towards independence with functional activities.   progressing, not met   4. Strength: Patient will improve strength to 50% of stated goals, listed in objective measures above, in order to progress towards independence with functional activities.   progressing, not met   5. HEP: Patient will demonstrate independence with HEP in order to progress toward functional independence.  progressing, not met      Long Term Goals:  8 weeks     1. Pain: Pt will demonstrate improved pain by reports of less than or equal to 8/10 worst pain on the verbal rating scale in order to progress toward maximal functional ability and improve QOL.       2. Function: Patient will demonstrate improved function as indicated by a functional limitation score of less than or equal to 38% on the Modified Oswestry Low Back Pain Disability Questionnaire.     3. Mobility: Patient will improve AROM to stated goals, listed in objective measures above, in order to return to maximal functional potential and improve quality of life.     4. Strength: Patient will improve strength to stated goals, listed in objective measures above, in order to improve functional independence and quality of life.     5. Patient will return to normal ADL's, IADL's, community involvement, recreational activities, and work-related activities with less than or equal to 7/10 pain and maximal function.          Plan     Continue PT per POC, assess  tolerance to today's session,  progress exercise as tolerated and appropriate    Plan of care Certification: 9/25/2020 to 11/24/2020.     Outpatient Physical Therapy 2 times weekly for 8 weeks to include any combination of the following interventions: virtual visits, dry needling, modalities, electrical stimulation (IFC, Pre-Mod, Attended with Functional Dry Needling), Manual Therapy, Neuromuscular Re-ed, Patient Education, Self Care, Therapeutic Activites and Therapeutic Exercise     Sofya Miller, PT, DPT, CSCS, PPCES  10/28/2020

## 2020-11-04 ENCOUNTER — CLINICAL SUPPORT (OUTPATIENT)
Dept: REHABILITATION | Facility: HOSPITAL | Age: 40
End: 2020-11-04
Payer: COMMERCIAL

## 2020-11-04 DIAGNOSIS — R53.1 DECREASED STRENGTH: ICD-10-CM

## 2020-11-04 PROCEDURE — 97140 MANUAL THERAPY 1/> REGIONS: CPT | Mod: PN | Performed by: PHYSICAL THERAPIST

## 2020-11-04 PROCEDURE — 97110 THERAPEUTIC EXERCISES: CPT | Mod: PN | Performed by: PHYSICAL THERAPIST

## 2020-11-04 NOTE — PROGRESS NOTES
"  Physical Therapy Treatment Note     [facility]  [id number]    Therapy Diagnosis:   Encounter Diagnosis   Name Primary?    Decreased strength      Physician: Albert Lebron MD    Visit Date: 11/4/2020    Physician Orders: PT Eval and Treat  Medical Diagnosis from Referral: Chronic Low Back Pain without Sciatica  Evaluation Date: 9/25/2020  Authorization Period Expiration: 12/31/2020  Plan of Care Expiration: 11/24/2020  Visit #/Visits authorized:  10/ 20     Time In: 1048    Time Out: 1145  Total Billable Time: 57 minutes    Precautions: Standard    Subjective     Pt reports that she cannot recall how she felt following previous session, but she is hurting throughout her whole back today.   She was not compliant with home exercise program.   Response to previous treatment: patient reports that she cannot recall  Functional change: nothing significant    Pain: 4/10   Location: left and mid low back, mid back, upper trapezius muscles    Objective     Joy received the following manual therapy techniques: Myofacial release and Soft tissue Mobilization (STM) were applied to bilateral thoracic and lumbar paraspinals, quadratus lumborum, bilateral upper trapezius muscles and left rhomboids for 25 minutes prior to exercises.      Joy received therapeutic exercises (exercises performed today are bolded)  to develop strength, posture and core stabilization for 32 minutes including:    Upper bike ergometer x 2 minutes  Recumbent bike x 2 minutes deferred  Pirifomis stretch 3x30"   Glute stretch 3x30"   LTR 2 x 1'   Open books x 10 each side   Isometric hip abd/add 2x10 10" holds  TA activation in hooklying 3" holds x 1'   PPT 2 x 1'   Bridges 2x10 (cues to perform glute set prior to lifting and to breathe out on raising her pelvis up)   Prone hip extension 2x10  Clamshells 2 x 10   Sidelying hip abduction 2x10     Deferred:   Prone triple extension 2 x 10 each  Prayer stretch 3 x 30 " "seconds forward, lateral, both sides  Thoracic extension over foam roll 10 x 10 seconds   Rhomboid stretch in doorway 3x30"    Home Exercises and Patient Education Provided     Education/Self-Care provided:   · Patient educated on the impairments noted above and the effects of physical therapy intervention to improve overall condition and QOL.   - educated pt on the need to be mindful of maintaining good core recruitment through performance of daily tasks such as walking or lifting     Written Home Exercises Provided: yes.  Exercises were reviewed and Joy was able to demonstrate them prior to the end of the session.  Joy demonstrated good  understanding of the education provided.      See EMR under Patient Instructions for exercises provided 9/25/2020.    Assessment     Patient had tension throughout the thoracic and lumbar musculature, which improved with manual therapy.  She fatigued with strengthening exercises, but had no increase in pain symptoms.  Reviewed with patient previous education regarding the need for compliance with HEP in order to achieve optimal outcomes.   Joy is progressing well towards her goals.   Pt prognosis is Good.     Pt will continue to benefit from skilled outpatient physical therapy to address the deficits listed in the problem list box on initial evaluation, provide pt/family education and to maximize pt's level of independence in the home and community environment.     Pt's spiritual, cultural and educational needs considered and pt agreeable to plan of care and goals.     Anticipated barriers to physical therapy:  co-morbidities, sedentary lifestyle and motivation to improve condition    GOALS:     Short Term Goals:  4 weeks     1. Pain: Pt will demonstrate improved pain by reports of less than or equal to 9/10 worst pain on the verbal rating scale in order to progress toward maximal functional ability and improve QOL.  progressing, not met   2. Function: Patient will " demonstrate improved function as indicated by a functional limitation score of less than or equal to 40% on the Modified Oswestry Low Back Pain Disability Questionnaire.  progressing, not met   3. Mobility: Patient will improve AROM to 50% of stated goals, listed in objective measures above, in order to progress towards independence with functional activities.   progressing, not met   4. Strength: Patient will improve strength to 50% of stated goals, listed in objective measures above, in order to progress towards independence with functional activities.   progressing, not met   5. HEP: Patient will demonstrate independence with HEP in order to progress toward functional independence.  progressing, not met      Long Term Goals:  8 weeks     1. Pain: Pt will demonstrate improved pain by reports of less than or equal to 8/10 worst pain on the verbal rating scale in order to progress toward maximal functional ability and improve QOL.       2. Function: Patient will demonstrate improved function as indicated by a functional limitation score of less than or equal to 38% on the Modified Oswestry Low Back Pain Disability Questionnaire.     3. Mobility: Patient will improve AROM to stated goals, listed in objective measures above, in order to return to maximal functional potential and improve quality of life.     4. Strength: Patient will improve strength to stated goals, listed in objective measures above, in order to improve functional independence and quality of life.     5. Patient will return to normal ADL's, IADL's, community involvement, recreational activities, and work-related activities with less than or equal to 7/10 pain and maximal function.          Plan     Continue PT per POC, assess tolerance to today's session,  progress exercise as tolerated and appropriate    Plan of care Certification: 9/25/2020 to 11/24/2020.     Outpatient Physical Therapy 2 times weekly for 8 weeks to include any combination of the  following interventions: virtual visits, dry needling, modalities, electrical stimulation (IFC, Pre-Mod, Attended with Functional Dry Needling), Manual Therapy, Neuromuscular Re-ed, Patient Education, Self Care, Therapeutic Activites and Therapeutic Exercise     Sofya Miller, PT, DPT, CSCS, PPCES  11/4/2020

## 2020-11-05 ENCOUNTER — PATIENT OUTREACH (OUTPATIENT)
Dept: ADMINISTRATIVE | Facility: OTHER | Age: 40
End: 2020-11-05

## 2020-11-05 ENCOUNTER — OFFICE VISIT (OUTPATIENT)
Dept: UROLOGY | Facility: CLINIC | Age: 40
End: 2020-11-05
Payer: COMMERCIAL

## 2020-11-05 VITALS
SYSTOLIC BLOOD PRESSURE: 130 MMHG | HEIGHT: 68 IN | BODY MASS INDEX: 34.25 KG/M2 | DIASTOLIC BLOOD PRESSURE: 74 MMHG | HEART RATE: 71 BPM | WEIGHT: 226 LBS

## 2020-11-05 DIAGNOSIS — N31.9 NEUROGENIC BLADDER: Primary | ICD-10-CM

## 2020-11-05 PROCEDURE — 99499 UNLISTED E&M SERVICE: CPT | Mod: S$GLB,,, | Performed by: UROLOGY

## 2020-11-05 PROCEDURE — 51784 PR ANAL/URINARY MUSCLE STUDY: ICD-10-PCS | Mod: 26,51,S$GLB, | Performed by: UROLOGY

## 2020-11-05 PROCEDURE — 51728 PR COMPLEX CYSTOMETROGRAM VOIDING PRESSURE STUDIES: ICD-10-PCS | Mod: 26,S$GLB,, | Performed by: UROLOGY

## 2020-11-05 PROCEDURE — 99999 PR PBB SHADOW E&M-EST. PATIENT-LVL III: CPT | Mod: PBBFAC,,, | Performed by: UROLOGY

## 2020-11-05 PROCEDURE — 51784 ANAL/URINARY MUSCLE STUDY: CPT | Mod: 26,51,S$GLB, | Performed by: UROLOGY

## 2020-11-05 PROCEDURE — 51728 CYSTOMETROGRAM W/VP: CPT | Mod: 26,S$GLB,, | Performed by: UROLOGY

## 2020-11-05 PROCEDURE — 99999 PR PBB SHADOW E&M-EST. PATIENT-LVL III: ICD-10-PCS | Mod: PBBFAC,,, | Performed by: UROLOGY

## 2020-11-05 PROCEDURE — 51797 INTRAABDOMINAL PRESSURE TEST: CPT | Mod: 26,S$GLB,, | Performed by: UROLOGY

## 2020-11-05 PROCEDURE — 51741 PR UROFLOWMETRY, COMPLEX: ICD-10-PCS | Mod: 26,S$GLB,, | Performed by: UROLOGY

## 2020-11-05 PROCEDURE — 51797 PR VOIDING PRESS STUDY INTRA-ABDOMINAL VOID: ICD-10-PCS | Mod: 26,S$GLB,, | Performed by: UROLOGY

## 2020-11-05 PROCEDURE — 51741 ELECTRO-UROFLOWMETRY FIRST: CPT | Mod: 26,S$GLB,, | Performed by: UROLOGY

## 2020-11-05 PROCEDURE — 99499 NO LOS: ICD-10-PCS | Mod: S$GLB,,, | Performed by: UROLOGY

## 2020-11-05 NOTE — PROGRESS NOTES
11/05/2020    Procedure: Urodynamics    HPI: 40-year-old female with possible neurogenic bladder.  Patient has definite urinary retention.  Patient states that she has had some back pain for some time, she has been evaluated by the [facility] and neuro surgery, currently no need for surgical intervention.  Patient states that she feels a lot of this started back in April of 2019 after a total abdominal hysterectomy with bilateral salpingo oophorectomy.  This was done for chronic pelvic pain, which was not improved after doing pelvic floor physical therapy for 2 years.  Patient states she has had no other pelvic surgeries, no slings.  Patient states that she has transient symptoms to where it time she has extreme urgency than other times she has to push to void.  She is only getting up 1 time per evening to void, no evidence of increased frequency during the daytime.  She does have intermittent urgency as stated.  No gross hematuria, no microscopic hematuria, she has never been a smoker.  She has had no other urological gynecological history, except for that which was stated.  She has a brother with stones, no other family history of urological cancers.  Patient has had no dysuria, she does state she has constipation which seems to be worse consistent with all the other symptoms.  Patient feels like she voided to completion today, but does have an elevated PVR.    Procedure in Detail: Pt voided and a PVR was measured with straight cath using sterile technique. Urodynamics catheters were placed in bladder and rectum. EMG pads were placed. The patient was positioned on the urinal over the CMG cup. The test was performed for pressure-flow studies of storage and voiding function. Valsalva was performed at intervals to evaluate catheter function and detrusor response/incontinence. PVR was again measured after the patient voided. The catheters were withdrawn and the patient allowed to dress.      Uroflow: post fill  study    Max flow- 11 ml/sec  Voided volume- 276 ml  PVR- 17 ml      Urodynamics:    Filling Phase:  Resting Pressure- 0 cm/H2O  First sensation- 132 ml  First desire- 159 ml  Strong desire- 294 ml  Capacity- 294ml  Uninhibited contractions/detrusor instability- none  Valsalva demonstrates no leak.  No evidence of loss of compliance.    Voiding Phase:  Max flow rate- 11 ml/sec  Voided volume- 276 ml  Residual- 17 ml  Intraabdominal pressure- 15 cm/H2O   Peak detrusor pressure- 58 cm/H2O  Peak flow pressure- 47 cm/H2O  normal detrusor pressure shape, but high pressure.  EMG within normal limits.      Impression:  No evidence of detrusor instability or stress incontinence, no loss compliance.  Patient had very pressures, no evidence of significant straining though.  Coordinated bladder contractions were demonstrated, with no evidence of postvoid residuals.  Decreased flow rate.  More consistent with outflow obstruction, will clinically correlate.  No evidence of neurogenic bladder though.

## 2020-11-06 ENCOUNTER — CLINICAL SUPPORT (OUTPATIENT)
Dept: REHABILITATION | Facility: HOSPITAL | Age: 40
End: 2020-11-06
Payer: COMMERCIAL

## 2020-11-06 DIAGNOSIS — R53.1 DECREASED STRENGTH: ICD-10-CM

## 2020-11-06 PROCEDURE — 97110 THERAPEUTIC EXERCISES: CPT | Mod: PN | Performed by: PHYSICAL THERAPIST

## 2020-11-06 PROCEDURE — 97140 MANUAL THERAPY 1/> REGIONS: CPT | Mod: PN | Performed by: PHYSICAL THERAPIST

## 2020-11-06 NOTE — PROGRESS NOTES
"  Physical Therapy Assistant Treatment Note     [facility]  [facility]: [id number]    Therapy Diagnosis:   Encounter Diagnosis   Name Primary?    Decreased strength      Physician: Albert Lebron MD    Visit Date: 11/6/2020    Physician Orders: PT Eval and Treat  Medical Diagnosis from Referral: Chronic Low Back Pain without Sciatica  Evaluation Date: 9/25/2020  Authorization Period Expiration: 12/31/2020  Plan of Care Expiration: 11/24/2020  Visit #/Visits authorized:  11/ 20     Time In: 0951    Time Out: 1032  Total Billable Time: 41 minutes    Precautions: Standard    Subjective     Pt reports that she was feeling better, but is hurting today.   She was not compliant with home exercise program.   Response to previous treatment: temporary relief   Functional change: nothing significant    Pain: 4/10   Location: left and mid low back, mid back, upper trapezius muscles    Objective     Joy received the following manual therapy techniques: Myofacial release and Soft tissue Mobilization (STM) were applied to bilateral thoracic and lumbar paraspinals, quadratus lumborum, bilateral upper trapezius muscles and rhomboids for 25 minutes prior to exercises.      Joy received therapeutic exercises (exercises performed today are bolded)  to develop strength, posture and core stabilization for 15 minutes including:    Upper bike ergometer x 2 minutes  Open books x 10 each side   PPT 2 x 1'   TA activation in hooklying 3" holds x 1'   Prone hip extension x 1'  Prone bilateral hip extension x 1'  Clamshells x 1'  Sidelying hip abduction x 1'     Deferred:   Isometric hip abd/add 2x10 10" holds  Recumbent bike x 2 minutes   LTR 2 x 1'   Bridges 2x10 (cues to perform glute set prior to lifting and to breathe out on raising her pelvis up)   Pirifomis stretch 3x30"   Glute stretch 3x30"   Prone triple extension 2 x 10 each  Prayer stretch 3 x 30 seconds forward, lateral, both sides  Thoracic extension " "over foam roll 10 x 10 seconds   Rhomboid stretch in doorway 3x30"    Home Exercises and Patient Education Provided     Education/Self-Care provided:   · Patient educated on the impairments noted above and the effects of physical therapy intervention to improve overall condition and QOL.   - educated pt on the need to be mindful of maintaining good core recruitment through performance of daily tasks such as walking or lifting     Written Home Exercises Provided: yes.  Exercises were reviewed and Joy was able to demonstrate them prior to the end of the session.  Joy demonstrated good  understanding of the education provided.      See EMR under Patient Instructions for exercises provided 9/25/2020.    Assessment     Patient tolerated session well today, but continues to fatigue with low level strengthening activities.  Again reviewed with patient previous education regarding the need for compliance with HEP in order to achieve optimal outcomes.   Joy is progressing well towards her goals.   Pt prognosis is Good.     Pt will continue to benefit from skilled outpatient physical therapy to address the deficits listed in the problem list box on initial evaluation, provide pt/family education and to maximize pt's level of independence in the home and community environment.     Pt's spiritual, cultural and educational needs considered and pt agreeable to plan of care and goals.     Anticipated barriers to physical therapy:  co-morbidities, sedentary lifestyle and motivation to improve condition    GOALS:     Short Term Goals:  4 weeks     1. Pain: Pt will demonstrate improved pain by reports of less than or equal to 9/10 worst pain on the verbal rating scale in order to progress toward maximal functional ability and improve QOL.  progressing, not met   2. Function: Patient will demonstrate improved function as indicated by a functional limitation score of less than or equal to 40% on the Modified Oswestry Low Back " Pain Disability Questionnaire.  progressing, not met   3. Mobility: Patient will improve AROM to 50% of stated goals, listed in objective measures above, in order to progress towards independence with functional activities.   progressing, not met   4. Strength: Patient will improve strength to 50% of stated goals, listed in objective measures above, in order to progress towards independence with functional activities.   progressing, not met   5. HEP: Patient will demonstrate independence with HEP in order to progress toward functional independence.  progressing, not met      Long Term Goals:  8 weeks     1. Pain: Pt will demonstrate improved pain by reports of less than or equal to 8/10 worst pain on the verbal rating scale in order to progress toward maximal functional ability and improve QOL.       2. Function: Patient will demonstrate improved function as indicated by a functional limitation score of less than or equal to 38% on the Modified Oswestry Low Back Pain Disability Questionnaire.     3. Mobility: Patient will improve AROM to stated goals, listed in objective measures above, in order to return to maximal functional potential and improve quality of life.     4. Strength: Patient will improve strength to stated goals, listed in objective measures above, in order to improve functional independence and quality of life.     5. Patient will return to normal ADL's, IADL's, community involvement, recreational activities, and work-related activities with less than or equal to 7/10 pain and maximal function.          Plan     Continue PT per POC, assess tolerance to today's session,  progress exercise as tolerated and appropriate    Plan of care Certification: 9/25/2020 to 11/24/2020.     Outpatient Physical Therapy 2 times weekly for 8 weeks to include any combination of the following interventions: virtual visits, dry needling, modalities, electrical stimulation (IFC, Pre-Mod, Attended with Functional Dry  Mitesh), Manual Therapy, Neuromuscular Re-ed, Patient Education, Self Care, Therapeutic Activites and Therapeutic Exercise     Sofya Miller, PT, DPT, CSCS, PPCES  11/6/2020

## 2020-11-09 ENCOUNTER — OFFICE VISIT (OUTPATIENT)
Dept: UROLOGY | Facility: CLINIC | Age: 40
End: 2020-11-09
Payer: COMMERCIAL

## 2020-11-09 VITALS
SYSTOLIC BLOOD PRESSURE: 122 MMHG | WEIGHT: 223.75 LBS | BODY MASS INDEX: 33.91 KG/M2 | TEMPERATURE: 98 F | HEIGHT: 68 IN | DIASTOLIC BLOOD PRESSURE: 76 MMHG

## 2020-11-09 DIAGNOSIS — N31.9 NEUROGENIC BLADDER: Primary | ICD-10-CM

## 2020-11-09 DIAGNOSIS — R39.15 URGENCY OF URINATION: ICD-10-CM

## 2020-11-09 PROCEDURE — 99999 PR PBB SHADOW E&M-EST. PATIENT-LVL III: ICD-10-PCS | Mod: PBBFAC,,, | Performed by: UROLOGY

## 2020-11-09 PROCEDURE — 3008F BODY MASS INDEX DOCD: CPT | Mod: CPTII,S$GLB,, | Performed by: UROLOGY

## 2020-11-09 PROCEDURE — 99999 PR PBB SHADOW E&M-EST. PATIENT-LVL III: CPT | Mod: PBBFAC,,, | Performed by: UROLOGY

## 2020-11-09 PROCEDURE — 99214 OFFICE O/P EST MOD 30 MIN: CPT | Mod: S$GLB,,, | Performed by: UROLOGY

## 2020-11-09 PROCEDURE — 99214 PR OFFICE/OUTPT VISIT, EST, LEVL IV, 30-39 MIN: ICD-10-PCS | Mod: S$GLB,,, | Performed by: UROLOGY

## 2020-11-09 PROCEDURE — 3008F PR BODY MASS INDEX (BMI) DOCUMENTED: ICD-10-PCS | Mod: CPTII,S$GLB,, | Performed by: UROLOGY

## 2020-11-09 RX ORDER — MIRABEGRON 50 MG/1
50 TABLET, FILM COATED, EXTENDED RELEASE ORAL DAILY
Qty: 30 TABLET | Refills: 11 | Status: SHIPPED | OUTPATIENT
Start: 2020-11-09 | End: 2021-04-15

## 2020-11-09 NOTE — PROGRESS NOTES
Chief Complaint:   Encounter Diagnosis   Name Primary?    Neurogenic bladder Yes       HPI:    11/9/20- patient returns today to discuss her urodynamics.  Symptoms thus far have not been specific for outflow obstruction.  40-year-old female with possible neurogenic bladder.  Patient has definite urinary retention.  Patient states that she has had some back pain for some time, she has been evaluated by the Pain Clinic and neuro surgery, currently no need for surgical intervention.  Patient states that she feels a lot of this started back in April of 2019 after a total abdominal hysterectomy with bilateral salpingo oophorectomy.  This was done for chronic pelvic pain, which was not improved after doing pelvic floor physical therapy for 2 years.  Patient states she has had no other pelvic surgeries, no slings.  Patient states that she has transient symptoms to where it time she has extreme urgency than other times she has to push to void.  She is only getting up 1 time per evening to void, no evidence of increased frequency during the daytime.  She does have intermittent urgency as stated.  No gross hematuria, no microscopic hematuria, she has never been a smoker.  She has had no other urological gynecological history, except for that which was stated.  She has a brother with stones, no other family history of urological cancers.  Patient has had no dysuria, she does state she has constipation which seems to be worse consistent with all the other symptoms.  Patient feels like she voided to completion today, but does have an elevated PVR.    Allergies:  Patient has no known allergies.    Medications:  has a current medication list which includes the following prescription(s): cetirizine, diclofenac, estradiol, fluoxetine, galcanezumab-gnlm, omeprazole, tizanidine, tramadol, and diazepam.    Review of Systems:  General: No fever, chills, fatigability, or weight loss.  Skin: No rashes, itching, or changes in color or  texture of skin.  Chest: Denies ARELLANO, cyanosis, wheezing, cough, and sputum production.  Abdomen: Appetite fine. No weight loss. Denies diarrhea, abdominal pain, hematemesis, or blood in stool.  Musculoskeletal: No joint stiffness or swelling. Denies back pain.  : As above.  All other review of systems negative.    PMH:   has a past medical history of ADHD (attention deficit hyperactivity disorder), B12 deficiency, Chronic low back pain, Endometriosis, Fibromyalgia syndrome, Idiopathic hypersomnia, Irritable bowel syndrome, Migraines, PONV (postoperative nausea and vomiting), Shingles, Undifferentiated inflammatory arthritis (2018), and Undifferentiated inflammatory arthritis (2018).    PSH:   has a past surgical history that includes Pelvic laparoscopy; Tubal ligation;  section; Robot-assisted laparoscopic abdominal hysterectomy using da Tom Xi (N/A, 4/10/2019); Robot-assisted laparoscopic salpingo-oophorectomy using da Tom Xi (Right, 4/10/2019); Robot-assisted laparoscopic lysis of adhesions using da Tom Xi (N/A, 4/10/2019); Cystoscopy (N/A, 4/10/2019); Hysterectomy; Oophorectomy; and Hysterectomy (2019).    FamHx: family history includes Cataracts in her maternal grandfather and maternal grandmother; Diabetes in her maternal grandmother; Glaucoma in her maternal grandmother; Heart disease in her father; No Known Problems in her mother; Skin cancer in her father, maternal grandmother, and paternal grandfather.    SocHx:  reports that she has never smoked. She has never used smokeless tobacco. She reports current alcohol use. She reports that she does not use drugs.      Physical Exam:  Vitals:    20 1126   BP: 122/76   Temp: 97.7 °F (36.5 °C)     General: A&Ox3, no apparent distress, no deformities  Neck: No masses, normal ROM  Lungs: normal inspiration, no use of accessory muscles  Heart: normal pulse, no arrhythmias  Abdomen: Soft, NT, ND, no masses, no hernias, no  hepatosplenomegaly  Skin: The skin is warm and dry. No jaundice.  Ext: No c/c/e.    Labs/Studies:    Urinalysis normal 9/20   mL 9/20  UDS possible outflow obstruction, no neurogenic bladder 11/5/20    Impression/Plan:     Neurogenic bladder- based on the PVR from last appointment, we were obviously concerned about a neurogenic bladder.  This was not found though with our urodynamic study, it was more consistent with outflow obstruction.  Although based on clinical history, outflow obstruction also seem suspicious.  Therefore we will attempt a Myrbetriq 50 mg trial, she will monitor for urinary retention or hypertension.  She will contact with any complaints, otherwise I will see her in 1 month with a uroflow and PVR.  If this fails outflow obstruction then maybe more likely, and will pursue pelvic floor physical therapy, as this was done prior to symptoms presenting, therefore may assist.  In addition would also perform a cystoscopy.

## 2020-11-10 ENCOUNTER — CLINICAL SUPPORT (OUTPATIENT)
Dept: REHABILITATION | Facility: HOSPITAL | Age: 40
End: 2020-11-10
Payer: COMMERCIAL

## 2020-11-10 DIAGNOSIS — R53.1 DECREASED STRENGTH: ICD-10-CM

## 2020-11-10 PROCEDURE — 97110 THERAPEUTIC EXERCISES: CPT | Mod: PN,CQ

## 2020-11-10 PROCEDURE — 97140 MANUAL THERAPY 1/> REGIONS: CPT | Mod: PN,CQ

## 2020-11-11 RX ORDER — FLUOXETINE HYDROCHLORIDE 20 MG/1
CAPSULE ORAL
Qty: 60 CAPSULE | Refills: 1 | Status: SHIPPED | OUTPATIENT
Start: 2020-11-11 | End: 2021-03-11 | Stop reason: SDUPTHER

## 2020-11-18 ENCOUNTER — CLINICAL SUPPORT (OUTPATIENT)
Dept: REHABILITATION | Facility: HOSPITAL | Age: 40
End: 2020-11-18
Payer: COMMERCIAL

## 2020-11-18 DIAGNOSIS — R53.1 DECREASED STRENGTH: ICD-10-CM

## 2020-11-18 PROCEDURE — 97140 MANUAL THERAPY 1/> REGIONS: CPT | Mod: PN | Performed by: PHYSICAL THERAPIST

## 2020-11-18 PROCEDURE — 97110 THERAPEUTIC EXERCISES: CPT | Mod: PN | Performed by: PHYSICAL THERAPIST

## 2020-11-18 NOTE — PROGRESS NOTES
"  Physical Therapy Assistant Treatment Note     [facility]  [facility] [id number]    Therapy Diagnosis:   Encounter Diagnosis   Name Primary?    Decreased strength      Physician: Albert Lebron MD    Visit Date: 11/18/2020    Physician Orders: PT Eval and Treat  Medical Diagnosis from Referral: Chronic Low Back Pain without Sciatica  Evaluation Date: 9/25/2020  Authorization Period Expiration: 12/31/2020  Plan of Care Expiration: 11/24/2020  Visit #/Visits authorized:  13/ 20     Time In: 1000    Time Out: 1045  Total Billable Time: 45 minutes    Precautions: Standard    Subjective     Pt reports that she had to cancel her last visit due to not feeling well.  Patient reports headache.  Patient reports that her whole back is hurting.  She was not compliant with home exercise program.   Response to previous treatment: reduced pain   Functional change: none reported    Pain: 4 /10   Location: "everywhere" bilateral cervical, thoracic and lumbar regions, worse on the R compared to the L    Objective     Joy received the following manual therapy techniques: Myofascial release and Soft tissue Mobilization (STM) were applied to bilateral cervical, thoracic, and lumbar paraspinals, quadratus lumborum, bilateral upper trapezius muscles and rhomboids for 30 minutes prior to exercises.    Joy received therapeutic exercises (exercises performed today are bolded)  to develop strength, posture and core stabilization for 15 minutes including:    Upper bike ergometer x 2 minutes  Open books x 10 each side   PPT 1 x 1'   +PPT with marches x 1'  TA activation in hooklying 3" holds x 1'   Prone hip extension x 1'  Prone bilateral hip extension x 1'  Clamshells x 1'  Sidelying hip abduction x 1'     Deferred:   Isometric hip abd/add 2x10 10" holds  Recumbent bike x 2 minutes   LTR 2 x 1'   Bridges 2x10 (cues to perform glute set prior to lifting and to breathe out on raising her pelvis up)   Pirifomis " "stretch 3x30"   Glute stretch 3x30"   Prone triple extension 2 x 10 each  Prayer stretch 3 x 30 seconds forward, lateral, both sides  Thoracic extension over foam roll 10 x 10 seconds   Rhomboid stretch in doorway 3x30"    Home Exercises and Patient Education Provided     Education/Self-Care provided:   · Patient educated on the impairments noted above and the effects of physical therapy intervention to improve overall condition and QOL.   - educated pt on the need to be mindful of maintaining good core recruitment through performance of daily tasks such as walking or lifting     Written Home Exercises Provided: yes.  Exercises were reviewed and Joy was able to demonstrate them prior to the end of the session.  Joy demonstrated good  understanding of the education provided.      See EMR under Patient Instructions for exercises provided 9/25/2020.    Assessment   Patient had hypertonicity, right greater than left, which improved with manual therapy.  Noted continued difficulty with posterior strengthening activities.      Joy is progressing well towards her goals.   Pt prognosis is Good.     Pt will continue to benefit from skilled outpatient physical therapy to address the deficits listed in the problem list box on initial evaluation, provide pt/family education and to maximize pt's level of independence in the home and community environment.     Pt's spiritual, cultural and educational needs considered and pt agreeable to plan of care and goals.     Anticipated barriers to physical therapy:  co-morbidities, sedentary lifestyle and motivation to improve condition    GOALS:     Short Term Goals:  4 weeks     1. Pain: Pt will demonstrate improved pain by reports of less than or equal to 9/10 worst pain on the verbal rating scale in order to progress toward maximal functional ability and improve QOL.  progressing, not met   2. Function: Patient will demonstrate improved function as indicated by a functional " limitation score of less than or equal to 40% on the Modified Oswestry Low Back Pain Disability Questionnaire.  progressing, not met   3. Mobility: Patient will improve AROM to 50% of stated goals, listed in objective measures above, in order to progress towards independence with functional activities.   progressing, not met   4. Strength: Patient will improve strength to 50% of stated goals, listed in objective measures above, in order to progress towards independence with functional activities.   progressing, not met   5. HEP: Patient will demonstrate independence with HEP in order to progress toward functional independence.  progressing, not met      Long Term Goals:  8 weeks     1. Pain: Pt will demonstrate improved pain by reports of less than or equal to 8/10 worst pain on the verbal rating scale in order to progress toward maximal functional ability and improve QOL.       2. Function: Patient will demonstrate improved function as indicated by a functional limitation score of less than or equal to 38% on the Modified Oswestry Low Back Pain Disability Questionnaire.     3. Mobility: Patient will improve AROM to stated goals, listed in objective measures above, in order to return to maximal functional potential and improve quality of life.     4. Strength: Patient will improve strength to stated goals, listed in objective measures above, in order to improve functional independence and quality of life.     5. Patient will return to normal ADL's, IADL's, community involvement, recreational activities, and work-related activities with less than or equal to 7/10 pain and maximal function.          Plan     Continue PT per POC, assess tolerance to today's session,  progress exercise as tolerated and appropriate    Plan of care Certification: 9/25/2020 to 11/24/2020.     Outpatient Physical Therapy 2 times weekly for 8 weeks to include any combination of the following interventions: virtual visits, dry needling,  modalities, electrical stimulation (IFC, Pre-Mod, Attended with Functional Dry Needling), Manual Therapy, Neuromuscular Re-ed, Patient Education, Self Care, Therapeutic Activites and Therapeutic Exercise     Sofya Miller, PT  11/18/2020

## 2020-11-19 NOTE — PROGRESS NOTES
"  Physical Therapy Assistant Treatment Note     [facility]  [facility] [id number]    Therapy Diagnosis:   Encounter Diagnosis   Name Primary?    Decreased strength      Physician: Albert Lebron MD    Visit Date: 11/20/2020    Physician Orders: PT Eval and Treat  Medical Diagnosis from Referral: Chronic Low Back Pain without Sciatica  Evaluation Date: 9/25/2020  Authorization Period Expiration: 12/31/2020  Plan of Care Expiration: 11/24/2020  Visit #/Visits authorized:  14/ 20   PTA Visit #: 1    Time In: 8:20    Time Out: 9:05  Total Billable Time: 45 minutes    Precautions: Standard    Subjective     Pt reports Feeling better overall today, less pain overall. But after last tx she had a migraine that lasted 3 days and caused her to tense up again. She reports she hasn't really been doing her home exercises.  She was not compliant with home exercise program.   Response to previous treatment: reduced pain, next day headache   Functional change: none reported     Pain: 3 /10   Location: "everywhere" bilateral cervical, thoracic and lumbar regions, worse on the R compared to the L    Objective     Joy received the following manual therapy techniques for 15 minutes after exercises.  STM to bernadine-scapular region  Myofascial release to bilateral mid and lower traps, rhomboids, thoracic, and lumbar paraspinals    Joy received therapeutic exercises (exercises performed today are bolded)  to develop strength, posture and core stabilization for 30 minutes including:    UE ergometer x 2 minutes with cues for UE posture    Supine  PPT 1 x 1'   PPT with marches x 1'  TA activation in hooklying 3" holds x 1'     Prone  Prone hip extension x 1'  Prone bilateral hip extension x 1'  +Supermans partial range 1 x 8  +I's Y's T's 1' 1 x 10 ea.    Sidelying  Clamshells x 1'  Sidelying hip abduction x 1'   Open books x 15 each side   +Sideplank on elbow raise/lower trunk x3  2' *attempted, need to keep " "building core strength    Quadruped  +Cat-cow 1 x 20      Deferred:   Isometric hip abd/add 2x10 10" holds  Recumbent bike x 2 minutes   LTR 2 x 1'   Bridges 2x10 (cues to perform glute set prior to lifting and to breathe out on raising her pelvis up)   Pirifomis stretch 3x30"   Glute stretch 3x30"   Prone triple extension 2 x 10 each  Prayer stretch 3 x 30 seconds forward, lateral, both sides  Thoracic extension over foam roll 10 x 10 seconds   Rhomboid stretch in doorway 3x30"    Home Exercises and Patient Education Provided     Education/Self-Care provided:   · Patient educated on the impairments noted above and the effects of physical therapy intervention to improve overall condition and QOL.   - educated pt on the need to be mindful of maintaining good core recruitment through performance of daily tasks such as walking or lifting   - education provided on importance of HEP and how it accompanies PT treatment.    Written Home Exercises Provided: yes.  Exercises were reviewed and Joy was able to demonstrate them prior to the end of the session.  Joy demonstrated good  understanding of the education provided.      See EMR under Patient Instructions for exercises provided 9/25/2020.    Assessment     Pt presents today with reports of improved pain and demonstrating improved posture. She tolerated progression of exercises well today with reports of additional relief after prone extension exercises. Noted hypertonic bilateral mid/low traps, rhomboids, thoracic and lumbar paraspinals, relieved by manual therapy. Upon exiting the clinic, noted improved posture and gait with reports of reduced pain. Pt will benefit from cont skilled PT.    Joy is progressing well towards her goals.   Pt prognosis is Good.     Pt will continue to benefit from skilled outpatient physical therapy to address the deficits listed in the problem list box on initial evaluation, provide pt/family education and to maximize pt's level of " independence in the home and community environment.     Pt's spiritual, cultural and educational needs considered and pt agreeable to plan of care and goals.     Anticipated barriers to physical therapy:  co-morbidities, sedentary lifestyle and motivation to improve condition    GOALS:      Short Term Goals:  4 weeks     1. Pain: Pt will demonstrate improved pain by reports of less than or equal to 9/10 worst pain on the verbal rating scale in order to progress toward maximal functional ability and improve QOL.  Met 11/20/2020   2. Function: Patient will demonstrate improved function as indicated by a functional limitation score of less than or equal to 40% on the Modified Oswestry Low Back Pain Disability Questionnaire.  progressing, not met   3. Mobility: Patient will improve AROM to 50% of stated goals, listed in objective measures above, in order to progress towards independence with functional activities.   progressing, not met   4. Strength: Patient will improve strength to 50% of stated goals, listed in objective measures above, in order to progress towards independence with functional activities.   progressing, not met   5. HEP: Patient will demonstrate independence with HEP in order to progress toward functional independence.  progressing, not met      Long Term Goals:  8 weeks     1. Pain: Pt will demonstrate improved pain by reports of less than or equal to 8/10 worst pain on the verbal rating scale in order to progress toward maximal functional ability and improve QOL.       2. Function: Patient will demonstrate improved function as indicated by a functional limitation score of less than or equal to 38% on the Modified Oswestry Low Back Pain Disability Questionnaire.     3. Mobility: Patient will improve AROM to stated goals, listed in objective measures above, in order to return to maximal functional potential and improve quality of life.     4. Strength: Patient will improve strength to stated goals,  listed in objective measures above, in order to improve functional independence and quality of life.     5. Patient will return to normal ADL's, IADL's, community involvement, recreational activities, and work-related activities with less than or equal to 7/10 pain and maximal function.          Plan     Continue PT per POC, assess tolerance to today's session,  progress exercise as tolerated and appropriate.    Plan of care Certification: 9/25/2020 to 11/24/2020.     Outpatient Physical Therapy 2 times weekly for 8 weeks to include any combination of the following interventions: virtual visits, dry needling, modalities, electrical stimulation (IFC, Pre-Mod, Attended with Functional Dry Needling), Manual Therapy, Neuromuscular Re-ed, Patient Education, Self Care, Therapeutic Activites and Therapeutic Exercise     Roldan Rich, SERINA  11/20/2020

## 2020-11-20 ENCOUNTER — CLINICAL SUPPORT (OUTPATIENT)
Dept: REHABILITATION | Facility: HOSPITAL | Age: 40
End: 2020-11-20
Payer: COMMERCIAL

## 2020-11-20 DIAGNOSIS — R53.1 DECREASED STRENGTH: ICD-10-CM

## 2020-11-20 PROCEDURE — 97110 THERAPEUTIC EXERCISES: CPT | Mod: PN,CQ

## 2020-11-20 PROCEDURE — 97140 MANUAL THERAPY 1/> REGIONS: CPT | Mod: PN,CQ

## 2020-11-30 ENCOUNTER — PATIENT MESSAGE (OUTPATIENT)
Dept: UROLOGY | Facility: CLINIC | Age: 40
End: 2020-11-30

## 2020-12-01 ENCOUNTER — CLINICAL SUPPORT (OUTPATIENT)
Dept: REHABILITATION | Facility: HOSPITAL | Age: 40
End: 2020-12-01
Payer: COMMERCIAL

## 2020-12-01 DIAGNOSIS — R53.1 DECREASED STRENGTH: ICD-10-CM

## 2020-12-01 PROCEDURE — 97140 MANUAL THERAPY 1/> REGIONS: CPT | Mod: PN,CQ

## 2020-12-01 PROCEDURE — 97110 THERAPEUTIC EXERCISES: CPT | Mod: PN,CQ

## 2020-12-01 NOTE — PROGRESS NOTES
"  Physical Therapy Assistant Treatment Note     Name: Joy Recinos  Clinic Number: [id number]    Therapy Diagnosis:   Encounter Diagnosis   Name Primary?    Decreased strength      Physician: Albert Lebron MD    Visit Date: 12/1/2020    Physician Orders: PT Eval and Treat  Medical Diagnosis from Referral: Chronic Low Back Pain without Sciatica  Evaluation Date: 9/25/2020  Authorization Period Expiration: 12/31/2020  Plan of Care Expiration: 11/24/2020  Visit #/Visits authorized:  15/ 20   PTA Visit #: 2    Time In: 9:45  Time Out: 10:35  Total Billable Time: 50 minutes    Precautions: Standard    Subjective     Pt reports pain is waking her up every night, "everywhere" in her back.    She was not compliant with home exercise program.   Response to previous treatment: reduced pain, next day headache   Functional change: none reported     Pain: 3 /10   Location: "everywhere" bilateral cervical, thoracic and lumbar regions, worse on the R compared to the L    Objective     Joy received the following manual therapy techniques for 10 minutes after exercises.  STM to bernadine-scapular region, bilateral QL and lumbar/thoracic paraspinals  Myofascial release to bilateral mid and lower traps, rhomboids, thoracic, and lumbar paraspinals  Myofascial release under active stretch to bilateral upper traps and levator scap      Joy received therapeutic exercises (exercises performed today are bolded)  to develop strength, posture and core stabilization for 35 minutes including:    UE ergometer x 2 minutes with cues for UE posture    Supine  PPT 1 x 1'   PPT with marches x 1'  TA activation in hooklying 3" holds x 1'   +LTR on swiss ball 2'  +DKTC on swiss ball 2'   +Ab presses with swiss ball x20    Prone  Prone hip extension x 1'  Prone bilateral hip extension x 1'  Supermans partial range 1 x 8  I's Y's T's 1' 1 x 10 ea.  +Prone on elbows press ups x15    Sidelying  Clamshells x 1'  Sidelying hip abduction x 1' " "  Open books x 15 each side   +Sideplank on elbow raise/lower trunk x3  2' *attempted, need to keep building core strength    Quadruped  Cat-cow 1 x 20  +Bird-dog just arms 1 x 20 ea.  +Isolated Thoracic rotations x 15 ea.      Deferred:   Isometric hip abd/add 2x10 10" holds  Recumbent bike x 2 minutes   LTR 2 x 1'   Bridges 2x10 (cues to perform glute set prior to lifting and to breathe out on raising her pelvis up)   Pirifomis stretch 3x30"   Glute stretch 3x30"   Prone triple extension 2 x 10 each  Prayer stretch 3 x 30 seconds forward, lateral, both sides  Thoracic extension over foam roll 10 x 10 seconds   Rhomboid stretch in doorway 3x30"    Home Exercises and Patient Education Provided     Education/Self-Care provided:   · Patient educated on the impairments noted above and the effects of physical therapy intervention to improve overall condition and QOL.   - educated pt on the need to be mindful of maintaining good core recruitment through performance of daily tasks such as walking or lifting   - education provided on importance of HEP and how it accompanies PT treatment.    Written Home Exercises Provided: yes.  Exercises were reviewed and Joy was able to demonstrate them prior to the end of the session.  Joy demonstrated good  understanding of the education provided.      See EMR under Patient Instructions for exercises provided 9/25/2020.    Assessment     Pt performed exercises well today with no complaints. Pt with pain still affecting her sleep, but has noted that heat tends to help a lot. Pt tolerated progression of exercises well today with no complaints. She is improving with quadruped exercises with demo of increased stability and strength. Noted decreased ROM with thoracic rotation exercise. Pt is responding well to extension exercises with no c/o pain and improved posture. Pt will benefit from cont skilled PT and progression of exercises.    Joy is progressing well towards her goals. "   Pt prognosis is Good.     Pt will continue to benefit from skilled outpatient physical therapy to address the deficits listed in the problem list box on initial evaluation, provide pt/family education and to maximize pt's level of independence in the home and community environment.     Pt's spiritual, cultural and educational needs considered and pt agreeable to plan of care and goals.     Anticipated barriers to physical therapy:  co-morbidities, sedentary lifestyle and motivation to improve condition    GOALS:      Short Term Goals:  4 weeks     1. Pain: Pt will demonstrate improved pain by reports of less than or equal to 9/10 worst pain on the verbal rating scale in order to progress toward maximal functional ability and improve QOL.  Met 11/20/2020   2. Function: Patient will demonstrate improved function as indicated by a functional limitation score of less than or equal to 40% on the Modified Oswestry Low Back Pain Disability Questionnaire.  progressing, not met   3. Mobility: Patient will improve AROM to 50% of stated goals, listed in objective measures above, in order to progress towards independence with functional activities.   progressing, not met   4. Strength: Patient will improve strength to 50% of stated goals, listed in objective measures above, in order to progress towards independence with functional activities.   progressing, not met   5. HEP: Patient will demonstrate independence with HEP in order to progress toward functional independence.  progressing, not met      Long Term Goals:  8 weeks     1. Pain: Pt will demonstrate improved pain by reports of less than or equal to 8/10 worst pain on the verbal rating scale in order to progress toward maximal functional ability and improve QOL.       2. Function: Patient will demonstrate improved function as indicated by a functional limitation score of less than or equal to 38% on the Modified Oswestry Low Back Pain Disability Questionnaire.      3. Mobility: Patient will improve AROM to stated goals, listed in objective measures above, in order to return to maximal functional potential and improve quality of life.     4. Strength: Patient will improve strength to stated goals, listed in objective measures above, in order to improve functional independence and quality of life.     5. Patient will return to normal ADL's, IADL's, community involvement, recreational activities, and work-related activities with less than or equal to 7/10 pain and maximal function.          Plan     Continue PT per POC, assess tolerance to today's session,  progress exercise as tolerated and appropriate.     Plan of care Certification: 9/25/2020 to 11/24/2020.     Outpatient Physical Therapy 2 times weekly for 8 weeks to include any combination of the following interventions: virtual visits, dry needling, modalities, electrical stimulation (IFC, Pre-Mod, Attended with Functional Dry Needling), Manual Therapy, Neuromuscular Re-ed, Patient Education, Self Care, Therapeutic Activites and Therapeutic Exercise     Roldan Rich PTA  12/1/2020

## 2020-12-08 ENCOUNTER — CLINICAL SUPPORT (OUTPATIENT)
Dept: REHABILITATION | Facility: HOSPITAL | Age: 40
End: 2020-12-08
Payer: COMMERCIAL

## 2020-12-08 DIAGNOSIS — R53.1 DECREASED STRENGTH: ICD-10-CM

## 2020-12-08 PROCEDURE — 97110 THERAPEUTIC EXERCISES: CPT | Mod: PN | Performed by: PHYSICAL THERAPIST

## 2020-12-08 PROCEDURE — 97140 MANUAL THERAPY 1/> REGIONS: CPT | Mod: PN | Performed by: PHYSICAL THERAPIST

## 2021-02-21 ENCOUNTER — OFFICE VISIT (OUTPATIENT)
Dept: URGENT CARE | Facility: CLINIC | Age: 41
End: 2021-02-21
Payer: COMMERCIAL

## 2021-02-21 VITALS
HEART RATE: 84 BPM | RESPIRATION RATE: 20 BRPM | HEIGHT: 68 IN | OXYGEN SATURATION: 97 % | WEIGHT: 223 LBS | SYSTOLIC BLOOD PRESSURE: 112 MMHG | DIASTOLIC BLOOD PRESSURE: 70 MMHG | TEMPERATURE: 99 F | BODY MASS INDEX: 33.8 KG/M2

## 2021-02-21 DIAGNOSIS — B34.9 VIRAL SYNDROME: ICD-10-CM

## 2021-02-21 DIAGNOSIS — R11.2 NAUSEA AND VOMITING, INTRACTABILITY OF VOMITING NOT SPECIFIED, UNSPECIFIED VOMITING TYPE: Primary | ICD-10-CM

## 2021-02-21 LAB
CTP QC/QA: YES
SARS-COV-2 RDRP RESP QL NAA+PROBE: NEGATIVE

## 2021-02-21 PROCEDURE — 3008F BODY MASS INDEX DOCD: CPT | Mod: CPTII,S$GLB,, | Performed by: PHYSICIAN ASSISTANT

## 2021-02-21 PROCEDURE — 1125F PR PAIN SEVERITY QUANTIFIED, PAIN PRESENT: ICD-10-PCS | Mod: S$GLB,,, | Performed by: PHYSICIAN ASSISTANT

## 2021-02-21 PROCEDURE — 1125F AMNT PAIN NOTED PAIN PRSNT: CPT | Mod: S$GLB,,, | Performed by: PHYSICIAN ASSISTANT

## 2021-02-21 PROCEDURE — U0002 COVID-19 LAB TEST NON-CDC: HCPCS | Mod: QW,S$GLB,, | Performed by: PHYSICIAN ASSISTANT

## 2021-02-21 PROCEDURE — 99213 OFFICE O/P EST LOW 20 MIN: CPT | Mod: S$GLB,,, | Performed by: PHYSICIAN ASSISTANT

## 2021-02-21 PROCEDURE — 99213 PR OFFICE/OUTPT VISIT, EST, LEVL III, 20-29 MIN: ICD-10-PCS | Mod: S$GLB,,, | Performed by: PHYSICIAN ASSISTANT

## 2021-02-21 PROCEDURE — U0002: ICD-10-PCS | Mod: QW,S$GLB,, | Performed by: PHYSICIAN ASSISTANT

## 2021-02-21 PROCEDURE — 3008F PR BODY MASS INDEX (BMI) DOCUMENTED: ICD-10-PCS | Mod: CPTII,S$GLB,, | Performed by: PHYSICIAN ASSISTANT

## 2021-02-24 ENCOUNTER — TELEPHONE (OUTPATIENT)
Dept: URGENT CARE | Facility: CLINIC | Age: 41
End: 2021-02-24

## 2021-03-12 ENCOUNTER — IMMUNIZATION (OUTPATIENT)
Dept: INTERNAL MEDICINE | Facility: CLINIC | Age: 41
End: 2021-03-12
Payer: COMMERCIAL

## 2021-03-12 DIAGNOSIS — Z23 NEED FOR VACCINATION: Primary | ICD-10-CM

## 2021-03-12 PROCEDURE — 91300 COVID-19, MRNA, LNP-S, PF, 30 MCG/0.3 ML DOSE VACCINE: CPT | Mod: PBBFAC | Performed by: FAMILY MEDICINE

## 2021-03-12 RX ORDER — FLUOXETINE HYDROCHLORIDE 20 MG/1
CAPSULE ORAL
Qty: 60 CAPSULE | Refills: 1 | Status: SHIPPED | OUTPATIENT
Start: 2021-03-12 | End: 2021-07-12 | Stop reason: SDUPTHER

## 2021-04-02 ENCOUNTER — IMMUNIZATION (OUTPATIENT)
Dept: INTERNAL MEDICINE | Facility: CLINIC | Age: 41
End: 2021-04-02

## 2021-04-02 DIAGNOSIS — Z23 NEED FOR VACCINATION: Primary | ICD-10-CM

## 2021-04-02 PROCEDURE — 0002A COVID-19, MRNA, LNP-S, PF, 30 MCG/0.3 ML DOSE VACCINE: CPT | Mod: CV19,S$GLB,, | Performed by: FAMILY MEDICINE

## 2021-04-02 PROCEDURE — 0002A COVID-19, MRNA, LNP-S, PF, 30 MCG/0.3 ML DOSE VACCINE: ICD-10-PCS | Mod: CV19,S$GLB,, | Performed by: FAMILY MEDICINE

## 2021-04-02 PROCEDURE — 91300 COVID-19, MRNA, LNP-S, PF, 30 MCG/0.3 ML DOSE VACCINE: CPT | Mod: S$GLB,,, | Performed by: FAMILY MEDICINE

## 2021-04-02 PROCEDURE — 91300 COVID-19, MRNA, LNP-S, PF, 30 MCG/0.3 ML DOSE VACCINE: ICD-10-PCS | Mod: S$GLB,,, | Performed by: FAMILY MEDICINE

## 2021-04-12 ENCOUNTER — PATIENT OUTREACH (OUTPATIENT)
Dept: ADMINISTRATIVE | Facility: OTHER | Age: 41
End: 2021-04-12

## 2021-04-15 ENCOUNTER — OFFICE VISIT (OUTPATIENT)
Dept: OBSTETRICS AND GYNECOLOGY | Facility: CLINIC | Age: 41
End: 2021-04-15
Payer: COMMERCIAL

## 2021-04-15 VITALS
DIASTOLIC BLOOD PRESSURE: 78 MMHG | WEIGHT: 203.06 LBS | BODY MASS INDEX: 30.87 KG/M2 | SYSTOLIC BLOOD PRESSURE: 114 MMHG

## 2021-04-15 DIAGNOSIS — Z12.31 SCREENING MAMMOGRAM, ENCOUNTER FOR: Primary | ICD-10-CM

## 2021-04-15 DIAGNOSIS — Z01.419 WELL WOMAN EXAM WITH ROUTINE GYNECOLOGICAL EXAM: ICD-10-CM

## 2021-04-15 PROCEDURE — 99396 PR PREVENTIVE VISIT,EST,40-64: ICD-10-PCS | Mod: S$GLB,,, | Performed by: OBSTETRICS & GYNECOLOGY

## 2021-04-15 PROCEDURE — 99999 PR PBB SHADOW E&M-EST. PATIENT-LVL III: ICD-10-PCS | Mod: PBBFAC,,, | Performed by: OBSTETRICS & GYNECOLOGY

## 2021-04-15 PROCEDURE — 3008F PR BODY MASS INDEX (BMI) DOCUMENTED: ICD-10-PCS | Mod: CPTII,S$GLB,, | Performed by: OBSTETRICS & GYNECOLOGY

## 2021-04-15 PROCEDURE — 99396 PREV VISIT EST AGE 40-64: CPT | Mod: S$GLB,,, | Performed by: OBSTETRICS & GYNECOLOGY

## 2021-04-15 PROCEDURE — 3008F BODY MASS INDEX DOCD: CPT | Mod: CPTII,S$GLB,, | Performed by: OBSTETRICS & GYNECOLOGY

## 2021-04-15 PROCEDURE — 99999 PR PBB SHADOW E&M-EST. PATIENT-LVL III: CPT | Mod: PBBFAC,,, | Performed by: OBSTETRICS & GYNECOLOGY

## 2021-05-14 RX ORDER — ESTRADIOL 1 MG/1
1 TABLET ORAL DAILY
Qty: 30 TABLET | Refills: 11 | Status: SHIPPED | OUTPATIENT
Start: 2021-05-14 | End: 2022-05-06

## 2021-06-25 ENCOUNTER — OFFICE VISIT (OUTPATIENT)
Dept: URGENT CARE | Facility: CLINIC | Age: 41
End: 2021-06-25
Payer: COMMERCIAL

## 2021-06-25 VITALS
SYSTOLIC BLOOD PRESSURE: 118 MMHG | RESPIRATION RATE: 20 BRPM | WEIGHT: 196 LBS | HEIGHT: 68 IN | BODY MASS INDEX: 29.7 KG/M2 | HEART RATE: 95 BPM | TEMPERATURE: 98 F | DIASTOLIC BLOOD PRESSURE: 58 MMHG | OXYGEN SATURATION: 96 %

## 2021-06-25 DIAGNOSIS — J06.9 BACTERIAL URI: Primary | ICD-10-CM

## 2021-06-25 DIAGNOSIS — R05.9 COUGH: ICD-10-CM

## 2021-06-25 DIAGNOSIS — R09.81 SINUS CONGESTION: ICD-10-CM

## 2021-06-25 DIAGNOSIS — B96.89 BACTERIAL URI: Primary | ICD-10-CM

## 2021-06-25 PROCEDURE — 3008F BODY MASS INDEX DOCD: CPT | Mod: CPTII,S$GLB,, | Performed by: NURSE PRACTITIONER

## 2021-06-25 PROCEDURE — 99214 PR OFFICE/OUTPT VISIT, EST, LEVL IV, 30-39 MIN: ICD-10-PCS | Mod: S$GLB,,, | Performed by: NURSE PRACTITIONER

## 2021-06-25 PROCEDURE — 1125F PR PAIN SEVERITY QUANTIFIED, PAIN PRESENT: ICD-10-PCS | Mod: S$GLB,,, | Performed by: NURSE PRACTITIONER

## 2021-06-25 PROCEDURE — 1125F AMNT PAIN NOTED PAIN PRSNT: CPT | Mod: S$GLB,,, | Performed by: NURSE PRACTITIONER

## 2021-06-25 PROCEDURE — 99214 OFFICE O/P EST MOD 30 MIN: CPT | Mod: S$GLB,,, | Performed by: NURSE PRACTITIONER

## 2021-06-25 PROCEDURE — 3008F PR BODY MASS INDEX (BMI) DOCUMENTED: ICD-10-PCS | Mod: CPTII,S$GLB,, | Performed by: NURSE PRACTITIONER

## 2021-06-25 RX ORDER — AMOXICILLIN AND CLAVULANATE POTASSIUM 875; 125 MG/1; MG/1
1 TABLET, FILM COATED ORAL 2 TIMES DAILY
Qty: 14 TABLET | Refills: 0 | Status: SHIPPED | OUTPATIENT
Start: 2021-06-25 | End: 2021-07-02

## 2021-06-25 RX ORDER — BROMPHENIRAMINE MALEATE, PSEUDOEPHEDRINE HYDROCHLORIDE, AND DEXTROMETHORPHAN HYDROBROMIDE 2; 30; 10 MG/5ML; MG/5ML; MG/5ML
10 SYRUP ORAL EVERY 6 HOURS PRN
Qty: 140 ML | Refills: 0 | Status: SHIPPED | OUTPATIENT
Start: 2021-06-25 | End: 2022-07-22

## 2021-06-25 RX ORDER — PREDNISONE 20 MG/1
20 TABLET ORAL 2 TIMES DAILY
Qty: 10 TABLET | Refills: 0 | Status: SHIPPED | OUTPATIENT
Start: 2021-06-25 | End: 2021-06-30

## 2021-06-28 ENCOUNTER — TELEPHONE (OUTPATIENT)
Dept: URGENT CARE | Facility: CLINIC | Age: 41
End: 2021-06-28

## 2021-07-13 RX ORDER — FLUOXETINE HYDROCHLORIDE 20 MG/1
CAPSULE ORAL
Qty: 60 CAPSULE | Refills: 1 | Status: SHIPPED | OUTPATIENT
Start: 2021-07-13 | End: 2021-11-17 | Stop reason: SDUPTHER

## 2021-09-02 NOTE — PROGRESS NOTES
"  Physical Therapy Assistant Treatment Note     Name: Joy Huntley [facility]  Clinic Number: [id number]    Therapy Diagnosis:   Encounter Diagnosis   Name Primary?    Decreased strength      Physician: Albert Lebron MD    Visit Date: 11/10/2020    Physician Orders: PT Eval and Treat  Medical Diagnosis from Referral: Chronic Low Back Pain without Sciatica  Evaluation Date: 9/25/2020  Authorization Period Expiration: 12/31/2020  Plan of Care Expiration: 11/24/2020  Visit #/Visits authorized:  12/ 20     Time In: 10:30    Time Out: 11:15  Total Billable Time: 45 minutes    Precautions: Standard    Subjective     Pt reports Last tx session was helpful, but because of an event over the weekend where she was sitting for hours in an uncomfortable metal chair her back is hurting a lot more now and she is experiencing tension headaches.  She was not compliant with home exercise program.   Response to previous treatment: reduced pain   Functional change: none reported    Pain: 4 /10   Location: "everywhere" bilateral cervical, thoracic and lumbar regions, worse on the R compared to the L    Objective     Joy received the following manual therapy techniques: Myofascial release and Soft tissue Mobilization (STM) were applied to bilateral cervical, thoracic, and lumbar paraspinals, quadratus lumborum, bilateral upper trapezius muscles and rhomboids for 30 minutes prior to exercises.  STM to bernadine-scapular region  Manual cervical traction to hypertonic paraspinals  Suboccipital release  Myofascial release with active stretch to bilateral upper traps, rhomboids, thoracic, and lumbar paraspinals    Joy received therapeutic exercises (exercises performed today are bolded)  to develop strength, posture and core stabilization for 15 minutes including:    Upper bike ergometer x 2 minutes  Open books x 10 each side   PPT 1 x 1'   +PPT with marches x 1'  TA activation in hooklying 3" holds x 1'   Prone hip extension x 1'  Prone " "bilateral hip extension x 1'  Clamshells x 1'  Sidelying hip abduction x 1'     Deferred:   Isometric hip abd/add 2x10 10" holds  Recumbent bike x 2 minutes   LTR 2 x 1'   Bridges 2x10 (cues to perform glute set prior to lifting and to breathe out on raising her pelvis up)   Pirifomis stretch 3x30"   Glute stretch 3x30"   Prone triple extension 2 x 10 each  Prayer stretch 3 x 30 seconds forward, lateral, both sides  Thoracic extension over foam roll 10 x 10 seconds   Rhomboid stretch in doorway 3x30"    Home Exercises and Patient Education Provided     Education/Self-Care provided:   · Patient educated on the impairments noted above and the effects of physical therapy intervention to improve overall condition and QOL.   - educated pt on the need to be mindful of maintaining good core recruitment through performance of daily tasks such as walking or lifting     Written Home Exercises Provided: yes.  Exercises were reviewed and Joy was able to demonstrate them prior to the end of the session.  Joy demonstrated good  understanding of the education provided.      See EMR under Patient Instructions for exercises provided 9/25/2020.    Assessment   Pt presents with hypertonic bilateral cervical, thoracic, and lumbar paraspinals, hypertonic bilateral traps, QL, and suboccipitals. Relieved by manual therapy, with reports of decreased pain and demonstration of improved standing posture. Pt performed exercises well today with no reproduction of pain in her lower back, with prone hip extension being the most difficult. Pt had low fatigue and will benefit from progression of exercises.    Joy is progressing well towards her goals.   Pt prognosis is Good.     Pt will continue to benefit from skilled outpatient physical therapy to address the deficits listed in the problem list box on initial evaluation, provide pt/family education and to maximize pt's level of independence in the home and community environment.     Pt's " spiritual, cultural and educational needs considered and pt agreeable to plan of care and goals.     Anticipated barriers to physical therapy:  co-morbidities, sedentary lifestyle and motivation to improve condition    GOALS:     Short Term Goals:  4 weeks     1. Pain: Pt will demonstrate improved pain by reports of less than or equal to 9/10 worst pain on the verbal rating scale in order to progress toward maximal functional ability and improve QOL.  progressing, not met   2. Function: Patient will demonstrate improved function as indicated by a functional limitation score of less than or equal to 40% on the Modified Oswestry Low Back Pain Disability Questionnaire.  progressing, not met   3. Mobility: Patient will improve AROM to 50% of stated goals, listed in objective measures above, in order to progress towards independence with functional activities.   progressing, not met   4. Strength: Patient will improve strength to 50% of stated goals, listed in objective measures above, in order to progress towards independence with functional activities.   progressing, not met   5. HEP: Patient will demonstrate independence with HEP in order to progress toward functional independence.  progressing, not met      Long Term Goals:  8 weeks     1. Pain: Pt will demonstrate improved pain by reports of less than or equal to 8/10 worst pain on the verbal rating scale in order to progress toward maximal functional ability and improve QOL.       2. Function: Patient will demonstrate improved function as indicated by a functional limitation score of less than or equal to 38% on the Modified Oswestry Low Back Pain Disability Questionnaire.     3. Mobility: Patient will improve AROM to stated goals, listed in objective measures above, in order to return to maximal functional potential and improve quality of life.     4. Strength: Patient will improve strength to stated goals, listed in objective measures above, in order to improve  functional independence and quality of life.     5. Patient will return to normal ADL's, IADL's, community involvement, recreational activities, and work-related activities with less than or equal to 7/10 pain and maximal function.          Plan     Continue PT per POC, assess tolerance to today's session,  progress exercise as tolerated and appropriate    Plan of care Certification: 9/25/2020 to 11/24/2020.     Outpatient Physical Therapy 2 times weekly for 8 weeks to include any combination of the following interventions: virtual visits, dry needling, modalities, electrical stimulation (IFC, Pre-Mod, Attended with Functional Dry Needling), Manual Therapy, Neuromuscular Re-ed, Patient Education, Self Care, Therapeutic Activites and Therapeutic Exercise     Roldan Rich, SERINA  11/10/2020       no

## 2021-09-16 ENCOUNTER — TELEPHONE (OUTPATIENT)
Dept: ADMINISTRATIVE | Facility: HOSPITAL | Age: 41
End: 2021-09-16

## 2021-09-17 ENCOUNTER — HOSPITAL ENCOUNTER (OUTPATIENT)
Dept: RADIOLOGY | Facility: HOSPITAL | Age: 41
Discharge: HOME | End: 2021-09-17
Attending: OBSTETRICS & GYNECOLOGY
Payer: COMMERCIAL

## 2021-09-17 DIAGNOSIS — Z12.31 SCREENING MAMMOGRAM, ENCOUNTER FOR: ICD-10-CM

## 2021-09-17 PROCEDURE — 77063 BREAST TOMOSYNTHESIS BI: CPT | Mod: 26,,, | Performed by: RADIOLOGY

## 2021-09-17 PROCEDURE — 77063 MAMMO DIGITAL SCREENING BILAT WITH TOMO: ICD-10-PCS | Mod: 26,,, | Performed by: RADIOLOGY

## 2021-09-17 PROCEDURE — 77067 SCR MAMMO BI INCL CAD: CPT | Mod: TC

## 2021-09-17 PROCEDURE — 77067 MAMMO DIGITAL SCREENING BILAT WITH TOMO: ICD-10-PCS | Mod: 26,,, | Performed by: RADIOLOGY

## 2021-09-17 PROCEDURE — 77067 SCR MAMMO BI INCL CAD: CPT | Mod: 26,,, | Performed by: RADIOLOGY

## 2021-09-24 ENCOUNTER — OFFICE VISIT (OUTPATIENT)
Dept: OTOLARYNGOLOGY | Facility: CLINIC | Age: 41
End: 2021-09-24
Payer: COMMERCIAL

## 2021-09-24 VITALS — TEMPERATURE: 98 F | HEIGHT: 68 IN | BODY MASS INDEX: 29.8 KG/M2

## 2021-09-24 DIAGNOSIS — H65.92 FLUID LEVEL BEHIND TYMPANIC MEMBRANE OF LEFT EAR: Primary | ICD-10-CM

## 2021-09-24 DIAGNOSIS — J30.89 NON-SEASONAL ALLERGIC RHINITIS, UNSPECIFIED TRIGGER: ICD-10-CM

## 2021-09-24 PROCEDURE — 99203 OFFICE O/P NEW LOW 30 MIN: CPT | Mod: S$GLB,,, | Performed by: PHYSICIAN ASSISTANT

## 2021-09-24 PROCEDURE — 99203 PR OFFICE/OUTPT VISIT, NEW, LEVL III, 30-44 MIN: ICD-10-PCS | Mod: S$GLB,,, | Performed by: PHYSICIAN ASSISTANT

## 2021-09-24 PROCEDURE — 3008F BODY MASS INDEX DOCD: CPT | Mod: CPTII,S$GLB,, | Performed by: PHYSICIAN ASSISTANT

## 2021-09-24 PROCEDURE — 3008F PR BODY MASS INDEX (BMI) DOCUMENTED: ICD-10-PCS | Mod: CPTII,S$GLB,, | Performed by: PHYSICIAN ASSISTANT

## 2021-09-24 PROCEDURE — 1159F PR MEDICATION LIST DOCUMENTED IN MEDICAL RECORD: ICD-10-PCS | Mod: CPTII,S$GLB,, | Performed by: PHYSICIAN ASSISTANT

## 2021-09-24 PROCEDURE — 99999 PR PBB SHADOW E&M-EST. PATIENT-LVL III: ICD-10-PCS | Mod: PBBFAC,,, | Performed by: PHYSICIAN ASSISTANT

## 2021-09-24 PROCEDURE — 1159F MED LIST DOCD IN RCRD: CPT | Mod: CPTII,S$GLB,, | Performed by: PHYSICIAN ASSISTANT

## 2021-09-24 PROCEDURE — 99999 PR PBB SHADOW E&M-EST. PATIENT-LVL III: CPT | Mod: PBBFAC,,, | Performed by: PHYSICIAN ASSISTANT

## 2021-09-24 RX ORDER — LEVOCETIRIZINE DIHYDROCHLORIDE 5 MG/1
5 TABLET, FILM COATED ORAL NIGHTLY
Qty: 30 TABLET | Refills: 11 | Status: SHIPPED | OUTPATIENT
Start: 2021-09-24 | End: 2021-10-21 | Stop reason: SDUPTHER

## 2021-09-24 RX ORDER — FLUTICASONE PROPIONATE 50 MCG
2 SPRAY, SUSPENSION (ML) NASAL 2 TIMES DAILY
Qty: 16 ML | Refills: 11 | Status: SHIPPED | OUTPATIENT
Start: 2021-09-24 | End: 2023-12-27

## 2021-09-24 RX ORDER — PREDNISONE 10 MG/1
TABLET ORAL
Qty: 13 TABLET | Refills: 0 | Status: SHIPPED | OUTPATIENT
Start: 2021-09-24 | End: 2022-05-06

## 2021-10-21 ENCOUNTER — OFFICE VISIT (OUTPATIENT)
Dept: OTOLARYNGOLOGY | Facility: CLINIC | Age: 41
End: 2021-10-21
Payer: COMMERCIAL

## 2021-10-21 VITALS — BODY MASS INDEX: 28.53 KG/M2 | HEIGHT: 68 IN | WEIGHT: 188.25 LBS

## 2021-10-21 DIAGNOSIS — H92.03 REFERRED OTALGIA OF BOTH EARS: Primary | ICD-10-CM

## 2021-10-21 PROCEDURE — 99213 OFFICE O/P EST LOW 20 MIN: CPT | Mod: S$GLB,,, | Performed by: OTOLARYNGOLOGY

## 2021-10-21 PROCEDURE — 1159F MED LIST DOCD IN RCRD: CPT | Mod: CPTII,S$GLB,, | Performed by: OTOLARYNGOLOGY

## 2021-10-21 PROCEDURE — 1159F PR MEDICATION LIST DOCUMENTED IN MEDICAL RECORD: ICD-10-PCS | Mod: CPTII,S$GLB,, | Performed by: OTOLARYNGOLOGY

## 2021-10-21 PROCEDURE — 99999 PR PBB SHADOW E&M-EST. PATIENT-LVL II: CPT | Mod: PBBFAC,,, | Performed by: OTOLARYNGOLOGY

## 2021-10-21 PROCEDURE — 99999 PR PBB SHADOW E&M-EST. PATIENT-LVL II: ICD-10-PCS | Mod: PBBFAC,,, | Performed by: OTOLARYNGOLOGY

## 2021-10-21 PROCEDURE — 3008F PR BODY MASS INDEX (BMI) DOCUMENTED: ICD-10-PCS | Mod: CPTII,S$GLB,, | Performed by: OTOLARYNGOLOGY

## 2021-10-21 PROCEDURE — 99213 PR OFFICE/OUTPT VISIT, EST, LEVL III, 20-29 MIN: ICD-10-PCS | Mod: S$GLB,,, | Performed by: OTOLARYNGOLOGY

## 2021-10-21 PROCEDURE — 3008F BODY MASS INDEX DOCD: CPT | Mod: CPTII,S$GLB,, | Performed by: OTOLARYNGOLOGY

## 2021-10-21 RX ORDER — LEVOCETIRIZINE DIHYDROCHLORIDE 5 MG/1
5 TABLET, FILM COATED ORAL NIGHTLY
Qty: 30 TABLET | Refills: 11 | Status: SHIPPED | OUTPATIENT
Start: 2021-10-21 | End: 2022-10-13 | Stop reason: SDUPTHER

## 2021-11-12 RX ORDER — FLUOXETINE HYDROCHLORIDE 20 MG/1
CAPSULE ORAL
Qty: 60 CAPSULE | Refills: 1 | OUTPATIENT
Start: 2021-11-12

## 2021-11-17 ENCOUNTER — PATIENT MESSAGE (OUTPATIENT)
Dept: PSYCHIATRY | Facility: CLINIC | Age: 41
End: 2021-11-17
Payer: COMMERCIAL

## 2021-11-17 RX ORDER — FLUOXETINE HYDROCHLORIDE 20 MG/1
CAPSULE ORAL
Qty: 60 CAPSULE | Refills: 1 | Status: SHIPPED | OUTPATIENT
Start: 2021-11-17 | End: 2022-01-10

## 2021-11-17 RX ORDER — FLUOXETINE HYDROCHLORIDE 20 MG/1
CAPSULE ORAL
Qty: 60 CAPSULE | Refills: 1 | OUTPATIENT
Start: 2021-11-17

## 2022-01-10 ENCOUNTER — OFFICE VISIT (OUTPATIENT)
Dept: PSYCHIATRY | Facility: CLINIC | Age: 42
End: 2022-01-10
Payer: COMMERCIAL

## 2022-01-10 DIAGNOSIS — F41.1 GAD (GENERALIZED ANXIETY DISORDER): Primary | ICD-10-CM

## 2022-01-10 PROCEDURE — 99214 PR OFFICE/OUTPT VISIT, EST, LEVL IV, 30-39 MIN: ICD-10-PCS | Mod: S$GLB,,, | Performed by: PSYCHIATRY & NEUROLOGY

## 2022-01-10 PROCEDURE — 99999 PR PBB SHADOW E&M-EST. PATIENT-LVL I: CPT | Mod: PBBFAC,,, | Performed by: PSYCHIATRY & NEUROLOGY

## 2022-01-10 PROCEDURE — 99214 OFFICE O/P EST MOD 30 MIN: CPT | Mod: S$GLB,,, | Performed by: PSYCHIATRY & NEUROLOGY

## 2022-01-10 PROCEDURE — 99999 PR PBB SHADOW E&M-EST. PATIENT-LVL I: ICD-10-PCS | Mod: PBBFAC,,, | Performed by: PSYCHIATRY & NEUROLOGY

## 2022-01-10 RX ORDER — TRAZODONE HYDROCHLORIDE 100 MG/1
TABLET ORAL
Qty: 30 TABLET | Refills: 2 | Status: SHIPPED | OUTPATIENT
Start: 2022-01-10 | End: 2022-04-22 | Stop reason: SDUPTHER

## 2022-01-10 RX ORDER — VENLAFAXINE HYDROCHLORIDE 75 MG/1
CAPSULE, EXTENDED RELEASE ORAL
Qty: 60 CAPSULE | Refills: 2 | Status: SHIPPED | OUTPATIENT
Start: 2022-01-10 | End: 2022-04-13 | Stop reason: SDUPTHER

## 2022-01-10 NOTE — PROGRESS NOTES
"Outpatient Psychiatry Follow-up Visit (MD/NP)    1/10/2022    Joy Recinos, a 41 y.o. female, presenting for follow-up visit. Met with patient.    Reason for Encounter: f/u, DOUG.     Interval History: Patient seen and interviewed for follow-up, last seen about 19 months ago. Returns due to persistent anxiety, problems with functioning. Quite anxious; "Feel like I can't handle things anymore", also having significant problems with pain - chronic from fibromyalgia as well as new and worse back pain, as yet undiagnossed. Endorses chronic social anxiety, awkwardness. Problems with sleep. Taking melatonin. Previously helpful. Not not helpful. No new health problems. Adherent to medication, though not consistently full dose. On xyzal instead of zyrtec.     Past med List:   Sertraline  Duloxetine  escitalopram  Fluoxetine   milnacipran    Background: 36 y/o F presents for establishment of care, reports problems with anxiety & depression. From PCP note (3.16.18): Here today for f/u on depression & anxiety. She reports that since medication adjustment she has been less irritable. Her  is here with her today & affirms this. She has a psych appointment scheduled for May 11. She reports it has been some mild improvement in her volition. Reports treatment with cymbalta - for fibromyalgia, anxiety, depression for 5 years. Neither fibromyalgia pain no moods improved. wellbutrin trial has been perhaps helpful. Describes baseline symptoms as "worried about everything", "worried about worrying", sad, not interested in things, "like I don't have a purpose". Negative thoughts about self. Tired all the time, even when sleeps excessively. Denies SI. "All my life". "stays all the time", though better/worse at times, influenced by stressful situations. Problems with attention/concentration. Reports all of the following daily in past 2 weeks (around which time she was laid off): Feeling nervous, anxious or on edge, Not " "being able to stop or control worrying  Worrying too much about different things   Trouble relaxing   Being so restless that it is hard to sit still   Becoming easily annoyed or irritable   Feeling afraid as if something awful might happen    DOUG-7 = 21 (estimates a 19 in weeks prior to the layoff)    Sleep Problems - hypersomnia  Sad Mood more than 1/2 time  Appetite and weight changes (decreased appetite, small subjective weight loss)  Concentration problems  Guilt   Thoughts of Emptiness  Anhedonia  Anergia  Slowing/PMR    QIDS = 12    Also has decreased sexual interest, pain during intercourse.    PsychHx: first treatment by Obgyn - sertraline(?) when had problems with endometriosis. No AVH, no SI/HI, no delusions. No hospitalizations or self-harm behaviors. Prolonged duloxetine trial, thinks transient or minimal benefit. Past treatment with sertraline.     MedHx: idiopathic hypersomnia. 2 sleep studies, narcolepsy ruled out. Endometriosis. 2 herniated lumbar discs. Migraines. GERD. Seasonal allergies.   FamHx: mother & dad both take medication for depression. Father diagnosed with adhd in 60's.   SocHx: from [facility]. Heart murmur at birth. "fainted and blacked out school", had several episodes between 5 and 9. Worked up but never medically explained. Still gets dizzy and weak, but doesn't black out. Also had back pain frequently as a child. No developmental problems. Regular classes in school. Ok with going to school. Normal socially. Average student. 3 semesters of college. Went to technical school for 1 year, finished (computer/). 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 Applying for new jobs. Lives with  & 2 kids.  x 14 years. "really good" relationship x decreased sexual interest and pain. Week before  pulled out a tampon - was extremely painful. [place] has been " "painful ever since. Has had PT recommended for this. 2 kids (13 & 9). Dtr has been bullied most of her life due to being overweight. She also has problems with oppositionality at home (though is a good student, doesn't have discipline problems at school), won't take a bath. Son is "good kid", "very attached to me". Quiet, not many friends. Mom is supportive, lives nearby.     Review Of Systems:     GENERAL:  No weight gain or loss  SKIN:  No rashes or lacerations  HEAD:  No headaches  CHEST:  No shortness of breath, hyperventilation or cough  CARDIOVASCULAR:  No tachycardia or chest pain  ABDOMEN:  No nausea, vomiting, pain, constipation or diarrhea  URINARY:  No frequency, dysuria or sexual dysfunction  ENDOCRINE:  No polydipsia, polyuria  MUSCULOSKELETAL:  pain and stiffness of the joints  NEUROLOGIC:  No weakness, sensory changes, seizures, confusion, memory loss, tremor or other abnormal movements    Current Evaluation:     Nutritional Screening: Considering the patient's height and weight, medications, medical history and preferences, should a referral be made to the dietitian? no    Constitutional  Vitals:  Most recent vital signs, dated less than 90 days prior to this appointment, were not reviewed.    There were no vitals filed for this visit.     General:  unremarkable, age appropriate     Musculoskeletal  Muscle Strength/Tone:  no tremor, no tic   Gait & Station:  non-ataxic     Psychiatric  Appearance: casually dressed & groomed;   Behavior: calm,   Cooperation: cooperative with assessment  Speech: normal rate, volume, tone  Thought Process: linear, goal-directed  Thought Content: No suicidal or homicidal ideation; no delusions  Affect: anxious  Mood: anxious  Perceptions: No auditory or visual hallucinations  Level of Consciousness: alert throughout interview  Insight: fair  Cognition: Oriented to person, place, time, & situation  Memory: no apparent deficits to general clinical interview; not formally " assessed  Attention/Concentration: no apparent deficits to general clinical interview; not formally assessed  Fund of Knowledge: average by vocabulary/education    Laboratory Data  No visits with results within 1 Month(s) from this visit.   Latest known visit with results is:   Office Visit on 02/21/2021   Component Date Value Ref Range Status    POC Rapid COVID 02/21/2021 Negative  Negative Final     Acceptable 02/21/2021 Yes   Final     Medications  Outpatient Encounter Medications as of 1/10/2022   Medication Sig Dispense Refill    brompheniramine-pseudoeph-DM (BROMFED DM) 2-30-10 mg/5 mL Syrp Take 10 mLs by mouth every 6 (six) hours as needed (cough and congestion). 140 mL 0    diclofenac (VOLTAREN) 75 MG EC tablet Take 1 tablet (75 mg total) by mouth 2 (two) times daily. 20 tablet 0    estradioL (ESTRACE) 1 MG tablet Take 1 tablet (1 mg total) by mouth once daily. 30 tablet 11    FLUoxetine 20 MG capsule Take 2 capsules by mouth daily 60 capsule 1    fluticasone propionate (FLONASE) 50 mcg/actuation nasal spray instill 2 sprays (100 mcg total) by Each Nostril route 2 (two) times daily. 16 mL 11    levocetirizine (XYZAL) 5 MG tablet Take 1 tablet (5 mg total) by mouth every evening. 30 tablet 11    omeprazole (PRILOSEC) 20 MG capsule TAKE ONE CAPSULE BY MOUTH DAILY (Patient taking differently: TAKE ONE CAPSULE BY MOUTH NIGHTLY) 30 capsule 0    predniSONE (DELTASONE) 10 MG tablet Take 1 tablet three times daily for 2 days. Then 1 tablet twice daily for 2 days. Then 1 tablet once daily for 2 days. Then 1/2 tablet once daily for 2 days. 13 tablet 0    tiZANidine (ZANAFLEX) 2 MG tablet Take 2-3 tablets (4-6 mg total) by mouth nightly as needed. 30 tablet 1    traMADol (ULTRAM) 50 mg tablet Take 1 tablet (50 mg total) by mouth every 24 hours as needed for Pain. 30 tablet 0     No facility-administered encounter medications on file as of 1/10/2022.     Assessment - Diagnosis - Goals:      Impression: 42 y/o F with generalized anxiety disorder & adhd. Previous trials of duloxetine with minimal benefit, low dose bupropion with modest benefit. adhd by clinical history supplemented by family input, screening tools. Had persistent symptoms despite treatment & anxious in context of worse stressors. Failed atomoxetine. Returns due to persistent symptoms, now also with more pain problems.     Dx: generalized anxiety disorder. adhd    Treatment Goals:  Specify outcomes written in observable, behavioral terms:   Reduce anxiety by DOUG-7.     Treatment Plan/Recommendations:   · Venlafaxine for mood. Ok to skip medication & observe for resolution of possible side effects.   · Encouraged therapy. Info for accessing therapy.   · Discussed risks, benefits, and alternatives to treatment plan documented above with patient. I answered all patient questions related to this plan and patient expressed understanding and agreement.     Return to Clinic: 3 months    WINIFRED Nieto MD  Psychiatry  [facility]  [address] , [place] [address]  [phone]

## 2022-01-30 ENCOUNTER — PATIENT MESSAGE (OUTPATIENT)
Dept: ADMINISTRATIVE | Facility: OTHER | Age: 42
End: 2022-01-30
Payer: COMMERCIAL

## 2022-01-30 ENCOUNTER — OFFICE VISIT (OUTPATIENT)
Dept: URGENT CARE | Facility: CLINIC | Age: 42
End: 2022-01-30
Payer: COMMERCIAL

## 2022-01-30 VITALS
TEMPERATURE: 98 F | SYSTOLIC BLOOD PRESSURE: 118 MMHG | HEART RATE: 63 BPM | OXYGEN SATURATION: 99 % | DIASTOLIC BLOOD PRESSURE: 60 MMHG | RESPIRATION RATE: 18 BRPM

## 2022-01-30 DIAGNOSIS — Z20.822 ENCOUNTER FOR LABORATORY TESTING FOR COVID-19 VIRUS: ICD-10-CM

## 2022-01-30 DIAGNOSIS — R82.90 MALODOROUS URINE: ICD-10-CM

## 2022-01-30 DIAGNOSIS — J01.80 ACUTE NON-RECURRENT SINUSITIS OF OTHER SINUS: Primary | ICD-10-CM

## 2022-01-30 DIAGNOSIS — B34.9 VIRAL SYNDROME: ICD-10-CM

## 2022-01-30 LAB
BILIRUB UR QL STRIP: NEGATIVE
CTP QC/QA: YES
GLUCOSE UR QL STRIP: NEGATIVE
KETONES UR QL STRIP: NEGATIVE
LEUKOCYTE ESTERASE UR QL STRIP: NEGATIVE
PH, POC UA: 5
POC BLOOD, URINE: NEGATIVE
POC NITRATES, URINE: NEGATIVE
PROT UR QL STRIP: NEGATIVE
SARS-COV-2 RDRP RESP QL NAA+PROBE: NEGATIVE
SP GR UR STRIP: 1.02 (ref 1–1.03)
UROBILINOGEN UR STRIP-ACNC: NORMAL (ref 0.1–1.1)

## 2022-01-30 PROCEDURE — 99213 PR OFFICE/OUTPT VISIT, EST, LEVL III, 20-29 MIN: ICD-10-PCS | Mod: S$GLB,,, | Performed by: STUDENT IN AN ORGANIZED HEALTH CARE EDUCATION/TRAINING PROGRAM

## 2022-01-30 PROCEDURE — 3074F SYST BP LT 130 MM HG: CPT | Mod: CPTII,S$GLB,, | Performed by: STUDENT IN AN ORGANIZED HEALTH CARE EDUCATION/TRAINING PROGRAM

## 2022-01-30 PROCEDURE — 3078F PR MOST RECENT DIASTOLIC BLOOD PRESSURE < 80 MM HG: ICD-10-PCS | Mod: CPTII,S$GLB,, | Performed by: STUDENT IN AN ORGANIZED HEALTH CARE EDUCATION/TRAINING PROGRAM

## 2022-01-30 PROCEDURE — 1159F PR MEDICATION LIST DOCUMENTED IN MEDICAL RECORD: ICD-10-PCS | Mod: CPTII,S$GLB,, | Performed by: STUDENT IN AN ORGANIZED HEALTH CARE EDUCATION/TRAINING PROGRAM

## 2022-01-30 PROCEDURE — 3078F DIAST BP <80 MM HG: CPT | Mod: CPTII,S$GLB,, | Performed by: STUDENT IN AN ORGANIZED HEALTH CARE EDUCATION/TRAINING PROGRAM

## 2022-01-30 PROCEDURE — 3074F PR MOST RECENT SYSTOLIC BLOOD PRESSURE < 130 MM HG: ICD-10-PCS | Mod: CPTII,S$GLB,, | Performed by: STUDENT IN AN ORGANIZED HEALTH CARE EDUCATION/TRAINING PROGRAM

## 2022-01-30 PROCEDURE — 81003 POCT URINALYSIS, DIPSTICK, AUTOMATED, W/O SCOPE: ICD-10-PCS | Mod: QW,S$GLB,, | Performed by: STUDENT IN AN ORGANIZED HEALTH CARE EDUCATION/TRAINING PROGRAM

## 2022-01-30 PROCEDURE — U0002: ICD-10-PCS | Mod: QW,S$GLB,, | Performed by: STUDENT IN AN ORGANIZED HEALTH CARE EDUCATION/TRAINING PROGRAM

## 2022-01-30 PROCEDURE — 1160F PR REVIEW ALL MEDS BY PRESCRIBER/CLIN PHARMACIST DOCUMENTED: ICD-10-PCS | Mod: CPTII,S$GLB,, | Performed by: STUDENT IN AN ORGANIZED HEALTH CARE EDUCATION/TRAINING PROGRAM

## 2022-01-30 PROCEDURE — 1159F MED LIST DOCD IN RCRD: CPT | Mod: CPTII,S$GLB,, | Performed by: STUDENT IN AN ORGANIZED HEALTH CARE EDUCATION/TRAINING PROGRAM

## 2022-01-30 PROCEDURE — 99213 OFFICE O/P EST LOW 20 MIN: CPT | Mod: S$GLB,,, | Performed by: STUDENT IN AN ORGANIZED HEALTH CARE EDUCATION/TRAINING PROGRAM

## 2022-01-30 PROCEDURE — 81003 URINALYSIS AUTO W/O SCOPE: CPT | Mod: QW,S$GLB,, | Performed by: STUDENT IN AN ORGANIZED HEALTH CARE EDUCATION/TRAINING PROGRAM

## 2022-01-30 PROCEDURE — U0002 COVID-19 LAB TEST NON-CDC: HCPCS | Mod: QW,S$GLB,, | Performed by: STUDENT IN AN ORGANIZED HEALTH CARE EDUCATION/TRAINING PROGRAM

## 2022-01-30 PROCEDURE — 1160F RVW MEDS BY RX/DR IN RCRD: CPT | Mod: CPTII,S$GLB,, | Performed by: STUDENT IN AN ORGANIZED HEALTH CARE EDUCATION/TRAINING PROGRAM

## 2022-01-30 RX ORDER — IPRATROPIUM BROMIDE 42 UG/1
2 SPRAY, METERED NASAL 3 TIMES DAILY
Qty: 15 ML | Refills: 0 | Status: SHIPPED | OUTPATIENT
Start: 2022-01-30 | End: 2022-02-04

## 2022-01-30 NOTE — PATIENT INSTRUCTIONS
Patient Education      You may leave home and/or return to work when the following conditions are met:  · 24 hours fever free without fever-reducing medications AND  · Improved symptoms  · You are fully vaccinated or have not had close contact with someone with COVID-19 (within 6 feet for 15 minutes or more)    If you are fully vaccinated and had a close contact, there is no need for quarantine, unless you develop symptoms.      If you are not fully vaccinated and had a close contact:  · Retest at 5 to 7 days post-exposure  · If possible, it is recommended that you quarantine for 5 days from the time of contact regardless of your test status.  · A mask should be worn indoors post quarantine.       Viral Syndrome Discharge Instructions   About this topic   Viral syndrome is an illness with signs like you would get with a cold or the flu. A tiny germ called a virus causes this infection. Most people get better in 1 to 2 weeks without treatment. This illness spreads easily from person to person.     What care is needed at home?   · Ask your doctor what you need to do when you go home. Make sure you ask questions if you do not understand what the doctor says. This way you will know what you need to do.  · Drink lots of water, juice, or broth to replace fluids lost in runny nose and fever. Suck on ice chips or popsicles to ease throat pain.  · Get lots of rest and sleep. Sleep helps your body get back the energy it needs.  · Stay away from others until you are feeling better.  What follow-up care is needed?   Your doctor may ask you to make visits to the office to check on your progress. Be sure to keep these visits.  What drugs may be needed?   The doctor may order drugs to:  · Help with pain  · Lower fever  · Help with pain from a sore throat  · Help a runny or stuffy nose  · Ease or stop coughing  Will physical activity be limited?   You need to rest while you are getting better. This means you may need to limit your  activity until you feel well.  What changes to diet are needed?   Eat foods that will not upset your stomach like chicken soup, bananas, rice, apples, or toast.  What problems could happen?   · Lung problems like pneumonia and bronchitis  · Too much fluid loss  · Infection  What can be done to prevent this health problem?   · If you are sick, cover your mouth and nose with tissue when you cough or sneeze. You can also cough into your elbow. Throw away tissues in the trash and wash your hands after touching used tissues.  · Wash your hands often with soap and water for at least 20 seconds, especially after coughing or sneezing. Alcohol-based hand sanitizers also work to kill the virus.  · Do not get too close (kissing, hugging) to people who are sick.  · Stay away from crowded places.  · Do not share towels or hankies with anyone who is sick. Clean commonly handled things like door handles, remotes, toys, and phones. Wipe them with a disinfectant.  · Take vitamin C to help build up your body's ability to fight disease.  · Get a flu shot each year.  When do I need to call the doctor?   · Signs of infection. These include a fever of 100.4°F (38°C) or higher, chills, very bad sore throat, ear or sinus pain, cough, more sputum or change in color of sputum, and mouth sores.  · Trouble breathing  · Very bad throwing up or throwing up that does not stop  · Health problem is not better or you are feeling worse  Teach Back: Helping You Understand   The Teach Back Method helps you understand the information we are giving you. After you talk with the staff, tell them in your own words what you learned. This helps to make sure the staff has described each thing clearly. It also helps to explain things that may have been confusing. Before going home, make sure you can do these:  · I can tell you about my condition.  · I can tell you what may help ease my sore throat.  · I can tell you what I can do to help avoid passing the  infection to others.  · I can tell you what I will do if I have trouble breathing, very bad throwing up, or throwing up that does not stop.  Last Reviewed Date   2020-08-24  Consumer Information Use and Disclaimer   This information is not specific medical advice and does not replace information you receive from your health care provider. This is only a brief summary of general information. It does NOT include all information about conditions, illnesses, injuries, tests, procedures, treatments, therapies, discharge instructions or life-style choices that may apply to you. You must talk with your health care provider for complete information about your health and treatment options. This information should not be used to decide whether or not to accept your health care providers advice, instructions or recommendations. Only your health care provider has the knowledge and training to provide advice that is right for you.  Copyright   Copyright © 2021 [employer] and its affiliates and/or licensors. All rights reserved.

## 2022-01-30 NOTE — PROGRESS NOTES
Subjective:       Patient ID: Joy Recinos is a 41 y.o. female.    Vitals:  tympanic temperature is 97.8 °F (36.6 °C). Her blood pressure is 118/60 and her pulse is 63. Her respiration is 18 and oxygen saturation is 99%.     Chief Complaint: COVID-19 Concerns    Pt presents for symptomatic COVID testing. Reports onset of frontal HA with intermittent dizziness/blurred vision, congestion, mild cough, nausea, and fatigue last Sunday. Also states body aches present though may be related to fibromyalgia. Recently switched from Prozac to Effexor 3wks ago, had just increased Effexor dose prior to symptom onset. Has had close COVID contact at home. Notes odor to urine. Overall states improving but mostly concerned for COVID given exposure. Denied f/c, emesis, dysuria, vaginal discharge, SoB, Sharma, CP, syncope.    Other  This is a new problem. The current episode started in the past 7 days (01/23/22). The problem occurs constantly. The problem has been gradually improving. Associated symptoms include congestion, coughing, fatigue, headaches, myalgias, nausea and urinary symptoms (odor). Pertinent negatives include no change in bowel habit, chest pain, chills, diaphoresis, fever, joint swelling, neck pain, numbness, rash, sore throat, swollen glands, vomiting or weakness. Nothing aggravates the symptoms. Treatments tried: xyzal. The treatment provided mild relief.       Constitution: Positive for fatigue. Negative for chills, sweating and fever.   HENT: Positive for congestion. Negative for ear pain, ear discharge, tinnitus, hearing loss and sore throat.    Neck: Negative for neck pain.   Cardiovascular: Negative for chest pain, palpitations and sob on exertion.   Eyes: Positive for blurred vision (intermittent with dizziness). Negative for eye discharge, eye itching, eye pain and eye redness.   Respiratory: Positive for cough. Negative for chest tightness, sputum production, shortness of breath, stridor and  wheezing.    Gastrointestinal: Positive for nausea. Negative for vomiting and diarrhea.   Musculoskeletal: Positive for muscle ache. Negative for joint swelling.   Skin: Negative for rash.   Neurological: Positive for dizziness, light-headedness and headaches. Negative for passing out and numbness.   Psychiatric/Behavioral: Negative for confusion.       Objective:      Physical Exam   Constitutional: She is oriented to person, place, and time. She appears well-developed. She does not appear ill. No distress.   HENT:   Head: Normocephalic and atraumatic.   Ears:   Right Ear: Hearing, external ear and ear canal normal. No mastoid tenderness. Tympanic membrane is bulging. Tympanic membrane is not perforated, not erythematous and not retracted. A middle ear effusion (serous) is present.   Left Ear: Hearing, external ear and ear canal normal. No mastoid tenderness. Tympanic membrane is bulging. Tympanic membrane is not perforated, not erythematous and not retracted. A middle ear effusion (serous) is present.   Nose: Rhinorrhea present. No mucosal edema. Right sinus exhibits no maxillary sinus tenderness and no frontal sinus tenderness. Left sinus exhibits no maxillary sinus tenderness and no frontal sinus tenderness.   Mouth/Throat: Uvula is midline, oropharynx is clear and moist and mucous membranes are normal. No uvula swelling.   Eyes: Conjunctivae and EOM are normal. Right eye exhibits no discharge. Left eye exhibits no discharge.   Neck: Neck supple.   Cardiovascular: Normal rate, regular rhythm and normal heart sounds.   Pulmonary/Chest: Effort normal and breath sounds normal. No respiratory distress. She has no wheezes. She has no rhonchi. She has no rales.   Musculoskeletal: Normal range of motion.         General: Normal range of motion.   Neurological: She is alert and oriented to person, place, and time. No cranial nerve deficit (CN II-XII grossly intact).   Skin: Skin is warm, dry and no rash.   Psychiatric:  Her behavior is normal. Judgment and thought content normal.   Nursing note and vitals reviewed.    Recent Lab Results       01/30/22  1706   01/30/22  1657        POC Blood, Urine Negative         POC Bilirubin, Urine Negative         POC Ketones, Urine Negative         POC Protein, Urine Negative         POC Nitrates, Urine Negative         POC Glucose, Urine Negative         POC Leukocytes, Urine Negative         POC Urobilinogen, Urine Normal         POC Specific Gravity, Urine 1.025         pH, UA 5.0          Acceptable   Yes       SARS-CoV-2 RNA, Amplification, Qual   Negative               Assessment:       1. Acute non-recurrent sinusitis of other sinus    2. Viral syndrome    3. Malodorous urine    4. Encounter for laboratory testing for COVID-19 virus          Plan:         Acute non-recurrent sinusitis of other sinus  -     ipratropium (ATROVENT) 42 mcg (0.06 %) nasal spray; 2 sprays by Nasal route 3 (three) times daily. for 5 days  Dispense: 15 mL; Refill: 0    Viral syndrome  - counseled on home care and OTC medications    Malodorous urine  -     POCT Urinalysis, Dipstick, Automated, W/O Scope    Encounter for laboratory testing for COVID-19 virus  -     POCT COVID-19 Rapid Screening    Results, medications and diagnosis reviewed with patient, questions answered, and return precautions given    Follow up if symptoms worsen or fail to improve.    Harmeet Madrigal MD/MPH  [facility]  [facility]

## 2022-02-03 ENCOUNTER — TELEPHONE (OUTPATIENT)
Dept: URGENT CARE | Facility: CLINIC | Age: 42
End: 2022-02-03
Payer: COMMERCIAL

## 2022-02-11 ENCOUNTER — OFFICE VISIT (OUTPATIENT)
Dept: PSYCHIATRY | Facility: CLINIC | Age: 42
End: 2022-02-11
Payer: COMMERCIAL

## 2022-02-11 DIAGNOSIS — F90.9 ATTENTION DEFICIT HYPERACTIVITY DISORDER (ADHD), UNSPECIFIED ADHD TYPE: ICD-10-CM

## 2022-02-11 DIAGNOSIS — F33.1 MODERATE EPISODE OF RECURRENT MAJOR DEPRESSIVE DISORDER: ICD-10-CM

## 2022-02-11 DIAGNOSIS — F41.1 GAD (GENERALIZED ANXIETY DISORDER): Primary | ICD-10-CM

## 2022-02-11 PROCEDURE — 90791 PSYCH DIAGNOSTIC EVALUATION: CPT | Mod: S$GLB,,, | Performed by: SOCIAL WORKER

## 2022-02-11 PROCEDURE — 90791 PR PSYCHIATRIC DIAGNOSTIC EVALUATION: ICD-10-PCS | Mod: S$GLB,,, | Performed by: SOCIAL WORKER

## 2022-02-11 NOTE — PROGRESS NOTES
Psychiatry Initial Visit (PhD/LCSW)  Diagnostic Interview - CPT 98887    Date: 2/11/2022    Site: [facility]    Referral source: Dr. Doyle    Clinical status of patient: Outpatient    Joy Recinos, a 41 y.o. female, for initial evaluation visit.  Met with patient.    Chief complaint/reason for encounter: depression and anxiety    History of present illness: Pt shared that she has always had some underlying anxiety and depression but they have worsened over the past couple of years.  Pt lives in chronic pain from fibromyalgia which has only worsened this experience.  Pt was sent home to work remotely during covid.  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  She has the option to go to work or continue remotely but now the office is just a big empty building.  The few friends she has were all through work so she has lost her connections to the outside world as well.  Pt also has trouble with word finding and a feeling of brain fog which increase her social anxiety and so she is finding she is avoiding social situations more and more.      Pain: 8    Symptoms:   · Mood: depressed mood, diminished interest, insomnia, hypersomnia, psychomotor retardation, fatigue, worthlessness/guilt, poor concentration, altered libido, thoughts of death, tearfulness and social isolation  · Anxiety: decreased memory, excessive anxiety/worry, restlessness/keyed up, irritability, muscle tension, panic attacks and post-traumatic stress  · Substance abuse: denied  · Cognitive functioning: denied  · Health behaviors: noncontributory    Psychiatric history: currently under psychiatric care    Medical history: Fibromyalgia, herniated discs    Family history of psychiatric illness: Both of her parents diagnosed with depression in past few years    Social history (marriage, employment, etc.): Pt is the youngest of three children raised by her parents.  Mom was a  " and Dad worked in a blue collar job.  Dad was verbally abusive to everyone in the family.  Pt stated that his physical discipline with the boys also sometimes crossed the line.  Parents both still living and  to each other.  They live a few miles from patient.  She sees her mom regularly but avoids spending much time with her father.  Mother has adopted his critical ways so she has become less rewarding to be with over time as well.  Pt has been  for 18 years.  Her  is 10 years older than she.  Historically he took care of all of their finances.  About two years ago she became aware that they were in a very poor financial situation.  They have entered into a time share that is like having a second mortgage and they are unable to get out of it.  She took over the finances at that time and has steadily worked to get them out of debt.  They are still living paycheck to paycheck which increases her anxiety tremendously.  Pt is well paid in her position but feels like she is stuck in this job she no longer enjoys because they can't afford to have her take any decrease in pay.  Pt has a 17 year old daughter and 13 year old son.  Pt reports that she and  are content but have very limited intimacy in their marriage.  He sleeps in the recliner every night.  Son now adopting this habit and sleeping on the couch.  Rarely they have sex but it is painful to her so she tolerates it for him.  Some suggestion of some psychological reason for pain associated with sex.  Had healthy sex life until they got  and as soon as they got  "pain and discomfort" started with intercourse.  Brothers both moved farther from home so seem to tolerate her parents more than she does.  Pt stated eldest brother was very intelligent and mother never missed an opportunity to talk about how exceptional he was and how patient was not.  Pt wanted to go to college but mom told her to just get a 9 month " "certification in office management instead.      Substance use:   Alcohol: occasional   Drugs: none   Tobacco: none   Caffeine: rarely    Current medications and drug reactions (include OTC, herbal): see medication list     Strengths and liabilities: Strength: Patient accepts guidance/feedback, Strength: Patient is expressive/articulate., Strength: Patient is intelligent., Liability: Patient is defensive., Liability: Patient is dependent., Liability: Patient lacks social skills., Liability: Patient has no suport network., Liability: Patient has poor health., Liability: Patient lacks coping skills.    Current Evaluation:     Mental Status Exam:  General Appearance:  unremarkable, age appropriate   Speech: normal tone, normal rate, normal pitch, normal volume      Level of Cooperation: cooperative      Thought Processes: normal and logical   Mood: steady, depressed      Thought Content: normal, no suicidality, no homicidality, delusions, or paranoia   Affect: congruent and appropriate   Orientation: Oriented x3   Memory: recent >  intact   Attention Span & Concentration: spelled "WORLD" forwards and backwards   Fund of General Knowledge: 4 of 4 recent presidents   Abstract Reasoning: interpretation of proverbs was concrete   Judgment & Insight: fair     Language  intact     Diagnostic Impression - Plan:       ICD-10-CM ICD-9-CM   1. DOUG (generalized anxiety disorder)  F41.1 300.02   2. Attention deficit hyperactivity disorder (ADHD), unspecified ADHD type  F90.9 314.01   3. Moderate episode of recurrent major depressive disorder  F33.1 296.32       Plan:individual psychotherapy    Return to Clinic: 2 weeks    Length of Service (minutes): 45    nEedina Fall   02/11/2022   10:15 AM   "

## 2022-02-25 ENCOUNTER — OFFICE VISIT (OUTPATIENT)
Dept: OPHTHALMOLOGY | Facility: CLINIC | Age: 42
End: 2022-02-25
Payer: COMMERCIAL

## 2022-02-25 DIAGNOSIS — H52.13 MYOPIA WITH ASTIGMATISM AND PRESBYOPIA, BILATERAL: ICD-10-CM

## 2022-02-25 DIAGNOSIS — H52.203 MYOPIA WITH ASTIGMATISM AND PRESBYOPIA, BILATERAL: ICD-10-CM

## 2022-02-25 DIAGNOSIS — H52.4 MYOPIA WITH ASTIGMATISM AND PRESBYOPIA, BILATERAL: ICD-10-CM

## 2022-02-25 DIAGNOSIS — R51.9 FREQUENT HEADACHES: Primary | ICD-10-CM

## 2022-02-25 PROCEDURE — 1159F PR MEDICATION LIST DOCUMENTED IN MEDICAL RECORD: ICD-10-PCS | Mod: CPTII,S$GLB,, | Performed by: OPTOMETRIST

## 2022-02-25 PROCEDURE — 99999 PR PBB SHADOW E&M-EST. PATIENT-LVL III: CPT | Mod: PBBFAC,,, | Performed by: OPTOMETRIST

## 2022-02-25 PROCEDURE — 92015 PR REFRACTION: ICD-10-PCS | Mod: S$GLB,,, | Performed by: OPTOMETRIST

## 2022-02-25 PROCEDURE — 92014 PR EYE EXAM, EST PATIENT,COMPREHESV: ICD-10-PCS | Mod: S$GLB,,, | Performed by: OPTOMETRIST

## 2022-02-25 PROCEDURE — 1160F RVW MEDS BY RX/DR IN RCRD: CPT | Mod: CPTII,S$GLB,, | Performed by: OPTOMETRIST

## 2022-02-25 PROCEDURE — 92014 COMPRE OPH EXAM EST PT 1/>: CPT | Mod: S$GLB,,, | Performed by: OPTOMETRIST

## 2022-02-25 PROCEDURE — 92015 DETERMINE REFRACTIVE STATE: CPT | Mod: S$GLB,,, | Performed by: OPTOMETRIST

## 2022-02-25 PROCEDURE — 1160F PR REVIEW ALL MEDS BY PRESCRIBER/CLIN PHARMACIST DOCUMENTED: ICD-10-PCS | Mod: CPTII,S$GLB,, | Performed by: OPTOMETRIST

## 2022-02-25 PROCEDURE — 99999 PR PBB SHADOW E&M-EST. PATIENT-LVL III: ICD-10-PCS | Mod: PBBFAC,,, | Performed by: OPTOMETRIST

## 2022-02-25 PROCEDURE — 1159F MED LIST DOCD IN RCRD: CPT | Mod: CPTII,S$GLB,, | Performed by: OPTOMETRIST

## 2022-02-25 NOTE — PROGRESS NOTES
HPI     Last visit with [facility] on 01/06/2020.  Patient states decrease with overall vision.  Experiencing headaches and pain behind both eyes but more so left side,   using Visine prn.   Wear SVL glasses     Last edited by Toya Nguyễn on 2/25/2022 10:44 AM. (History)            Assessment /Plan     For exam results, see Encounter Report.    Frequent headaches  Not likely ocular in origin  Updated glasses Rx and recommended comp Rx  Normal, well-perfused optic nerves bilaterally with no edema     Myopia with astigmatism and presbyopia, bilateral    Eyeglass Final Rx     Eyeglass Final Rx       Sphere Cylinder Axis Add    Right -3.00 +1.00 025 +1.25    Left -3.00 +0.75 175 +1.25    Expiration Date: 2/25/2023    Comments: Single vision distance okay          Eyeglass Final Rx #2       Sphere Cylinder Axis Add    Right -2.00 +1.00 025     Left -2.00 +0.75 175     Type: SVL Computer    Expiration Date: 2/25/2023                RTC 1 yr for dilated eye exam or PRN if any problems.   Discussed above and answered questions.

## 2022-03-04 ENCOUNTER — OFFICE VISIT (OUTPATIENT)
Dept: PSYCHIATRY | Facility: CLINIC | Age: 42
End: 2022-03-04
Payer: COMMERCIAL

## 2022-03-04 DIAGNOSIS — F41.1 GAD (GENERALIZED ANXIETY DISORDER): Primary | ICD-10-CM

## 2022-03-04 DIAGNOSIS — F90.9 ATTENTION DEFICIT HYPERACTIVITY DISORDER (ADHD), UNSPECIFIED ADHD TYPE: ICD-10-CM

## 2022-03-04 PROCEDURE — 90834 PSYTX W PT 45 MINUTES: CPT | Mod: S$GLB,,, | Performed by: SOCIAL WORKER

## 2022-03-04 PROCEDURE — 90834 PR PSYCHOTHERAPY W/PATIENT, 45 MIN: ICD-10-PCS | Mod: S$GLB,,, | Performed by: SOCIAL WORKER

## 2022-03-04 NOTE — PROGRESS NOTES
"Individual Psychotherapy (PhD/LCSW)    3/4/2022    Site:  [facility]         Therapeutic Intervention: Met with patient.  Outpatient - Insight oriented psychotherapy 45 min - CPT code 52546    Chief complaint/reason for encounter: depression and anxiety     Interval history and content of current session: Pt emotional today as she was able to share in greater detail how unfulfilled she is in her marriage.  We explored doing things to improve it, leaving and staying in it as it.  Pt seemed overwhelmed by all three options.  Pt feels like she is existing in all aspects of her life but not living.  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  Did "treat" herself to a ticket to a craft show in September (sister in law excited to go with her.)   never opposed to her doing things without him but makes no effort to do things with her.  He had a conference in [place] last week and she asked to come along to simply get away and have some time away from kids and house but he told her no.  House is a mess and nobody (kids or ) will  after themselves.  Pt seems unable to assert herself with kids or .      Treatment plan:  · Target symptoms: depression, anxiety , interpersonal  · Why chosen therapy is appropriate versus another modality: relevant to diagnosis, patient responds to this modality, evidence based practice  · Outcome monitoring methods: self-report, observation  · Therapeutic intervention type: insight oriented psychotherapy, behavior modifying psychotherapy    Risk parameters:  Patient reports no suicidal ideation  Patient reports no homicidal ideation  Patient reports no self-injurious behavior  Patient reports no violent behavior    Verbal deficits: None    Patient's response to intervention:  The patient's response to intervention is accepting.    Progress toward goals and other mental status changes:  The patient's progress toward goals is not " progressing.    Diagnosis:     ICD-10-CM ICD-9-CM   1. DOUG (generalized anxiety disorder)  F41.1 300.02   2. Attention deficit hyperactivity disorder (ADHD), unspecified ADHD type  F90.9 314.01       Plan:  individual psychotherapy    Return to clinic: 2 weeks    Length of Service (minutes): 45     Enedina Fall   03/04/2022   2:13 PM

## 2022-04-01 ENCOUNTER — OFFICE VISIT (OUTPATIENT)
Dept: PSYCHIATRY | Facility: CLINIC | Age: 42
End: 2022-04-01
Payer: COMMERCIAL

## 2022-04-01 DIAGNOSIS — F41.1 GAD (GENERALIZED ANXIETY DISORDER): Primary | ICD-10-CM

## 2022-04-01 DIAGNOSIS — F33.1 MODERATE EPISODE OF RECURRENT MAJOR DEPRESSIVE DISORDER: ICD-10-CM

## 2022-04-01 DIAGNOSIS — F90.9 ATTENTION DEFICIT HYPERACTIVITY DISORDER (ADHD), UNSPECIFIED ADHD TYPE: ICD-10-CM

## 2022-04-01 PROCEDURE — 90834 PR PSYCHOTHERAPY W/PATIENT, 45 MIN: ICD-10-PCS | Mod: S$GLB,,, | Performed by: SOCIAL WORKER

## 2022-04-01 PROCEDURE — 90834 PSYTX W PT 45 MINUTES: CPT | Mod: S$GLB,,, | Performed by: SOCIAL WORKER

## 2022-04-01 NOTE — PROGRESS NOTES
Individual Psychotherapy (PhD/LCSW)    4/1/2022    Site:  [place]         Therapeutic Intervention: Met with patient.  Outpatient - Behavior modifying psychotherapy 45 min - CPT code 79866    Chief complaint/reason for encounter: anxiety     Interval history and content of current session: Pt shared that she feels like she has OCD.  She feels that she spends a lot of time doing things that don't matter (folding towels a certain way or clothes a certain way) meanwhile house in total disarray.  Pt frustrated because kids completely ignore her.   gets home from work and sits in recliner even though there are tons of things that need to be done around the house that he keeps ignoring.  Working with patient to set boundaries with kids and stick with them.  Kids do not go to bed when she tells them to and then too tired to get moving in the morning.  Suggested all technology taken out of bedrooms before bed.  Son plays on phone until late at night.  Kids are old enough to be responsible for their own laundry.  Working with patient to set limits and stick to them.  She has been on a couple of job interviews and spoken to  about leaving [place] coming to [place] so they will both not have to commute and son could be in a better high school.    for the first time admitted he is in his comfort zone there but knows it is not the best choice for his kids.  Daughter has decided to go to [place] next year.      Treatment plan:  · Target symptoms: anxiety   · Why chosen therapy is appropriate versus another modality: relevant to diagnosis, patient responds to this modality, evidence based practice  · Outcome monitoring methods: self-report, observation  · Therapeutic intervention type: behavior modifying psychotherapy    Risk parameters:  Patient reports no suicidal ideation  Patient reports no homicidal ideation  Patient reports no self-injurious behavior  Patient reports no violent behavior    Verbal  deficits: None    Patient's response to intervention:  The patient's response to intervention is guarded    Progress toward goals and other mental status changes:  The patient's progress toward goals is limited.    Diagnosis:     ICD-10-CM ICD-9-CM   1. DOUG (generalized anxiety disorder)  F41.1 300.02   2. Attention deficit hyperactivity disorder (ADHD), unspecified ADHD type  F90.9 314.01   3. Moderate episode of recurrent major depressive disorder  F33.1 296.32       Plan:  individual psychotherapy    Return to clinic: 2 weeks    Length of Service (minutes): 45     Enedina Fall   04/01/2022   12:57 PM

## 2022-04-13 RX ORDER — VENLAFAXINE HYDROCHLORIDE 75 MG/1
CAPSULE, EXTENDED RELEASE ORAL
Qty: 60 CAPSULE | Refills: 0 | Status: SHIPPED | OUTPATIENT
Start: 2022-04-13 | End: 2022-04-22 | Stop reason: SDUPTHER

## 2022-04-18 ENCOUNTER — PATIENT MESSAGE (OUTPATIENT)
Dept: PSYCHIATRY | Facility: CLINIC | Age: 42
End: 2022-04-18
Payer: COMMERCIAL

## 2022-04-22 ENCOUNTER — OFFICE VISIT (OUTPATIENT)
Dept: PSYCHIATRY | Facility: CLINIC | Age: 42
End: 2022-04-22
Payer: COMMERCIAL

## 2022-04-22 VITALS
WEIGHT: 206.38 LBS | DIASTOLIC BLOOD PRESSURE: 95 MMHG | HEART RATE: 82 BPM | SYSTOLIC BLOOD PRESSURE: 129 MMHG | BODY MASS INDEX: 31.38 KG/M2

## 2022-04-22 DIAGNOSIS — G47.00 INSOMNIA, UNSPECIFIED TYPE: ICD-10-CM

## 2022-04-22 DIAGNOSIS — F90.9 ATTENTION DEFICIT HYPERACTIVITY DISORDER (ADHD), UNSPECIFIED ADHD TYPE: ICD-10-CM

## 2022-04-22 DIAGNOSIS — F41.1 GAD (GENERALIZED ANXIETY DISORDER): Primary | ICD-10-CM

## 2022-04-22 PROCEDURE — 99214 OFFICE O/P EST MOD 30 MIN: CPT | Mod: S$GLB,,, | Performed by: PSYCHIATRY & NEUROLOGY

## 2022-04-22 PROCEDURE — 99999 PR PBB SHADOW E&M-EST. PATIENT-LVL II: ICD-10-PCS | Mod: PBBFAC,,, | Performed by: PSYCHIATRY & NEUROLOGY

## 2022-04-22 PROCEDURE — 99214 PR OFFICE/OUTPT VISIT, EST, LEVL IV, 30-39 MIN: ICD-10-PCS | Mod: S$GLB,,, | Performed by: PSYCHIATRY & NEUROLOGY

## 2022-04-22 PROCEDURE — 99999 PR PBB SHADOW E&M-EST. PATIENT-LVL II: CPT | Mod: PBBFAC,,, | Performed by: PSYCHIATRY & NEUROLOGY

## 2022-04-22 RX ORDER — TRAZODONE HYDROCHLORIDE 100 MG/1
TABLET ORAL
Qty: 30 TABLET | Refills: 2 | Status: SHIPPED | OUTPATIENT
Start: 2022-04-22 | End: 2022-11-04 | Stop reason: SDUPTHER

## 2022-04-22 RX ORDER — CLONAZEPAM 0.5 MG/1
0.5 TABLET ORAL DAILY PRN
Qty: 30 TABLET | Refills: 1 | Status: SHIPPED | OUTPATIENT
Start: 2022-04-22 | End: 2022-11-04 | Stop reason: SDUPTHER

## 2022-04-22 RX ORDER — VENLAFAXINE HYDROCHLORIDE 75 MG/1
CAPSULE, EXTENDED RELEASE ORAL
Qty: 30 CAPSULE | Refills: 2 | Status: SHIPPED | OUTPATIENT
Start: 2022-04-22 | End: 2022-07-01

## 2022-04-22 RX ORDER — VENLAFAXINE HYDROCHLORIDE 150 MG/1
150 CAPSULE, EXTENDED RELEASE ORAL DAILY
Qty: 30 CAPSULE | Refills: 2 | Status: SHIPPED | OUTPATIENT
Start: 2022-04-22 | End: 2022-07-01

## 2022-04-22 NOTE — PROGRESS NOTES
"Outpatient Psychiatry Follow-up Visit (MD/NP)    4/22/2022    Joy Recinos, a 41 y.o. female, presenting for follow-up visit. Met with patient.    Reason for Encounter: f/u, DOUG.     Interval History: Patient seen and interviewed for follow-up, last seen about 3 months ago. Describes ongoing problems with anxiety, similar in character to previously. Depression is somewhat better, however. Adverse effect to role functioning. Health status at baseline without new complaints. Adherent to medication. Denies side effects. Participating in therapy, finding not getting benefit, has been witholding some needs.     Past med List:   Sertraline  Duloxetine  escitalopram  Fluoxetine   milnacipran    Background: 36 y/o F presents for establishment of care, reports problems with anxiety & depression. From PCP note (3.16.18): Here today for f/u on depression & anxiety. She reports that since medication adjustment she has been less irritable. Her  is here with her today & affirms this. She has a psych appointment scheduled for May 11. She reports it has been some mild improvement in her volition. Reports treatment with cymbalta - for fibromyalgia, anxiety, depression for 5 years. Neither fibromyalgia pain no moods improved. wellbutrin trial has been perhaps helpful. Describes baseline symptoms as "worried about everything", "worried about worrying", sad, not interested in things, "like I don't have a purpose". Negative thoughts about self. Tired all the time, even when sleeps excessively. Denies SI. "All my life". "stays all the time", though better/worse at times, influenced by stressful situations. Problems with attention/concentration. Reports all of the following daily in past 2 weeks (around which time she was laid off): Feeling nervous, anxious or on edge, Not being able to stop or control worrying  Worrying too much about different things   Trouble relaxing   Being so restless that it is hard to sit still " "  Becoming easily annoyed or irritable   Feeling afraid as if something awful might happen    DOUG-7 = 21 (estimates a 19 in weeks prior to the layoff)    Sleep Problems - hypersomnia  Sad Mood more than 1/2 time  Appetite and weight changes (decreased appetite, small subjective weight loss)  Concentration problems  Guilt   Thoughts of Emptiness  Anhedonia  Anergia  Slowing/PMR    QIDS = 12    Also has decreased sexual interest, pain during intercourse.    PsychHx: first treatment by Obgyn - sertraline(?) when had problems with endometriosis. No AVH, no SI/HI, no delusions. No hospitalizations or self-harm behaviors. Prolonged duloxetine trial, thinks transient or minimal benefit. Past treatment with sertraline.     MedHx: idiopathic hypersomnia. 2 sleep studies, narcolepsy ruled out. Endometriosis. 2 herniated lumbar discs. Migraines. GERD. Seasonal allergies.   FamHx: mother & dad both take medication for depression. Father diagnosed with adhd in 60's.   SocHx: from [facility]. Heart murmur at birth. "fainted and blacked out school", had several episodes between 5 and 9. Worked up but never medically explained. Still gets dizzy and weak, but doesn't black out. Also had back pain frequently as a child. No developmental problems. Regular classes in school. Ok with going to school. Normal socially. Average student. 3 semesters of college. Went to technical school for 1 year, finished (computer/). 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 Applying for new jobs. Lives with  & 2 kids.  x 14 years. "really good" relationship x decreased sexual interest and pain. Week before  pulled out a tampon - was extremely painful. [place] has been painful ever since. Has had PT recommended for this. 2 kids (13 & 9). Dtr has been bullied most of her life due to being overweight. She also has " "problems with oppositionality at home (though is a good student, doesn't have discipline problems at school), won't take a bath. Son is "good kid", "very attached to me". Quiet, not many friends. Mom is supportive, lives nearby.     Review Of Systems:     GENERAL:  No weight gain or loss  SKIN:  No rashes or lacerations  HEAD:  No headaches  CHEST:  No shortness of breath, hyperventilation or cough  CARDIOVASCULAR:  No tachycardia or chest pain  ABDOMEN:  No nausea, vomiting, pain, constipation or diarrhea  URINARY:  No frequency, dysuria or sexual dysfunction  ENDOCRINE:  No polydipsia, polyuria  MUSCULOSKELETAL:  pain and stiffness of the joints  NEUROLOGIC:  No weakness, sensory changes, seizures, confusion, memory loss, tremor or other abnormal movements    Current Evaluation:     Nutritional Screening: Considering the patient's height and weight, medications, medical history and preferences, should a referral be made to the dietitian? no    Constitutional  Vitals:  Most recent vital signs, dated less than 90 days prior to this appointment, were not reviewed.    There were no vitals filed for this visit.     General:  unremarkable, age appropriate     Musculoskeletal  Muscle Strength/Tone:  no tremor, no tic   Gait & Station:  non-ataxic     Psychiatric  Appearance: casually dressed & groomed;   Behavior: calm,   Cooperation: cooperative with assessment  Speech: normal rate, volume, tone  Thought Process: linear, goal-directed  Thought Content: No suicidal or homicidal ideation; no delusions  Affect: anxious  Mood: anxious  Perceptions: No auditory or visual hallucinations  Level of Consciousness: alert throughout interview  Insight: fair  Cognition: Oriented to person, place, time, & situation  Memory: no apparent deficits to general clinical interview; not formally assessed  Attention/Concentration: no apparent deficits to general clinical interview; not formally assessed  Fund of Knowledge: average by " vocabulary/education    Laboratory Data  No visits with results within 1 Month(s) from this visit.   Latest known visit with results is:   Office Visit on 01/30/2022   Component Date Value Ref Range Status    POC Rapid COVID 01/30/2022 Negative  Negative Final     Acceptable 01/30/2022 Yes   Final    POC Blood, Urine 01/30/2022 Negative  Negative Final    POC Bilirubin, Urine 01/30/2022 Negative  Negative Final    POC Urobilinogen, Urine 01/30/2022 Normal  0.1 - 1.1 Final    POC Ketones, Urine 01/30/2022 Negative  Negative Final    POC Protein, Urine 01/30/2022 Negative  Negative Final    POC Nitrates, Urine 01/30/2022 Negative  Negative Final    POC Glucose, Urine 01/30/2022 Negative  Negative Final    pH, UA 01/30/2022 5.0   Final    POC Specific Gravity, Urine 01/30/2022 1.025  1.003 - 1.029 Final    POC Leukocytes, Urine 01/30/2022 Negative  Negative Final     Medications  Outpatient Encounter Medications as of 4/22/2022   Medication Sig Dispense Refill    brompheniramine-pseudoeph-DM (BROMFED DM) 2-30-10 mg/5 mL Syrp Take 10 mLs by mouth every 6 (six) hours as needed (cough and congestion). 140 mL 0    diclofenac (VOLTAREN) 75 MG EC tablet Take 1 tablet (75 mg total) by mouth 2 (two) times daily. 20 tablet 0    estradioL (ESTRACE) 1 MG tablet Take 1 tablet (1 mg total) by mouth once daily. 30 tablet 11    fluticasone propionate (FLONASE) 50 mcg/actuation nasal spray instill 2 sprays (100 mcg total) by Each Nostril route 2 (two) times daily. 16 mL 11    levocetirizine (XYZAL) 5 MG tablet Take 1 tablet (5 mg total) by mouth every evening. 30 tablet 11    omeprazole (PRILOSEC) 20 MG capsule TAKE ONE CAPSULE BY MOUTH DAILY 30 capsule 0    predniSONE (DELTASONE) 10 MG tablet Take 1 tablet three times daily for 2 days. Then 1 tablet twice daily for 2 days. Then 1 tablet once daily for 2 days. Then 1/2 tablet once daily for 2 days. 13 tablet 0    tiZANidine (ZANAFLEX) 2 MG tablet  Take 2-3 tablets (4-6 mg total) by mouth nightly as needed. 30 tablet 1    traMADol (ULTRAM) 50 mg tablet Take 1 tablet (50 mg total) by mouth every 24 hours as needed for Pain. 30 tablet 0    traZODone (DESYREL) 100 MG tablet Take 1/2 to 1 tablet at bedtime as needed for sleep. 30 tablet 2    venlafaxine (EFFEXOR-XR) 75 MG 24 hr capsule Take 1 tablet daily for 7 days then 2 tablets daily thereafter 60 capsule 0     No facility-administered encounter medications on file as of 4/22/2022.     Assessment - Diagnosis - Goals:     Impression: 42 y/o F with generalized anxiety disorder & adhd. Previous trials of duloxetine with minimal benefit, low dose bupropion with modest benefit. adhd by clinical history supplemented by family input, screening tools. Had persistent symptoms despite treatment & anxious in context of worse stressors. Failed atomoxetine. Returns due to persistent symptoms, now also with more pain problems.     Dx: generalized anxiety disorder. adhd    Treatment Goals:  Specify outcomes written in observable, behavioral terms:   Reduce anxiety by DOUG-7.     Treatment Plan/Recommendations:   · Venlafaxine 225 mg daily for mood. Ok to skip medication & observe for resolution of possible side effects.   · Trazodone for sleep.   · Encouraged her to be more candid about needs in therapy to get full benefit.   · Discussed risks, benefits, and alternatives to treatment plan documented above with patient. I answered all patient questions related to this plan and patient expressed understanding and agreement.     Return to Clinic: 3 months    WINIFRED Nieto MD  Psychiatry  [facility]  [address] , [place], [place] [id number] [phone]

## 2022-04-27 ENCOUNTER — PATIENT MESSAGE (OUTPATIENT)
Dept: ADMINISTRATIVE | Facility: HOSPITAL | Age: 42
End: 2022-04-27
Payer: COMMERCIAL

## 2022-05-06 ENCOUNTER — OFFICE VISIT (OUTPATIENT)
Dept: OBSTETRICS AND GYNECOLOGY | Facility: CLINIC | Age: 42
End: 2022-05-06
Payer: COMMERCIAL

## 2022-05-06 VITALS
DIASTOLIC BLOOD PRESSURE: 76 MMHG | SYSTOLIC BLOOD PRESSURE: 124 MMHG | BODY MASS INDEX: 31.5 KG/M2 | WEIGHT: 207.88 LBS | HEIGHT: 68 IN

## 2022-05-06 DIAGNOSIS — Z79.890 MENOPAUSAL SYNDROME ON HORMONE REPLACEMENT THERAPY: ICD-10-CM

## 2022-05-06 DIAGNOSIS — N94.10 DYSPAREUNIA IN FEMALE: ICD-10-CM

## 2022-05-06 DIAGNOSIS — Z12.4 ENCOUNTER FOR SCREENING FOR CERVICAL CANCER: ICD-10-CM

## 2022-05-06 DIAGNOSIS — Z12.31 ENCOUNTER FOR SCREENING MAMMOGRAM FOR MALIGNANT NEOPLASM OF BREAST: ICD-10-CM

## 2022-05-06 DIAGNOSIS — Z01.419 WELL WOMAN EXAM WITH ROUTINE GYNECOLOGICAL EXAM: Primary | ICD-10-CM

## 2022-05-06 DIAGNOSIS — N95.1 MENOPAUSAL SYNDROME ON HORMONE REPLACEMENT THERAPY: ICD-10-CM

## 2022-05-06 PROCEDURE — 3074F SYST BP LT 130 MM HG: CPT | Mod: CPTII,S$GLB,, | Performed by: NURSE PRACTITIONER

## 2022-05-06 PROCEDURE — 99999 PR PBB SHADOW E&M-EST. PATIENT-LVL III: ICD-10-PCS | Mod: PBBFAC,,, | Performed by: NURSE PRACTITIONER

## 2022-05-06 PROCEDURE — 3074F PR MOST RECENT SYSTOLIC BLOOD PRESSURE < 130 MM HG: ICD-10-PCS | Mod: CPTII,S$GLB,, | Performed by: NURSE PRACTITIONER

## 2022-05-06 PROCEDURE — 3078F DIAST BP <80 MM HG: CPT | Mod: CPTII,S$GLB,, | Performed by: NURSE PRACTITIONER

## 2022-05-06 PROCEDURE — 1160F PR REVIEW ALL MEDS BY PRESCRIBER/CLIN PHARMACIST DOCUMENTED: ICD-10-PCS | Mod: CPTII,S$GLB,, | Performed by: NURSE PRACTITIONER

## 2022-05-06 PROCEDURE — 3008F BODY MASS INDEX DOCD: CPT | Mod: CPTII,S$GLB,, | Performed by: NURSE PRACTITIONER

## 2022-05-06 PROCEDURE — 99396 PREV VISIT EST AGE 40-64: CPT | Mod: S$GLB,,, | Performed by: NURSE PRACTITIONER

## 2022-05-06 PROCEDURE — 3078F PR MOST RECENT DIASTOLIC BLOOD PRESSURE < 80 MM HG: ICD-10-PCS | Mod: CPTII,S$GLB,, | Performed by: NURSE PRACTITIONER

## 2022-05-06 PROCEDURE — 1160F RVW MEDS BY RX/DR IN RCRD: CPT | Mod: CPTII,S$GLB,, | Performed by: NURSE PRACTITIONER

## 2022-05-06 PROCEDURE — 99999 PR PBB SHADOW E&M-EST. PATIENT-LVL III: CPT | Mod: PBBFAC,,, | Performed by: NURSE PRACTITIONER

## 2022-05-06 PROCEDURE — 1159F MED LIST DOCD IN RCRD: CPT | Mod: CPTII,S$GLB,, | Performed by: NURSE PRACTITIONER

## 2022-05-06 PROCEDURE — 99396 PR PREVENTIVE VISIT,EST,40-64: ICD-10-PCS | Mod: S$GLB,,, | Performed by: NURSE PRACTITIONER

## 2022-05-06 PROCEDURE — 3008F PR BODY MASS INDEX (BMI) DOCUMENTED: ICD-10-PCS | Mod: CPTII,S$GLB,, | Performed by: NURSE PRACTITIONER

## 2022-05-06 PROCEDURE — 1159F PR MEDICATION LIST DOCUMENTED IN MEDICAL RECORD: ICD-10-PCS | Mod: CPTII,S$GLB,, | Performed by: NURSE PRACTITIONER

## 2022-05-06 RX ORDER — ESTRADIOL 2 MG/1
2 TABLET ORAL DAILY
Qty: 30 TABLET | Refills: 11 | Status: SHIPPED | OUTPATIENT
Start: 2022-05-06 | End: 2023-06-17 | Stop reason: SDUPTHER

## 2022-05-06 NOTE — PROGRESS NOTES
Subjective:       Patient ID: Joy Recinos is a 41 y.o. female.    Chief Complaint:  Well Woman    Patient's last menstrual period was 2019 (exact date).  History of Present Illness  Annual Exam-Premenopausal  Patient presents for annual exam.  The patient is sexually active. GYN screening history: last pap: approximate date 3/16/18 and was normal and last mammogram: approximate date 21 and was normal.  History of hysterectomy with BSO, benign indications.  The patient wears seatbelts: yes. The patient participates in regular exercise: no. Has the patient ever been transfused or tattooed?: no. The patient reports that there is not domestic violence in her life.  Patient reports continuing to have pain with intercourse.  Patient reports pain mainly occurs with insertion.  Patient does report intermittent use of lubrication with intercourse although is unsure if water-based or oil-based.  Currently on Estrace 1 mg for hormone replacement therapy but has began to experience increase in hot flashes and night sweats which are becoming bothersome to her.    OB History    Para Term  AB Living   2 2 2     2   SAB IAB Ectopic Multiple Live Births           2      # Outcome Date GA Lbr Zachery/2nd Weight Sex Delivery Anes PTL Lv   2 Term     F CS-LTranv   TREMAYNE      Complications: Failure to Progress in First Stage   1 Term     M CS-LTranv   TREMAYNE       Review of Systems  Review of Systems   Constitutional: Negative for appetite change, fatigue, fever and unexpected weight change.   Eyes: Negative for visual disturbance.   Cardiovascular: Negative for chest pain.   Gastrointestinal: Negative for abdominal pain, bloating, constipation, diarrhea, nausea and vomiting.   Genitourinary: Positive for dyspareunia. Negative for bladder incontinence, dysmenorrhea, dysuria, flank pain, frequency, genital sores, menorrhagia, menstrual problem, pelvic pain, urgency, vaginal bleeding, vaginal discharge, vaginal  pain, postcoital bleeding, vaginal dryness and vaginal odor.   Integumentary:  Negative for rash, acne, mole/lesion, breast mass, nipple discharge, breast skin changes and breast tenderness.   Neurological: Negative for syncope and headaches.   Hematological: Negative for adenopathy. Does not bruise/bleed easily.   All other systems reviewed and are negative.  Breast: Positive for breast self exam.Negative for asymmetry, lump, mass, nipple discharge, skin changes and tenderness           Objective:      Physical Exam:   Constitutional: She is oriented to person, place, and time. She appears well-developed and well-nourished.    HENT:   Head: Normocephalic and atraumatic.   Nose: Nose normal.    Eyes: Pupils are equal, round, and reactive to light. Conjunctivae and EOM are normal.     Cardiovascular: Normal rate and regular rhythm.     Pulmonary/Chest: Effort normal. Right breast exhibits no inverted nipple, no mass, no nipple discharge, no skin change, no tenderness, no bleeding and no swelling. Left breast exhibits no inverted nipple, no mass, no nipple discharge, no skin change, no tenderness, no bleeding and no swelling. Breasts are symmetrical.        Abdominal: Soft. She exhibits no distension. There is no abdominal tenderness. Hernia confirmed negative in the right inguinal area and confirmed negative in the left inguinal area.     Genitourinary:    Inguinal canal, vagina and rectum normal.      Pelvic exam was performed with patient supine.   The external female genitalia was normal.   Genitalia hair distrobution normal .   Labial bartholins normal.There is no rash, tenderness, lesion or injury on the right labia. There is no rash, tenderness, lesion or injury on the left labia. There is an absent right adnexa and an absent left adnexa. Vaginal cuff normal.  No erythema,  no vaginal discharge, rectocele or cystocele in the vagina. Cervix is absent.Uterus is absent.    Genitourinary Comments: Vagina atrophic.              Musculoskeletal: Normal range of motion and moves all extremeties.      Lymphadenopathy: No inguinal adenopathy noted on the right or left side.    Neurological: She is alert and oriented to person, place, and time.    Skin: Skin is warm and dry. She is not diaphoretic.    Psychiatric: She has a normal mood and affect. Her behavior is normal. Judgment and thought content normal.            Assessment:     1. Well woman exam with routine gynecological exam    2. Encounter for screening for cervical cancer    3. Encounter for screening mammogram for malignant neoplasm of breast    4. Menopausal syndrome on hormone replacement therapy    5. Dyspareunia in female          Plan:   Joy was seen today for well woman.    Diagnoses and all orders for this visit:    Well woman exam with routine gynecological exam  -     Cancel: Liquid-Based Pap Smear, Screening  -     Cancel: HPV High Risk Genotypes, PCR    Encounter for screening for cervical cancer  -     Cancel: Liquid-Based Pap Smear, Screening  -     Cancel: HPV High Risk Genotypes, PCR    Encounter for screening mammogram for malignant neoplasm of breast  -     Mammo Digital Screening Bilat w/ Сергей; Future    Menopausal syndrome on hormone replacement therapy  -     estradioL (ESTRACE) 2 MG tablet; Take 1 tablet (2 mg total) by mouth once daily.    Dyspareunia in female    Will increase HRT to 2 mg daily.  Patient will let me know how she is feeling on increased dosage.    Recommend oil-based lubrication with intercourse.  Also recommend perineal massage. May consider pelvic floor therapy if no improvement.     Follow up with me in 1 year for annual well woman exam.

## 2022-06-16 ENCOUNTER — PATIENT OUTREACH (OUTPATIENT)
Dept: ADMINISTRATIVE | Facility: HOSPITAL | Age: 42
End: 2022-06-16
Payer: COMMERCIAL

## 2022-06-16 NOTE — PROGRESS NOTES
Called patient to schedule overdue PCP appt, Patient answered, appt scheduled for 7/22/22 @1:40pm

## 2022-07-01 ENCOUNTER — OFFICE VISIT (OUTPATIENT)
Dept: PSYCHIATRY | Facility: CLINIC | Age: 42
End: 2022-07-01
Payer: COMMERCIAL

## 2022-07-01 VITALS
HEIGHT: 68 IN | SYSTOLIC BLOOD PRESSURE: 118 MMHG | BODY MASS INDEX: 31.85 KG/M2 | WEIGHT: 210.13 LBS | DIASTOLIC BLOOD PRESSURE: 82 MMHG | HEART RATE: 82 BPM

## 2022-07-01 DIAGNOSIS — F90.9 ATTENTION DEFICIT HYPERACTIVITY DISORDER (ADHD), UNSPECIFIED ADHD TYPE: ICD-10-CM

## 2022-07-01 DIAGNOSIS — F41.1 GAD (GENERALIZED ANXIETY DISORDER): Primary | ICD-10-CM

## 2022-07-01 PROCEDURE — 3008F PR BODY MASS INDEX (BMI) DOCUMENTED: ICD-10-PCS | Mod: CPTII,S$GLB,, | Performed by: PSYCHIATRY & NEUROLOGY

## 2022-07-01 PROCEDURE — 99999 PR PBB SHADOW E&M-EST. PATIENT-LVL II: ICD-10-PCS | Mod: PBBFAC,,, | Performed by: PSYCHIATRY & NEUROLOGY

## 2022-07-01 PROCEDURE — 90833 PR PSYCHOTHERAPY W/PATIENT W/E&M, 30 MIN (ADD ON): ICD-10-PCS | Mod: S$GLB,,, | Performed by: PSYCHIATRY & NEUROLOGY

## 2022-07-01 PROCEDURE — 99999 PR PBB SHADOW E&M-EST. PATIENT-LVL II: CPT | Mod: PBBFAC,,, | Performed by: PSYCHIATRY & NEUROLOGY

## 2022-07-01 PROCEDURE — 3079F DIAST BP 80-89 MM HG: CPT | Mod: CPTII,S$GLB,, | Performed by: PSYCHIATRY & NEUROLOGY

## 2022-07-01 PROCEDURE — 3074F SYST BP LT 130 MM HG: CPT | Mod: CPTII,S$GLB,, | Performed by: PSYCHIATRY & NEUROLOGY

## 2022-07-01 PROCEDURE — 99214 OFFICE O/P EST MOD 30 MIN: CPT | Mod: S$GLB,,, | Performed by: PSYCHIATRY & NEUROLOGY

## 2022-07-01 PROCEDURE — 3008F BODY MASS INDEX DOCD: CPT | Mod: CPTII,S$GLB,, | Performed by: PSYCHIATRY & NEUROLOGY

## 2022-07-01 PROCEDURE — 90833 PSYTX W PT W E/M 30 MIN: CPT | Mod: S$GLB,,, | Performed by: PSYCHIATRY & NEUROLOGY

## 2022-07-01 PROCEDURE — 3079F PR MOST RECENT DIASTOLIC BLOOD PRESSURE 80-89 MM HG: ICD-10-PCS | Mod: CPTII,S$GLB,, | Performed by: PSYCHIATRY & NEUROLOGY

## 2022-07-01 PROCEDURE — 99214 PR OFFICE/OUTPT VISIT, EST, LEVL IV, 30-39 MIN: ICD-10-PCS | Mod: S$GLB,,, | Performed by: PSYCHIATRY & NEUROLOGY

## 2022-07-01 PROCEDURE — 3074F PR MOST RECENT SYSTOLIC BLOOD PRESSURE < 130 MM HG: ICD-10-PCS | Mod: CPTII,S$GLB,, | Performed by: PSYCHIATRY & NEUROLOGY

## 2022-07-01 RX ORDER — VENLAFAXINE HYDROCHLORIDE 225 MG/1
225 TABLET, EXTENDED RELEASE ORAL DAILY
Qty: 30 EACH | Refills: 3 | Status: SHIPPED | OUTPATIENT
Start: 2022-07-01 | End: 2022-09-20

## 2022-07-01 NOTE — PROGRESS NOTES
"Outpatient Psychiatry Follow-up Visit (MD/NP)    7/1/2022    Joy Recinos, a 42 y.o. female, presenting for follow-up visit. Met with patient.    Reason for Encounter: f/u, DOUG.     Interval History: Patient seen and interviewed for follow-up, last seen just over 2 months ago.     Decreased depression, ongoing anxiety, problems with concentration,   Adversely affects functioning. Denies new health complaints. No new medications. Adherent to medication. Denies side effects. Participating in therapy, finding not getting benefit, has been witholding some needs.     Past med List:   Sertraline  Duloxetine  escitalopram  Fluoxetine   milnacipran    Background: 36 y/o F presents for establishment of care, reports problems with anxiety & depression. From PCP note (3.16.18): Here today for f/u on depression & anxiety. She reports that since medication adjustment she has been less irritable. Her  is here with her today & affirms this. She has a psych appointment scheduled for May 11. She reports it has been some mild improvement in her volition. Reports treatment with cymbalta - for fibromyalgia, anxiety, depression for 5 years. Neither fibromyalgia pain no moods improved. wellbutrin trial has been perhaps helpful. Describes baseline symptoms as "worried about everything", "worried about worrying", sad, not interested in things, "like I don't have a purpose". Negative thoughts about self. Tired all the time, even when sleeps excessively. Denies SI. "All my life". "stays all the time", though better/worse at times, influenced by stressful situations. Problems with attention/concentration. Reports all of the following daily in past 2 weeks (around which time she was laid off): Feeling nervous, anxious or on edge, Not being able to stop or control worrying  Worrying too much about different things   Trouble relaxing   Being so restless that it is hard to sit still   Becoming easily annoyed or irritable " "  Feeling afraid as if something awful might happen    DOUG-7 = 21 (estimates a 19 in weeks prior to the layoff)    Sleep Problems - hypersomnia  Sad Mood more than 1/2 time  Appetite and weight changes (decreased appetite, small subjective weight loss)  Concentration problems  Guilt   Thoughts of Emptiness  Anhedonia  Anergia  Slowing/PMR    QIDS = 12    Also has decreased sexual interest, pain during intercourse.    PsychHx: first treatment by Obgyn - sertraline(?) when had problems with endometriosis. No AVH, no SI/HI, no delusions. No hospitalizations or self-harm behaviors. Prolonged duloxetine trial, thinks transient or minimal benefit. Past treatment with sertraline.     MedHx: idiopathic hypersomnia. 2 sleep studies, narcolepsy ruled out. Endometriosis. 2 herniated lumbar discs. Migraines. GERD. Seasonal allergies.   FamHx: mother & dad both take medication for depression. Father diagnosed with adhd in 60's.   SocHx: from [facility]. Heart murmur at birth. "fainted and blacked out school", had several episodes between 5 and 9. Worked up but never medically explained. Still gets dizzy and weak, but doesn't black out. Also had back pain frequently as a child. No developmental problems. Regular classes in school. Ok with going to school. Normal socially. Average student. 3 semesters of college. Went to technical school for 1 year, finished (computer/). 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 Applying for new jobs. Lives with  & 2 kids.  x 14 years. "really good" relationship x decreased sexual interest and pain. Week before  pulled out a tampon - was extremely painful. [place] has been painful ever since. Has had PT recommended for this. 2 kids (13 & 9). Dtr has been bullied most of her life due to being overweight. She also has problems with oppositionality at home " "(though is a good student, doesn't have discipline problems at school), won't take a bath. Son is "good kid", "very attached to me". Quiet, not many friends. Mom is supportive, lives nearby.     Review Of Systems:     GENERAL:  No weight gain or loss  SKIN:  No rashes or lacerations  HEAD:  No headaches  CHEST:  No shortness of breath, hyperventilation or cough  CARDIOVASCULAR:  No tachycardia or chest pain  ABDOMEN:  No nausea, vomiting, pain, constipation or diarrhea  URINARY:  No frequency, dysuria or sexual dysfunction  ENDOCRINE:  No polydipsia, polyuria  MUSCULOSKELETAL:  pain and stiffness of the joints  NEUROLOGIC:  No weakness, sensory changes, seizures, confusion, memory loss, tremor or other abnormal movements    Current Evaluation:     Nutritional Screening: Considering the patient's height and weight, medications, medical history and preferences, should a referral be made to the dietitian? no    Constitutional  Vitals:  Most recent vital signs, dated less than 90 days prior to this appointment, were not reviewed.    There were no vitals filed for this visit.     General:  unremarkable, age appropriate     Musculoskeletal  Muscle Strength/Tone:  no tremor, no tic   Gait & Station:  non-ataxic     Psychiatric  Appearance: casually dressed & groomed;   Behavior: calm,   Cooperation: cooperative with assessment  Speech: normal rate, volume, tone  Thought Process: linear, goal-directed  Thought Content: No suicidal or homicidal ideation; no delusions  Affect: anxious  Mood: anxious  Perceptions: No auditory or visual hallucinations  Level of Consciousness: alert throughout interview  Insight: fair  Cognition: Oriented to person, place, time, & situation  Memory: no apparent deficits to general clinical interview; not formally assessed  Attention/Concentration: no apparent deficits to general clinical interview; not formally assessed  Fund of Knowledge: average by vocabulary/education    Laboratory Data  No " visits with results within 1 Month(s) from this visit.   Latest known visit with results is:   Office Visit on 01/30/2022   Component Date Value Ref Range Status    POC Rapid COVID 01/30/2022 Negative  Negative Final     Acceptable 01/30/2022 Yes   Final    POC Blood, Urine 01/30/2022 Negative  Negative Final    POC Bilirubin, Urine 01/30/2022 Negative  Negative Final    POC Urobilinogen, Urine 01/30/2022 Normal  0.1 - 1.1 Final    POC Ketones, Urine 01/30/2022 Negative  Negative Final    POC Protein, Urine 01/30/2022 Negative  Negative Final    POC Nitrates, Urine 01/30/2022 Negative  Negative Final    POC Glucose, Urine 01/30/2022 Negative  Negative Final    pH, UA 01/30/2022 5.0   Final    POC Specific Gravity, Urine 01/30/2022 1.025  1.003 - 1.029 Final    POC Leukocytes, Urine 01/30/2022 Negative  Negative Final     Medications  Outpatient Encounter Medications as of 7/1/2022   Medication Sig Dispense Refill    brompheniramine-pseudoeph-DM (BROMFED DM) 2-30-10 mg/5 mL Syrp Take 10 mLs by mouth every 6 (six) hours as needed (cough and congestion). (Patient not taking: Reported on 5/6/2022) 140 mL 0    clonazePAM (KLONOPIN) 0.5 MG tablet Take 1 tablet (0.5 mg total) by mouth daily as needed for Anxiety. 30 tablet 1    diclofenac (VOLTAREN) 75 MG EC tablet Take 1 tablet (75 mg total) by mouth 2 (two) times daily. (Patient not taking: Reported on 5/6/2022) 20 tablet 0    estradioL (ESTRACE) 2 MG tablet Take 1 tablet (2 mg total) by mouth once daily. 30 tablet 11    fluticasone propionate (FLONASE) 50 mcg/actuation nasal spray instill 2 sprays (100 mcg total) by Each Nostril route 2 (two) times daily. 16 mL 11    levocetirizine (XYZAL) 5 MG tablet Take 1 tablet (5 mg total) by mouth every evening. 30 tablet 11    omeprazole (PRILOSEC) 20 MG capsule TAKE ONE CAPSULE BY MOUTH DAILY 30 capsule 0    tiZANidine (ZANAFLEX) 2 MG tablet Take 2-3 tablets (4-6 mg total) by mouth nightly as  needed. 30 tablet 1    traMADol (ULTRAM) 50 mg tablet Take 1 tablet (50 mg total) by mouth every 24 hours as needed for Pain. 30 tablet 0    traZODone (DESYREL) 100 MG tablet Take 1/2 to 1 tablet at bedtime as needed for sleep. 30 tablet 2    venlafaxine (EFFEXOR-XR) 150 MG Cp24 Take 1 capsule (150 mg total) by mouth once daily. 30 capsule 2    venlafaxine (EFFEXOR-XR) 75 MG 24 hr capsule Take 1 capsule daily with a 150 mg capsule. 30 capsule 2     No facility-administered encounter medications on file as of 7/1/2022.     Assessment - Diagnosis - Goals:     Impression: 42 y/o F with generalized anxiety disorder & adhd. Previous trials of duloxetine with minimal benefit, low dose bupropion with modest benefit. adhd by clinical history supplemented by family input, screening tools. Had persistent symptoms despite treatment & anxious in context of worse stressors. Failed atomoxetine.     Dx: generalized anxiety disorder. adhd    Treatment Goals:  Specify outcomes written in observable, behavioral terms:   Reduce anxiety by DOUG-7.     Treatment Plan/Recommendations:   · Venlafaxine 225 mg daily for mood. Ok to skip medication & observe for resolution of possible side effects.   · Consider trial of stimulant  · Trazodone for sleep.   · Psychotherapy today - thought-stopping, replacement, self-talk.   · Discussed risks, benefits, and alternatives to treatment plan documented above with patient. I answered all patient questions related to this plan and patient expressed understanding and agreement.     Return to Clinic: 3 months    CRory Nieto MD  Psychiatry  [facility]  [address] , [place], [place] [id number] [phone]

## 2022-07-22 ENCOUNTER — OFFICE VISIT (OUTPATIENT)
Dept: INTERNAL MEDICINE | Facility: CLINIC | Age: 42
End: 2022-07-22
Payer: COMMERCIAL

## 2022-07-22 VITALS
BODY MASS INDEX: 31.95 KG/M2 | DIASTOLIC BLOOD PRESSURE: 72 MMHG | OXYGEN SATURATION: 100 % | HEIGHT: 68 IN | HEART RATE: 80 BPM | SYSTOLIC BLOOD PRESSURE: 120 MMHG

## 2022-07-22 DIAGNOSIS — R14.0 ABDOMINAL BLOATING: ICD-10-CM

## 2022-07-22 DIAGNOSIS — Z00.00 ROUTINE GENERAL MEDICAL EXAMINATION AT A HEALTH CARE FACILITY: Primary | ICD-10-CM

## 2022-07-22 PROCEDURE — 3044F PR MOST RECENT HEMOGLOBIN A1C LEVEL <7.0%: ICD-10-PCS | Mod: CPTII,S$GLB,, | Performed by: INTERNAL MEDICINE

## 2022-07-22 PROCEDURE — 3078F DIAST BP <80 MM HG: CPT | Mod: CPTII,S$GLB,, | Performed by: INTERNAL MEDICINE

## 2022-07-22 PROCEDURE — 99396 PREV VISIT EST AGE 40-64: CPT | Mod: S$GLB,,, | Performed by: INTERNAL MEDICINE

## 2022-07-22 PROCEDURE — 3044F HG A1C LEVEL LT 7.0%: CPT | Mod: CPTII,S$GLB,, | Performed by: INTERNAL MEDICINE

## 2022-07-22 PROCEDURE — 99213 OFFICE O/P EST LOW 20 MIN: CPT | Mod: 25,S$GLB,, | Performed by: INTERNAL MEDICINE

## 2022-07-22 PROCEDURE — 3008F BODY MASS INDEX DOCD: CPT | Mod: CPTII,S$GLB,, | Performed by: INTERNAL MEDICINE

## 2022-07-22 PROCEDURE — 1159F PR MEDICATION LIST DOCUMENTED IN MEDICAL RECORD: ICD-10-PCS | Mod: CPTII,S$GLB,, | Performed by: INTERNAL MEDICINE

## 2022-07-22 PROCEDURE — 99999 PR PBB SHADOW E&M-EST. PATIENT-LVL IV: ICD-10-PCS | Mod: PBBFAC,,, | Performed by: INTERNAL MEDICINE

## 2022-07-22 PROCEDURE — 3074F PR MOST RECENT SYSTOLIC BLOOD PRESSURE < 130 MM HG: ICD-10-PCS | Mod: CPTII,S$GLB,, | Performed by: INTERNAL MEDICINE

## 2022-07-22 PROCEDURE — 99396 PR PREVENTIVE VISIT,EST,40-64: ICD-10-PCS | Mod: S$GLB,,, | Performed by: INTERNAL MEDICINE

## 2022-07-22 PROCEDURE — 1159F MED LIST DOCD IN RCRD: CPT | Mod: CPTII,S$GLB,, | Performed by: INTERNAL MEDICINE

## 2022-07-22 PROCEDURE — 99213 PR OFFICE/OUTPT VISIT, EST, LEVL III, 20-29 MIN: ICD-10-PCS | Mod: 25,S$GLB,, | Performed by: INTERNAL MEDICINE

## 2022-07-22 PROCEDURE — 99999 PR PBB SHADOW E&M-EST. PATIENT-LVL IV: CPT | Mod: PBBFAC,,, | Performed by: INTERNAL MEDICINE

## 2022-07-22 PROCEDURE — 3074F SYST BP LT 130 MM HG: CPT | Mod: CPTII,S$GLB,, | Performed by: INTERNAL MEDICINE

## 2022-07-22 PROCEDURE — 3078F PR MOST RECENT DIASTOLIC BLOOD PRESSURE < 80 MM HG: ICD-10-PCS | Mod: CPTII,S$GLB,, | Performed by: INTERNAL MEDICINE

## 2022-07-22 PROCEDURE — 3008F PR BODY MASS INDEX (BMI) DOCUMENTED: ICD-10-PCS | Mod: CPTII,S$GLB,, | Performed by: INTERNAL MEDICINE

## 2022-07-22 NOTE — PROGRESS NOTES
Subjective:      Patient ID: Joy Recinos is a 42 y.o. female.    Chief Complaint: Annual Exam    HPI     41 yo with   Patient Active Problem List   Diagnosis    B12 deficiency    Idiopathic hypersomnia    Migraines    Fibromyalgia syndrome    Breakthrough bleeding on birth control pills    Vitamin D deficiency    Positive DARRION (antinuclear antibody)    Periorbital erythema    Great toe pain, right    DOUG (generalized anxiety disorder)    Depression with anxiety    Pedal edema    ARELLANO (dyspnea on exertion)    PVD (peripheral vascular disease)    Class 2 obesity due to excess calories without serious comorbidity with body mass index (BMI) of 36.0 to 36.9 in adult    Endometriosis of pelvis    S/P hysterectomy    Attention deficit hyperactivity disorder (ADHD)    Chronic bilateral low back pain without sciatica    Incomplete emptying of bladder    Decreased strength    Neurogenic bladder     Past Medical History:   Diagnosis Date    ADHD (attention deficit hyperactivity disorder)     B12 deficiency     Chronic low back pain     oberlander    Endometriosis     Fibromyalgia syndrome     Idiopathic hypersomnia     Irritable bowel syndrome     goldman    Migraines     frances    PONV (postoperative nausea and vomiting)     Shingles     Undifferentiated inflammatory arthritis 2018    Undifferentiated inflammatory arthritis 2018     Here today for annual prev exam.  Compliant with meds without significant side effects. Energy and appetite are good.     Pt alsoc c/o Abdominal bloating with nausea with occ emesis. No pain. No blood. Has had about 4 to 5 episodes in past 2 years. Bloating can last up to 2 days. No pain.   Past Surgical History:   Procedure Laterality Date     SECTION      x 2     CYSTOSCOPY N/A 4/10/2019    Procedure: CYSTOSCOPY;  Surgeon: Gillian Carpenter MD;  Location: [facility];  Service: OB/GYN;  Laterality: N/A;    HYSTERECTOMY  2019     Total    OOPHORECTOMY Bilateral     left tube and ovary removed. later RSO with hyst. ovaries removed at differnt times, 2001 and 2019.    PELVIC LAPAROSCOPY      ROBOT-ASSISTED LAPAROSCOPIC ABDOMINAL HYSTERECTOMY USING DA LONI XI N/A 4/10/2019    Procedure: XI ROBOTIC HYSTERECTOMY;  Surgeon: Gillian Carpenter MD;  Location: [facility] OR;  Service: OB/GYN;  Laterality: N/A;    ROBOT-ASSISTED LAPAROSCOPIC LYSIS OF ADHESIONS USING DA LONI XI N/A 4/10/2019    Procedure: XI ROBOTIC LYSIS, ADHESIONS;  Surgeon: Gillian Carpenter MD;  Location: [facility] OR;  Service: OB/GYN;  Laterality: N/A;    ROBOT-ASSISTED LAPAROSCOPIC SALPINGO-OOPHORECTOMY USING DA LONI XI Right 4/10/2019    Procedure: XI ROBOTIC SALPINGO-OOPHORECTOMY;  Surgeon: Gillian Carpenter MD;  Location: [facility] OR;  Service: OB/GYN;  Laterality: Right;    TUBAL LIGATION       Social History     Socioeconomic History    Marital status:     Number of children: 2   Occupational History     Employer: [employer]   Tobacco Use    Smoking status: Never Smoker    Smokeless tobacco: Never Used   Substance and Sexual Activity    Alcohol use: Yes     Comment: rarely    Drug use: No    Sexual activity: Yes     Partners: Male     Birth control/protection: Condom     Comment:    Other Topics Concern    Are you pregnant or think you may be? No    Breast-feeding No   Social History Narrative    No smokers in household, 1 dog.     family history includes Cataracts in her maternal grandfather and maternal grandmother; Diabetes in her maternal grandmother; Glaucoma in her maternal grandmother; Heart disease in her father; No Known Problems in her mother; Skin cancer in her father, maternal grandmother, and paternal grandfather.    Review of Systems   Constitutional: Negative for chills and fever.   HENT: Negative for ear pain and sore throat.    Respiratory: Negative for cough.    Cardiovascular: Negative for chest pain.   Gastrointestinal:  "Negative for abdominal pain, blood in stool and diarrhea.   Genitourinary: Negative for dysuria and hematuria.   Musculoskeletal: Negative for neck stiffness.   Skin: Negative for rash and wound.   Neurological: Negative for seizures and syncope.     Objective:   /72 (BP Location: Left arm, Patient Position: Sitting, BP Method: Large (Manual))   Pulse 80   Ht 5' 8" (1.727 m)   LMP 03/22/2019 (Exact Date)   SpO2 100%   BMI 31.95 kg/m²     Physical Exam  Constitutional:       General: She is not in acute distress.     Appearance: She is well-developed.   HENT:      Head: Normocephalic and atraumatic.   Eyes:      Pupils: Pupils are equal, round, and reactive to light.   Neck:      Thyroid: No thyromegaly.   Cardiovascular:      Rate and Rhythm: Normal rate and regular rhythm.   Pulmonary:      Breath sounds: Normal breath sounds. No wheezing or rales.   Abdominal:      General: Bowel sounds are normal.      Palpations: Abdomen is soft.      Tenderness: There is no abdominal tenderness.   Musculoskeletal:      Cervical back: Neck supple.   Lymphadenopathy:      Cervical: No cervical adenopathy.   Skin:     General: Skin is warm and dry.   Neurological:      Mental Status: She is alert and oriented to person, place, and time.   Psychiatric:         Behavior: Behavior normal.         Assessment:     1. Routine general medical examination at a health care facility    2. Abdominal bloating      Plan:   Routine general medical examination at a health care facility  Heart healthy diet and reg exercise  HM reviewed  -     Comprehensive Metabolic Panel; Future; Expected date: 07/22/2022  -     CBC Auto Differential; Future; Expected date: 07/22/2022  -     TSH; Future; Expected date: 07/22/2022  -     Lipid Panel; Future; Expected date: 07/22/2022  -     Hemoglobin A1C; Future; Expected date: 07/22/2022    Abdominal bloating  -     Ambulatory referral/consult to Gastroenterology; Future; Expected date: 07/29/2022  - "     H. pylori Antibody, IgG; Future; Expected date: 07/22/2022  -     Celiac Disease Panel; Future; Expected date: 07/22/2022            Lab Frequency Next Occurrence   Mammo Digital Screening Bilat w/ Сергей Once 05/06/2022   C3 complement     C4 complement     C-reactive protein     Sedimentation rate     Protein electrophoresis, serum     Immunofixation electrophoresis     Immunoglobulins (IgG, IgA, IgM) Quantitative         Problem List Items Addressed This Visit    None     Visit Diagnoses     Routine general medical examination at a health care facility    -  Primary    Relevant Orders    Comprehensive Metabolic Panel (Completed)    CBC Auto Differential (Completed)    TSH (Completed)    Lipid Panel (Completed)    Hemoglobin A1C (Completed)    Abdominal bloating        Relevant Orders    Ambulatory referral/consult to Gastroenterology    H. pylori Antibody, IgG (Completed)    Celiac Disease Panel (Completed)          Follow up in about 1 year (around 7/22/2023), or if symptoms worsen or fail to improve.  Labs next Friday at 930 am

## 2022-07-29 ENCOUNTER — LAB VISIT (OUTPATIENT)
Dept: LAB | Facility: HOSPITAL | Age: 42
End: 2022-07-29
Attending: INTERNAL MEDICINE
Payer: COMMERCIAL

## 2022-07-29 DIAGNOSIS — Z00.00 ROUTINE GENERAL MEDICAL EXAMINATION AT A HEALTH CARE FACILITY: ICD-10-CM

## 2022-07-29 DIAGNOSIS — R14.0 ABDOMINAL BLOATING: ICD-10-CM

## 2022-07-29 LAB
ALBUMIN SERPL BCP-MCNC: 3.7 G/DL (ref 3.5–5.2)
ALP SERPL-CCNC: 99 U/L (ref 55–135)
ALT SERPL W/O P-5'-P-CCNC: 10 U/L (ref 10–44)
ANION GAP SERPL CALC-SCNC: 10 MMOL/L (ref 8–16)
AST SERPL-CCNC: 10 U/L (ref 10–40)
BASOPHILS # BLD AUTO: 0.02 K/UL (ref 0–0.2)
BASOPHILS NFR BLD: 0.4 % (ref 0–1.9)
BILIRUB SERPL-MCNC: 0.3 MG/DL (ref 0.1–1)
BUN SERPL-MCNC: 12 MG/DL (ref 6–20)
CALCIUM SERPL-MCNC: 9.2 MG/DL (ref 8.7–10.5)
CHLORIDE SERPL-SCNC: 105 MMOL/L (ref 95–110)
CHOLEST SERPL-MCNC: 216 MG/DL (ref 120–199)
CHOLEST/HDLC SERPL: 3.1 {RATIO} (ref 2–5)
CO2 SERPL-SCNC: 28 MMOL/L (ref 23–29)
CREAT SERPL-MCNC: 0.8 MG/DL (ref 0.5–1.4)
DIFFERENTIAL METHOD: ABNORMAL
EOSINOPHIL # BLD AUTO: 0 K/UL (ref 0–0.5)
EOSINOPHIL NFR BLD: 0 % (ref 0–8)
ERYTHROCYTE [DISTWIDTH] IN BLOOD BY AUTOMATED COUNT: 12.9 % (ref 11.5–14.5)
EST. GFR  (AFRICAN AMERICAN): >60 ML/MIN/1.73 M^2
EST. GFR  (NON AFRICAN AMERICAN): >60 ML/MIN/1.73 M^2
ESTIMATED AVG GLUCOSE: 97 MG/DL (ref 68–131)
GLUCOSE SERPL-MCNC: 86 MG/DL (ref 70–110)
HBA1C MFR BLD: 5 % (ref 4–5.6)
HCT VFR BLD AUTO: 40.1 % (ref 37–48.5)
HDLC SERPL-MCNC: 69 MG/DL (ref 40–75)
HDLC SERPL: 31.9 % (ref 20–50)
HGB BLD-MCNC: 12.8 G/DL (ref 12–16)
IMM GRANULOCYTES # BLD AUTO: 0 K/UL (ref 0–0.04)
IMM GRANULOCYTES NFR BLD AUTO: 0 % (ref 0–0.5)
LDLC SERPL CALC-MCNC: 127.8 MG/DL (ref 63–159)
LYMPHOCYTES # BLD AUTO: 1.6 K/UL (ref 1–4.8)
LYMPHOCYTES NFR BLD: 30.1 % (ref 18–48)
MCH RBC QN AUTO: 30 PG (ref 27–31)
MCHC RBC AUTO-ENTMCNC: 31.9 G/DL (ref 32–36)
MCV RBC AUTO: 94 FL (ref 82–98)
MONOCYTES # BLD AUTO: 0.3 K/UL (ref 0.3–1)
MONOCYTES NFR BLD: 5.7 % (ref 4–15)
NEUTROPHILS # BLD AUTO: 3.5 K/UL (ref 1.8–7.7)
NEUTROPHILS NFR BLD: 63.8 % (ref 38–73)
NONHDLC SERPL-MCNC: 147 MG/DL
NRBC BLD-RTO: 0 /100 WBC
PLATELET # BLD AUTO: 203 K/UL (ref 150–450)
PMV BLD AUTO: 12.2 FL (ref 9.2–12.9)
POTASSIUM SERPL-SCNC: 4.8 MMOL/L (ref 3.5–5.1)
PROT SERPL-MCNC: 6.4 G/DL (ref 6–8.4)
RBC # BLD AUTO: 4.27 M/UL (ref 4–5.4)
SODIUM SERPL-SCNC: 143 MMOL/L (ref 136–145)
TRIGL SERPL-MCNC: 96 MG/DL (ref 30–150)
TSH SERPL DL<=0.005 MIU/L-ACNC: 1.56 UIU/ML (ref 0.4–4)
WBC # BLD AUTO: 5.41 K/UL (ref 3.9–12.7)

## 2022-07-29 PROCEDURE — 85025 COMPLETE CBC W/AUTO DIFF WBC: CPT | Performed by: INTERNAL MEDICINE

## 2022-07-29 PROCEDURE — 80053 COMPREHEN METABOLIC PANEL: CPT | Performed by: INTERNAL MEDICINE

## 2022-07-29 PROCEDURE — 83036 HEMOGLOBIN GLYCOSYLATED A1C: CPT | Performed by: INTERNAL MEDICINE

## 2022-07-29 PROCEDURE — 84443 ASSAY THYROID STIM HORMONE: CPT | Performed by: INTERNAL MEDICINE

## 2022-07-29 PROCEDURE — 86677 HELICOBACTER PYLORI ANTIBODY: CPT | Performed by: INTERNAL MEDICINE

## 2022-07-29 PROCEDURE — 83516 IMMUNOASSAY NONANTIBODY: CPT | Performed by: INTERNAL MEDICINE

## 2022-07-29 PROCEDURE — 80061 LIPID PANEL: CPT | Performed by: INTERNAL MEDICINE

## 2022-08-01 LAB
GLIADIN PEPTIDE IGA SER-ACNC: 6 UNITS
GLIADIN PEPTIDE IGG SER-ACNC: 3 UNITS
IGA SERPL-MCNC: 194 MG/DL (ref 70–400)
TTG IGA SER-ACNC: 5 UNITS
TTG IGG SER-ACNC: 4 UNITS

## 2022-08-02 LAB — H PYLORI IGG SERPL QL IA: NEGATIVE

## 2022-09-19 ENCOUNTER — PATIENT MESSAGE (OUTPATIENT)
Dept: PSYCHIATRY | Facility: CLINIC | Age: 42
End: 2022-09-19
Payer: COMMERCIAL

## 2022-09-20 RX ORDER — VENLAFAXINE HYDROCHLORIDE 150 MG/1
150 CAPSULE, EXTENDED RELEASE ORAL DAILY
Qty: 30 CAPSULE | Refills: 2 | Status: SHIPPED | OUTPATIENT
Start: 2022-09-20 | End: 2022-11-04 | Stop reason: SDUPTHER

## 2022-09-20 RX ORDER — VENLAFAXINE HYDROCHLORIDE 75 MG/1
75 CAPSULE, EXTENDED RELEASE ORAL DAILY
Qty: 30 CAPSULE | Refills: 2 | Status: SHIPPED | OUTPATIENT
Start: 2022-09-20 | End: 2022-11-04 | Stop reason: SDUPTHER

## 2022-10-13 DIAGNOSIS — J30.89 NON-SEASONAL ALLERGIC RHINITIS, UNSPECIFIED TRIGGER: Primary | ICD-10-CM

## 2022-10-13 RX ORDER — LEVOCETIRIZINE DIHYDROCHLORIDE 5 MG/1
5 TABLET, FILM COATED ORAL NIGHTLY
Qty: 30 TABLET | Refills: 11 | Status: SHIPPED | OUTPATIENT
Start: 2022-10-13 | End: 2023-10-16 | Stop reason: SDUPTHER

## 2022-10-25 ENCOUNTER — OFFICE VISIT (OUTPATIENT)
Dept: GASTROENTEROLOGY | Facility: CLINIC | Age: 42
End: 2022-10-25
Payer: COMMERCIAL

## 2022-10-25 ENCOUNTER — HOSPITAL ENCOUNTER (OUTPATIENT)
Dept: RADIOLOGY | Facility: HOSPITAL | Age: 42
Discharge: HOME | End: 2022-10-25
Attending: PHYSICIAN ASSISTANT
Payer: COMMERCIAL

## 2022-10-25 ENCOUNTER — PATIENT MESSAGE (OUTPATIENT)
Dept: GASTROENTEROLOGY | Facility: CLINIC | Age: 42
End: 2022-10-25

## 2022-10-25 VITALS
OXYGEN SATURATION: 99 % | BODY MASS INDEX: 31.85 KG/M2 | WEIGHT: 210.13 LBS | SYSTOLIC BLOOD PRESSURE: 130 MMHG | DIASTOLIC BLOOD PRESSURE: 80 MMHG | HEIGHT: 68 IN | HEART RATE: 80 BPM

## 2022-10-25 DIAGNOSIS — R10.9 ABDOMINAL PAIN, UNSPECIFIED ABDOMINAL LOCATION: ICD-10-CM

## 2022-10-25 DIAGNOSIS — R10.9 ABDOMINAL PAIN, UNSPECIFIED ABDOMINAL LOCATION: Primary | ICD-10-CM

## 2022-10-25 DIAGNOSIS — K59.00 CONSTIPATION, UNSPECIFIED CONSTIPATION TYPE: ICD-10-CM

## 2022-10-25 DIAGNOSIS — R11.0 NAUSEA: ICD-10-CM

## 2022-10-25 PROCEDURE — 3075F SYST BP GE 130 - 139MM HG: CPT | Mod: CPTII,S$GLB,, | Performed by: PHYSICIAN ASSISTANT

## 2022-10-25 PROCEDURE — 3079F DIAST BP 80-89 MM HG: CPT | Mod: CPTII,S$GLB,, | Performed by: PHYSICIAN ASSISTANT

## 2022-10-25 PROCEDURE — 3044F HG A1C LEVEL LT 7.0%: CPT | Mod: CPTII,S$GLB,, | Performed by: PHYSICIAN ASSISTANT

## 2022-10-25 PROCEDURE — 1159F PR MEDICATION LIST DOCUMENTED IN MEDICAL RECORD: ICD-10-PCS | Mod: CPTII,S$GLB,, | Performed by: PHYSICIAN ASSISTANT

## 2022-10-25 PROCEDURE — 99203 OFFICE O/P NEW LOW 30 MIN: CPT | Mod: S$GLB,,, | Performed by: PHYSICIAN ASSISTANT

## 2022-10-25 PROCEDURE — 99999 PR PBB SHADOW E&M-EST. PATIENT-LVL IV: CPT | Mod: PBBFAC,,, | Performed by: PHYSICIAN ASSISTANT

## 2022-10-25 PROCEDURE — 3075F PR MOST RECENT SYSTOLIC BLOOD PRESS GE 130-139MM HG: ICD-10-PCS | Mod: CPTII,S$GLB,, | Performed by: PHYSICIAN ASSISTANT

## 2022-10-25 PROCEDURE — 3079F PR MOST RECENT DIASTOLIC BLOOD PRESSURE 80-89 MM HG: ICD-10-PCS | Mod: CPTII,S$GLB,, | Performed by: PHYSICIAN ASSISTANT

## 2022-10-25 PROCEDURE — 74019 RADEX ABDOMEN 2 VIEWS: CPT | Mod: 26,,, | Performed by: RADIOLOGY

## 2022-10-25 PROCEDURE — 99203 PR OFFICE/OUTPT VISIT, NEW, LEVL III, 30-44 MIN: ICD-10-PCS | Mod: S$GLB,,, | Performed by: PHYSICIAN ASSISTANT

## 2022-10-25 PROCEDURE — 1159F MED LIST DOCD IN RCRD: CPT | Mod: CPTII,S$GLB,, | Performed by: PHYSICIAN ASSISTANT

## 2022-10-25 PROCEDURE — 99999 PR PBB SHADOW E&M-EST. PATIENT-LVL IV: ICD-10-PCS | Mod: PBBFAC,,, | Performed by: PHYSICIAN ASSISTANT

## 2022-10-25 PROCEDURE — 74019 XR ABDOMEN FLAT AND ERECT: ICD-10-PCS | Mod: 26,,, | Performed by: RADIOLOGY

## 2022-10-25 PROCEDURE — 3044F PR MOST RECENT HEMOGLOBIN A1C LEVEL <7.0%: ICD-10-PCS | Mod: CPTII,S$GLB,, | Performed by: PHYSICIAN ASSISTANT

## 2022-10-25 PROCEDURE — 74019 RADEX ABDOMEN 2 VIEWS: CPT | Mod: TC

## 2022-10-25 NOTE — PATIENT INSTRUCTIONS
-Miralax prep for cleaning out bowels. Once bowels cleaned out, then begin with Miralax daily.  -Follow up in 4 weeks. If symptoms remain, will try trial of Linzess.

## 2022-10-25 NOTE — PROGRESS NOTES
Clinic Consult:  [facility] Consultation Note    Reason for Consult:  The primary encounter diagnosis was Abdominal pain, unspecified abdominal location. Diagnoses of Constipation, unspecified constipation type and Nausea were also pertinent to this visit.    PCP: Angus Caceres   No address on file    CC: Constipation and Bloated      HPI:  This is a 42 y.o. female here for evaluation of constipation. Reports this has been going on for almost 20 years. Symptoms include abdominal pain (mostly to the right side) and significant bloating. States that occasionally, she will feel as though she needs to have a BM, but when she sits down, she will feel sweaty, nauseated, and will vomit. Bloating can get severe to where she feels pregnant/distended. Having one bowel movement per week on average. Has taken one dose of Miralax but takes nothing else to help control her bowels. She denies blood in the stool. Has had 2 colonoscopies in the past (most recent one was 2011). Was told she had IBS. Aunt with Crohns. Has given birth and had hysterectomy due to endometriosis. Has completed PFT years ago.      Review of Systems   Constitutional:  Negative for activity change, appetite change, chills, diaphoresis, fatigue, fever and unexpected weight change.   HENT:  Negative for trouble swallowing.    Respiratory:  Negative for shortness of breath.    Cardiovascular:  Negative for chest pain.   Gastrointestinal:  Positive for abdominal distention, abdominal pain, constipation, nausea and vomiting. Negative for anal bleeding, blood in stool, diarrhea and rectal pain.   Genitourinary:  Negative for dysuria and hematuria.   Musculoskeletal:  Negative for back pain.   Skin:  Negative for color change and pallor.   Neurological:  Negative for dizziness, weakness and light-headedness.   Psychiatric/Behavioral:  Negative for dysphoric mood. The patient is nervous/anxious.       Medical History:   Past Medical History:    Diagnosis Date    ADHD (attention deficit hyperactivity disorder)     B12 deficiency     Chronic low back pain     oberlander    Endometriosis     Fibromyalgia syndrome     Idiopathic hypersomnia     Irritable bowel syndrome     goldman    Migraines     frances    PONV (postoperative nausea and vomiting)     Shingles     Undifferentiated inflammatory arthritis 2018    Undifferentiated inflammatory arthritis 2018       Surgical History:   Past Surgical History:   Procedure Laterality Date     SECTION      x 2     CYSTOSCOPY N/A 4/10/2019    Procedure: CYSTOSCOPY;  Surgeon: Gillian Carpenter MD;  Location: [facility] OR;  Service: OB/GYN;  Laterality: N/A;    HYSTERECTOMY  2019    Total    OOPHORECTOMY Bilateral     left tube and ovary removed. later RSO with hyst. ovaries removed at differnt times,  and .    PELVIC LAPAROSCOPY      ROBOT-ASSISTED LAPAROSCOPIC ABDOMINAL HYSTERECTOMY USING DA LONI XI N/A 4/10/2019    Procedure: XI ROBOTIC HYSTERECTOMY;  Surgeon: Gillian Carpenter MD;  Location: [facility] OR;  Service: OB/GYN;  Laterality: N/A;    ROBOT-ASSISTED LAPAROSCOPIC LYSIS OF ADHESIONS USING DA LONI XI N/A 4/10/2019    Procedure: XI ROBOTIC LYSIS, ADHESIONS;  Surgeon: Gillian Carpenter MD;  Location: [facility] OR;  Service: OB/GYN;  Laterality: N/A;    ROBOT-ASSISTED LAPAROSCOPIC SALPINGO-OOPHORECTOMY USING DA LONI XI Right 4/10/2019    Procedure: XI ROBOTIC SALPINGO-OOPHORECTOMY;  Surgeon: Gillian Carpenter MD;  Location: [facility] OR;  Service: OB/GYN;  Laterality: Right;    TUBAL LIGATION         Family History:    Family History   Problem Relation Age of Onset    No Known Problems Mother     Heart disease Father         Bypass    Skin cancer Father     Cataracts Maternal Grandmother     Diabetes Maternal Grandmother     Glaucoma Maternal Grandmother     Skin cancer Maternal Grandmother     Cataracts Maternal Grandfather     Skin cancer Paternal Grandfather     Breast cancer Neg Hx     Ovarian  cancer Neg Hx     Deep vein thrombosis Neg Hx        Social History:   Social History     Socioeconomic History    Marital status:     Number of children: 2   Occupational History     Employer: [email or web address]   Tobacco Use    Smoking status: Never    Smokeless tobacco: Never   Substance and Sexual Activity    Alcohol use: Yes     Comment: rarely    Drug use: No    Sexual activity: Yes     Partners: Male     Birth control/protection: Condom     Comment:    Other Topics Concern    Are you pregnant or think you may be? No    Breast-feeding No   Social History Narrative    No smokers in household, 1 dog.       Allergies:   Review of patient's allergies indicates:  No Known Allergies    Home Medications:   Current Outpatient Medications on File Prior to Visit   Medication Sig Dispense Refill    clonazePAM (KLONOPIN) 0.5 MG tablet Take 1 tablet (0.5 mg total) by mouth daily as needed for Anxiety. 30 tablet 1    estradioL (ESTRACE) 2 MG tablet Take 1 tablet (2 mg total) by mouth once daily. 30 tablet 11    fluticasone propionate (FLONASE) 50 mcg/actuation nasal spray instill 2 sprays (100 mcg total) by Each Nostril route 2 (two) times daily. 16 mL 11    levocetirizine (XYZAL) 5 MG tablet Take 1 tablet (5 mg total) by mouth every evening. 30 tablet 11    omeprazole (PRILOSEC) 20 MG capsule TAKE ONE CAPSULE BY MOUTH DAILY 30 capsule 0    tiZANidine (ZANAFLEX) 2 MG tablet Take 2-3 tablets (4-6 mg total) by mouth nightly as needed. 30 tablet 1    traMADol (ULTRAM) 50 mg tablet Take 1 tablet (50 mg total) by mouth every 24 hours as needed for Pain. 30 tablet 0    traZODone (DESYREL) 100 MG tablet Take 1/2 to 1 tablet at bedtime as needed for sleep. 30 tablet 2    venlafaxine (EFFEXOR-XR) 150 MG Cp24 Take 1 capsule (150 mg total) by mouth once daily. 30 capsule 2    venlafaxine (EFFEXOR-XR) 75 MG 24 hr capsule Take 1 capsule (75 mg total) by mouth once daily. Take with 1 150 mg capsule. 30 capsule 2     No  "current facility-administered medications on file prior to visit.       Physical Exam:  /80   Pulse 80   Ht 5' 8" (1.727 m)   Wt 95.3 kg (210 lb 1.6 oz)   LMP 2019 (Exact Date)   SpO2 99%   BMI 31.95 kg/m²   Body mass index is 31.95 kg/m².  Physical Exam  Constitutional:       General: She is not in acute distress.     Appearance: Normal appearance. She is not ill-appearing, toxic-appearing or diaphoretic.   HENT:      Head: Normocephalic and atraumatic.   Eyes:      General: No scleral icterus.     Extraocular Movements: Extraocular movements intact.   Cardiovascular:      Rate and Rhythm: Normal rate and regular rhythm.   Pulmonary:      Effort: Pulmonary effort is normal. No respiratory distress.      Breath sounds: Normal breath sounds.   Abdominal:      General: Bowel sounds are normal. There is no distension.      Palpations: Abdomen is soft. There is no mass.      Tenderness: There is no abdominal tenderness. There is no guarding.   Musculoskeletal:         General: Normal range of motion.      Cervical back: Normal range of motion.   Skin:     General: Skin is warm and dry.      Coloration: Skin is not jaundiced or pale.   Neurological:      Mental Status: She is alert and oriented to person, place, and time.         Labs: Pertinent labs reviewed.  Endoscopy: --  CRC Screenin colonoscopy  Anticoagulation: none    Assessment:  1. Abdominal pain, unspecified abdominal location    2. Constipation, unspecified constipation type    3. Nausea         Recommendations:  -Schedule for Xray today for further evaluation of stool burden.  -Will likely need Miralax prep followed by daily Miralax. Follow up in 4 weeks to discuss. If this does not work well, can consider trial of Linzess.  -Will touch base with patient after Xray reviewed.    Abdominal pain, unspecified abdominal location  -     X-Ray Abdomen Flat And Erect; Future; Expected date: 10/25/2022    Constipation, unspecified " constipation type  -     X-Ray Abdomen Flat And Erect; Future; Expected date: 10/25/2022    Nausea  -     X-Ray Abdomen Flat And Erect; Future; Expected date: 10/25/2022      No follow-ups on file.    Thank you so much for allowing me to participate in the care of oJy Pantoja PA-C

## 2022-11-04 ENCOUNTER — OFFICE VISIT (OUTPATIENT)
Dept: PSYCHIATRY | Facility: CLINIC | Age: 42
End: 2022-11-04
Payer: COMMERCIAL

## 2022-11-04 VITALS
SYSTOLIC BLOOD PRESSURE: 123 MMHG | DIASTOLIC BLOOD PRESSURE: 82 MMHG | WEIGHT: 210.13 LBS | BODY MASS INDEX: 31.95 KG/M2 | HEART RATE: 83 BPM

## 2022-11-04 DIAGNOSIS — F41.1 GAD (GENERALIZED ANXIETY DISORDER): Primary | ICD-10-CM

## 2022-11-04 PROCEDURE — 90833 PSYTX W PT W E/M 30 MIN: CPT | Mod: S$GLB,,, | Performed by: PSYCHIATRY & NEUROLOGY

## 2022-11-04 PROCEDURE — 3074F SYST BP LT 130 MM HG: CPT | Mod: CPTII,S$GLB,, | Performed by: PSYCHIATRY & NEUROLOGY

## 2022-11-04 PROCEDURE — 99999 PR PBB SHADOW E&M-EST. PATIENT-LVL II: ICD-10-PCS | Mod: PBBFAC,,, | Performed by: PSYCHIATRY & NEUROLOGY

## 2022-11-04 PROCEDURE — 3074F PR MOST RECENT SYSTOLIC BLOOD PRESSURE < 130 MM HG: ICD-10-PCS | Mod: CPTII,S$GLB,, | Performed by: PSYCHIATRY & NEUROLOGY

## 2022-11-04 PROCEDURE — 3008F BODY MASS INDEX DOCD: CPT | Mod: CPTII,S$GLB,, | Performed by: PSYCHIATRY & NEUROLOGY

## 2022-11-04 PROCEDURE — 99999 PR PBB SHADOW E&M-EST. PATIENT-LVL II: CPT | Mod: PBBFAC,,, | Performed by: PSYCHIATRY & NEUROLOGY

## 2022-11-04 PROCEDURE — 3044F PR MOST RECENT HEMOGLOBIN A1C LEVEL <7.0%: ICD-10-PCS | Mod: CPTII,S$GLB,, | Performed by: PSYCHIATRY & NEUROLOGY

## 2022-11-04 PROCEDURE — 90833 PR PSYCHOTHERAPY W/PATIENT W/E&M, 30 MIN (ADD ON): ICD-10-PCS | Mod: S$GLB,,, | Performed by: PSYCHIATRY & NEUROLOGY

## 2022-11-04 PROCEDURE — 3079F PR MOST RECENT DIASTOLIC BLOOD PRESSURE 80-89 MM HG: ICD-10-PCS | Mod: CPTII,S$GLB,, | Performed by: PSYCHIATRY & NEUROLOGY

## 2022-11-04 PROCEDURE — 3044F HG A1C LEVEL LT 7.0%: CPT | Mod: CPTII,S$GLB,, | Performed by: PSYCHIATRY & NEUROLOGY

## 2022-11-04 PROCEDURE — 3008F PR BODY MASS INDEX (BMI) DOCUMENTED: ICD-10-PCS | Mod: CPTII,S$GLB,, | Performed by: PSYCHIATRY & NEUROLOGY

## 2022-11-04 PROCEDURE — 99214 PR OFFICE/OUTPT VISIT, EST, LEVL IV, 30-39 MIN: ICD-10-PCS | Mod: S$GLB,,, | Performed by: PSYCHIATRY & NEUROLOGY

## 2022-11-04 PROCEDURE — 3079F DIAST BP 80-89 MM HG: CPT | Mod: CPTII,S$GLB,, | Performed by: PSYCHIATRY & NEUROLOGY

## 2022-11-04 PROCEDURE — 99214 OFFICE O/P EST MOD 30 MIN: CPT | Mod: S$GLB,,, | Performed by: PSYCHIATRY & NEUROLOGY

## 2022-11-04 RX ORDER — VENLAFAXINE HYDROCHLORIDE 75 MG/1
75 CAPSULE, EXTENDED RELEASE ORAL DAILY
Qty: 30 CAPSULE | Refills: 2 | Status: SHIPPED | OUTPATIENT
Start: 2022-11-04 | End: 2023-02-15 | Stop reason: SDUPTHER

## 2022-11-04 RX ORDER — METHYLPHENIDATE HYDROCHLORIDE 27 MG/1
27 TABLET ORAL EVERY MORNING
Qty: 30 TABLET | Refills: 0 | Status: SHIPPED | OUTPATIENT
Start: 2022-12-04 | End: 2023-05-01

## 2022-11-04 RX ORDER — VENLAFAXINE HYDROCHLORIDE 150 MG/1
150 CAPSULE, EXTENDED RELEASE ORAL DAILY
Qty: 30 CAPSULE | Refills: 2 | Status: SHIPPED | OUTPATIENT
Start: 2022-11-04 | End: 2023-02-15 | Stop reason: SDUPTHER

## 2022-11-04 RX ORDER — METHYLPHENIDATE HYDROCHLORIDE 27 MG/1
27 TABLET ORAL EVERY MORNING
Qty: 30 TABLET | Refills: 0 | Status: SHIPPED | OUTPATIENT
Start: 2022-11-04 | End: 2023-05-01

## 2022-11-04 RX ORDER — CLONAZEPAM 0.5 MG/1
0.5 TABLET ORAL DAILY PRN
Qty: 30 TABLET | Refills: 1 | Status: SHIPPED | OUTPATIENT
Start: 2022-11-04 | End: 2023-11-04

## 2022-11-04 RX ORDER — TRAZODONE HYDROCHLORIDE 100 MG/1
TABLET ORAL
Qty: 45 TABLET | Refills: 2 | Status: SHIPPED | OUTPATIENT
Start: 2022-11-04 | End: 2023-08-11 | Stop reason: SDUPTHER

## 2022-11-04 NOTE — PROGRESS NOTES
"Outpatient Psychiatry Follow-up Visit (MD/NP)    11/4/2022    Joy Recinos, a 42 y.o. female, presenting for follow-up visit. Met with patient.    Reason for Encounter: f/u, DOUG.     Interval History: Patient seen and interviewed for follow-up, last seen just over 2 months ago. Endorses ongoing anxiety symptoms, Anxiety - some sense of throat closing.   No new illnesses.   No new medications.   Daughter with mental health problems, not seeking help.   Starting new part-time business.     Past med List:   Sertraline  Duloxetine  escitalopram  Fluoxetine   milnacipran    Background: 38 y/o F presents for establishment of care, reports problems with anxiety & depression. From PCP note (3.16.18): Here today for f/u on depression & anxiety. She reports that since medication adjustment she has been less irritable. Her  is here with her today & affirms this. She has a psych appointment scheduled for May 11. She reports it has been some mild improvement in her volition. Reports treatment with cymbalta - for fibromyalgia, anxiety, depression for 5 years. Neither fibromyalgia pain no moods improved. wellbutrin trial has been perhaps helpful. Describes baseline symptoms as "worried about everything", "worried about worrying", sad, not interested in things, "like I don't have a purpose". Negative thoughts about self. Tired all the time, even when sleeps excessively. Denies SI. "All my life". "stays all the time", though better/worse at times, influenced by stressful situations. Problems with attention/concentration. Reports all of the following daily in past 2 weeks (around which time she was laid off): Feeling nervous, anxious or on edge, Not being able to stop or control worrying  Worrying too much about different things   Trouble relaxing   Being so restless that it is hard to sit still   Becoming easily annoyed or irritable   Feeling afraid as if something awful might happen    DOUG-7 = 21 (estimates a 19 " "in weeks prior to the layoff)    Sleep Problems - hypersomnia  Sad Mood more than 1/2 time  Appetite and weight changes (decreased appetite, small subjective weight loss)  Concentration problems  Guilt   Thoughts of Emptiness  Anhedonia  Anergia  Slowing/PMR    QIDS = 12    Also has decreased sexual interest, pain during intercourse.    PsychHx: first treatment by Obgyn - sertraline(?) when had problems with endometriosis. No AVH, no SI/HI, no delusions. No hospitalizations or self-harm behaviors. Prolonged duloxetine trial, thinks transient or minimal benefit. Past treatment with sertraline.     MedHx: idiopathic hypersomnia. 2 sleep studies, narcolepsy ruled out. Endometriosis. 2 herniated lumbar discs. Migraines. GERD. Seasonal allergies.   FamHx: mother & dad both take medication for depression. Father diagnosed with adhd in 60's.   SocHx: from [facility]. Heart murmur at birth. "fainted and blacked out school", had several episodes between 5 and 9. Worked up but never medically explained. Still gets dizzy and weak, but doesn't black out. Also had back pain frequently as a child. No developmental problems. Regular classes in school. Ok with going to school. Normal socially. Average student. 3 semesters of college. Went to technical school for 1 year, finished (computer/). 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 Applying for new jobs. Lives with  & 2 kids.  x 14 years. "really good" relationship x decreased sexual interest and pain. Week before  pulled out a tampon - was extremely painful. Dewey has been painful ever since. Has had PT recommended for this. 2 kids (13 & 9). Dtr has been bullied most of her life due to being overweight. She also has problems with oppositionality at home (though is a good student, doesn't have discipline problems at school), won't take a " "bath. Son is "good kid", "very attached to me". Quiet, not many friends. Mom is supportive, lives nearby.     Review Of Systems:     GENERAL:  No weight gain or loss  SKIN:  No rashes or lacerations  HEAD:  No headaches  CHEST:  No shortness of breath, hyperventilation or cough  CARDIOVASCULAR:  No tachycardia or chest pain  ABDOMEN:  No nausea, vomiting, pain, constipation or diarrhea  URINARY:  No frequency, dysuria or sexual dysfunction  ENDOCRINE:  No polydipsia, polyuria  MUSCULOSKELETAL:  pain and stiffness of the joints  NEUROLOGIC:  No weakness, sensory changes, seizures, confusion, memory loss, tremor or other abnormal movements    Current Evaluation:     Nutritional Screening: Considering the patient's height and weight, medications, medical history and preferences, should a referral be made to the dietitian? no    Constitutional  Vitals:  Most recent vital signs, dated less than 90 days prior to this appointment, were not reviewed.    Vitals:    11/04/22 1427   BP: 123/82   Pulse: 83   Weight: 95.3 kg (210 lb 1.6 oz)        General:  unremarkable, age appropriate     Musculoskeletal  Muscle Strength/Tone:  no tremor, no tic   Gait & Station:  non-ataxic     Psychiatric  Appearance: casually dressed & groomed;   Behavior: calm,   Cooperation: cooperative with assessment  Speech: normal rate, volume, tone  Thought Process: linear, goal-directed  Thought Content: No suicidal or homicidal ideation; no delusions  Affect: anxious  Mood: anxious  Perceptions: No auditory or visual hallucinations  Level of Consciousness: alert throughout interview  Insight: fair  Cognition: Oriented to person, place, time, & situation  Memory: no apparent deficits to general clinical interview; not formally assessed  Attention/Concentration: no apparent deficits to general clinical interview; not formally assessed  Fund of Knowledge: average by vocabulary/education    Laboratory Data  No visits with results within 1 Month(s) from " this visit.   Latest known visit with results is:   Lab Visit on 07/29/2022   Component Date Value Ref Range Status    Sodium 07/29/2022 143  136 - 145 mmol/L Final    Potassium 07/29/2022 4.8  3.5 - 5.1 mmol/L Final    Chloride 07/29/2022 105  95 - 110 mmol/L Final    CO2 07/29/2022 28  23 - 29 mmol/L Final    Glucose 07/29/2022 86  70 - 110 mg/dL Final    BUN 07/29/2022 12  6 - 20 mg/dL Final    Creatinine 07/29/2022 0.8  0.5 - 1.4 mg/dL Final    Calcium 07/29/2022 9.2  8.7 - 10.5 mg/dL Final    Total Protein 07/29/2022 6.4  6.0 - 8.4 g/dL Final    Albumin 07/29/2022 3.7  3.5 - 5.2 g/dL Final    Total Bilirubin 07/29/2022 0.3  0.1 - 1.0 mg/dL Final    Alkaline Phosphatase 07/29/2022 99  55 - 135 U/L Final    AST 07/29/2022 10  10 - 40 U/L Final    ALT 07/29/2022 10  10 - 44 U/L Final    Anion Gap 07/29/2022 10  8 - 16 mmol/L Final    eGFR if African American 07/29/2022 >60.0  >60 mL/min/1.73 m^2 Final    eGFR if non African American 07/29/2022 >60.0  >60 mL/min/1.73 m^2 Final    WBC 07/29/2022 5.41  3.90 - 12.70 K/uL Final    RBC 07/29/2022 4.27  4.00 - 5.40 M/uL Final    Hemoglobin 07/29/2022 12.8  12.0 - 16.0 g/dL Final    Hematocrit 07/29/2022 40.1  37.0 - 48.5 % Final    MCV 07/29/2022 94  82 - 98 fL Final    MCH 07/29/2022 30.0  27.0 - 31.0 pg Final    MCHC 07/29/2022 31.9 (L)  32.0 - 36.0 g/dL Final    RDW 07/29/2022 12.9  11.5 - 14.5 % Final    Platelets 07/29/2022 203  150 - 450 K/uL Final    MPV 07/29/2022 12.2  9.2 - 12.9 fL Final    Immature Granulocytes 07/29/2022 0.0  0.0 - 0.5 % Final    Gran # (ANC) 07/29/2022 3.5  1.8 - 7.7 K/uL Final    Immature Grans (Abs) 07/29/2022 0.00  0.00 - 0.04 K/uL Final    Lymph # 07/29/2022 1.6  1.0 - 4.8 K/uL Final    Mono # 07/29/2022 0.3  0.3 - 1.0 K/uL Final    Eos # 07/29/2022 0.0  0.0 - 0.5 K/uL Final    Baso # 07/29/2022 0.02  0.00 - 0.20 K/uL Final    nRBC 07/29/2022 0  0 /100 WBC Final    Gran % 07/29/2022 63.8  38.0 - 73.0 % Final    Lymph % 07/29/2022 30.1   18.0 - 48.0 % Final    Mono % 07/29/2022 5.7  4.0 - 15.0 % Final    Eosinophil % 07/29/2022 0.0  0.0 - 8.0 % Final    Basophil % 07/29/2022 0.4  0.0 - 1.9 % Final    Differential Method 07/29/2022 Automated   Final    TSH 07/29/2022 1.561  0.400 - 4.000 uIU/mL Final    Cholesterol 07/29/2022 216 (H)  120 - 199 mg/dL Final    Triglycerides 07/29/2022 96  30 - 150 mg/dL Final    HDL 07/29/2022 69  40 - 75 mg/dL Final    LDL Cholesterol 07/29/2022 127.8  63.0 - 159.0 mg/dL Final    HDL/Cholesterol Ratio 07/29/2022 31.9  20.0 - 50.0 % Final    Total Cholesterol/HDL Ratio 07/29/2022 3.1  2.0 - 5.0 Final    Non-HDL Cholesterol 07/29/2022 147  mg/dL Final    Hemoglobin A1C 07/29/2022 5.0  4.0 - 5.6 % Final    Estimated Avg Glucose 07/29/2022 97  68 - 131 mg/dL Final    H Pylori IgG Interp 07/29/2022 Negative  Negative Final    Antigliadin Abs, IgA 07/29/2022 6  <20 UNITS Final    Antigliadin Ab IgG 07/29/2022 3  <20 UNITS Final    TTG IgA 07/29/2022 5  <20 UNITS Final    TTG IgG 07/29/2022 4  <20 UNITS Final    Immunoglobulin A (IgA) 07/29/2022 194  70 - 400 mg/dL Final     Medications  Outpatient Encounter Medications as of 11/4/2022   Medication Sig Dispense Refill    clonazePAM (KLONOPIN) 0.5 MG tablet Take 1 tablet (0.5 mg total) by mouth daily as needed for Anxiety. 30 tablet 1    estradioL (ESTRACE) 2 MG tablet Take 1 tablet (2 mg total) by mouth once daily. 30 tablet 11    fluticasone propionate (FLONASE) 50 mcg/actuation nasal spray instill 2 sprays (100 mcg total) by Each Nostril route 2 (two) times daily. 16 mL 11    levocetirizine (XYZAL) 5 MG tablet Take 1 tablet (5 mg total) by mouth every evening. 30 tablet 11    omeprazole (PRILOSEC) 20 MG capsule TAKE ONE CAPSULE BY MOUTH DAILY 30 capsule 0    tiZANidine (ZANAFLEX) 2 MG tablet Take 2-3 tablets (4-6 mg total) by mouth nightly as needed. 30 tablet 1    traMADol (ULTRAM) 50 mg tablet Take 1 tablet (50 mg total) by mouth every 24 hours as needed for Pain. 30  tablet 0    traZODone (DESYREL) 100 MG tablet Take 1/2 to 1 tablet at bedtime as needed for sleep. 30 tablet 2    venlafaxine (EFFEXOR-XR) 150 MG Cp24 Take 1 capsule (150 mg total) by mouth once daily. 30 capsule 2    venlafaxine (EFFEXOR-XR) 75 MG 24 hr capsule Take 1 capsule (75 mg total) by mouth once daily. Take with 1 150 mg capsule. 30 capsule 2     No facility-administered encounter medications on file as of 11/4/2022.     Assessment - Diagnosis - Goals:     Impression: 43 y/o F with generalized anxiety disorder & adhd. Previous trials of duloxetine with minimal benefit, low dose bupropion with modest benefit. adhd by clinical history supplemented by family input, screening tools. Had persistent symptoms despite treatment & anxious in context of worse stressors. Failed atomoxetine.     Dx: generalized anxiety disorder. adhd    Treatment Goals:  Specify outcomes written in observable, behavioral terms:   Reduce anxiety by DOUG-7.     Treatment Plan/Recommendations:   Venlafaxine 225 mg daily for mood.   Trial of concerta.   Clonazepam prn anxiety.  Trazodone for sleep.   Psychotherapy today - thought-stopping, replacement, self-talk.   Discussed risks, benefits, and alternatives to treatment plan documented above with patient. I answered all patient questions related to this plan and patient expressed understanding and agreement.     Return to Clinic: 3 months    WINIFRED Nieto MD  Psychiatry  [facility]  [address] , [place] [address]  [phone]

## 2023-01-13 ENCOUNTER — OFFICE VISIT (OUTPATIENT)
Dept: PSYCHIATRY | Facility: CLINIC | Age: 43
End: 2023-01-13
Payer: COMMERCIAL

## 2023-01-13 DIAGNOSIS — F41.1 GAD (GENERALIZED ANXIETY DISORDER): Primary | ICD-10-CM

## 2023-01-13 DIAGNOSIS — F90.9 ATTENTION DEFICIT HYPERACTIVITY DISORDER (ADHD), UNSPECIFIED ADHD TYPE: ICD-10-CM

## 2023-01-13 PROCEDURE — 99214 OFFICE O/P EST MOD 30 MIN: CPT | Mod: 95,,, | Performed by: PSYCHIATRY & NEUROLOGY

## 2023-01-13 PROCEDURE — 90833 PR PSYCHOTHERAPY W/PATIENT W/E&M, 30 MIN (ADD ON): ICD-10-PCS | Mod: 95,,, | Performed by: PSYCHIATRY & NEUROLOGY

## 2023-01-13 PROCEDURE — 99214 PR OFFICE/OUTPT VISIT, EST, LEVL IV, 30-39 MIN: ICD-10-PCS | Mod: 95,,, | Performed by: PSYCHIATRY & NEUROLOGY

## 2023-01-13 PROCEDURE — 90833 PSYTX W PT W E/M 30 MIN: CPT | Mod: 95,,, | Performed by: PSYCHIATRY & NEUROLOGY

## 2023-01-13 NOTE — PROGRESS NOTES
"Outpatient Psychiatry Follow-up Visit (MD/NP)    1/13/2023    Joy Recinos, a 42 y.o. female, presenting for follow-up visit. Met with patient.    Reason for Encounter: f/u, DOUG.     Interval History: Patient seen and interviewed for follow-up, last seen just over two months ago. Couldn't tolerate side effects. Resolved with discontinuation. No new illnesses. Ongoing fibromyalgia. ongoing anxiety symptoms, feels restless, tendency to avoid to cope. Anxiety - some sense of throat closing. Some avoidance. Adherent to daily medication, no use of prn medication. Trial of concerta was not tolerated.     Past med List:   Sertraline  Duloxetine  escitalopram  Fluoxetine   milnacipran    Background: 36 y/o F presents for establishment of care, reports problems with anxiety & depression. From PCP note (3.16.18): Here today for f/u on depression & anxiety. She reports that since medication adjustment she has been less irritable. Her  is here with her today & affirms this. She has a psych appointment scheduled for May 11. She reports it has been some mild improvement in her volition. Reports treatment with cymbalta - for fibromyalgia, anxiety, depression for 5 years. Neither fibromyalgia pain no moods improved. wellbutrin trial has been perhaps helpful. Describes baseline symptoms as "worried about everything", "worried about worrying", sad, not interested in things, "like I don't have a purpose". Negative thoughts about self. Tired all the time, even when sleeps excessively. Denies SI. "All my life". "stays all the time", though better/worse at times, influenced by stressful situations. Problems with attention/concentration. Reports all of the following daily in past 2 weeks (around which time she was laid off): Feeling nervous, anxious or on edge, Not being able to stop or control worrying  Worrying too much about different things   Trouble relaxing   Being so restless that it is hard to sit still " "  Becoming easily annoyed or irritable   Feeling afraid as if something awful might happen    DOUG-7 = 21 (estimates a 19 in weeks prior to the layoff)    Sleep Problems - hypersomnia  Sad Mood more than 1/2 time  Appetite and weight changes (decreased appetite, small subjective weight loss)  Concentration problems  Guilt   Thoughts of Emptiness  Anhedonia  Anergia  Slowing/PMR    QIDS = 12    Also has decreased sexual interest, pain during intercourse.    PsychHx: first treatment by Obgyn - sertraline(?) when had problems with endometriosis. No AVH, no SI/HI, no delusions. No hospitalizations or self-harm behaviors. Prolonged duloxetine trial, thinks transient or minimal benefit. Past treatment with sertraline.     MedHx: idiopathic hypersomnia. 2 sleep studies, narcolepsy ruled out. Endometriosis. 2 herniated lumbar discs. Migraines. GERD. Seasonal allergies.   FamHx: mother & dad both take medication for depression. Father diagnosed with adhd in 60's.   SocHx: from [facility]. Heart murmur at birth. "fainted and blacked out school", had several episodes between 5 and 9. Worked up but never medically explained. Still gets dizzy and weak, but doesn't black out. Also had back pain frequently as a child. No developmental problems. Regular classes in school. Ok with going to school. Normal socially. Average student. 3 semesters of college. Went to technical school for 1 year, finished (computer/). 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 Applying for new jobs. Lives with  & 2 kids.  x 14 years. "really good" relationship x decreased sexual interest and pain. Week before  pulled out a tampon - was extremely painful. [place] has been painful ever since. Has had PT recommended for this. 2 kids (13 & 9). Dtr has been bullied most of her life due to being overweight. She also has " "problems with oppositionality at home (though is a good student, doesn't have discipline problems at school), won't take a bath. Son is "good kid", "very attached to me". Quiet, not many friends. Mom is supportive, lives nearby.     Review Of Systems:     GENERAL:  No weight gain or loss  SKIN:  No rashes or lacerations  HEAD:  No headaches  CHEST:  No shortness of breath, hyperventilation or cough  CARDIOVASCULAR:  No tachycardia or chest pain  ABDOMEN:  No nausea, vomiting, pain, constipation or diarrhea  URINARY:  No frequency, dysuria or sexual dysfunction  ENDOCRINE:  No polydipsia, polyuria  MUSCULOSKELETAL:  pain and stiffness of the joints  NEUROLOGIC:  No weakness, sensory changes, seizures, confusion, memory loss, tremor or other abnormal movements    Current Evaluation:     Nutritional Screening: Considering the patient's height and weight, medications, medical history and preferences, should a referral be made to the dietitian? no    Constitutional  Vitals:  Most recent vital signs, dated less than 90 days prior to this appointment, were not reviewed.    There were no vitals filed for this visit.       General:  unremarkable, age appropriate     Musculoskeletal  Muscle Strength/Tone:  no tremor, no tic   Gait & Station:  non-ataxic     Psychiatric  Appearance: casually dressed & groomed;   Behavior: calm,   Cooperation: cooperative with assessment  Speech: normal rate, volume, tone  Thought Process: linear, goal-directed  Thought Content: No suicidal or homicidal ideation; no delusions  Affect: anxious  Mood: anxious  Perceptions: No auditory or visual hallucinations  Level of Consciousness: alert throughout interview  Insight: fair  Cognition: Oriented to person, place, time, & situation  Memory: no apparent deficits to general clinical interview; not formally assessed  Attention/Concentration: no apparent deficits to general clinical interview; not formally assessed  Fund of Knowledge: average by " vocabulary/education    Laboratory Data  No visits with results within 1 Month(s) from this visit.   Latest known visit with results is:   Lab Visit on 07/29/2022   Component Date Value Ref Range Status    Sodium 07/29/2022 143  136 - 145 mmol/L Final    Potassium 07/29/2022 4.8  3.5 - 5.1 mmol/L Final    Chloride 07/29/2022 105  95 - 110 mmol/L Final    CO2 07/29/2022 28  23 - 29 mmol/L Final    Glucose 07/29/2022 86  70 - 110 mg/dL Final    BUN 07/29/2022 12  6 - 20 mg/dL Final    Creatinine 07/29/2022 0.8  0.5 - 1.4 mg/dL Final    Calcium 07/29/2022 9.2  8.7 - 10.5 mg/dL Final    Total Protein 07/29/2022 6.4  6.0 - 8.4 g/dL Final    Albumin 07/29/2022 3.7  3.5 - 5.2 g/dL Final    Total Bilirubin 07/29/2022 0.3  0.1 - 1.0 mg/dL Final    Alkaline Phosphatase 07/29/2022 99  55 - 135 U/L Final    AST 07/29/2022 10  10 - 40 U/L Final    ALT 07/29/2022 10  10 - 44 U/L Final    Anion Gap 07/29/2022 10  8 - 16 mmol/L Final    eGFR if African American 07/29/2022 >60.0  >60 mL/min/1.73 m^2 Final    eGFR if non African American 07/29/2022 >60.0  >60 mL/min/1.73 m^2 Final    WBC 07/29/2022 5.41  3.90 - 12.70 K/uL Final    RBC 07/29/2022 4.27  4.00 - 5.40 M/uL Final    Hemoglobin 07/29/2022 12.8  12.0 - 16.0 g/dL Final    Hematocrit 07/29/2022 40.1  37.0 - 48.5 % Final    MCV 07/29/2022 94  82 - 98 fL Final    MCH 07/29/2022 30.0  27.0 - 31.0 pg Final    MCHC 07/29/2022 31.9 (L)  32.0 - 36.0 g/dL Final    RDW 07/29/2022 12.9  11.5 - 14.5 % Final    Platelets 07/29/2022 203  150 - 450 K/uL Final    MPV 07/29/2022 12.2  9.2 - 12.9 fL Final    Immature Granulocytes 07/29/2022 0.0  0.0 - 0.5 % Final    Gran # (ANC) 07/29/2022 3.5  1.8 - 7.7 K/uL Final    Immature Grans (Abs) 07/29/2022 0.00  0.00 - 0.04 K/uL Final    Lymph # 07/29/2022 1.6  1.0 - 4.8 K/uL Final    Mono # 07/29/2022 0.3  0.3 - 1.0 K/uL Final    Eos # 07/29/2022 0.0  0.0 - 0.5 K/uL Final    Baso # 07/29/2022 0.02  0.00 - 0.20 K/uL Final    nRBC 07/29/2022 0  0 /100  WBC Final    Gran % 07/29/2022 63.8  38.0 - 73.0 % Final    Lymph % 07/29/2022 30.1  18.0 - 48.0 % Final    Mono % 07/29/2022 5.7  4.0 - 15.0 % Final    Eosinophil % 07/29/2022 0.0  0.0 - 8.0 % Final    Basophil % 07/29/2022 0.4  0.0 - 1.9 % Final    Differential Method 07/29/2022 Automated   Final    TSH 07/29/2022 1.561  0.400 - 4.000 uIU/mL Final    Cholesterol 07/29/2022 216 (H)  120 - 199 mg/dL Final    Triglycerides 07/29/2022 96  30 - 150 mg/dL Final    HDL 07/29/2022 69  40 - 75 mg/dL Final    LDL Cholesterol 07/29/2022 127.8  63.0 - 159.0 mg/dL Final    HDL/Cholesterol Ratio 07/29/2022 31.9  20.0 - 50.0 % Final    Total Cholesterol/HDL Ratio 07/29/2022 3.1  2.0 - 5.0 Final    Non-HDL Cholesterol 07/29/2022 147  mg/dL Final    Hemoglobin A1C 07/29/2022 5.0  4.0 - 5.6 % Final    Estimated Avg Glucose 07/29/2022 97  68 - 131 mg/dL Final    H Pylori IgG Interp 07/29/2022 Negative  Negative Final    Antigliadin Abs, IgA 07/29/2022 6  <20 UNITS Final    Antigliadin Ab IgG 07/29/2022 3  <20 UNITS Final    TTG IgA 07/29/2022 5  <20 UNITS Final    TTG IgG 07/29/2022 4  <20 UNITS Final    Immunoglobulin A (IgA) 07/29/2022 194  70 - 400 mg/dL Final     Medications  Outpatient Encounter Medications as of 1/13/2023   Medication Sig Dispense Refill    clonazePAM (KLONOPIN) 0.5 MG tablet Take 1 tablet (0.5 mg total) by mouth daily as needed for Anxiety. 30 tablet 1    estradioL (ESTRACE) 2 MG tablet Take 1 tablet (2 mg total) by mouth once daily. 30 tablet 11    sodium fluoride-pot nitrate (PREVIDENT 5000 SENSITIVE) 1.1-5 % Pste Brush 2 (two) times daily. Spit. Do not rinse with water nor eat, nor drink for 30 minutes 100 mL 6    fluticasone propionate (FLONASE) 50 mcg/actuation nasal spray instill 2 sprays (100 mcg total) by Each Nostril route 2 (two) times daily. 16 mL 11    levocetirizine (XYZAL) 5 MG tablet Take 1 tablet (5 mg total) by mouth every evening. 30 tablet 11    methylphenidate HCl 27 MG CR tablet Take 1  tablet (27 mg total) by mouth every morning. 30 tablet 0    methylphenidate HCl 27 MG CR tablet Take 1 tablet (27 mg total) by mouth every morning. 30 tablet 0    omeprazole (PRILOSEC) 20 MG capsule TAKE ONE CAPSULE BY MOUTH DAILY 30 capsule 0    tiZANidine (ZANAFLEX) 2 MG tablet Take 2-3 tablets (4-6 mg total) by mouth nightly as needed. 30 tablet 1    traMADol (ULTRAM) 50 mg tablet Take 1 tablet (50 mg total) by mouth every 24 hours as needed for Pain. 30 tablet 0    traZODone (DESYREL) 100 MG tablet Take 1/2 to 1 and 1/2 tablets at bedtime as needed for sleep. 45 tablet 2    venlafaxine (EFFEXOR-XR) 150 MG Cp24 Take 1 capsule (150 mg total) by mouth once daily. 30 capsule 2    venlafaxine (EFFEXOR-XR) 75 MG 24 hr capsule Take 1 capsule (75 mg total) by mouth once daily. Take with 1 150 mg capsule. 30 capsule 2     No facility-administered encounter medications on file as of 1/13/2023.     Assessment - Diagnosis - Goals:     Impression: 41 y/o F with generalized anxiety disorder & adhd. Previous trials of duloxetine with minimal benefit, low dose bupropion with modest benefit. adhd by clinical history supplemented by family input, screening tools. Had persistent symptoms despite treatment & anxious in context of worse stressors. Failed atomoxetine, concerta.     Dx: generalized anxiety disorder. adhd    Treatment Goals:  Specify outcomes written in observable, behavioral terms:   Reduce anxiety by DOUG-7.     Treatment Plan/Recommendations:   Venlafaxine 225 mg daily for mood.   Clonazepam prn anxiety. Encouraged use prn.   Trazodone for sleep.   Psychotherapy today - thought-stopping, replacement, self-talk, motivational interviewing.   Discussed risks, benefits, and alternatives to treatment plan documented above with patient. I answered all patient questions related to this plan and patient expressed understanding and agreement.     Return to Clinic: 3 months    WINIFRED Nieto MD  Psychiatry  [facility]  [address] , [place] [address]  [phone]

## 2023-01-25 ENCOUNTER — PATIENT MESSAGE (OUTPATIENT)
Dept: ADMINISTRATIVE | Facility: HOSPITAL | Age: 43
End: 2023-01-25
Payer: COMMERCIAL

## 2023-02-15 RX ORDER — VENLAFAXINE HYDROCHLORIDE 150 MG/1
150 CAPSULE, EXTENDED RELEASE ORAL DAILY
Qty: 30 CAPSULE | Refills: 2 | Status: SHIPPED | OUTPATIENT
Start: 2023-02-15 | End: 2023-05-21 | Stop reason: SDUPTHER

## 2023-02-15 RX ORDER — VENLAFAXINE HYDROCHLORIDE 75 MG/1
75 CAPSULE, EXTENDED RELEASE ORAL DAILY
Qty: 30 CAPSULE | Refills: 2 | Status: SHIPPED | OUTPATIENT
Start: 2023-02-15 | End: 2023-05-21 | Stop reason: SDUPTHER

## 2023-05-01 ENCOUNTER — OFFICE VISIT (OUTPATIENT)
Dept: PRIMARY CARE CLINIC | Facility: CLINIC | Age: 43
End: 2023-05-01
Payer: COMMERCIAL

## 2023-05-01 VITALS
DIASTOLIC BLOOD PRESSURE: 82 MMHG | SYSTOLIC BLOOD PRESSURE: 118 MMHG | WEIGHT: 215.63 LBS | HEART RATE: 84 BPM | HEIGHT: 68 IN | TEMPERATURE: 98 F | BODY MASS INDEX: 32.68 KG/M2

## 2023-05-01 DIAGNOSIS — J01.80 OTHER ACUTE SINUSITIS, RECURRENCE NOT SPECIFIED: Primary | ICD-10-CM

## 2023-05-01 DIAGNOSIS — J06.9 URI WITH COUGH AND CONGESTION: ICD-10-CM

## 2023-05-01 PROCEDURE — 3079F PR MOST RECENT DIASTOLIC BLOOD PRESSURE 80-89 MM HG: ICD-10-PCS | Mod: CPTII,S$GLB,, | Performed by: FAMILY MEDICINE

## 2023-05-01 PROCEDURE — 99999 PR PBB SHADOW E&M-EST. PATIENT-LVL V: ICD-10-PCS | Mod: PBBFAC,,, | Performed by: FAMILY MEDICINE

## 2023-05-01 PROCEDURE — 3074F SYST BP LT 130 MM HG: CPT | Mod: CPTII,S$GLB,, | Performed by: FAMILY MEDICINE

## 2023-05-01 PROCEDURE — 3008F PR BODY MASS INDEX (BMI) DOCUMENTED: ICD-10-PCS | Mod: CPTII,S$GLB,, | Performed by: FAMILY MEDICINE

## 2023-05-01 PROCEDURE — 3074F PR MOST RECENT SYSTOLIC BLOOD PRESSURE < 130 MM HG: ICD-10-PCS | Mod: CPTII,S$GLB,, | Performed by: FAMILY MEDICINE

## 2023-05-01 PROCEDURE — 3079F DIAST BP 80-89 MM HG: CPT | Mod: CPTII,S$GLB,, | Performed by: FAMILY MEDICINE

## 2023-05-01 PROCEDURE — 3008F BODY MASS INDEX DOCD: CPT | Mod: CPTII,S$GLB,, | Performed by: FAMILY MEDICINE

## 2023-05-01 PROCEDURE — 99214 PR OFFICE/OUTPT VISIT, EST, LEVL IV, 30-39 MIN: ICD-10-PCS | Mod: 25,S$GLB,, | Performed by: FAMILY MEDICINE

## 2023-05-01 PROCEDURE — 99999 PR PBB SHADOW E&M-EST. PATIENT-LVL V: CPT | Mod: PBBFAC,,, | Performed by: FAMILY MEDICINE

## 2023-05-01 PROCEDURE — 1159F MED LIST DOCD IN RCRD: CPT | Mod: CPTII,S$GLB,, | Performed by: FAMILY MEDICINE

## 2023-05-01 PROCEDURE — 96372 PR INJECTION,THERAP/PROPH/DIAG2ST, IM OR SUBCUT: ICD-10-PCS | Mod: S$GLB,,, | Performed by: FAMILY MEDICINE

## 2023-05-01 PROCEDURE — 1159F PR MEDICATION LIST DOCUMENTED IN MEDICAL RECORD: ICD-10-PCS | Mod: CPTII,S$GLB,, | Performed by: FAMILY MEDICINE

## 2023-05-01 PROCEDURE — 96372 THER/PROPH/DIAG INJ SC/IM: CPT | Mod: S$GLB,,, | Performed by: FAMILY MEDICINE

## 2023-05-01 PROCEDURE — 99214 OFFICE O/P EST MOD 30 MIN: CPT | Mod: 25,S$GLB,, | Performed by: FAMILY MEDICINE

## 2023-05-01 RX ORDER — PROMETHAZINE HYDROCHLORIDE AND DEXTROMETHORPHAN HYDROBROMIDE 6.25; 15 MG/5ML; MG/5ML
5 SYRUP ORAL EVERY 6 HOURS PRN
Qty: 120 ML | Refills: 0 | Status: SHIPPED | OUTPATIENT
Start: 2023-05-01 | End: 2023-05-11

## 2023-05-01 RX ORDER — BETAMETHASONE SODIUM PHOSPHATE AND BETAMETHASONE ACETATE 3; 3 MG/ML; MG/ML
9 INJECTION, SUSPENSION INTRA-ARTICULAR; INTRALESIONAL; INTRAMUSCULAR; SOFT TISSUE
Status: COMPLETED | OUTPATIENT
Start: 2023-05-01 | End: 2023-05-01

## 2023-05-01 RX ORDER — AMOXICILLIN AND CLAVULANATE POTASSIUM 875; 125 MG/1; MG/1
1 TABLET, FILM COATED ORAL EVERY 12 HOURS
Qty: 14 TABLET | Refills: 0 | Status: SHIPPED | OUTPATIENT
Start: 2023-05-01 | End: 2023-05-08

## 2023-05-01 RX ORDER — PREDNISONE 20 MG/1
40 TABLET ORAL DAILY
Qty: 10 TABLET | Refills: 0 | Status: SHIPPED | OUTPATIENT
Start: 2023-05-01 | End: 2023-05-06

## 2023-05-01 RX ADMIN — BETAMETHASONE SODIUM PHOSPHATE AND BETAMETHASONE ACETATE 9 MG: 3; 3 INJECTION, SUSPENSION INTRA-ARTICULAR; INTRALESIONAL; INTRAMUSCULAR; SOFT TISSUE at 01:05

## 2023-05-01 NOTE — PROGRESS NOTES
After obtaining consent, per orders from Dr Mayers, celestone 9mg injection given by myself. Pt instructed to wait 15 min afterwards and to report any adverse reaction/feelings immediately.

## 2023-05-01 NOTE — PROGRESS NOTES
"    [facility]       [address] [place] [address]      Phone: [phone]      Fax: [phone]    Hakeem Mayers MD                Office Visit  05/01/2023        Subjective      HPI:  Joy Recinos is a 42 y.o. female presents today in clinic for "Sinus Problem and Otalgia  ."     42-year-old female presents today complaining of sinus issues.      Patient states that symptoms started just over a week ago.  They had a campfire in their backyard.  The next day, she started having a sore throat.  Feels sharp, knife-like.  Hurts to swallow.  Both ears feel full, tender.  Has some mild congestion, quite stopped up.  No runny nose, but is having to blow her nose quite frequently.  Coughing, sometimes productive of green mucus.  No fever, chills, body aches.  Has been using OTC Janet-Sylva cold, Mucinex, but no relief.    Separately, states that her daughter try to fill the prescription for we go V.  Not covered by insurance, cost would be around 1400 dollars per month.  They also have not heard from lifestyle and wellness, but she thinks that could be because daughter may not have answered the phone when they tried to call.  She would still like to get that appointment scheduled.      The following were updated and reviewed by myself in the chart: medications, past medical history, past surgical history, family history, social history, and allergies.     Medications:  Current Outpatient Medications on File Prior to Visit   Medication Sig Dispense Refill    clonazePAM (KLONOPIN) 0.5 MG tablet Take 1 tablet (0.5 mg total) by mouth daily as needed for Anxiety. 30 tablet 1    estradioL (ESTRACE) 2 MG tablet Take 1 tablet (2 mg total) by mouth once daily. 30 tablet 11    fluticasone propionate (FLONASE) 50 mcg/actuation nasal spray instill 2 sprays (100 mcg total) by Each Nostril route 2 (two) times daily. 16 mL 11    levocetirizine (XYZAL) 5 MG tablet " Take 1 tablet (5 mg total) by mouth every evening. 30 tablet 11    omeprazole (PRILOSEC) 20 MG capsule TAKE ONE CAPSULE BY MOUTH DAILY 30 capsule 0    sodium fluoride-pot nitrate (PREVIDENT 5000 SENSITIVE) 1.1-5 % Pste Brush 2 (two) times daily. Spit. Do not rinse with water nor eat, nor drink for 30 minutes 100 mL 6    tiZANidine (ZANAFLEX) 2 MG tablet Take 2-3 tablets (4-6 mg total) by mouth nightly as needed. 30 tablet 1    traMADol (ULTRAM) 50 mg tablet Take 1 tablet (50 mg total) by mouth every 24 hours as needed for Pain. 30 tablet 0    traZODone (DESYREL) 100 MG tablet Take 1/2 to 1 and 1/2 tablets at bedtime as needed for sleep. 45 tablet 2    venlafaxine (EFFEXOR-XR) 150 MG Cp24 Take 1 capsule (150 mg total) by mouth once daily. 30 capsule 2    venlafaxine (EFFEXOR-XR) 75 MG 24 hr capsule Take 1 capsule (75 mg total) by mouth once daily. Take with the 150 mg capsule. 30 capsule 2    methylphenidate HCl 27 MG CR tablet Take 1 tablet (27 mg total) by mouth every morning. 30 tablet 0    methylphenidate HCl 27 MG CR tablet Take 1 tablet (27 mg total) by mouth every morning. 30 tablet 0     No current facility-administered medications on file prior to visit.        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      Patient Active Problem List    Diagnosis Date Noted    Neurogenic bladder 11/05/2020    Decreased strength 09/30/2020    Incomplete emptying of bladder 09/25/2020    Chronic bilateral low back pain without sciatica 08/19/2020    Attention deficit hyperactivity disorder (ADHD) 02/24/2020    S/P  hysterectomy 04/10/2019    Endometriosis of pelvis 2019    ARELLANO (dyspnea on exertion) 2018    PVD (peripheral vascular disease) 2018    Class 2 obesity due to excess calories without serious comorbidity with body mass index (BMI) of 36.0 to 36.9 in adult 2018    Pedal edema 2018    DOUG (generalized anxiety disorder) 2018    Depression with anxiety 2018    Great toe pain, right 2018    Positive DARRION (antinuclear antibody) 2017    Periorbital erythema 2017    Vitamin D deficiency 2016    Breakthrough bleeding on birth control pills 10/03/2014    B12 deficiency     Idiopathic hypersomnia     Migraines     Fibromyalgia syndrome         PSHx:  Past Surgical History:   Procedure Laterality Date     SECTION      x 2     CYSTOSCOPY N/A 4/10/2019    Procedure: CYSTOSCOPY;  Surgeon: Gillian Carpenter MD;  Location: [facility];  Service: OB/GYN;  Laterality: N/A;    HYSTERECTOMY  2019    Total    OOPHORECTOMY Bilateral     left tube and ovary removed. later RSO with hyst. ovaries removed at differnt times,  and .    PELVIC LAPAROSCOPY      ROBOT-ASSISTED LAPAROSCOPIC ABDOMINAL HYSTERECTOMY USING DA LONI XI N/A 4/10/2019    Procedure: XI ROBOTIC HYSTERECTOMY;  Surgeon: Gillian Carpenter MD;  Location: [facility];  Service: OB/GYN;  Laterality: N/A;    ROBOT-ASSISTED LAPAROSCOPIC LYSIS OF ADHESIONS USING DA LONI XI N/A 4/10/2019    Procedure: XI ROBOTIC LYSIS, ADHESIONS;  Surgeon: Gillian Carpenter MD;  Location: [facility];  Service: OB/GYN;  Laterality: N/A;    ROBOT-ASSISTED LAPAROSCOPIC SALPINGO-OOPHORECTOMY USING DA LONI XI Right 4/10/2019    Procedure: XI ROBOTIC SALPINGO-OOPHORECTOMY;  Surgeon: Gillian Carpenter MD;  Location: [facility];  Service: OB/GYN;  Laterality: Right;    TUBAL LIGATION          FHx:  Family History   Problem Relation Age of Onset    No Known Problems Mother     Heart disease Father         Bypass    Skin cancer Father  "    Cataracts Maternal Grandmother     Diabetes Maternal Grandmother     Glaucoma Maternal Grandmother     Skin cancer Maternal Grandmother     Cataracts Maternal Grandfather     Skin cancer Paternal Grandfather     Breast cancer Neg Hx     Ovarian cancer Neg Hx     Deep vein thrombosis Neg Hx         Social:  Social History     Socioeconomic History    Marital status:     Number of children: 2   Occupational History     Employer: [employer]   Tobacco Use    Smoking status: Never    Smokeless tobacco: Never   Substance and Sexual Activity    Alcohol use: Yes     Comment: rarely    Drug use: No    Sexual activity: Yes     Partners: Male     Birth control/protection: Condom     Comment:    Other Topics Concern    Are you pregnant or think you may be? No    Breast-feeding No   Social History Narrative    No smokers in household, 1 dog.        Allergies:  Review of patient's allergies indicates:  No Known Allergies     ROS:  Review of Systems   Constitutional:  Positive for activity change. Negative for appetite change, chills and fever.   HENT:  Positive for congestion, ear pain, postnasal drip, sinus pressure, sore throat and trouble swallowing. Negative for rhinorrhea and sinus pain.    Respiratory:  Positive for cough. Negative for shortness of breath and wheezing.    Cardiovascular:  Negative for chest pain and palpitations.   Gastrointestinal:  Negative for abdominal pain, constipation, diarrhea, nausea and vomiting.   Genitourinary:  Negative for difficulty urinating.   Musculoskeletal:  Negative for arthralgias and myalgias.   Skin:  Negative for color change and rash.   Neurological:  Negative for speech difficulty and headaches.   All other systems reviewed and are negative.       Objective      /82   Pulse 84   Temp 97.6 °F (36.4 °C)   Ht 5' 8" (1.727 m)   Wt 97.8 kg (215 lb 9.8 oz)   LMP 03/22/2019 (Exact Date)   BMI 32.78 kg/m²   Ht Readings from Last 3 Encounters: " "  05/01/23 5' 8" (1.727 m)   10/25/22 5' 8" (1.727 m)   07/22/22 5' 8" (1.727 m)     Wt Readings from Last 3 Encounters:   05/01/23 97.8 kg (215 lb 9.8 oz)   10/25/22 95.3 kg (210 lb 1.6 oz)   05/06/22 94.3 kg (207 lb 14.3 oz)       PHYSICAL EXAM:  Physical Exam  Vitals and nursing note reviewed.   Constitutional:       General: She is not in acute distress.     Appearance: Normal appearance.   HENT:      Head: Normocephalic and atraumatic.      Right Ear: Tympanic membrane, ear canal and external ear normal.      Left Ear: Tympanic membrane, ear canal and external ear normal.      Nose: Congestion present. No rhinorrhea.      Mouth/Throat:      Mouth: Mucous membranes are moist.      Pharynx: Oropharynx is clear. Posterior oropharyngeal erythema present. No oropharyngeal exudate.   Eyes:      Extraocular Movements: Extraocular movements intact.      Conjunctiva/sclera: Conjunctivae normal.      Pupils: Pupils are equal, round, and reactive to light.   Cardiovascular:      Rate and Rhythm: Normal rate and regular rhythm.   Pulmonary:      Effort: Pulmonary effort is normal. No respiratory distress.      Breath sounds: No wheezing, rhonchi or rales.   Musculoskeletal:         General: Normal range of motion.      Cervical back: Normal range of motion.   Lymphadenopathy:      Cervical: No cervical adenopathy.   Skin:     General: Skin is warm and dry.      Findings: No rash.   Neurological:      Mental Status: She is alert.            LABS / IMAGING:  No results found for this or any previous visit (from the past 4368 hour(s)).      Assessment    1. Other acute sinusitis, recurrence not specified    2. URI with cough and congestion          Plan    Joy was seen today for sinus problem and otalgia.    Diagnoses and all orders for this visit:    Other acute sinusitis, recurrence not specified  -     amoxicillin-clavulanate 875-125mg (AUGMENTIN) 875-125 mg per tablet; Take 1 tablet by mouth every 12 (twelve) hours. " for 7 days  -     betamethasone acetate-betamethasone sodium phosphate injection 9 mg  -     predniSONE (DELTASONE) 20 MG tablet; Take 2 tablets (40 mg total) by mouth once daily. for 5 days    URI with cough and congestion  -     betamethasone acetate-betamethasone sodium phosphate injection 9 mg  -     predniSONE (DELTASONE) 20 MG tablet; Take 2 tablets (40 mg total) by mouth once daily. for 5 days  -     promethazine-dextromethorphan (PROMETHAZINE-DM) 6.25-15 mg/5 mL Syrp; Take 5 mLs by mouth every 6 (six) hours as needed (cough).      Physically, looks pretty good.  Has some erythema, mucus plug in the back of the throat.  Symptoms going on for over a week, suspect she is developing a sinus infection.  Will treat today with Augmentin, Celestone/prednisone.  Promethazine DM.  Tylenol/ibuprofen.  Zinc lozenges.    Will ask lifestyle and wellness to contact mom to schedule the appointment for her daughter.  Next time daughter comes in, we can have mom had it as a proxy for MyChart.    FOLLOW-UP:  Follow up if symptoms worsen or fail to improve.    I spent a total of 35 minutes face to face and non-face to face on the date of this visit.This includes time preparing to see the patient (eg, review of tests, notes), obtaining and/or reviewing additional history from an independent historian and/or outside medical records, documenting clinical information in the electronic health record, independently interpreting results and/or communicating results to the patient/family/caregiver, or care coordinator.    Signed by:  Hakeem Mayers MD

## 2023-05-01 NOTE — PATIENT INSTRUCTIONS
"Physically, everything looks pretty good today.      Suspect you might be brewing a bit of a sinus infection and that is what is causing the symptoms to linger.      Take all antibiotics, as prescribed.      Celestone injection today.  Start prednisone tablets tomorrow.    Try my prescription cough medicine, as needed.  Be aware, it can make you sleepy or drowsy, so if too sedating for daytime use save it for bedtime.      Rest  Stay hydrated, drink plenty of fluids   Use OTC nasal saline spray (Ocenn's, AYR, Arm&Hammer,etc.) to clear nasal drainage and help with nasal congestion  Use an OTC antihistamine (Zyrtec or Claritin) to help dry mucus and post nasal drip  Use OTC Mucinex (plain blue box, no "letters") for sinus/chest congestion  Gargle with warm salt water for throat comfort (10-15 seconds, do NOT swallow!)  Use OTC zinc lozenges or OTC Cepacol lozenges (with benzocaine) for sore throat/cough  Alternate OTC Tylenol and/or Ibuprofen for fever, headache and body aches     For Marie, the next time she comes in, we can always add you as a proxy to her my chart so that you get messages and phone calls, as well.  I will send a message to Dr. Mojica in the lifestyle and wellness department and ask them to call you to schedule an appointment for her.    Separately, since the Wegovy (semaglutide) was so expensive, I'll fax a prescription for it to my compounding pharmacy in [place].  I will put your number on it so they reach out to you directly.  This may be an alternative option for her.  "

## 2023-05-10 RX ORDER — OMEPRAZOLE 20 MG/1
20 CAPSULE, DELAYED RELEASE ORAL DAILY
Qty: 30 CAPSULE | Refills: 0 | Status: SHIPPED | OUTPATIENT
Start: 2023-05-10 | End: 2023-06-21 | Stop reason: SDUPTHER

## 2023-05-22 RX ORDER — VENLAFAXINE HYDROCHLORIDE 150 MG/1
150 CAPSULE, EXTENDED RELEASE ORAL DAILY
Qty: 30 CAPSULE | Refills: 2 | Status: SHIPPED | OUTPATIENT
Start: 2023-05-22 | End: 2023-08-22 | Stop reason: SDUPTHER

## 2023-05-22 RX ORDER — VENLAFAXINE HYDROCHLORIDE 75 MG/1
75 CAPSULE, EXTENDED RELEASE ORAL DAILY
Qty: 30 CAPSULE | Refills: 2 | Status: SHIPPED | OUTPATIENT
Start: 2023-05-22 | End: 2023-08-22 | Stop reason: SDUPTHER

## 2023-05-26 ENCOUNTER — TELEPHONE (OUTPATIENT)
Dept: RADIOLOGY | Facility: HOSPITAL | Age: 43
End: 2023-05-26

## 2023-06-17 DIAGNOSIS — N95.1 MENOPAUSAL SYNDROME ON HORMONE REPLACEMENT THERAPY: ICD-10-CM

## 2023-06-17 DIAGNOSIS — Z79.890 MENOPAUSAL SYNDROME ON HORMONE REPLACEMENT THERAPY: ICD-10-CM

## 2023-06-19 RX ORDER — ESTRADIOL 2 MG/1
2 TABLET ORAL DAILY
Qty: 30 TABLET | Refills: 2 | Status: SHIPPED | OUTPATIENT
Start: 2023-06-19 | End: 2023-12-27 | Stop reason: SDUPTHER

## 2023-06-22 RX ORDER — OMEPRAZOLE 20 MG/1
20 CAPSULE, DELAYED RELEASE ORAL DAILY
Qty: 30 CAPSULE | Refills: 0 | Status: SHIPPED | OUTPATIENT
Start: 2023-06-22 | End: 2023-07-17 | Stop reason: SDUPTHER

## 2023-06-30 ENCOUNTER — HOSPITAL ENCOUNTER (OUTPATIENT)
Dept: RADIOLOGY | Facility: HOSPITAL | Age: 43
Discharge: HOME | End: 2023-06-30
Attending: NURSE PRACTITIONER
Payer: COMMERCIAL

## 2023-06-30 DIAGNOSIS — Z12.31 ENCOUNTER FOR SCREENING MAMMOGRAM FOR MALIGNANT NEOPLASM OF BREAST: ICD-10-CM

## 2023-06-30 PROCEDURE — 77067 SCR MAMMO BI INCL CAD: CPT | Mod: 26,,, | Performed by: RADIOLOGY

## 2023-06-30 PROCEDURE — 77067 MAMMO DIGITAL SCREENING BILAT WITH TOMO: ICD-10-PCS | Mod: 26,,, | Performed by: RADIOLOGY

## 2023-06-30 PROCEDURE — 77063 MAMMO DIGITAL SCREENING BILAT WITH TOMO: ICD-10-PCS | Mod: 26,,, | Performed by: RADIOLOGY

## 2023-06-30 PROCEDURE — 77067 SCR MAMMO BI INCL CAD: CPT | Mod: TC,PO

## 2023-06-30 PROCEDURE — 77063 BREAST TOMOSYNTHESIS BI: CPT | Mod: 26,,, | Performed by: RADIOLOGY

## 2023-07-17 RX ORDER — TRAZODONE HYDROCHLORIDE 100 MG/1
TABLET ORAL
Qty: 45 TABLET | Refills: 2 | OUTPATIENT
Start: 2023-07-17

## 2023-07-17 RX ORDER — OMEPRAZOLE 20 MG/1
20 CAPSULE, DELAYED RELEASE ORAL DAILY
Qty: 90 CAPSULE | Refills: 0 | Status: SHIPPED | OUTPATIENT
Start: 2023-07-17 | End: 2023-10-16 | Stop reason: SDUPTHER

## 2023-07-18 NOTE — TELEPHONE ENCOUNTER
Care Due:                  Date            Visit Type   Department     Provider  --------------------------------------------------------------------------------                                EP -                              PRIMARY      [facility]  Last Visit: 07-      CARE ([facility])   MEDICINE       Angus Caceres  Next Visit: None Scheduled  None         None Found                                                            Last  Test          Frequency    Reason                     Performed    Due Date  --------------------------------------------------------------------------------    Office Visit  12 months..  omeprazole...............  07- 07-    [facility] Embedded Care Due Messages. Reference number: [id number].   7/17/2023 7:57:46 PM CDT

## 2023-07-18 NOTE — TELEPHONE ENCOUNTER
Provider Staff:  Action required for this patient     Please see care gap opportunities below in Care Due Message.    Thanks!  [facility]     Appointments      Date Provider   Last Visit   7/22/2022 Angus Caceres MD   Next Visit   Visit date not found Angus Caceres MD     Refill Decision Note   Joy Recinos  is requesting a refill authorization.  Brief Assessment and Rationale for Refill:  Approve     Medication Therapy Plan:         Comments:     Note composed:7:58 PM 07/17/2023

## 2023-08-11 RX ORDER — TRAZODONE HYDROCHLORIDE 100 MG/1
TABLET ORAL
Qty: 45 TABLET | Refills: 2 | Status: SHIPPED | OUTPATIENT
Start: 2023-08-11 | End: 2023-10-20 | Stop reason: SDUPTHER

## 2023-08-18 ENCOUNTER — LAB VISIT (OUTPATIENT)
Dept: LAB | Facility: HOSPITAL | Age: 43
End: 2023-08-18
Attending: FAMILY MEDICINE
Payer: COMMERCIAL

## 2023-08-18 ENCOUNTER — OFFICE VISIT (OUTPATIENT)
Dept: PRIMARY CARE CLINIC | Facility: CLINIC | Age: 43
End: 2023-08-18
Payer: COMMERCIAL

## 2023-08-18 VITALS
BODY MASS INDEX: 33.05 KG/M2 | SYSTOLIC BLOOD PRESSURE: 116 MMHG | HEIGHT: 68 IN | DIASTOLIC BLOOD PRESSURE: 80 MMHG | TEMPERATURE: 97 F | WEIGHT: 218.06 LBS | HEART RATE: 97 BPM

## 2023-08-18 DIAGNOSIS — R53.83 FATIGUE, UNSPECIFIED TYPE: ICD-10-CM

## 2023-08-18 DIAGNOSIS — E55.9 VITAMIN D DEFICIENCY: ICD-10-CM

## 2023-08-18 DIAGNOSIS — Z00.00 ANNUAL PHYSICAL EXAM: ICD-10-CM

## 2023-08-18 DIAGNOSIS — Z79.890 ON HORMONE REPLACEMENT THERAPY: ICD-10-CM

## 2023-08-18 DIAGNOSIS — M25.50 MULTIPLE JOINT PAIN: ICD-10-CM

## 2023-08-18 DIAGNOSIS — Z00.00 ANNUAL PHYSICAL EXAM: Primary | ICD-10-CM

## 2023-08-18 DIAGNOSIS — E53.8 B12 DEFICIENCY: ICD-10-CM

## 2023-08-18 DIAGNOSIS — M79.7 FIBROMYALGIA: ICD-10-CM

## 2023-08-18 PROBLEM — I73.9 PVD (PERIPHERAL VASCULAR DISEASE): Status: RESOLVED | Noted: 2018-11-16 | Resolved: 2023-08-18

## 2023-08-18 LAB — RHEUMATOID FACT SERPL-ACNC: <13 IU/ML (ref 0–15)

## 2023-08-18 PROCEDURE — 99396 PREV VISIT EST AGE 40-64: CPT | Mod: S$GLB,,, | Performed by: FAMILY MEDICINE

## 2023-08-18 PROCEDURE — 84443 ASSAY THYROID STIM HORMONE: CPT | Performed by: FAMILY MEDICINE

## 2023-08-18 PROCEDURE — 99999 PR PBB SHADOW E&M-EST. PATIENT-LVL IV: ICD-10-PCS | Mod: PBBFAC,,, | Performed by: FAMILY MEDICINE

## 2023-08-18 PROCEDURE — 86431 RHEUMATOID FACTOR QUANT: CPT | Performed by: FAMILY MEDICINE

## 2023-08-18 PROCEDURE — 80061 LIPID PANEL: CPT | Performed by: FAMILY MEDICINE

## 2023-08-18 PROCEDURE — 80053 COMPREHEN METABOLIC PANEL: CPT | Performed by: FAMILY MEDICINE

## 2023-08-18 PROCEDURE — 1159F MED LIST DOCD IN RCRD: CPT | Mod: CPTII,S$GLB,, | Performed by: FAMILY MEDICINE

## 2023-08-18 PROCEDURE — 36415 COLL VENOUS BLD VENIPUNCTURE: CPT | Mod: PN | Performed by: FAMILY MEDICINE

## 2023-08-18 PROCEDURE — 83036 HEMOGLOBIN GLYCOSYLATED A1C: CPT | Performed by: FAMILY MEDICINE

## 2023-08-18 PROCEDURE — 3074F SYST BP LT 130 MM HG: CPT | Mod: CPTII,S$GLB,, | Performed by: FAMILY MEDICINE

## 2023-08-18 PROCEDURE — 82306 VITAMIN D 25 HYDROXY: CPT | Performed by: FAMILY MEDICINE

## 2023-08-18 PROCEDURE — 83001 ASSAY OF GONADOTROPIN (FSH): CPT | Performed by: FAMILY MEDICINE

## 2023-08-18 PROCEDURE — 85025 COMPLETE CBC W/AUTO DIFF WBC: CPT | Performed by: FAMILY MEDICINE

## 2023-08-18 PROCEDURE — 85652 RBC SED RATE AUTOMATED: CPT | Performed by: FAMILY MEDICINE

## 2023-08-18 PROCEDURE — 3074F PR MOST RECENT SYSTOLIC BLOOD PRESSURE < 130 MM HG: ICD-10-PCS | Mod: CPTII,S$GLB,, | Performed by: FAMILY MEDICINE

## 2023-08-18 PROCEDURE — 3008F BODY MASS INDEX DOCD: CPT | Mod: CPTII,S$GLB,, | Performed by: FAMILY MEDICINE

## 2023-08-18 PROCEDURE — 3008F PR BODY MASS INDEX (BMI) DOCUMENTED: ICD-10-PCS | Mod: CPTII,S$GLB,, | Performed by: FAMILY MEDICINE

## 2023-08-18 PROCEDURE — 99999 PR PBB SHADOW E&M-EST. PATIENT-LVL IV: CPT | Mod: PBBFAC,,, | Performed by: FAMILY MEDICINE

## 2023-08-18 PROCEDURE — 84403 ASSAY OF TOTAL TESTOSTERONE: CPT | Performed by: FAMILY MEDICINE

## 2023-08-18 PROCEDURE — 3079F PR MOST RECENT DIASTOLIC BLOOD PRESSURE 80-89 MM HG: ICD-10-PCS | Mod: CPTII,S$GLB,, | Performed by: FAMILY MEDICINE

## 2023-08-18 PROCEDURE — 86200 CCP ANTIBODY: CPT | Performed by: FAMILY MEDICINE

## 2023-08-18 PROCEDURE — 1159F PR MEDICATION LIST DOCUMENTED IN MEDICAL RECORD: ICD-10-PCS | Mod: CPTII,S$GLB,, | Performed by: FAMILY MEDICINE

## 2023-08-18 PROCEDURE — 99396 PR PREVENTIVE VISIT,EST,40-64: ICD-10-PCS | Mod: S$GLB,,, | Performed by: FAMILY MEDICINE

## 2023-08-18 PROCEDURE — 3079F DIAST BP 80-89 MM HG: CPT | Mod: CPTII,S$GLB,, | Performed by: FAMILY MEDICINE

## 2023-08-18 PROCEDURE — 86140 C-REACTIVE PROTEIN: CPT | Performed by: FAMILY MEDICINE

## 2023-08-18 PROCEDURE — 82670 ASSAY OF TOTAL ESTRADIOL: CPT | Performed by: FAMILY MEDICINE

## 2023-08-18 PROCEDURE — 82607 VITAMIN B-12: CPT | Performed by: FAMILY MEDICINE

## 2023-08-18 NOTE — PROGRESS NOTES
[facility] [place] [facility]       [address] [place] [address]      Phone: [phone]      Fax: [phone]    Hakeem Mayers MD    Office Visit  2023    Annual Exam      43-year-old female presents today for wellness exam.      Patient states that she wants to get a number of things checked.  She has been feeling fatigued, tired lately.  Had some occasional nausea.  Worries that he might be her vitamin levels.  Was told she was vitamin-D deficient in the past.    No chest pains, shortness of breath.  No fever, chills.  No coughing, sneezing, URI type symptoms.  Bowel movements are okay, but they tend to be more on the constipation side.  No urinary issues.      Has issues with anxiety/depression.  Sees psychiatrist.  Feels like symptoms have gotten worse since she started seeing the psychiatrist.  Never tried adjusting her medications, but that seems to be making things worse.  In the past, she took Cymbalta and that seemed to work okay, but not perfect.  Has also tried seen two different counselors, but never quite got in a good rhythm.    Has been diagnosed with fibromyalgia in the past.  All of her joints hurt.  As her anxiety gets worse, it makes her pains worse.    PMH: Anxiety/depression.  GERD.  Allergies.  Fibromyalgia.  Endometriosis.    PSH:  Endometriosis (multiple).  Hysterectomy.  Oophorectomy.    F MH: Skin cancer.  Thyroid.    Allergies:  NKDA   Social:  Works as a project  for an engineering firm.   works for [employer].  T: Denies  A:  Occasionally  D:  Denies    Exercise:  No regular exercise program    Pap:  Hysterectomy  MM2023     Gyn: NP at the [facility]  Psychiatry: Dr. Willett        Immunizations:  Immunization History   Administered Date(s) Administered    COVID-19, MRNA, LN-S, PF (Pfizer) (Purple Cap) 2021, 2021    Influenza - Quadrivalent 2014, 10/23/2015    Influenza - Quadrivalent  - PF *Preferred* (6 months and older) 02/16/2018    Influenza Split 12/09/2011    Tdap 06/19/2015       Care Teams:  Patient Care Team:  Angus Caceres MD as PCP - General (Internal Medicine)  Em Stevens LPN (Inactive) as Care Coordinator    Medical History:  Past Medical History:   Diagnosis Date    ADHD (attention deficit hyperactivity disorder)     B12 deficiency     Chronic low back pain     oberlander    Endometriosis     Fibromyalgia syndrome     Idiopathic hypersomnia     Irritable bowel syndrome     goldman    Migraines     frances    PONV (postoperative nausea and vomiting)     Shingles     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 No    Breast-feeding No   Social History Narrative    No smokers in household, 1 dog.     Social Determinants of Health     Financial Resource Strain: Low Risk  (8/16/2020)    Overall Financial Resource Strain (CARDIA)     Difficulty of Paying Living Expenses: Not very hard   Food Insecurity: No Food Insecurity (8/16/2020)    Hunger Vital Sign     Worried About Running Out of Food in the Last Year: Never true     Ran Out of Food in the Last Year: Never true   Transportation Needs: No Transportation Needs (8/16/2020)    PRAPARE - Transportation     Lack of Transportation (Medical): No     Lack of Transportation (Non-Medical): No   Stress: Stress Concern Present (8/18/2020)    [place] [employer] -  Occupational Stress Questionnaire     Feeling of Stress : Rather much   Social Connections: Unknown (2020)    Social Connection and Isolation Panel [NHANES]     Frequency of Communication with Friends and Family: Twice a week     Frequency of Social Gatherings with Friends and Family: Patient refused     Active Member of Clubs or Organizations: No     Attends Club or Organization Meetings: 1 to 4 times per year     Marital Status:        Alcohol History:  Social History     Substance and Sexual Activity   Alcohol Use Yes    Comment: rarely       Tobacco History:  Social History     Tobacco Use   Smoking Status Never   Smokeless Tobacco Never       Family History:  Family History   Problem Relation Age of Onset    No Known Problems Mother     Heart disease Father         Bypass    Skin cancer Father     Cataracts Maternal Grandmother     Diabetes Maternal Grandmother     Glaucoma Maternal Grandmother     Skin cancer Maternal Grandmother     Cataracts Maternal Grandfather     Skin cancer Paternal Grandfather     Breast cancer Neg Hx     Ovarian cancer Neg Hx     Deep vein thrombosis Neg Hx        Surgical History:  Past Surgical History:   Procedure Laterality Date     SECTION      x 2     CYSTOSCOPY N/A 04/10/2019    Procedure: CYSTOSCOPY;  Surgeon: Gillian Carpenter MD;  Location: [facility] OR;  Service: OB/GYN;  Laterality: N/A;    HYSTERECTOMY  2019    Total    OOPHORECTOMY Bilateral     left tube and ovary removed. later RSO with hyst. ovaries removed at differnt times,  and .    PELVIC LAPAROSCOPY      ROBOT-ASSISTED LAPAROSCOPIC ABDOMINAL HYSTERECTOMY USING DA LONI XI N/A 04/10/2019    Procedure: XI ROBOTIC HYSTERECTOMY;  Surgeon: iGllian Carpenter MD;  Location: [facility] OR;  Service: OB/GYN;  Laterality: N/A;    ROBOT-ASSISTED LAPAROSCOPIC LYSIS OF ADHESIONS USING DA LONI XI N/A 04/10/2019    Procedure: XI ROBOTIC LYSIS, ADHESIONS;  Surgeon: Gillian Carpenter MD;  Location: [facility]  OR;  Service: OB/GYN;  Laterality: N/A;    ROBOT-ASSISTED LAPAROSCOPIC SALPINGO-OOPHORECTOMY USING DA LONI XI Right 04/10/2019    Procedure: XI ROBOTIC SALPINGO-OOPHORECTOMY;  Surgeon: Gillian Carpenter MD;  Location: [facility] OR;  Service: OB/GYN;  Laterality: Right;    TUBAL LIGATION         Allergies:  Review of patient's allergies indicates:  No Known Allergies    Medications:    Current Outpatient Medications:     estradioL (ESTRACE) 2 MG tablet, Take 1 tablet (2 mg total) by mouth once daily., Disp: 30 tablet, Rfl: 2    levocetirizine (XYZAL) 5 MG tablet, Take 1 tablet (5 mg total) by mouth every evening., Disp: 30 tablet, Rfl: 11    omeprazole (PRILOSEC) 20 MG capsule, Take 1 capsule (20 mg total) by mouth once daily., Disp: 90 capsule, Rfl: 0    sodium fluoride-pot nitrate (PREVIDENT 5000 SENSITIVE) 1.1-5 % Pste, Brush 2 (two) times daily. Spit. Do not rinse with water nor eat, nor drink for 30 minutes, Disp: 100 mL, Rfl: 6    tiZANidine (ZANAFLEX) 2 MG tablet, Take 2-3 tablets (4-6 mg total) by mouth nightly as needed., Disp: 30 tablet, Rfl: 1    traMADol (ULTRAM) 50 mg tablet, Take 1 tablet (50 mg total) by mouth every 24 hours as needed for Pain., Disp: 30 tablet, Rfl: 0    traZODone (DESYREL) 100 MG tablet, Take 1/2 to 1 and 1/2 tablets at bedtime as needed for sleep., Disp: 45 tablet, Rfl: 2    venlafaxine (EFFEXOR-XR) 150 MG Cp24, Take 1 capsule (150 mg total) by mouth once daily., Disp: 30 capsule, Rfl: 2    venlafaxine (EFFEXOR-XR) 75 MG 24 hr capsule, Take 1 capsule (75 mg total) by mouth once daily. Take with the 150 mg capsule., Disp: 30 capsule, Rfl: 2    clonazePAM (KLONOPIN) 0.5 MG tablet, Take 1 tablet (0.5 mg total) by mouth daily as needed for Anxiety. (Patient not taking: Reported on 8/18/2023), Disp: 30 tablet, Rfl: 1    fluticasone propionate (FLONASE) 50 mcg/actuation nasal spray, instill 2 sprays (100 mcg total) by Each Nostril route 2 (two) times daily. (Patient not taking: Reported on  "8/18/2023), Disp: 16 mL, Rfl: 11    Health Maintenance:  Health Maintenance   Topic Date Due    Mammogram  06/30/2024    TETANUS VACCINE  06/19/2025    Hepatitis C Screening  Completed    Lipid Panel  Completed       Screening Questions  1.  Do you smoke? No  2.  In the past month have you been bothered by feeling "down", depressed or hopeless? No  3.  In the past month, have you experienced a loss of interest or pleasure in doing things? No  4.  In the past 3 months, on any single occasion have you had 5 or more drinks containing alcohol? No  5.  Regarding your use of alcohol, have you ever felt you should cut down on your drinking? No  6.  Are you sexually active? Yes  7   Do you exercise?  rarely    Counseling  The patient was counseled regarding diet and exercise, motor vehicle safety, sun exposure, and use of vitamins and supplements.  The patient was counseled regarding Living Will/Durable Power-Of-.  The patient was given information regarding the dangers of smoking and the overuse of alcohol.    Review of Systems   Constitutional:  Positive for activity change and fatigue. Negative for appetite change, chills and fever.   HENT:  Negative for congestion, postnasal drip, rhinorrhea, sore throat and trouble swallowing.    Respiratory:  Negative for cough, shortness of breath and wheezing.    Cardiovascular:  Negative for chest pain and palpitations.   Gastrointestinal:  Negative for abdominal pain, constipation, diarrhea, nausea and vomiting.   Genitourinary:  Negative for difficulty urinating.   Musculoskeletal:  Positive for arthralgias and myalgias.   Skin:  Negative for color change and rash.   Neurological:  Negative for speech difficulty and headaches.   Psychiatric/Behavioral:  The patient is nervous/anxious.    All other systems reviewed and are negative.       Objective   Vitals:    08/18/23 1017   BP: 116/80   Pulse: 97   Temp: 97.4 °F (36.3 °C)   Weight: 98.9 kg (218 lb 0.6 oz)   Height: 5' " "8" (1.727 m)     Physical Exam  Vitals and nursing note reviewed.   Constitutional:       General: She is not in acute distress.     Appearance: Normal appearance.   HENT:      Head: Normocephalic and atraumatic.      Right Ear: Tympanic membrane, ear canal and external ear normal.      Left Ear: Tympanic membrane, ear canal and external ear normal.      Nose: Nose normal. No congestion or rhinorrhea.      Mouth/Throat:      Mouth: Mucous membranes are moist.      Pharynx: Oropharynx is clear. No oropharyngeal exudate or posterior oropharyngeal erythema.   Eyes:      Extraocular Movements: Extraocular movements intact.      Conjunctiva/sclera: Conjunctivae normal.      Pupils: Pupils are equal, round, and reactive to light.   Cardiovascular:      Rate and Rhythm: Normal rate and regular rhythm.   Pulmonary:      Effort: Pulmonary effort is normal. No respiratory distress.      Breath sounds: No wheezing, rhonchi or rales.   Musculoskeletal:         General: Normal range of motion.      Cervical back: Normal range of motion.   Lymphadenopathy:      Cervical: No cervical adenopathy.   Skin:     General: Skin is warm and dry.      Findings: No rash.   Neurological:      Mental Status: She is alert.          No results found for this or any previous visit (from the past 4368 hour(s)).    Plan    Joy was seen today for annual exam.    Diagnoses and all orders for this visit:    Annual physical exam  -     CBC Auto Differential; Future  -     Comprehensive Metabolic Panel; Future  -     TSH; Future  -     Lipid Panel; Future  -     Hemoglobin A1C; Future  -     Vitamin D 25-Hydroxy; Future  -     VITAMIN B12; Future  -     RHEUMATOID FACTOR; Future  -     CYCLIC CITRUL PEPTIDE ANTIBODY, IGG; Future  -     Sedimentation rate; Future  -     C-REACTIVE PROTEIN; Future  -     Testosterone; Future  -     FOLLICLE STIMULATING HORMONE; Future  -     ESTRADIOL; Future    Fatigue, unspecified type  -     CBC Auto Differential; " Future  -     Comprehensive Metabolic Panel; Future  -     TSH; Future  -     Lipid Panel; Future  -     Hemoglobin A1C; Future  -     Vitamin D 25-Hydroxy; Future  -     VITAMIN B12; Future  -     RHEUMATOID FACTOR; Future  -     CYCLIC CITRUL PEPTIDE ANTIBODY, IGG; Future  -     Sedimentation rate; Future  -     C-REACTIVE PROTEIN; Future  -     Testosterone; Future  -     FOLLICLE STIMULATING HORMONE; Future  -     ESTRADIOL; Future    Fibromyalgia  -     CBC Auto Differential; Future  -     Comprehensive Metabolic Panel; Future  -     TSH; Future  -     Lipid Panel; Future  -     Hemoglobin A1C; Future  -     Vitamin D 25-Hydroxy; Future  -     VITAMIN B12; Future  -     RHEUMATOID FACTOR; Future  -     CYCLIC CITRUL PEPTIDE ANTIBODY, IGG; Future  -     Sedimentation rate; Future  -     C-REACTIVE PROTEIN; Future  -     Testosterone; Future  -     FOLLICLE STIMULATING HORMONE; Future  -     ESTRADIOL; Future    Multiple joint pain  -     CBC Auto Differential; Future  -     Comprehensive Metabolic Panel; Future  -     TSH; Future  -     Lipid Panel; Future  -     Hemoglobin A1C; Future  -     Vitamin D 25-Hydroxy; Future  -     VITAMIN B12; Future  -     RHEUMATOID FACTOR; Future  -     CYCLIC CITRUL PEPTIDE ANTIBODY, IGG; Future  -     Sedimentation rate; Future  -     C-REACTIVE PROTEIN; Future  -     Testosterone; Future  -     FOLLICLE STIMULATING HORMONE; Future  -     ESTRADIOL; Future    Vitamin D deficiency  -     Vitamin D 25-Hydroxy; Future    B12 deficiency  -     VITAMIN B12; Future    On hormone replacement therapy  -     CBC Auto Differential; Future  -     Comprehensive Metabolic Panel; Future  -     TSH; Future  -     Lipid Panel; Future  -     Hemoglobin A1C; Future  -     Vitamin D 25-Hydroxy; Future  -     VITAMIN B12; Future  -     RHEUMATOID FACTOR; Future  -     CYCLIC CITRUL PEPTIDE ANTIBODY, IGG; Future  -     Sedimentation rate; Future  -     C-REACTIVE PROTEIN; Future  -      Testosterone; Future  -     FOLLICLE STIMULATING HORMONE; Future  -     ESTRADIOL; Future    Physically, she looks okay today.      Will get screening labs, as above.    We will contact Dr. VYAS to see if he could see patient, offer 2nd opinion.  If necessary, maybe we can go outside of [facility]?    Follow-up:  Follow up in about 1 year (around 8/18/2024) for annual exam.    Signed by:  Hakeem Mayers MD

## 2023-08-18 NOTE — PATIENT INSTRUCTIONS
Physically, everything looks pretty good today.      On paper, you also look good.  Blood pressure is perfect.      Let us get some blood work done today.  Results will be posted in [facility] as soon as they are available.  In addition to my basic, screening, labs, we will check some vitamin levels, rheumatologic labs, hormone levels, etc..      Continue to eat a healthy diet.  Be careful with portion sizes.  Includes lots of fresh fruits, vegetables, whole grains, lean proteins.  See info below.    Keep hydrated.  Be sure to drink at least 8-10, 8 oz, glasses of water every day.    Stay active.  Try to do some sort of physical activity every day.  Nothing outrageous, just try walking for 10-15 minutes each day.     I will reach out to a psychiatrist friend of mine, Dr. Waite, to see if he can schedule an appointment with you for a 2nd opinion on your anxiety/depression symptoms.  Will let you know what he says.

## 2023-08-19 LAB
25(OH)D3+25(OH)D2 SERPL-MCNC: 21 NG/ML (ref 30–96)
ALBUMIN SERPL BCP-MCNC: 3.8 G/DL (ref 3.5–5.2)
ALP SERPL-CCNC: 113 U/L (ref 55–135)
ALT SERPL W/O P-5'-P-CCNC: 8 U/L (ref 10–44)
ANION GAP SERPL CALC-SCNC: 9 MMOL/L (ref 8–16)
AST SERPL-CCNC: 12 U/L (ref 10–40)
BASOPHILS # BLD AUTO: 0.02 K/UL (ref 0–0.2)
BASOPHILS NFR BLD: 0.4 % (ref 0–1.9)
BILIRUB SERPL-MCNC: 0.3 MG/DL (ref 0.1–1)
BUN SERPL-MCNC: 11 MG/DL (ref 6–20)
CALCIUM SERPL-MCNC: 9 MG/DL (ref 8.7–10.5)
CCP AB SER IA-ACNC: <0.5 U/ML
CHLORIDE SERPL-SCNC: 106 MMOL/L (ref 95–110)
CHOLEST SERPL-MCNC: 229 MG/DL (ref 120–199)
CHOLEST/HDLC SERPL: 3.8 {RATIO} (ref 2–5)
CO2 SERPL-SCNC: 28 MMOL/L (ref 23–29)
CREAT SERPL-MCNC: 0.8 MG/DL (ref 0.5–1.4)
CRP SERPL-MCNC: 18 MG/L (ref 0–8.2)
DIFFERENTIAL METHOD: ABNORMAL
EOSINOPHIL # BLD AUTO: 0.1 K/UL (ref 0–0.5)
EOSINOPHIL NFR BLD: 2.1 % (ref 0–8)
ERYTHROCYTE [DISTWIDTH] IN BLOOD BY AUTOMATED COUNT: 12.7 % (ref 11.5–14.5)
ERYTHROCYTE [SEDIMENTATION RATE] IN BLOOD BY PHOTOMETRIC METHOD: 4 MM/HR (ref 0–36)
EST. GFR  (NO RACE VARIABLE): >60 ML/MIN/1.73 M^2
ESTIMATED AVG GLUCOSE: 94 MG/DL (ref 68–131)
ESTRADIOL SERPL-MCNC: 48 PG/ML
FSH SERPL-ACNC: 57.53 MIU/ML
GLUCOSE SERPL-MCNC: 87 MG/DL (ref 70–110)
HBA1C MFR BLD: 4.9 % (ref 4–5.6)
HCT VFR BLD AUTO: 42.6 % (ref 37–48.5)
HDLC SERPL-MCNC: 61 MG/DL (ref 40–75)
HDLC SERPL: 26.6 % (ref 20–50)
HGB BLD-MCNC: 13.5 G/DL (ref 12–16)
IMM GRANULOCYTES # BLD AUTO: 0.01 K/UL (ref 0–0.04)
IMM GRANULOCYTES NFR BLD AUTO: 0.2 % (ref 0–0.5)
LDLC SERPL CALC-MCNC: 148 MG/DL (ref 63–159)
LYMPHOCYTES # BLD AUTO: 1.6 K/UL (ref 1–4.8)
LYMPHOCYTES NFR BLD: 28.8 % (ref 18–48)
MCH RBC QN AUTO: 30 PG (ref 27–31)
MCHC RBC AUTO-ENTMCNC: 31.7 G/DL (ref 32–36)
MCV RBC AUTO: 95 FL (ref 82–98)
MONOCYTES # BLD AUTO: 0.3 K/UL (ref 0.3–1)
MONOCYTES NFR BLD: 5 % (ref 4–15)
NEUTROPHILS # BLD AUTO: 3.6 K/UL (ref 1.8–7.7)
NEUTROPHILS NFR BLD: 63.5 % (ref 38–73)
NONHDLC SERPL-MCNC: 168 MG/DL
NRBC BLD-RTO: 0 /100 WBC
PLATELET # BLD AUTO: 210 K/UL (ref 150–450)
PMV BLD AUTO: 12.2 FL (ref 9.2–12.9)
POTASSIUM SERPL-SCNC: 4.1 MMOL/L (ref 3.5–5.1)
PROT SERPL-MCNC: 7 G/DL (ref 6–8.4)
RBC # BLD AUTO: 4.5 M/UL (ref 4–5.4)
SODIUM SERPL-SCNC: 143 MMOL/L (ref 136–145)
TESTOST SERPL-MCNC: 23 NG/DL (ref 5–73)
TRIGL SERPL-MCNC: 100 MG/DL (ref 30–150)
TSH SERPL DL<=0.005 MIU/L-ACNC: 1.47 UIU/ML (ref 0.4–4)
VIT B12 SERPL-MCNC: 298 PG/ML (ref 210–950)
WBC # BLD AUTO: 5.65 K/UL (ref 3.9–12.7)

## 2023-08-20 DIAGNOSIS — M79.7 FIBROMYALGIA: Primary | ICD-10-CM

## 2023-08-20 DIAGNOSIS — E55.9 VITAMIN D DEFICIENCY: ICD-10-CM

## 2023-08-20 DIAGNOSIS — M25.50 MULTIPLE JOINT PAIN: ICD-10-CM

## 2023-08-20 RX ORDER — ERGOCALCIFEROL 1.25 MG/1
50000 CAPSULE ORAL
Qty: 13 CAPSULE | Refills: 3 | Status: SHIPPED | OUTPATIENT
Start: 2023-08-20

## 2023-08-20 RX ORDER — MELOXICAM 15 MG/1
15 TABLET ORAL DAILY
Qty: 90 TABLET | Refills: 3 | Status: SHIPPED | OUTPATIENT
Start: 2023-08-20 | End: 2023-12-27

## 2023-08-20 NOTE — PROGRESS NOTES
Ms. Joy,     You should be able to see the results of your recent blood work in [facility].  Overall, everything looks okay.      Blood counts all look fine.  Electrolytes, kidney function, liver function, and thyroid function were all normal.  Your hormone levels all came back okay.  Your A1c was in the normal range, so no signs of diabetes.    Your vitamin-D level, however, did come back quite low.  If you are not already taking a vitamin-D supplement, I would recommend starting on one.  I will send a prescription to the pharmacy for something that you can take once a week.  Sometimes replacing your vitamin-D levels can help with some of the pains.      Cholesterol levels are relatively okay.  Total cholesterol was a little higher than I would like at 229.  We really prefer this under 200.  LDL, or bad cholesterol, was also a little high.  Yours was 148 and we prefer it closer to 100.  For now, just focus on diet and exercise.  Be careful with portions sizes.  Includes lots of fresh fruits, vegetables, whole grains, lean proteins.  Drink water.  Try to walk 5-10 minutes every day.  All of these things can help.    Your rheumatologic labs were all normal, but the inflammatory markers was a bit elevated.  Are you taking any kind of anti-inflammatory medicine?  If not, let me try sending some meloxicam to the pharmacy.  Take this once daily, in the morning, and it may help with some of the joint pains throughout the day.  If not, please let me know and we can always get you in with a rheumatologist to get their opinion.

## 2023-08-22 RX ORDER — VENLAFAXINE HYDROCHLORIDE 75 MG/1
75 CAPSULE, EXTENDED RELEASE ORAL DAILY
Qty: 30 CAPSULE | Refills: 2 | Status: SHIPPED | OUTPATIENT
Start: 2023-08-22 | End: 2023-10-20

## 2023-08-22 RX ORDER — VENLAFAXINE HYDROCHLORIDE 150 MG/1
150 CAPSULE, EXTENDED RELEASE ORAL DAILY
Qty: 30 CAPSULE | Refills: 2 | Status: SHIPPED | OUTPATIENT
Start: 2023-08-22 | End: 2023-10-20

## 2023-10-16 DIAGNOSIS — J30.89 NON-SEASONAL ALLERGIC RHINITIS, UNSPECIFIED TRIGGER: ICD-10-CM

## 2023-10-17 RX ORDER — OMEPRAZOLE 20 MG/1
20 CAPSULE, DELAYED RELEASE ORAL DAILY
Qty: 90 CAPSULE | Refills: 0 | Status: SHIPPED | OUTPATIENT
Start: 2023-10-17 | End: 2024-01-12 | Stop reason: SDUPTHER

## 2023-10-17 RX ORDER — LEVOCETIRIZINE DIHYDROCHLORIDE 5 MG/1
5 TABLET, FILM COATED ORAL NIGHTLY
Qty: 30 TABLET | Refills: 11 | Status: SHIPPED | OUTPATIENT
Start: 2023-10-17 | End: 2024-10-16

## 2023-10-17 NOTE — TELEPHONE ENCOUNTER
Care Due:                  Date            Visit Type   Department     Provider  --------------------------------------------------------------------------------                                EP -                              PRIMARY      [facility]  Last Visit: 07-      CARE ([facility])   MEDICINE       Angus Caceres  Next Visit: None Scheduled  None         None Found                                                            Last  Test          Frequency    Reason                     Performed    Due Date  --------------------------------------------------------------------------------    Office Visit  12 months..  meloxicam, omeprazole....  07- 07-    [facility] Embedded Care Due Messages. Reference number: [id number].   10/16/2023 11:03:17 PM CDT

## 2023-10-20 ENCOUNTER — OFFICE VISIT (OUTPATIENT)
Dept: PSYCHIATRY | Facility: CLINIC | Age: 43
End: 2023-10-20
Payer: COMMERCIAL

## 2023-10-20 DIAGNOSIS — F90.9 ATTENTION DEFICIT HYPERACTIVITY DISORDER (ADHD), UNSPECIFIED ADHD TYPE: ICD-10-CM

## 2023-10-20 DIAGNOSIS — G47.00 INSOMNIA, UNSPECIFIED TYPE: ICD-10-CM

## 2023-10-20 DIAGNOSIS — F41.1 GAD (GENERALIZED ANXIETY DISORDER): Primary | ICD-10-CM

## 2023-10-20 PROCEDURE — 3044F HG A1C LEVEL LT 7.0%: CPT | Mod: CPTII,95,, | Performed by: PSYCHIATRY & NEUROLOGY

## 2023-10-20 PROCEDURE — 3044F PR MOST RECENT HEMOGLOBIN A1C LEVEL <7.0%: ICD-10-PCS | Mod: CPTII,95,, | Performed by: PSYCHIATRY & NEUROLOGY

## 2023-10-20 PROCEDURE — 99214 OFFICE O/P EST MOD 30 MIN: CPT | Mod: 95,,, | Performed by: PSYCHIATRY & NEUROLOGY

## 2023-10-20 PROCEDURE — 99214 PR OFFICE/OUTPT VISIT, EST, LEVL IV, 30-39 MIN: ICD-10-PCS | Mod: 95,,, | Performed by: PSYCHIATRY & NEUROLOGY

## 2023-10-20 RX ORDER — TRAZODONE HYDROCHLORIDE 100 MG/1
TABLET ORAL
Qty: 45 TABLET | Refills: 3 | Status: SHIPPED | OUTPATIENT
Start: 2023-10-20 | End: 2024-01-16 | Stop reason: SDUPTHER

## 2023-10-20 RX ORDER — VENLAFAXINE HYDROCHLORIDE 150 MG/1
300 CAPSULE, EXTENDED RELEASE ORAL DAILY
Qty: 60 CAPSULE | Refills: 3 | Status: SHIPPED | OUTPATIENT
Start: 2023-10-20 | End: 2024-01-18 | Stop reason: SDUPTHER

## 2023-10-20 NOTE — PROGRESS NOTES
"Outpatient Psychiatry Follow-up Visit (MD/NP)    10/20/2023    Joy Recinos, a 43 y.o. female, presenting for follow-up visit. Met with patient.    Reason for Encounter: f/u, DOUG.     Interval History: Patient seen and interviewed for follow-up, last seen just over nine months ago. Endorses ongoing problems with anxiety, "nervous twitches", muscle tension, fatigue, overworrying. Sometimes behaviors interrupted by worries. Problems with attention and concentration. Vitamin d supplementation. No other new medication. Adherent to venlafaxine. Tolerates it ok.     Past med List:   Sertraline  Duloxetine  escitalopram  Fluoxetine   milnacipran    Background: 38 y/o F presents for establishment of care, reports problems with anxiety & depression. From PCP note (3.16.18): Here today for f/u on depression & anxiety. She reports that since medication adjustment she has been less irritable. Her  is here with her today & affirms this. She has a psych appointment scheduled for May 11. She reports it has been some mild improvement in her volition. Reports treatment with cymbalta - for fibromyalgia, anxiety, depression for 5 years. Neither fibromyalgia pain no moods improved. wellbutrin trial has been perhaps helpful. Describes baseline symptoms as "worried about everything", "worried about worrying", sad, not interested in things, "like I don't have a purpose". Negative thoughts about self. Tired all the time, even when sleeps excessively. Denies SI. "All my life". "stays all the time", though better/worse at times, influenced by stressful situations. Problems with attention/concentration. Reports all of the following daily in past 2 weeks (around which time she was laid off): Feeling nervous, anxious or on edge, Not being able to stop or control worrying, Worrying too much about different things   Trouble relaxing   Being so restless that it is hard to sit still   Becoming easily annoyed or irritable   Feeling " "afraid as if something awful might happen    DOUG-7 = 21 (estimates a 19 in weeks prior to the layoff)    Sleep Problems - hypersomnia  Sad Mood more than 1/2 time  Appetite and weight changes (decreased appetite, small subjective weight loss)  Concentration problems  Guilt   Thoughts of Emptiness  Anhedonia  Anergia  Slowing/PMR    QIDS = 12    Also has decreased sexual interest, pain during intercourse.    PsychHx: first treatment by Obgyn - sertraline(?) when had problems with endometriosis. No AVH, no SI/HI, no delusions. No hospitalizations or self-harm behaviors. Prolonged duloxetine trial, thinks transient or minimal benefit. Past treatment with sertraline.     MedHx: idiopathic hypersomnia. 2 sleep studies, narcolepsy ruled out. Endometriosis. 2 herniated lumbar discs. Migraines. GERD. Seasonal allergies.   FamHx: mother & dad both take medication for depression. Father diagnosed with adhd in 60's.   SocHx: from [facility]. Heart murmur at birth. "fainted and blacked out school", had several episodes between 5 and 9. Worked up but never medically explained. Still gets dizzy and weak, but doesn't black out. Also had back pain frequently as a child. No developmental problems. Regular classes in school. Ok with going to school. Normal socially. Average student. 3 semesters of college. Went to technical school for 1 year, finished (computer/). 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 Applying for new jobs. Lives with  & 2 kids.  x 14 years. "really good" relationship x decreased sexual interest and pain. Week before  pulled out a tampon - was extremely painful. [place] has been painful ever since. Has had PT recommended for this. 2 kids (13 & 9). Dtr has been bullied most of her life due to being overweight. She also has problems with oppositionality at home (though is a " "good student, doesn't have discipline problems at school), won't take a bath. Son is "good kid", "very attached to me". Quiet, not many friends. Mom is supportive, lives nearby.     Review Of Systems:     GENERAL:  No weight gain or loss  SKIN:  No rashes or lacerations  HEAD:  No headaches  CHEST:  No shortness of breath, hyperventilation or cough  CARDIOVASCULAR:  No tachycardia or chest pain  ABDOMEN:  No nausea, vomiting, pain, constipation or diarrhea  URINARY:  No frequency, dysuria or sexual dysfunction  ENDOCRINE:  No polydipsia, polyuria  MUSCULOSKELETAL:  pain and stiffness of the joints  NEUROLOGIC:  No weakness, sensory changes, seizures, confusion, memory loss, tremor or other abnormal movements    Current Evaluation:     Nutritional Screening: Considering the patient's height and weight, medications, medical history and preferences, should a referral be made to the dietitian? no    Constitutional  Vitals:  Most recent vital signs, dated less than 90 days prior to this appointment, were not reviewed.    There were no vitals filed for this visit.       General:  unremarkable, age appropriate     Musculoskeletal  Muscle Strength/Tone:  no tremor, no tic   Gait & Station:  non-ataxic     Psychiatric  Appearance: casually dressed & groomed;   Behavior: calm,   Cooperation: cooperative with assessment  Speech: normal rate, volume, tone  Thought Process: linear, goal-directed  Thought Content: No suicidal or homicidal ideation; no delusions  Affect: anxious  Mood: anxious  Perceptions: No auditory or visual hallucinations  Level of Consciousness: alert throughout interview  Insight: fair  Cognition: Oriented to person, place, time, & situation  Memory: no apparent deficits to general clinical interview; not formally assessed  Attention/Concentration: no apparent deficits to general clinical interview; not formally assessed  Fund of Knowledge: average by vocabulary/education    Laboratory Data  No visits with " results within 1 Month(s) from this visit.   Latest known visit with results is:   Lab Visit on 08/18/2023   Component Date Value Ref Range Status    WBC 08/18/2023 5.65  3.90 - 12.70 K/uL Final    RBC 08/18/2023 4.50  4.00 - 5.40 M/uL Final    Hemoglobin 08/18/2023 13.5  12.0 - 16.0 g/dL Final    Hematocrit 08/18/2023 42.6  37.0 - 48.5 % Final    MCV 08/18/2023 95  82 - 98 fL Final    MCH 08/18/2023 30.0  27.0 - 31.0 pg Final    MCHC 08/18/2023 31.7 (L)  32.0 - 36.0 g/dL Final    RDW 08/18/2023 12.7  11.5 - 14.5 % Final    Platelets 08/18/2023 210  150 - 450 K/uL Final    MPV 08/18/2023 12.2  9.2 - 12.9 fL Final    Immature Granulocytes 08/18/2023 0.2  0.0 - 0.5 % Final    Gran # (ANC) 08/18/2023 3.6  1.8 - 7.7 K/uL Final    Immature Grans (Abs) 08/18/2023 0.01  0.00 - 0.04 K/uL Final    Lymph # 08/18/2023 1.6  1.0 - 4.8 K/uL Final    Mono # 08/18/2023 0.3  0.3 - 1.0 K/uL Final    Eos # 08/18/2023 0.1  0.0 - 0.5 K/uL Final    Baso # 08/18/2023 0.02  0.00 - 0.20 K/uL Final    nRBC 08/18/2023 0  0 /100 WBC Final    Gran % 08/18/2023 63.5  38.0 - 73.0 % Final    Lymph % 08/18/2023 28.8  18.0 - 48.0 % Final    Mono % 08/18/2023 5.0  4.0 - 15.0 % Final    Eosinophil % 08/18/2023 2.1  0.0 - 8.0 % Final    Basophil % 08/18/2023 0.4  0.0 - 1.9 % Final    Differential Method 08/18/2023 Automated   Final    Sodium 08/18/2023 143  136 - 145 mmol/L Final    Potassium 08/18/2023 4.1  3.5 - 5.1 mmol/L Final    Chloride 08/18/2023 106  95 - 110 mmol/L Final    CO2 08/18/2023 28  23 - 29 mmol/L Final    Glucose 08/18/2023 87  70 - 110 mg/dL Final    BUN 08/18/2023 11  6 - 20 mg/dL Final    Creatinine 08/18/2023 0.8  0.5 - 1.4 mg/dL Final    Calcium 08/18/2023 9.0  8.7 - 10.5 mg/dL Final    Total Protein 08/18/2023 7.0  6.0 - 8.4 g/dL Final    Albumin 08/18/2023 3.8  3.5 - 5.2 g/dL Final    Total Bilirubin 08/18/2023 0.3  0.1 - 1.0 mg/dL Final    Alkaline Phosphatase 08/18/2023 113  55 - 135 U/L Final    AST 08/18/2023 12  10 - 40  U/L Final    ALT 08/18/2023 8 (L)  10 - 44 U/L Final    eGFR 08/18/2023 >60.0  >60 mL/min/1.73 m^2 Final    Anion Gap 08/18/2023 9  8 - 16 mmol/L Final    TSH 08/18/2023 1.474  0.400 - 4.000 uIU/mL Final    Cholesterol 08/18/2023 229 (H)  120 - 199 mg/dL Final    Triglycerides 08/18/2023 100  30 - 150 mg/dL Final    HDL 08/18/2023 61  40 - 75 mg/dL Final    LDL Cholesterol 08/18/2023 148.0  63.0 - 159.0 mg/dL Final    HDL/Cholesterol Ratio 08/18/2023 26.6  20.0 - 50.0 % Final    Total Cholesterol/HDL Ratio 08/18/2023 3.8  2.0 - 5.0 Final    Non-HDL Cholesterol 08/18/2023 168  mg/dL Final    Hemoglobin A1C 08/18/2023 4.9  4.0 - 5.6 % Final    Estimated Avg Glucose 08/18/2023 94  68 - 131 mg/dL Final    Vit D, 25-Hydroxy 08/18/2023 21 (L)  30 - 96 ng/mL Final    Vitamin B-12 08/18/2023 298  210 - 950 pg/mL Final    Rheumatoid Factor 08/18/2023 <13.0  0.0 - 15.0 IU/mL Final    CCP Antibodies 08/18/2023 <0.5  <5.0 U/mL Final    Sed Rate 08/18/2023 4  0 - 36 mm/Hr Final    CRP 08/18/2023 18.0 (H)  0.0 - 8.2 mg/L Final    Testosterone, Total 08/18/2023 23  5 - 73 ng/dL Final    Follicle Stimulating Hormone 08/18/2023 57.53  See Text mIU/mL Final    Estradiol 08/18/2023 48  See Text pg/mL Final     Medications  Outpatient Encounter Medications as of 10/20/2023   Medication Sig Dispense Refill    clonazePAM (KLONOPIN) 0.5 MG tablet Take 1 tablet (0.5 mg total) by mouth daily as needed for Anxiety. (Patient not taking: Reported on 8/18/2023) 30 tablet 1    ergocalciferol (ERGOCALCIFEROL) 50,000 unit Cap Take 1 capsule (50,000 Units total) by mouth every 7 days. 13 capsule 3    estradioL (ESTRACE) 2 MG tablet Take 1 tablet (2 mg total) by mouth once daily. 30 tablet 2    fluticasone propionate (FLONASE) 50 mcg/actuation nasal spray instill 2 sprays (100 mcg total) by Each Nostril route 2 (two) times daily. (Patient not taking: Reported on 8/18/2023) 16 mL 11    levocetirizine (XYZAL) 5 MG tablet Take 1 tablet (5 mg total) by  mouth every evening. 30 tablet 11    meloxicam (MOBIC) 15 MG tablet Take 1 tablet (15 mg total) by mouth once daily. 90 tablet 3    omeprazole (PRILOSEC) 20 MG capsule Take 1 capsule (20 mg total) by mouth once daily. 90 capsule 0    sodium fluoride-pot nitrate (PREVIDENT 5000 SENSITIVE) 1.1-5 % Pste Brush 2 (two) times daily. Spit. Do not rinse with water nor eat, nor drink for 30 minutes 100 mL 6    tiZANidine (ZANAFLEX) 2 MG tablet Take 2-3 tablets (4-6 mg total) by mouth nightly as needed. 30 tablet 1    traMADol (ULTRAM) 50 mg tablet Take 1 tablet (50 mg total) by mouth every 24 hours as needed for Pain. 30 tablet 0    traZODone (DESYREL) 100 MG tablet Take 1/2 to 1 and 1/2 tablets at bedtime as needed for sleep. 45 tablet 2    venlafaxine (EFFEXOR-XR) 150 MG Cp24 Take 1 capsule (150 mg total) by mouth once daily. 30 capsule 2    venlafaxine (EFFEXOR-XR) 75 MG 24 hr capsule Take 1 capsule (75 mg total) by mouth once daily. Take with the 150 mg capsule. 30 capsule 2    [DISCONTINUED] levocetirizine (XYZAL) 5 MG tablet Take 1 tablet (5 mg total) by mouth every evening. 30 tablet 11    [DISCONTINUED] omeprazole (PRILOSEC) 20 MG capsule Take 1 capsule (20 mg total) by mouth once daily. 90 capsule 0     No facility-administered encounter medications on file as of 10/20/2023.     Assessment - Diagnosis - Goals:     Impression: 42 y/o F with generalized anxiety disorder & adhd. Previous trials of duloxetine with minimal benefit, low dose bupropion with modest benefit. adhd by clinical history supplemented by family input, screening tools. Had persistent symptoms despite treatment & anxious in context of worse stressors. Failed atomoxetine, concerta.     Dx: generalized anxiety disorder. adhd    Treatment Goals:  Specify outcomes written in observable, behavioral terms:   Reduce anxiety by DOUG-7.     Treatment Plan/Recommendations:   Venlafaxine to 300 mg daily for mood.   Clonazepam prn anxiety. Encouraged use prn.    Trazodone for sleep.   Psychotherapy today - thought-stopping, replacement, self-talk, motivational interviewing.   Discussed risks, benefits, and alternatives to treatment plan documented above with patient. I answered all patient questions related to this plan and patient expressed understanding and agreement.     Return to Clinic: 3 months    WINIFRED Nieto MD  Psychiatry  [facility]  [address] , [place] [address]  [phone]

## 2023-12-27 ENCOUNTER — OFFICE VISIT (OUTPATIENT)
Dept: OBSTETRICS AND GYNECOLOGY | Facility: CLINIC | Age: 43
End: 2023-12-27
Payer: COMMERCIAL

## 2023-12-27 VITALS
HEIGHT: 68 IN | WEIGHT: 220.44 LBS | BODY MASS INDEX: 33.41 KG/M2 | DIASTOLIC BLOOD PRESSURE: 78 MMHG | SYSTOLIC BLOOD PRESSURE: 120 MMHG

## 2023-12-27 DIAGNOSIS — Z01.419 ENCOUNTER FOR GYNECOLOGICAL EXAMINATION WITHOUT ABNORMAL FINDING: Primary | ICD-10-CM

## 2023-12-27 DIAGNOSIS — Z79.890 MENOPAUSAL SYNDROME ON HORMONE REPLACEMENT THERAPY: ICD-10-CM

## 2023-12-27 DIAGNOSIS — N95.1 MENOPAUSAL SYNDROME ON HORMONE REPLACEMENT THERAPY: ICD-10-CM

## 2023-12-27 PROCEDURE — 3074F PR MOST RECENT SYSTOLIC BLOOD PRESSURE < 130 MM HG: ICD-10-PCS | Mod: CPTII,S$GLB,, | Performed by: NURSE PRACTITIONER

## 2023-12-27 PROCEDURE — 3078F PR MOST RECENT DIASTOLIC BLOOD PRESSURE < 80 MM HG: ICD-10-PCS | Mod: CPTII,S$GLB,, | Performed by: NURSE PRACTITIONER

## 2023-12-27 PROCEDURE — 1159F PR MEDICATION LIST DOCUMENTED IN MEDICAL RECORD: ICD-10-PCS | Mod: CPTII,S$GLB,, | Performed by: NURSE PRACTITIONER

## 2023-12-27 PROCEDURE — 99396 PR PREVENTIVE VISIT,EST,40-64: ICD-10-PCS | Mod: S$GLB,,, | Performed by: NURSE PRACTITIONER

## 2023-12-27 PROCEDURE — 1160F PR REVIEW ALL MEDS BY PRESCRIBER/CLIN PHARMACIST DOCUMENTED: ICD-10-PCS | Mod: CPTII,S$GLB,, | Performed by: NURSE PRACTITIONER

## 2023-12-27 PROCEDURE — 1159F MED LIST DOCD IN RCRD: CPT | Mod: CPTII,S$GLB,, | Performed by: NURSE PRACTITIONER

## 2023-12-27 PROCEDURE — 3078F DIAST BP <80 MM HG: CPT | Mod: CPTII,S$GLB,, | Performed by: NURSE PRACTITIONER

## 2023-12-27 PROCEDURE — 99999 PR PBB SHADOW E&M-EST. PATIENT-LVL III: ICD-10-PCS | Mod: PBBFAC,,, | Performed by: NURSE PRACTITIONER

## 2023-12-27 PROCEDURE — 3008F BODY MASS INDEX DOCD: CPT | Mod: CPTII,S$GLB,, | Performed by: NURSE PRACTITIONER

## 2023-12-27 PROCEDURE — 3044F PR MOST RECENT HEMOGLOBIN A1C LEVEL <7.0%: ICD-10-PCS | Mod: CPTII,S$GLB,, | Performed by: NURSE PRACTITIONER

## 2023-12-27 PROCEDURE — 3044F HG A1C LEVEL LT 7.0%: CPT | Mod: CPTII,S$GLB,, | Performed by: NURSE PRACTITIONER

## 2023-12-27 PROCEDURE — 1160F RVW MEDS BY RX/DR IN RCRD: CPT | Mod: CPTII,S$GLB,, | Performed by: NURSE PRACTITIONER

## 2023-12-27 PROCEDURE — 99999 PR PBB SHADOW E&M-EST. PATIENT-LVL III: CPT | Mod: PBBFAC,,, | Performed by: NURSE PRACTITIONER

## 2023-12-27 PROCEDURE — 3008F PR BODY MASS INDEX (BMI) DOCUMENTED: ICD-10-PCS | Mod: CPTII,S$GLB,, | Performed by: NURSE PRACTITIONER

## 2023-12-27 PROCEDURE — 3074F SYST BP LT 130 MM HG: CPT | Mod: CPTII,S$GLB,, | Performed by: NURSE PRACTITIONER

## 2023-12-27 PROCEDURE — 99396 PREV VISIT EST AGE 40-64: CPT | Mod: S$GLB,,, | Performed by: NURSE PRACTITIONER

## 2023-12-27 RX ORDER — ESTRADIOL 2 MG/1
2 TABLET ORAL DAILY
Qty: 90 TABLET | Refills: 3 | Status: SHIPPED | OUTPATIENT
Start: 2023-12-27 | End: 2024-12-26

## 2023-12-27 NOTE — PROGRESS NOTES
CC: Well woman exam    Joy Recinos is a 43 y.o. female  presents for a well woman exam.  Hysterectomy with BSO in 2019  Was on estrace - increased to 2 mg last year due to night sweats but then pt reduced down to 1/2 tablet and has been doing this since  She is having some anxiety and is not sure if estrogen is causing or if going back to 2 mg would help her  Mmg 2023  normal    Did a soak in scented bath product and noted some redness to left groin that has improved but now just seems has dry skin  in area       Past Medical History:   Diagnosis Date    ADHD (attention deficit hyperactivity disorder)     B12 deficiency     Chronic low back pain     oberlander    Endometriosis     Fibromyalgia syndrome     Idiopathic hypersomnia     Irritable bowel syndrome     goldman    Migraines     frances    PONV (postoperative nausea and vomiting)     Shingles     Undifferentiated inflammatory arthritis 2018    Undifferentiated inflammatory arthritis 2018     Past Surgical History:   Procedure Laterality Date     SECTION      x 2     CYSTOSCOPY N/A 04/10/2019    Procedure: CYSTOSCOPY;  Surgeon: Gillian Carpenter MD;  Location: [facility] OR;  Service: OB/GYN;  Laterality: N/A;    HYSTERECTOMY  2019    Total    OOPHORECTOMY Bilateral     left tube and ovary removed. later RSO with hyst. ovaries removed at differnt times,  and .    PELVIC LAPAROSCOPY      ROBOT-ASSISTED LAPAROSCOPIC ABDOMINAL HYSTERECTOMY USING DA LONI XI N/A 04/10/2019    Procedure: XI ROBOTIC HYSTERECTOMY;  Surgeon: Gillian Carpenter MD;  Location: [facility] OR;  Service: OB/GYN;  Laterality: N/A;    ROBOT-ASSISTED LAPAROSCOPIC LYSIS OF ADHESIONS USING DA LONI XI N/A 04/10/2019    Procedure: XI ROBOTIC LYSIS, ADHESIONS;  Surgeon: Gillian Carpenter MD;  Location: [facility] OR;  Service: OB/GYN;  Laterality: N/A;    ROBOT-ASSISTED LAPAROSCOPIC SALPINGO-OOPHORECTOMY USING DA LONI XI Right 04/10/2019    Procedure: XI  "ROBOTIC SALPINGO-OOPHORECTOMY;  Surgeon: Gillian Carpenter MD;  Location: [facility];  Service: OB/GYN;  Laterality: Right;    TUBAL LIGATION       Family History   Problem Relation Age of Onset    No Known Problems Mother     Heart disease Father         Bypass    Skin cancer Father     Cataracts Maternal Grandmother     Diabetes Maternal Grandmother     Glaucoma Maternal Grandmother     Skin cancer Maternal Grandmother     Cataracts Maternal Grandfather     Skin cancer Paternal Grandfather     Breast cancer Neg Hx     Ovarian cancer Neg Hx     Deep vein thrombosis Neg Hx      Social History     Tobacco Use    Smoking status: Never    Smokeless tobacco: Never   Substance Use Topics    Alcohol use: Yes     Comment: rarely    Drug use: No     OB History          2    Para   2    Term   2            AB        Living   2         SAB        IAB        Ectopic        Multiple        Live Births   2                 /78   Ht 5' 8" (1.727 m)   Wt 100 kg (220 lb 7.4 oz)   LMP 2019 (Exact Date)   BMI 33.52 kg/m²     ROS:  Per hpi    PE:   APPEARANCE: Well nourished, well developed, in no acute distress.  AFFECT: WNL, alert and oriented x 3.  SKIN: No acne or hirsutism.  NECK: Neck symmetric without masses or thyromegaly.  NODES: No inguinal, cervical, axillary or femoral lymph node enlargement.  CHEST: Good respiratory effort.   ABDOMEN: Soft. No tenderness or masses. No hepatosplenomegaly. No hernias.  BREASTS: Symmetrical, no skin changes or visible lesions. No palpable masses, nipple discharge bilaterally.  PELVIC: Normal external female genitalia without lesions. Normal hair distribution. Adequate perineal body, normal urethral meatus. Vagina  without lesions or discharge. No significant cystocele or rectocele. Bimanual exam shows uterus and cervix to be surgically absent. Adnexa absent  Physical Exam     1. Encounter for gynecological examination without abnormal finding        2. Menopausal " syndrome on hormone replacement therapy  estradioL (ESTRACE) 2 MG tablet       and PLAN:    Joy was seen today for well woman.    Diagnoses and all orders for this visit:    Encounter for gynecological examination without abnormal finding    Menopausal syndrome on hormone replacement therapy  -     estradioL (ESTRACE) 2 MG tablet; Take 1 tablet (2 mg total) by mouth once daily.     Recommend for now to increase back to 2mg on estrace and see how she feels as can always go down or even taper off  Aquaphor to groin area of dryness - if not improving will go to lotrim cream        Patient was counseled today on A.C.S. Pap guidelines and recommendations for yearly pelvic exams, mammograms and monthly self breast exams; to see her PCP for other health maintenance.

## 2024-01-12 RX ORDER — OMEPRAZOLE 20 MG/1
20 CAPSULE, DELAYED RELEASE ORAL DAILY
Qty: 90 CAPSULE | Refills: 0 | Status: SHIPPED | OUTPATIENT
Start: 2024-01-12

## 2024-01-12 NOTE — TELEPHONE ENCOUNTER
Care Due:                  Date            Visit Type   Department     Provider  --------------------------------------------------------------------------------                                EP -                              PRIMARY      [facility] INTER[facility]  Last Visit: 07-      CARE ([facility])   MEDICINE       Angus Caceres  Next Visit: None Scheduled  None         None Found                                                            Last  Test          Frequency    Reason                     Performed    Due Date  --------------------------------------------------------------------------------    Office Visit  15 months..  omeprazole...............  07-   10-    [facility] Embedded Care Due Messages. Reference number: [id number].   1/12/2024 3:52:16 PM CST

## 2024-01-16 RX ORDER — TRAZODONE HYDROCHLORIDE 100 MG/1
TABLET ORAL
Qty: 45 TABLET | Refills: 1 | Status: SHIPPED | OUTPATIENT
Start: 2024-01-16 | End: 2024-01-18 | Stop reason: SDUPTHER

## 2024-01-18 ENCOUNTER — OFFICE VISIT (OUTPATIENT)
Dept: PSYCHIATRY | Facility: CLINIC | Age: 44
End: 2024-01-18
Payer: COMMERCIAL

## 2024-01-18 DIAGNOSIS — G47.00 INSOMNIA, UNSPECIFIED TYPE: ICD-10-CM

## 2024-01-18 DIAGNOSIS — F41.1 GAD (GENERALIZED ANXIETY DISORDER): Primary | ICD-10-CM

## 2024-01-18 PROCEDURE — 99214 OFFICE O/P EST MOD 30 MIN: CPT | Mod: 95,,, | Performed by: PSYCHIATRY & NEUROLOGY

## 2024-01-18 PROCEDURE — 90833 PSYTX W PT W E/M 30 MIN: CPT | Mod: 95,,, | Performed by: PSYCHIATRY & NEUROLOGY

## 2024-01-18 RX ORDER — TRAZODONE HYDROCHLORIDE 100 MG/1
TABLET ORAL
Qty: 45 TABLET | Refills: 3 | Status: SHIPPED | OUTPATIENT
Start: 2024-01-18 | End: 2024-04-19 | Stop reason: SDUPTHER

## 2024-01-18 RX ORDER — VENLAFAXINE HYDROCHLORIDE 150 MG/1
300 CAPSULE, EXTENDED RELEASE ORAL DAILY
Qty: 60 CAPSULE | Refills: 3 | Status: SHIPPED | OUTPATIENT
Start: 2024-01-18 | End: 2024-04-19 | Stop reason: SDUPTHER

## 2024-01-18 NOTE — PROGRESS NOTES
"Outpatient Psychiatry Follow-up Visit (MD/NP)    1/18/2024    Joy Recinos, a 43 y.o. female, presenting for follow-up visit. Met with patient.    Reason for Encounter: f/u, DOUG.     Interval History: Patient seen and interviewed for follow-up, last seen just over nine months ago.   Working on reducing anxiety, trying serenity concepts, "Letting things go". Limited success. Ongoing work stress and problems with function. Headaches, feeling sick. Neck & shoulder tightness. Some relationship problems. No new meds.     Past med List:   Sertraline  Duloxetine  escitalopram  Fluoxetine   milnacipran    Background: 36 y/o F presents for establishment of care, reports problems with anxiety & depression. From PCP note (3.16.18): Here today for f/u on depression & anxiety. She reports that since medication adjustment she has been less irritable. Her  is here with her today & affirms this. She has a psych appointment scheduled for May 11. She reports it has been some mild improvement in her volition. Reports treatment with cymbalta - for fibromyalgia, anxiety, depression for 5 years. Neither fibromyalgia pain no moods improved. wellbutrin trial has been perhaps helpful. Describes baseline symptoms as "worried about everything", "worried about worrying", sad, not interested in things, "like I don't have a purpose". Negative thoughts about self. Tired all the time, even when sleeps excessively. Denies SI. "All my life". "stays all the time", though better/worse at times, influenced by stressful situations. Problems with attention/concentration. Reports all of the following daily in past 2 weeks (around which time she was laid off): Feeling nervous, anxious or on edge, Not being able to stop or control worrying, Worrying too much about different things   Trouble relaxing   Being so restless that it is hard to sit still   Becoming easily annoyed or irritable   Feeling afraid as if something awful might " "happen    DOUG-7 = 21 (estimates a 19 in weeks prior to the layoff)    Sleep Problems - hypersomnia  Sad Mood more than 1/2 time  Appetite and weight changes (decreased appetite, small subjective weight loss)  Concentration problems  Guilt   Thoughts of Emptiness  Anhedonia  Anergia  Slowing/PMR    QIDS = 12    Also has decreased sexual interest, pain during intercourse.    PsychHx: first treatment by Obgyn - sertraline(?) when had problems with endometriosis. No AVH, no SI/HI, no delusions. No hospitalizations or self-harm behaviors. Prolonged duloxetine trial, thinks transient or minimal benefit. Past treatment with sertraline.     MedHx: idiopathic hypersomnia. 2 sleep studies, narcolepsy ruled out. Endometriosis. 2 herniated lumbar discs. Migraines. GERD. Seasonal allergies.   FamHx: mother & dad both take medication for depression. Father diagnosed with adhd in 60's.   SocHx: from [facility]. Heart murmur at birth. "fainted and blacked out school", had several episodes between 5 and 9. Worked up but never medically explained. Still gets dizzy and weak, but doesn't black out. Also had back pain frequently as a child. No developmental problems. Regular classes in school. Ok with going to school. Normal socially. Average student. 3 semesters of college. Went to technical school for 1 year, finished (computer/). 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 Applying for new jobs. Lives with  & 2 kids.  x 14 years. "really good" relationship x decreased sexual interest and pain. Week before  pulled out a tampon - was extremely painful. [place] has been painful ever since. Has had PT recommended for this. 2 kids (13 & 9). Dtr has been bullied most of her life due to being overweight. She also has problems with oppositionality at home (though is a good student, doesn't have " "discipline problems at school), won't take a bath. Son is "good kid", "very attached to me". Quiet, not many friends. Mom is supportive, lives nearby.     Review Of Systems:     GENERAL:  No weight gain or loss  SKIN:  No rashes or lacerations  HEAD:  No headaches  CHEST:  No shortness of breath, hyperventilation or cough  CARDIOVASCULAR:  No tachycardia or chest pain  ABDOMEN:  No nausea, vomiting, pain, constipation or diarrhea  URINARY:  No frequency, dysuria or sexual dysfunction  ENDOCRINE:  No polydipsia, polyuria  MUSCULOSKELETAL:  pain and stiffness of the joints  NEUROLOGIC:  No weakness, sensory changes, seizures, confusion, memory loss, tremor or other abnormal movements    Current Evaluation:     Nutritional Screening: Considering the patient's height and weight, medications, medical history and preferences, should a referral be made to the dietitian? no    Constitutional  Vitals:  Most recent vital signs, dated less than 90 days prior to this appointment, were not reviewed.    There were no vitals filed for this visit.       General:  unremarkable, age appropriate     Musculoskeletal  Muscle Strength/Tone:  no tremor, no tic   Gait & Station:  non-ataxic     Psychiatric  Appearance: casually dressed & groomed;   Behavior: calm,   Cooperation: cooperative with assessment  Speech: normal rate, volume, tone  Thought Process: linear, goal-directed  Thought Content: No suicidal or homicidal ideation; no delusions  Affect: anxious  Mood: anxious  Perceptions: No auditory or visual hallucinations  Level of Consciousness: alert throughout interview  Insight: fair  Cognition: Oriented to person, place, time, & situation  Memory: no apparent deficits to general clinical interview; not formally assessed  Attention/Concentration: no apparent deficits to general clinical interview; not formally assessed  Fund of Knowledge: average by vocabulary/education    Laboratory Data  No visits with results within 1 Month(s) " from this visit.   Latest known visit with results is:   Lab Visit on 08/18/2023   Component Date Value Ref Range Status    WBC 08/18/2023 5.65  3.90 - 12.70 K/uL Final    RBC 08/18/2023 4.50  4.00 - 5.40 M/uL Final    Hemoglobin 08/18/2023 13.5  12.0 - 16.0 g/dL Final    Hematocrit 08/18/2023 42.6  37.0 - 48.5 % Final    MCV 08/18/2023 95  82 - 98 fL Final    MCH 08/18/2023 30.0  27.0 - 31.0 pg Final    MCHC 08/18/2023 31.7 (L)  32.0 - 36.0 g/dL Final    RDW 08/18/2023 12.7  11.5 - 14.5 % Final    Platelets 08/18/2023 210  150 - 450 K/uL Final    MPV 08/18/2023 12.2  9.2 - 12.9 fL Final    Immature Granulocytes 08/18/2023 0.2  0.0 - 0.5 % Final    Gran # (ANC) 08/18/2023 3.6  1.8 - 7.7 K/uL Final    Immature Grans (Abs) 08/18/2023 0.01  0.00 - 0.04 K/uL Final    Lymph # 08/18/2023 1.6  1.0 - 4.8 K/uL Final    Mono # 08/18/2023 0.3  0.3 - 1.0 K/uL Final    Eos # 08/18/2023 0.1  0.0 - 0.5 K/uL Final    Baso # 08/18/2023 0.02  0.00 - 0.20 K/uL Final    nRBC 08/18/2023 0  0 /100 WBC Final    Gran % 08/18/2023 63.5  38.0 - 73.0 % Final    Lymph % 08/18/2023 28.8  18.0 - 48.0 % Final    Mono % 08/18/2023 5.0  4.0 - 15.0 % Final    Eosinophil % 08/18/2023 2.1  0.0 - 8.0 % Final    Basophil % 08/18/2023 0.4  0.0 - 1.9 % Final    Differential Method 08/18/2023 Automated   Final    Sodium 08/18/2023 143  136 - 145 mmol/L Final    Potassium 08/18/2023 4.1  3.5 - 5.1 mmol/L Final    Chloride 08/18/2023 106  95 - 110 mmol/L Final    CO2 08/18/2023 28  23 - 29 mmol/L Final    Glucose 08/18/2023 87  70 - 110 mg/dL Final    BUN 08/18/2023 11  6 - 20 mg/dL Final    Creatinine 08/18/2023 0.8  0.5 - 1.4 mg/dL Final    Calcium 08/18/2023 9.0  8.7 - 10.5 mg/dL Final    Total Protein 08/18/2023 7.0  6.0 - 8.4 g/dL Final    Albumin 08/18/2023 3.8  3.5 - 5.2 g/dL Final    Total Bilirubin 08/18/2023 0.3  0.1 - 1.0 mg/dL Final    Alkaline Phosphatase 08/18/2023 113  55 - 135 U/L Final    AST 08/18/2023 12  10 - 40 U/L Final    ALT  08/18/2023 8 (L)  10 - 44 U/L Final    eGFR 08/18/2023 >60.0  >60 mL/min/1.73 m^2 Final    Anion Gap 08/18/2023 9  8 - 16 mmol/L Final    TSH 08/18/2023 1.474  0.400 - 4.000 uIU/mL Final    Cholesterol 08/18/2023 229 (H)  120 - 199 mg/dL Final    Triglycerides 08/18/2023 100  30 - 150 mg/dL Final    HDL 08/18/2023 61  40 - 75 mg/dL Final    LDL Cholesterol 08/18/2023 148.0  63.0 - 159.0 mg/dL Final    HDL/Cholesterol Ratio 08/18/2023 26.6  20.0 - 50.0 % Final    Total Cholesterol/HDL Ratio 08/18/2023 3.8  2.0 - 5.0 Final    Non-HDL Cholesterol 08/18/2023 168  mg/dL Final    Hemoglobin A1C 08/18/2023 4.9  4.0 - 5.6 % Final    Estimated Avg Glucose 08/18/2023 94  68 - 131 mg/dL Final    Vit D, 25-Hydroxy 08/18/2023 21 (L)  30 - 96 ng/mL Final    Vitamin B-12 08/18/2023 298  210 - 950 pg/mL Final    Rheumatoid Factor 08/18/2023 <13.0  0.0 - 15.0 IU/mL Final    CCP Antibodies 08/18/2023 <0.5  <5.0 U/mL Final    Sed Rate 08/18/2023 4  0 - 36 mm/Hr Final    CRP 08/18/2023 18.0 (H)  0.0 - 8.2 mg/L Final    Testosterone, Total 08/18/2023 23  5 - 73 ng/dL Final    Follicle Stimulating Hormone 08/18/2023 57.53  See Text mIU/mL Final    Estradiol 08/18/2023 48  See Text pg/mL Final     Medications  Outpatient Encounter Medications as of 1/18/2024   Medication Sig Dispense Refill    clonazePAM (KLONOPIN) 0.5 MG tablet Take 1 tablet (0.5 mg total) by mouth daily as needed for Anxiety. (Patient not taking: Reported on 8/18/2023) 30 tablet 1    ergocalciferol (ERGOCALCIFEROL) 50,000 unit Cap Take 1 capsule (50,000 Units total) by mouth every 7 days. 13 capsule 3    estradioL (ESTRACE) 2 MG tablet Take 1 tablet (2 mg total) by mouth once daily. 90 tablet 3    levocetirizine (XYZAL) 5 MG tablet Take 1 tablet (5 mg total) by mouth every evening. 30 tablet 11    omeprazole (PRILOSEC) 20 MG capsule Take 1 capsule (20 mg total) by mouth once daily. 90 capsule 0    traZODone (DESYREL) 100 MG tablet Take 1/2 to 1 and 1/2 tablets at  bedtime as needed for sleep. 45 tablet 1    venlafaxine (EFFEXOR-XR) 150 MG Cp24 Take 2 capsules (300 mg total) by mouth once daily. 60 capsule 3     No facility-administered encounter medications on file as of 1/18/2024.     Assessment - Diagnosis - Goals:     Impression: 44 y/o F with generalized anxiety disorder & adhd. Previous trials of duloxetine with minimal benefit, low dose bupropion with modest benefit. adhd by clinical history supplemented by family input, screening tools. Had persistent symptoms despite treatment & anxious in context of worse stressors. Failed atomoxetine, concerta.     Dx: generalized anxiety disorder. adhd    Treatment Goals:  Specify outcomes written in observable, behavioral terms:   Reduce anxiety by DOUG-7.     Treatment Plan/Recommendations:   Venlafaxine 300 mg daily for mood.   Clonazepam prn anxiety. Encouraged use prn.   Trazodone for sleep.   Psychotherapy today - thought-stopping, replacement, self-talk, motivational interviewing.   Discussed risks, benefits, and alternatives to treatment plan documented above with patient. I answered all patient questions related to this plan and patient expressed understanding and agreement.     Return to Clinic: 3 months    WINIFRED Nieto MD  Psychiatry  [facility]  [address] , [place], [place] [id number] [phone]

## 2024-04-09 ENCOUNTER — OFFICE VISIT (OUTPATIENT)
Dept: URGENT CARE | Facility: CLINIC | Age: 44
End: 2024-04-09
Payer: COMMERCIAL

## 2024-04-09 VITALS
SYSTOLIC BLOOD PRESSURE: 127 MMHG | BODY MASS INDEX: 33.34 KG/M2 | HEIGHT: 68 IN | OXYGEN SATURATION: 100 % | DIASTOLIC BLOOD PRESSURE: 76 MMHG | WEIGHT: 220 LBS | TEMPERATURE: 98 F | RESPIRATION RATE: 16 BRPM | HEART RATE: 70 BPM

## 2024-04-09 DIAGNOSIS — J32.9 SINUSITIS, UNSPECIFIED CHRONICITY, UNSPECIFIED LOCATION: Primary | ICD-10-CM

## 2024-04-09 PROCEDURE — 99214 OFFICE O/P EST MOD 30 MIN: CPT | Mod: S$GLB,,, | Performed by: NURSE PRACTITIONER

## 2024-04-09 RX ORDER — PREDNISONE 20 MG/1
20 TABLET ORAL DAILY
Qty: 5 TABLET | Refills: 0 | Status: SHIPPED | OUTPATIENT
Start: 2024-04-09 | End: 2024-06-17

## 2024-04-09 RX ORDER — AMOXICILLIN AND CLAVULANATE POTASSIUM 875; 125 MG/1; MG/1
1 TABLET, FILM COATED ORAL 2 TIMES DAILY
Qty: 14 TABLET | Refills: 0 | Status: SHIPPED | OUTPATIENT
Start: 2024-04-09 | End: 2024-04-16

## 2024-04-09 NOTE — PROGRESS NOTES
"Subjective:      Patient ID: Joy Recinos is a 43 y.o. female.    Vitals:  height is 5' 8" (1.727 m) and weight is 99.8 kg (220 lb). Her oral temperature is 98.3 °F (36.8 °C). Her blood pressure is 127/76 and her pulse is 70. Her respiration is 16 and oxygen saturation is 100%.     Chief Complaint: Cough (Dry cough, hoarse voice, itchy ears & ear fullness, head pressure  )    43 year old female patient presents here today with dry cough, hoarse voice, itchy ears & ear fullness, head pressure x 3 days. Pt tried mucinex,beba seltzer cold and flu for sxs with no relief.  Pt denies sob, cp, n/v/d. Pt states her mom had similar sxs but has since cleared.       Cough  This is a new problem. The current episode started in the past 7 days. The problem has been unchanged. The cough is Non-productive. Associated symptoms include ear congestion, headaches, nasal congestion, postnasal drip and rhinorrhea. Pertinent negatives include no chest pain, chills, ear pain, fever, heartburn, hemoptysis, myalgias, rash, sore throat, shortness of breath, sweats, weight loss or wheezing. She has tried rest for the symptoms. The treatment provided no relief. There is no history of asthma.       Constitution: Negative for chills and fever.   HENT:  Positive for postnasal drip. Negative for ear pain and sore throat.    Cardiovascular:  Negative for chest pain.   Respiratory:  Positive for cough. Negative for bloody sputum, shortness of breath and wheezing.    Gastrointestinal:  Negative for heartburn.   Musculoskeletal:  Negative for muscle ache.   Skin:  Negative for rash.   Neurological:  Positive for headaches.      Objective:     Physical Exam   Constitutional: She is oriented to person, place, and time. She appears well-developed. She is cooperative.  Non-toxic appearance. She does not appear ill. No distress.   HENT:   Head: Normocephalic and atraumatic.   Ears:   Right Ear: Hearing, tympanic membrane, external ear and ear " canal normal.   Left Ear: Hearing, tympanic membrane, external ear and ear canal normal.   Nose: Nose normal. No mucosal edema, rhinorrhea or nasal deformity. No epistaxis. Right sinus exhibits no maxillary sinus tenderness and no frontal sinus tenderness. Left sinus exhibits no maxillary sinus tenderness and no frontal sinus tenderness.   Mouth/Throat: Uvula is midline, oropharynx is clear and moist and mucous membranes are normal. No trismus in the jaw. Normal dentition. No uvula swelling. No oropharyngeal exudate, posterior oropharyngeal edema or posterior oropharyngeal erythema.   Eyes: Conjunctivae and lids are normal. No scleral icterus.   Neck: Trachea normal and phonation normal. Neck supple. No edema present. No erythema present. No neck rigidity present.   Cardiovascular: Normal rate, regular rhythm, normal heart sounds and normal pulses.   Pulmonary/Chest: Effort normal and breath sounds normal. No respiratory distress. She has no decreased breath sounds. She has no rhonchi.   Abdominal: Normal appearance.   Musculoskeletal: Normal range of motion.         General: No deformity. Normal range of motion.   Neurological: She is alert and oriented to person, place, and time. She exhibits normal muscle tone. Coordination normal.   Skin: Skin is warm, dry, intact, not diaphoretic and not pale.   Psychiatric: Her speech is normal and behavior is normal. Judgment and thought content normal.   Nursing note and vitals reviewed.      Assessment:     1. Sinusitis, unspecified chronicity, unspecified location        Plan:       Sinusitis, unspecified chronicity, unspecified location  -     predniSONE (DELTASONE) 20 MG tablet; Take 1 tablet (20 mg total) by mouth once daily.  Dispense: 5 tablet; Refill: 0  -     amoxicillin-clavulanate 875-125mg (AUGMENTIN) 875-125 mg per tablet; Take 1 tablet by mouth 2 (two) times daily. for 7 days  Dispense: 14 tablet; Refill: 0    Finish the entire course of antibiotics even if  symptoms improve  Patient counseled on symptomatic treatment.   - Rest  - Hydration-- 64 ounces fluid per day  - Cool mist humidifier/vaporizer  - Nasal saline/steroids  - Antihistamines  - Mucinex  - Gargles and lozenges for sore throat  - OTC pain/fever relievers    Follow up with PCP or ER immediately for worsening, new symptoms or no improvement. Go to ER if you develop fever of 103 or higher, chest pain, shortness of breath, upper back pain, stiff neck or severe headache.      Diagnosis, treatment, AVS reviewed with patient. Patient understands and agrees with plan

## 2024-04-09 NOTE — LETTER
April 9, 2024      [facility]  [address], [address]  [place] [address]  Phone: [phone]       Patient: Joy Recinos   YOB: 1980  Date of Visit: 04/09/2024    To Whom It May Concern:    Park Recinos  was at [facility] on 04/09/2024. The patient may return to work/school on  04/12/2024 with no restrictions. If you have any questions or concerns, or if I can be of further assistance, please do not hesitate to contact me.    Sincerely,     ISRAEL Tomlinson

## 2024-04-12 RX ORDER — OMEPRAZOLE 20 MG/1
20 CAPSULE, DELAYED RELEASE ORAL DAILY
Qty: 90 CAPSULE | Refills: 0 | Status: SHIPPED | OUTPATIENT
Start: 2024-04-12

## 2024-04-12 NOTE — TELEPHONE ENCOUNTER
Care Due:                  Date            Visit Type   Department     Provider  --------------------------------------------------------------------------------                                EP -                              PRIMARY      [facility]  Last Visit: 07-      CARE ([facility])   MEDICINE       Angus Caceres  Next Visit: None Scheduled  None         None Found                                                            Last  Test          Frequency    Reason                     Performed    Due Date  --------------------------------------------------------------------------------    Office Visit  12 months..  ergocalciferol,            07- 07-                             omeprazole...............    [employer] Embedded Care Due Messages. Reference number: [id number].   4/12/2024 10:09:19 AM CDT

## 2024-04-17 ENCOUNTER — TELEPHONE (OUTPATIENT)
Dept: PSYCHIATRY | Facility: CLINIC | Age: 44
End: 2024-04-17
Payer: COMMERCIAL

## 2024-04-19 ENCOUNTER — OFFICE VISIT (OUTPATIENT)
Dept: PSYCHIATRY | Facility: CLINIC | Age: 44
End: 2024-04-19
Payer: COMMERCIAL

## 2024-04-19 DIAGNOSIS — F90.9 ATTENTION DEFICIT HYPERACTIVITY DISORDER (ADHD), UNSPECIFIED ADHD TYPE: ICD-10-CM

## 2024-04-19 DIAGNOSIS — G47.00 INSOMNIA, UNSPECIFIED TYPE: ICD-10-CM

## 2024-04-19 DIAGNOSIS — F41.9 ANXIETY: Primary | ICD-10-CM

## 2024-04-19 PROCEDURE — 99214 OFFICE O/P EST MOD 30 MIN: CPT | Mod: 95,,, | Performed by: PSYCHIATRY & NEUROLOGY

## 2024-04-19 PROCEDURE — 90833 PSYTX W PT W E/M 30 MIN: CPT | Mod: 95,,, | Performed by: PSYCHIATRY & NEUROLOGY

## 2024-04-19 RX ORDER — VENLAFAXINE HYDROCHLORIDE 150 MG/1
300 CAPSULE, EXTENDED RELEASE ORAL DAILY
Qty: 60 CAPSULE | Refills: 3 | Status: SHIPPED | OUTPATIENT
Start: 2024-04-19 | End: 2024-10-16

## 2024-04-19 RX ORDER — CLONAZEPAM 0.5 MG/1
0.5 TABLET ORAL DAILY PRN
Qty: 30 TABLET | Refills: 1 | Status: SHIPPED | OUTPATIENT
Start: 2024-04-19 | End: 2025-04-19

## 2024-04-19 RX ORDER — TRAZODONE HYDROCHLORIDE 100 MG/1
TABLET ORAL
Qty: 45 TABLET | Refills: 3 | Status: SHIPPED | OUTPATIENT
Start: 2024-04-19

## 2024-04-19 NOTE — PROGRESS NOTES
"Outpatient Psychiatry Follow-up Visit (MD/NP)    4/19/2024    Joy Recinos, a 43 y.o. female, presenting for follow-up visit. Met with patient.    Reason for Encounter: f/u, DOUG.     Interval History: Patient seen and interviewed for follow-up, last seen about three months ago. Reports mixed update -  went on a Jew retreat that she says helped his attitude and behaviors, helped their relationship and subsequently has helped her own rod and her mental health. Symptoms of anxiety are ongoing, continues to try to apply serenity concepts with partial success. Ongoing health problems - Had laryngitis - treated with steroids, antibiotics. Ongoing fibromyalgia pain. No ongoing new meds. Taking vitamin D.     Past med List:   Sertraline  Duloxetine  escitalopram  Fluoxetine   milnacipran    Background: 36 y/o F presents for establishment of care, reports problems with anxiety & depression. From PCP note (3.16.18): Here today for f/u on depression & anxiety. She reports that since medication adjustment she has been less irritable. Her  is here with her today & affirms this. She has a psych appointment scheduled for May 11. She reports it has been some mild improvement in her volition. Reports treatment with cymbalta - for fibromyalgia, anxiety, depression for 5 years. Neither fibromyalgia pain no moods improved. wellbutrin trial has been perhaps helpful. Describes baseline symptoms as "worried about everything", "worried about worrying", sad, not interested in things, "like I don't have a purpose". Negative thoughts about self. Tired all the time, even when sleeps excessively. Denies SI. "All my life". "stays all the time", though better/worse at times, influenced by stressful situations. Problems with attention/concentration. Reports all of the following daily in past 2 weeks (around which time she was laid off): Feeling nervous, anxious or on edge, Not being able to stop or control " "worrying, Worrying too much about different things   Trouble relaxing   Being so restless that it is hard to sit still   Becoming easily annoyed or irritable   Feeling afraid as if something awful might happen    DOUG-7 = 21 (estimates a 19 in weeks prior to the layoff)    Sleep Problems - hypersomnia  Sad Mood more than 1/2 time  Appetite and weight changes (decreased appetite, small subjective weight loss)  Concentration problems  Guilt   Thoughts of Emptiness  Anhedonia  Anergia  Slowing/PMR    QIDS = 12    Also has decreased sexual interest, pain during intercourse.    PsychHx: first treatment by Obgyn - sertraline(?) when had problems with endometriosis. No AVH, no SI/HI, no delusions. No hospitalizations or self-harm behaviors. Prolonged duloxetine trial, thinks transient or minimal benefit. Past treatment with sertraline.     MedHx: idiopathic hypersomnia. 2 sleep studies, narcolepsy ruled out. Endometriosis. 2 herniated lumbar discs. Migraines. GERD. Seasonal allergies.   FamHx: mother & dad both take medication for depression. Father diagnosed with adhd in 60's.   SocHx: from [facility]. Heart murmur at birth. "fainted and blacked out school", had several episodes between 5 and 9. Worked up but never medically explained. Still gets dizzy and weak, but doesn't black out. Also had back pain frequently as a child. No developmental problems. Regular classes in school. Ok with going to school. Normal socially. Average student. 3 semesters of college. Went to technical school for 1 year, finished (computer/). 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 Applying for new jobs. Lives with  & 2 kids.  x 14 years. "really good" relationship x decreased sexual interest and pain. Week before  pulled out a tampon - was extremely painful. [place] has been painful ever since. Has had PT " "recommended for this. 2 kids (13 & 9). Dtr has been bullied most of her life due to being overweight. She also has problems with oppositionality at home (though is a good student, doesn't have discipline problems at school), won't take a bath. Son is "good kid", "very attached to me". Quiet, not many friends. Mom is supportive, lives nearby.     Review Of Systems:     GENERAL:  No weight gain or loss  SKIN:  No rashes or lacerations  HEAD:  No headaches  CHEST:  No shortness of breath, hyperventilation or cough  CARDIOVASCULAR:  No tachycardia or chest pain  ABDOMEN:  No nausea, vomiting, pain, constipation or diarrhea  URINARY:  No frequency, dysuria or sexual dysfunction  ENDOCRINE:  No polydipsia, polyuria  MUSCULOSKELETAL:  pain and stiffness of the joints  NEUROLOGIC:  No weakness, sensory changes, seizures, confusion, memory loss, tremor or other abnormal movements    Current Evaluation:     Nutritional Screening: Considering the patient's height and weight, medications, medical history and preferences, should a referral be made to the dietitian? no    Constitutional  Vitals:  Most recent vital signs, dated less than 90 days prior to this appointment, were not reviewed.    There were no vitals filed for this visit.       General:  unremarkable, age appropriate     Musculoskeletal  Muscle Strength/Tone:  no tremor, no tic   Gait & Station:  non-ataxic     Psychiatric  Appearance: casually dressed & groomed;   Behavior: calm,   Cooperation: cooperative with assessment  Speech: normal rate, volume, tone  Thought Process: linear, goal-directed  Thought Content: No suicidal or homicidal ideation; no delusions  Affect: anxious  Mood: anxious  Perceptions: No auditory or visual hallucinations  Level of Consciousness: alert throughout interview  Insight: fair  Cognition: Oriented to person, place, time, & situation  Memory: no apparent deficits to general clinical interview; not formally " assessed  Attention/Concentration: no apparent deficits to general clinical interview; not formally assessed  Fund of Knowledge: average by vocabulary/education    Laboratory Data  No visits with results within 1 Month(s) from this visit.   Latest known visit with results is:   Lab Visit on 08/18/2023   Component Date Value Ref Range Status    WBC 08/18/2023 5.65  3.90 - 12.70 K/uL Final    RBC 08/18/2023 4.50  4.00 - 5.40 M/uL Final    Hemoglobin 08/18/2023 13.5  12.0 - 16.0 g/dL Final    Hematocrit 08/18/2023 42.6  37.0 - 48.5 % Final    MCV 08/18/2023 95  82 - 98 fL Final    MCH 08/18/2023 30.0  27.0 - 31.0 pg Final    MCHC 08/18/2023 31.7 (L)  32.0 - 36.0 g/dL Final    RDW 08/18/2023 12.7  11.5 - 14.5 % Final    Platelets 08/18/2023 210  150 - 450 K/uL Final    MPV 08/18/2023 12.2  9.2 - 12.9 fL Final    Immature Granulocytes 08/18/2023 0.2  0.0 - 0.5 % Final    Gran # (ANC) 08/18/2023 3.6  1.8 - 7.7 K/uL Final    Immature Grans (Abs) 08/18/2023 0.01  0.00 - 0.04 K/uL Final    Lymph # 08/18/2023 1.6  1.0 - 4.8 K/uL Final    Mono # 08/18/2023 0.3  0.3 - 1.0 K/uL Final    Eos # 08/18/2023 0.1  0.0 - 0.5 K/uL Final    Baso # 08/18/2023 0.02  0.00 - 0.20 K/uL Final    nRBC 08/18/2023 0  0 /100 WBC Final    Gran % 08/18/2023 63.5  38.0 - 73.0 % Final    Lymph % 08/18/2023 28.8  18.0 - 48.0 % Final    Mono % 08/18/2023 5.0  4.0 - 15.0 % Final    Eosinophil % 08/18/2023 2.1  0.0 - 8.0 % Final    Basophil % 08/18/2023 0.4  0.0 - 1.9 % Final    Differential Method 08/18/2023 Automated   Final    Sodium 08/18/2023 143  136 - 145 mmol/L Final    Potassium 08/18/2023 4.1  3.5 - 5.1 mmol/L Final    Chloride 08/18/2023 106  95 - 110 mmol/L Final    CO2 08/18/2023 28  23 - 29 mmol/L Final    Glucose 08/18/2023 87  70 - 110 mg/dL Final    BUN 08/18/2023 11  6 - 20 mg/dL Final    Creatinine 08/18/2023 0.8  0.5 - 1.4 mg/dL Final    Calcium 08/18/2023 9.0  8.7 - 10.5 mg/dL Final    Total Protein 08/18/2023 7.0  6.0 - 8.4 g/dL  Final    Albumin 2023 3.8  3.5 - 5.2 g/dL Final    Total Bilirubin 2023 0.3  0.1 - 1.0 mg/dL Final    Alkaline Phosphatase 2023 113  55 - 135 U/L Final    AST 2023 12  10 - 40 U/L Final    ALT 2023 8 (L)  10 - 44 U/L Final    eGFR 2023 >60.0  >60 mL/min/1.73 m^2 Final    Anion Gap 2023 9  8 - 16 mmol/L Final    TSH 2023 1.474  0.400 - 4.000 uIU/mL Final    Cholesterol 2023 229 (H)  120 - 199 mg/dL Final    Triglycerides 2023 100  30 - 150 mg/dL Final    HDL 2023 61  40 - 75 mg/dL Final    LDL Cholesterol 2023 148.0  63.0 - 159.0 mg/dL Final    HDL/Cholesterol Ratio 2023 26.6  20.0 - 50.0 % Final    Total Cholesterol/HDL Ratio 2023 3.8  2.0 - 5.0 Final    Non-HDL Cholesterol 2023 168  mg/dL Final    Hemoglobin A1C 2023 4.9  4.0 - 5.6 % Final    Estimated Avg Glucose 2023 94  68 - 131 mg/dL Final    Vit D, 25-Hydroxy 2023 21 (L)  30 - 96 ng/mL Final    Vitamin B-12 2023 298  210 - 950 pg/mL Final    Rheumatoid Factor 2023 <13.0  0.0 - 15.0 IU/mL Final    CCP Antibodies 2023 <0.5  <5.0 U/mL Final    Sed Rate 2023 4  0 - 36 mm/Hr Final    CRP 2023 18.0 (H)  0.0 - 8.2 mg/L Final    Testosterone, Total 2023 23  5 - 73 ng/dL Final    Follicle Stimulating Hormone 2023 57.53  See Text mIU/mL Final    Estradiol 2023 48  See Text pg/mL Final     Medications  Outpatient Encounter Medications as of 2024   Medication Sig Dispense Refill    [] amoxicillin-clavulanate 875-125mg (AUGMENTIN) 875-125 mg per tablet Take 1 tablet by mouth 2 (two) times daily. for 7 days 14 tablet 0    clonazePAM (KLONOPIN) 0.5 MG tablet Take 1 tablet (0.5 mg total) by mouth daily as needed for Anxiety. 30 tablet 1    ergocalciferol (ERGOCALCIFEROL) 50,000 unit Cap Take 1 capsule (50,000 Units total) by mouth every 7 days. 13 capsule 3    estradioL (ESTRACE) 2 MG tablet Take 1 tablet (2 mg  total) by mouth once daily. 90 tablet 3    levocetirizine (XYZAL) 5 MG tablet Take 1 tablet (5 mg total) by mouth every evening. 30 tablet 11    omeprazole (PRILOSEC) 20 MG capsule Take 1 capsule (20 mg total) by mouth once daily. 90 capsule 0    predniSONE (DELTASONE) 20 MG tablet Take 1 tablet (20 mg total) by mouth once daily. 5 tablet 0    traZODone (DESYREL) 100 MG tablet Take ½ to 1 ½ tablets by mouth at bedtime as needed for sleep. 45 tablet 3    venlafaxine (EFFEXOR-XR) 150 MG Cp24 Take 2 capsules (300 mg total) by mouth once daily. 60 capsule 3    [DISCONTINUED] omeprazole (PRILOSEC) 20 MG capsule Take 1 capsule (20 mg total) by mouth once daily. 90 capsule 0     No facility-administered encounter medications on file as of 4/19/2024.     Assessment - Diagnosis - Goals:     Impression: 44 y/o F with generalized anxiety disorder & adhd. Previous trials of duloxetine with minimal benefit, low dose bupropion with modest benefit. adhd by clinical history supplemented by family input, screening tools. Had persistent symptoms despite treatment & anxious in context of worse stressors. Failed atomoxetine, concerta. Has been reluctant to try psychotherapy.     Dx: generalized anxiety disorder. adhd    Treatment Goals:  Specify outcomes written in observable, behavioral terms:   Reduce anxiety by DOUG-7.     Treatment Plan/Recommendations:   Venlafaxine 300 mg daily for mood.   Clonazepam prn anxiety. Encouraged use prn.   Trazodone for sleep.   Psychotherapy today - motivational interviewing toward therapy   Discussed risks, benefits, and alternatives to treatment plan documented above with patient. I answered all patient questions related to this plan and patient expressed understanding and agreement.     Return to Clinic: 3 months    WINIFRED Nieto MD  Psychiatry, [facility]

## 2024-04-22 NOTE — PROGRESS NOTES
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 Feeling afraid as if something awful might happen? 2 3 3 3 1 3   DOUG-7 Score 17 17 21 21 17 19     0-4 = Minimal anxiety  5-9 = Mild anxiety  10-14 = Moderate anxiety  15-21 = Severe anxiety         4/23/2024    12:56 PM   PHQ-9 Depression Patient Health Questionnaire   Patient agreed to terms: Yes   Little interest or pleasure in doing things 1   Feeling down, depressed, or hopeless 1   Trouble falling or staying asleep, or sleeping too much 3   Feeling tired or having little energy 3   Poor appetite or overeating 3   Feeling bad about yourself - or that you are a failure or have let yourself or your family down 3   Trouble concentrating on things, such " as reading the newspaper or watching television 3   Moving or speaking so slowly that other people could have noticed. Or the opposite - being so fidgety or restless that you have been moving around a lot more than usual 3   Thoughts that you would be better off dead, or of hurting yourself in some way 1   PHQ-9 Total Score 21   If you checked off any problems, how difficult have these problems made it for you to do your work, take care of things at home, or get along with other people? Somewhat difficult   Interpretation Severe     0-4 = No intervention  5 to 9 = Mild  10 to 14 = Moderate  15 to 19 = Moderately severe  ?20 = Severe      Current medications and drug reactions (include OTC, herbal):   Outpatient Encounter Medications as of 4/23/2024   Medication Sig Dispense Refill    clonazePAM (KLONOPIN) 0.5 MG tablet Take 1 tablet (0.5 mg total) by mouth daily as needed for Anxiety. 30 tablet 1    ergocalciferol (ERGOCALCIFEROL) 50,000 unit Cap Take 1 capsule (50,000 Units total) by mouth every 7 days. 13 capsule 3    estradioL (ESTRACE) 2 MG tablet Take 1 tablet (2 mg total) by mouth once daily. 90 tablet 3    levocetirizine (XYZAL) 5 MG tablet Take 1 tablet (5 mg total) by mouth every evening. 30 tablet 11    omeprazole (PRILOSEC) 20 MG capsule Take 1 capsule (20 mg total) by mouth once daily. 90 capsule 0    predniSONE (DELTASONE) 20 MG tablet Take 1 tablet (20 mg total) by mouth once daily. 5 tablet 0    traZODone (DESYREL) 100 MG tablet Take ½ to 1 ½ tablets by mouth at bedtime as needed for sleep. 45 tablet 3    venlafaxine (EFFEXOR-XR) 150 MG Cp24 Take 2 capsules (300 mg total) by mouth once daily. 60 capsule 3     No facility-administered encounter medications on file as of 4/23/2024.          Objective - Current Evaluation:     Mental Status Evaluation  Appearance: unremarkable, age appropriate  Behavior: normal, cooperative  Speech: normal tone, normal rate, normal pitch, normal volume  Mood:  euthymic  Affect: congruent and appropriate  Thought Process: normal and logical  Thought Content: normal, no suicidality, no homicidality, delusions, or paranoia  Sensorium: grossly intact  Cognition: grossly intact  Insight: intact  Judgment: adequate to circumstances    Strengths and liabilities: Strength: Patient accepts guidance/feedback, Strength: Patient is expressive/articulate, Strength: Patient is intelligent, Strength: Patient is motivated for change, Strength: Patient is physically healthy, Strength: Patient has positive support network, Strength: Patient has reasonable judgment, and Liability: Patient lacks coping skills      Diagnostic Impression - Plan:     1. Anxiety        Plan:individual psychotherapy      Return to Clinic: 2 weeks  Pt Reported to Schedule Self via Epic EMR MyChart Application and/or Department Support Staff         Janelle Rosenberg LCSW  4/23/2024  1:00 PM

## 2024-04-23 ENCOUNTER — OFFICE VISIT (OUTPATIENT)
Dept: PSYCHIATRY | Facility: CLINIC | Age: 44
End: 2024-04-23
Payer: COMMERCIAL

## 2024-04-23 DIAGNOSIS — F41.9 ANXIETY: ICD-10-CM

## 2024-04-23 PROCEDURE — 90791 PSYCH DIAGNOSTIC EVALUATION: CPT | Mod: 95,,, | Performed by: SOCIAL WORKER

## 2024-04-26 ENCOUNTER — NUTRITION (OUTPATIENT)
Dept: NUTRITION | Facility: CLINIC | Age: 44
End: 2024-04-26
Payer: COMMERCIAL

## 2024-04-26 DIAGNOSIS — Z71.3 DIETARY COUNSELING AND SURVEILLANCE: Primary | ICD-10-CM

## 2024-04-26 PROCEDURE — 97802 MEDICAL NUTRITION INDIV IN: CPT | Mod: S$GLB,,, | Performed by: NUTRITIONIST

## 2024-04-26 PROCEDURE — 99999 PR PBB SHADOW E&M-EST. PATIENT-LVL I: CPT | Mod: PBBFAC,,, | Performed by: NUTRITIONIST

## 2024-04-26 NOTE — PROGRESS NOTES
"Nutrition Assessment    Visit Type: employee wellness consult  Session Time:  1 Hour  Reason for MNT visit: Pt in for education and nutrition counseling regarding goal of weight loss and increasing her Energy      Age: 43 y.o.  Wt:   Wt Readings from Last 1 Encounters:   04/09/24 99.8 kg (220 lb)     Ht:   Ht Readings from Last 1 Encounters:   04/09/24 5' 8" (1.727 m)     BMI:   BMI Readings from Last 1 Encounters:   04/09/24 33.45 kg/m²       Client states:  Joy came with her  where we did both their employee wellness nutrition consults together (they agreed to this). She stated her Energy is low and she doesn't sleep well. Her stress level is on the higher end and wants to feel better overall    Medical History  Problem List             Resolved    B12 deficiency         Idiopathic hypersomnia         Migraines         Fibromyalgia syndrome         Breakthrough bleeding on birth control pills         Vitamin D deficiency         Positive DARRION (antinuclear antibody)         Periorbital erythema         Great toe pain, right         DOUG (generalized anxiety disorder)         Depression with anxiety         Pedal edema         ARELLANO (dyspnea on exertion)         Class 2 obesity due to excess calories without serious comorbidity with body mass index (BMI) of 36.0 to 36.9 in adult         Endometriosis of pelvis         S/P hysterectomy         Attention deficit hyperactivity disorder (ADHD)         Chronic bilateral low back pain without sciatica (Chronic)         Incomplete emptying of bladder         Decreased strength         Neurogenic bladder            Past Medical History:   Diagnosis Date    ADHD (attention deficit hyperactivity disorder)     B12 deficiency     Chronic low back pain     oberlander    Endometriosis     Fibromyalgia syndrome     Idiopathic hypersomnia     Irritable bowel syndrome     goldman    Migraines     frances    PONV (postoperative nausea and vomiting)     Shingles     " Undifferentiated inflammatory arthritis 2018    Undifferentiated inflammatory arthritis 2018       Past Surgical History:   Procedure Laterality Date     SECTION      x 2     CYSTOSCOPY N/A 04/10/2019    Procedure: CYSTOSCOPY;  Surgeon: Gillian Carpenter MD;  Location: [facility] OR;  Service: OB/GYN;  Laterality: N/A;    HYSTERECTOMY  2019    Total    OOPHORECTOMY Bilateral     left tube and ovary removed. later RSO with hyst. ovaries removed at differnt times,  and .    PELVIC LAPAROSCOPY      ROBOT-ASSISTED LAPAROSCOPIC ABDOMINAL HYSTERECTOMY USING DA LONI XI N/A 04/10/2019    Procedure: XI ROBOTIC HYSTERECTOMY;  Surgeon: Gillian Carpenter MD;  Location: [facility] OR;  Service: OB/GYN;  Laterality: N/A;    ROBOT-ASSISTED LAPAROSCOPIC LYSIS OF ADHESIONS USING DA LONI XI N/A 04/10/2019    Procedure: XI ROBOTIC LYSIS, ADHESIONS;  Surgeon: Gillian Carpenter MD;  Location: [facility] OR;  Service: OB/GYN;  Laterality: N/A;    ROBOT-ASSISTED LAPAROSCOPIC SALPINGO-OOPHORECTOMY USING DA LONI XI Right 04/10/2019    Procedure: XI ROBOTIC SALPINGO-OOPHORECTOMY;  Surgeon: Gillian Carpenter MD;  Location: [facility] OR;  Service: OB/GYN;  Laterality: Right;    TUBAL LIGATION            Medications    Prior to Admission medications    Medication Sig Start Date End Date Taking? Authorizing Provider   clonazePAM (KLONOPIN) 0.5 MG tablet Take 1 tablet (0.5 mg total) by mouth daily as needed for Anxiety. 24  Lenin Doyle MD   ergocalciferol (ERGOCALCIFEROL) 50,000 unit Cap Take 1 capsule (50,000 Units total) by mouth every 7 days. 23   Hakeem Mayers MD   estradioL (ESTRACE) 2 MG tablet Take 1 tablet (2 mg total) by mouth once daily. 23  Alexus Araujo NP   levocetirizine (XYZAL) 5 MG tablet Take 1 tablet (5 mg total) by mouth every evening. 10/17/23 10/16/24  Sera Alejandro MD   omeprazole (PRILOSEC) 20 MG capsule Take 1 capsule (20 mg total) by mouth once  daily. 4/12/24   Angus Caceres MD   predniSONE (DELTASONE) 20 MG tablet Take 1 tablet (20 mg total) by mouth once daily. 4/9/24   Swati Manning FNP   traZODone (DESYREL) 100 MG tablet Take ½ to 1 ½ tablets by mouth at bedtime as needed for sleep. 4/19/24   Lenin Doyle MD   venlafaxine (EFFEXOR-XR) 150 MG Cp24 Take 2 capsules (300 mg total) by mouth once daily. 4/19/24 10/16/24  Lenin Doyle MD        Vitamins, Minerals, and/or Supplements:  She is taking 50,000 international units/week of Vitamin D3 that was prescribed to her for her low Vitamin D levels. She stated she does not always take it every week as she forgets sometimes    Food Allergies or Intolerances:  [facility]     Social History    Marital status:      Social History     Tobacco Use    Smoking status: Never     Passive exposure: Never    Smokeless tobacco: Never   Substance Use Topics    Alcohol use: Yes     Comment: rarely     Current Alcohol use: Very rare. Maybe a few times a year where she will have a sugary cocktail    Lab Reports   Reviewed and noted    Lab Results   Component Value Date    FVIJMASX06UF 21 (L) 08/18/2023    TSH 1.474 08/18/2023    FREET4 0.92 08/17/2020    CRP 18.0 (H) 08/18/2023    SEDRATE 4 08/18/2023    AST 12 08/18/2023    ALT 8 (L) 08/18/2023    HDL 61 08/18/2023    LDLCALC 148.0 08/18/2023    TRIG 100 08/18/2023    HGBA1C 4.9 08/18/2023    HGBA1C 5.0 07/29/2022         BP Readings from Last 1 Encounters:   04/09/24 127/76        24-hour Recall: Reviewed and noted during consult       Beverages   Water: 2-3, 16.9 oz bottles per day  Coffee: a few cups per week with a good amount of flavored creamer  Tea: Sweet tea: 1 glass per day  Soft drinks: she will have a regular soft drink every now and then    LIFESTYLE FACTORS    Dinning out: 1-2 x per week    Meal preparation/shopping: her     Sleep: poor    Stress Level: high    Support System:  spouse    Exercise Regimen: Sedentary  (little or no exercise)      Diagnosis    Excessive energy intake related to drinking high sugary beverages and some large meal/snack/sweet portions as evidenced by 24 hour recall.      Intervention    Estimated Energy Requirements:   Calories: 1800  Protein: 130-150 grams  Carbohydrates: 140-160 grams  Total Fat: 75 grams  Baseline for fluids: 110 oz + sweat loss    Recommendations & Goals:  Patient goals and recommendations are tailored to the specific patient's needs, readiness to change, lifestyle, culture, skills, resources, & abilities. Strategies to help achieve these nutrition-related goals were discussed which can include but are not limited to SMART goal setting & mindful eating.     Aim for a minimum of 7 hours sleep   Exercise 60 minutes most days  Eat breakfast within 1-2 hours of waking up  Try not to skip any meals or snacks, not going more than 3-4 hours without eating   At each meal and snack, try to include a source of fiber + lean protein + healthy fat     Written Materials Provided  These resources are intended to assist the patient in making it easier to choose recommended options when eating out & to identify better-for-you brands at the grocery store:    Meal Planning Guide with recommendations discussed along with portion sizes and a customized meal plan   Fueling Well On-the-Go Food Guide  Eat Fit Shopping Guide  Lifestyle Nutrition Meal Guide  RD contact information    Goals:   -  Eat every 3-4 hours  -  Aim for 7-9 hours sleep  -  Start exercising by walking at least 10 minutes per day and increase gradually   -  Decrease the amount of sugary sweetened beverages  -  Increase water intake  -  Take your prescription Vitamin D3 every week   -  Enjoy deep fried foods at most twice a month      Monitoring/Evaluation    Monitor the following:  Weight  Sleep  Stress Management  Movement  Nutrient intake in reference to meal plan    Communicated with healthcare provider and documented plan for  referral to appropriate agency/healthcare provider as needed    Supervising Physician: Jose Alfredo Caceres MD    Patient motivation, anticipated barriers, expected compliance: Patient is motivated and has verbalized understanding and intent to comply.     Comprehension: fair     Follow-up: 6-8 weeks or prn

## 2024-04-26 NOTE — PATIENT INSTRUCTIONS
Name: Joy Recinos   Date: 04/26/2024    Recommended daily energy requirements to reach your goals:  Calories: 1800  Protein: 130-150 grams  Carbohydrates: 140-160 grams  Total Fat: 75 grams  Baseline for fluids: 110 oz + sweat loss    Goals:   -  Eat every 3-4 hours  -  Aim for 7-9 hours sleep  -  Start exercising by walking at least 10 minutes per day and increase gradually   -  Decrease the amount of sugary sweetened beverages  -  Increase water intake  -  Take your prescription Vitamin D3 every week   -  Enjoy deep fried foods at most twice a month

## 2024-05-01 NOTE — PROGRESS NOTES
Individual Psychotherapy Follow-up Visit Progress Note (PhD/LCSW)     Outpatient Psychotherapy - 60 minutes with patient (53 minutes or more) - [id number]    Date: 5/6/2024    Visit Type: Telehealth    Due to the nature of this visit type, a virtual visit with synchronous audio and video, each patient to whom this provider administers behavioral health services by telemedicine is: (1) informed of the relationship between the provider and patient and the respective role of any other health care provider with respect to management of the patient; and (2) notified that he or she may decline to receive services by telemedicine and may withdraw from such care at any time. If technological issues occur, at the professional discretion of the clinical provider, synchronous audio only services may be utilized after unsuccessful attempt(s) to connect via audiovisual services; similarly, if audio only visit occurs, patient's verbal consent will be obtained prior to receipt of service. Prevailing clinical standards of care are upheld despite service methodology; having said this, if the clinical provider is unable to meet the prevailing standards of care, the patient will be rescheduled for the provider's soonest availability - as clinically appropriate.     The patient was informed of the following:     Provider's contact info:  [facility]  [address], [address], [address]  [place] [address]  (Phone) [phone]    If technology issues occur, call office phone: Ph: [phone]  If crisis: Dial 911 or go to nearest Emergency Room (ER)  If questions related to privacy practices: contact [facility] Information Department: [phone]    For security purposes, the pt identified that they were at [address] [place] [address] during today's session and contact number is [phone].    The pt's emergency contact(s) is Extended Emergency Contact  "Information  Primary Emergency Contact: Allen Recinos  Address: [address] [place] [address] [facility]  Work Phone: [phone]  Mobile Phone: [phone]  Relation: Spouse  Secondary Emergency Contact: Zina Huntley  Address: [address] [place] [address] [facility]  Home Phone: [phone]  Mobile Phone: [phone]  Relation: Mother.    Crisis Disclaimer: Patient was informed that due to the virtual nature of the visit, that if a crisis develops, protocols will be implemented to ensure patient safety, including but not limited to: 1) Initiating a welfare check with local law enforcement and/or 2) Calling 911    5/6/2024  MRN: [id number]  Primary Care Provider: Angus Caceres MD    Joy Recinos is a 43 y.o. female who presents today for follow-up of depression and anxiety. Met with patient.      Preferred Name: Joy   Transgender Identity Form    Gender Identity Form  Patient Pronouns: she/her/hers  Transition Summary         Subjective:     Last encounter (with this provider): 4/23/2024     Content of Current Session: Pt reported, "I'm ok" upon entry to this session. LCSW utilized active listening/reflection to engage with pt and review experiences since their last session with this provider. Pt reported "I've been ok - nothing's really changed since we last spoke." Pt reported she has struggled with anxiety and depression for an extensive period of time. Pt reported having limited time for herself and noted struggling with self-prioritization. Pt reported "like I enjoy taking a bath every now and then but it's nothing for my  or kids to take the key and unlock the door to interrupt me so I don't try anymore." Pt reported historically having several friends; however, she noted, due to her lack of self-prioritization, her friendships have slowly dissipated. Pt reported feeling frustrated and overwhelmed. LCSW utilized " cognitive processing and supportive therapy. LCSW utilized introspective therapy to support pt's self-reflection. LCSW utilized reality orientation to support pt's objectivity, reframed perspectives, and thought challenging processes. LCSW utilized reality orientation and introspective therapy to support pt's objective reflection of her historical self-care routine and social system. LCSW utilized psychoeducation to support pt's understanding of self-care and socialization in personal mental health outlook. LCSW utilized CBT to support pt's understanding of personal locus of control and action therein in thought/belief change. LCSW utilized solution focused therapy to support pt's critical thinking and problem solving skills as it relates to improving her self-care and socialization. LCSW utilized the remainder of the session to further build rapport with pt. LCSW utilized compassion focused therapy to support pt's self-acceptance. LCSW utilized person centered and strengths based perspectives to further empower and edify pt. LCSW and pt practiced aforementioned skills in session. Pt responded appropriately to aforementioned interventions. Pt denied SI/HI/AVH.     Therapeutic Interventions Utilized During Current Session: Cognitive Processing Therapy, Cognitive Behavioral Therapy, Compassion-Focused Therapy, Introspective Therapy, Person-Centered Therapy, Psychoeducation, Reality Therapy, Strength-Based Therapy, Solution Focused Therapy, Supportive Therapy        5/6/2024    10:57 AM 4/23/2024    12:44 PM 4/19/2024    11:03 AM 1/18/2024     3:33 PM 10/20/2023     3:30 PM 1/12/2023     2:53 PM 4/22/2022    10:48 AM   GAD7   1. Feeling nervous, anxious, or on edge? 3 3 3 3 3 3 3   2. Not being able to stop or control worrying? 3 2 2 3 3 3 3   3. Worrying too much about different things? 3 3 2 3 3 3 3   4. Trouble relaxing? 3 3 3 3 3 3 3   5. Being so restless that it is hard to sit still? 2 2 3 3 3 3 3   6. Becoming  easily annoyed or irritable? 2 2 1 3 3 1 1   7. Feeling afraid as if something awful might happen? 3 2 3 3 3 1 3   DOUG-7 Score 19 17 17 21 21 17 19      0-4 = Minimal anxiety  5-9 = Mild anxiety  10-14 = Moderate anxiety  15-21 = Severe anxiety         5/6/2024    11:00 AM 4/23/2024    12:56 PM   PHQ-9 Depression Patient Health Questionnaire   Patient agreed to terms: Yes Yes   Little interest or pleasure in doing things 2 1   Feeling down, depressed, or hopeless 2 1   Trouble falling or staying asleep, or sleeping too much 2 3   Feeling tired or having little energy 3 3   Poor appetite or overeating 2 3   Feeling bad about yourself - or that you are a failure or have let yourself or your family down 3 3   Trouble concentrating on things, such as reading the newspaper or watching television 3 3   Moving or speaking so slowly that other people could have noticed. Or the opposite - being so fidgety or restless that you have been moving around a lot more than usual 2 3   Thoughts that you would be better off dead, or of hurting yourself in some way 1 1   PHQ-9 Total Score 20 21   If you checked off any problems, how difficult have these problems made it for you to do your work, take care of things at home, or get along with other people? Somewhat difficult Somewhat difficult   Interpretation Severe Severe     0-4 = No intervention  5 to 9 = Mild  10 to 14 = Moderate  15 to 19 = Moderately severe  ?20 = Severe      Objective:       Mental Status Evaluation  Appearance: unremarkable, age appropriate  Behavior: normal, cooperative  Speech: normal tone, normal rate, normal pitch, normal volume  Mood: euthymic  Affect: congruent and appropriate  Thought Process: normal and logical  Thought Content: normal, no suicidality, no homicidality, delusions, or paranoia  Sensorium: grossly intact  Cognition: grossly intact  Insight: intact  Judgment: adequate to circumstances    Risk parameters:  Patient reports no suicidal  ideation  Patient reports no homicidal ideation  Patient reports no self-injurious behavior  Patient reports no violent behavior      Assessment & Plan:     The patient's response to the interventions is accepting    The patient's progress toward treatment goals is fair     Homework assigned: none     Treatment plan:   A. Target symptoms: Anxiety   B. Therapeutic modalities: insight oriented psychotherapy, behavior modifying psychotherapy, supportive psychotherapy  C. Why chosen therapy is appropriate versus another modality: relevant to diagnosis, patient responds to this modality, evidence based practice   D. Outcome monitoring methods: self report, observation, rating scales, feedback from clinical staff      Visit Diagnosis:   1. Anxiety    2. Moderate episode of recurrent major depressive disorder    3. Attention deficit hyperactivity disorder (ADHD), unspecified ADHD type        Follow-up: individual psychotherapy    Return to Clinic: 2 weeks  Pt Reported to Schedule Self via Epic EMR MyChart Application and/or Department Support Staff          Janelle Rosenberg LCSW  5/6/2024  11:00 AM

## 2024-05-02 ENCOUNTER — TELEPHONE (OUTPATIENT)
Dept: PSYCHIATRY | Facility: CLINIC | Age: 44
End: 2024-05-02
Payer: COMMERCIAL

## 2024-05-06 ENCOUNTER — OFFICE VISIT (OUTPATIENT)
Dept: PSYCHIATRY | Facility: CLINIC | Age: 44
End: 2024-05-06
Payer: COMMERCIAL

## 2024-05-06 DIAGNOSIS — F41.9 ANXIETY: Primary | ICD-10-CM

## 2024-05-06 DIAGNOSIS — F90.9 ATTENTION DEFICIT HYPERACTIVITY DISORDER (ADHD), UNSPECIFIED ADHD TYPE: ICD-10-CM

## 2024-05-06 DIAGNOSIS — F33.1 MODERATE EPISODE OF RECURRENT MAJOR DEPRESSIVE DISORDER: ICD-10-CM

## 2024-05-06 PROCEDURE — 90837 PSYTX W PT 60 MINUTES: CPT | Mod: 95,,, | Performed by: SOCIAL WORKER

## 2024-05-14 NOTE — PROGRESS NOTES
Individual Psychotherapy Follow-up Visit Progress Note (PhD/LCSW)     Outpatient Psychotherapy - 60 minutes with patient (53 minutes or more) - [id number]    Date: 5/21/2024    Visit Type: Telehealth    Due to the nature of this visit type, a virtual visit with synchronous audio and video, each patient to whom this provider administers behavioral health services by telemedicine is: (1) informed of the relationship between the provider and patient and the respective role of any other health care provider with respect to management of the patient; and (2) notified that he or she may decline to receive services by telemedicine and may withdraw from such care at any time. If technological issues occur, at the professional discretion of the clinical provider, synchronous audio only services may be utilized after unsuccessful attempt(s) to connect via audiovisual services; similarly, if audio only visit occurs, patient's verbal consent will be obtained prior to receipt of service. Prevailing clinical standards of care are upheld despite service methodology; having said this, if the clinical provider is unable to meet the prevailing standards of care, the patient will be rescheduled for the provider's soonest availability - as clinically appropriate.     The patient was informed of the following:     Provider's contact info:  [facility]  [address], [address], [address]  [place] [address]  (Phone) [phone]    If technology issues occur, call office phone: Ph: [phone]  If crisis: Dial 911 or go to nearest Emergency Room (ER)  If questions related to privacy practices: contact [facility] Information Department: [phone]    For security purposes, the pt identified that they were at [address] [place] [address] during today's session and contact number is [phone].    The pt's emergency contact(s) is Extended Emergency Contact  "Information  Primary Emergency Contact: Allen Recinos  Address: [address] [place] [address] [facility]  Work Phone: [phone]  Mobile Phone: [phone]  Relation: Spouse  Secondary Emergency Contact: Zina Huntlye  Address: [address] [place] [address] [facility]  Home Phone: [phone]  Mobile Phone: [phone]  Relation: Mother.    Crisis Disclaimer: Patient was informed that due to the virtual nature of the visit, that if a crisis develops, protocols will be implemented to ensure patient safety, including but not limited to: 1) Initiating a welfare check with local law enforcement and/or 2) Calling 911    5/21/2024  MRN: [id number]  Primary Care Provider: Angus Caceres MD    Joy Recinos is a 43 y.o. female who presents today for follow-up of depression and anxiety. Met with patient.      Preferred Name: Joy   Transgender Identity Form    Gender Identity Form  Patient Pronouns: she/her/hers  Transition Summary         Subjective:     Last encounter (with this provider): 5/6/2024     Content of Current Session: Pt reported, "I'm ok, nothing's really changed" upon entry to this session. LCSW utilized active listening/reflection to engage with pt and review experiences since their last session with this provider. Pt reported she attempted to set boundaries with her family; however, she reported "I didn't really see much of a change." Pt reported, "I didn't really tell them why I wanted the boundaries or what they were for - I just kind of told them that my counselor said I needed them." LCSW utilized cognitive processing and supportive therapy. LCSW utilized introspective therapy to support pt's self-reflection. LCSW utilized reality orientation to support pt's objectivity, reframed perspectives, and thought challenging processes. LCSW utilized reality orientation and introspective therapy to support pt's objective " reflection of her family relationships. LCSW utilized interpersonal therapy to support pt's understanding and application of communication styles, I-statements, and boundary setting. LCSW utilized interpersonal therapy to support pt's understanding of boundary setting, self-advocacy, boundary styles, their purpose, and their role in personal mental health outlook. LCSW utilized compassion focused therapy to support pt's self-acceptance. LCSW utilized person centered and strengths based perspectives to further empower and edify pt. LCSW and pt practiced aforementioned skills in session. LCSW provided pt with supplemental resources via Iconfinder. Pt responded appropriately to aforementioned interventions. Pt denied SI/HI/AVH.     Therapeutic Interventions Utilized During Current Session: Cognitive Processing Therapy, Cognitive Behavioral Therapy, Compassion-Focused Therapy, Interpersonal Psychotherapy, Introspective Therapy, Person-Centered Therapy, Psychoeducation, Reality Therapy, Strength-Based Therapy, Supportive Therapy        5/21/2024    10:53 AM 5/6/2024    10:57 AM 4/23/2024    12:44 PM 4/19/2024    11:03 AM 1/18/2024     3:33 PM 10/20/2023     3:30 PM 1/12/2023     2:53 PM   GAD7   1. Feeling nervous, anxious, or on edge? 2 3 3 3 3 3 3   2. Not being able to stop or control worrying? 2 3 2 2 3 3 3   3. Worrying too much about different things? 2 3 3 2 3 3 3   4. Trouble relaxing? 1 3 3 3 3 3 3   5. Being so restless that it is hard to sit still? 1 2 2 3 3 3 3   6. Becoming easily annoyed or irritable? 1 2 2 1 3 3 1   7. Feeling afraid as if something awful might happen? 1 3 2 3 3 3 1   DOUG-7 Score 10 19 17 17 21 21 17      0-4 = Minimal anxiety  5-9 = Mild anxiety  10-14 = Moderate anxiety  15-21 = Severe anxiety         5/21/2024    10:55 AM 5/6/2024    11:00 AM 4/23/2024    12:56 PM   PHQ-9 Depression Patient Health Questionnaire   Patient agreed to terms: Yes Yes Yes   Little interest or pleasure in  doing things 1 2 1   Feeling down, depressed, or hopeless 2 2 1   Trouble falling or staying asleep, or sleeping too much 2 2 3   Feeling tired or having little energy 2 3 3   Poor appetite or overeating 1 2 3   Feeling bad about yourself - or that you are a failure or have let yourself or your family down 2 3 3   Trouble concentrating on things, such as reading the newspaper or watching television 2 3 3   Moving or speaking so slowly that other people could have noticed. Or the opposite - being so fidgety or restless that you have been moving around a lot more than usual 1 2 3   Thoughts that you would be better off dead, or of hurting yourself in some way 0 1 1   PHQ-9 Total Score 13 20 21   If you checked off any problems, how difficult have these problems made it for you to do your work, take care of things at home, or get along with other people? Somewhat difficult Somewhat difficult Somewhat difficult   Interpretation Moderate Severe Severe     0-4 = No intervention  5 to 9 = Mild  10 to 14 = Moderate  15 to 19 = Moderately severe  ?20 = Severe      Objective:       Mental Status Evaluation  Appearance: unremarkable, age appropriate  Behavior: normal, cooperative  Speech: normal tone, normal rate, normal pitch, normal volume  Mood: euthymic  Affect: congruent and appropriate  Thought Process: normal and logical  Thought Content: normal, no suicidality, no homicidality, delusions, or paranoia  Sensorium: grossly intact  Cognition: grossly intact  Insight: intact  Judgment: adequate to circumstances    Risk parameters:  Patient reports no suicidal ideation  Patient reports no homicidal ideation  Patient reports no self-injurious behavior  Patient reports no violent behavior      Assessment & Plan:     The patient's response to the interventions is accepting    The patient's progress toward treatment goals is fair     Homework assigned: none     Treatment plan:   A. Target symptoms: Depression and Anxiety   B.  Therapeutic modalities: insight oriented psychotherapy, behavior modifying psychotherapy, supportive psychotherapy  C. Why chosen therapy is appropriate versus another modality: relevant to diagnosis, patient responds to this modality, evidence based practice   D. Outcome monitoring methods: self report, observation, rating scales, feedback from clinical staff      Visit Diagnosis:   1. Anxiety    2. Moderate episode of recurrent major depressive disorder    3. Attention deficit hyperactivity disorder (ADHD), unspecified ADHD type        Follow-up: individual psychotherapy    Return to Clinic: 2 weeks  Pt Reported to Schedule Self via Epic EMR MyChart Application and/or Department Support Staff          Janelle Rosenberg LCSW  5/21/2024  11:00 AM

## 2024-05-17 ENCOUNTER — TELEPHONE (OUTPATIENT)
Dept: PSYCHIATRY | Facility: CLINIC | Age: 44
End: 2024-05-17
Payer: COMMERCIAL

## 2024-05-21 ENCOUNTER — OFFICE VISIT (OUTPATIENT)
Dept: PSYCHIATRY | Facility: CLINIC | Age: 44
End: 2024-05-21
Payer: COMMERCIAL

## 2024-05-21 ENCOUNTER — PATIENT MESSAGE (OUTPATIENT)
Dept: PSYCHIATRY | Facility: CLINIC | Age: 44
End: 2024-05-21

## 2024-05-21 DIAGNOSIS — F33.1 MODERATE EPISODE OF RECURRENT MAJOR DEPRESSIVE DISORDER: ICD-10-CM

## 2024-05-21 DIAGNOSIS — F90.9 ATTENTION DEFICIT HYPERACTIVITY DISORDER (ADHD), UNSPECIFIED ADHD TYPE: ICD-10-CM

## 2024-05-21 DIAGNOSIS — F41.9 ANXIETY: Primary | ICD-10-CM

## 2024-05-21 PROCEDURE — 90837 PSYTX W PT 60 MINUTES: CPT | Mod: 95,,, | Performed by: SOCIAL WORKER

## 2024-05-31 ENCOUNTER — PATIENT MESSAGE (OUTPATIENT)
Dept: OPHTHALMOLOGY | Facility: CLINIC | Age: 44
End: 2024-05-31

## 2024-05-31 ENCOUNTER — OFFICE VISIT (OUTPATIENT)
Dept: OPHTHALMOLOGY | Facility: CLINIC | Age: 44
End: 2024-05-31
Payer: COMMERCIAL

## 2024-05-31 DIAGNOSIS — H52.13 MYOPIA WITH ASTIGMATISM AND PRESBYOPIA, BILATERAL: Primary | ICD-10-CM

## 2024-05-31 DIAGNOSIS — H52.4 MYOPIA WITH ASTIGMATISM AND PRESBYOPIA, BILATERAL: Primary | ICD-10-CM

## 2024-05-31 DIAGNOSIS — H52.203 MYOPIA WITH ASTIGMATISM AND PRESBYOPIA, BILATERAL: Primary | ICD-10-CM

## 2024-05-31 PROCEDURE — 92015 DETERMINE REFRACTIVE STATE: CPT | Mod: S$GLB,,, | Performed by: OPTOMETRIST

## 2024-05-31 PROCEDURE — 92014 COMPRE OPH EXAM EST PT 1/>: CPT | Mod: S$GLB,,, | Performed by: OPTOMETRIST

## 2024-05-31 PROCEDURE — 99999 PR PBB SHADOW E&M-EST. PATIENT-LVL III: CPT | Mod: PBBFAC,,, | Performed by: OPTOMETRIST

## 2024-05-31 NOTE — PROGRESS NOTES
HPI     Annual Exam            Comments: Pt states she notices more trouble seeing small print and   computer with current eyeglasses. States she did not have the computer   pair of eyeglasses filled.           Comments    MELITON [facility]    No Drops          Last edited by Chayo Parada on 5/31/2024  1:31 PM.            Assessment /Plan     For exam results, see Encounter Report.    Myopia with astigmatism and presbyopia, bilateral      Eyeglass Final Rx       Eyeglass Final Rx         Sphere Cylinder Axis Add    Right -2.25 +1.00 030 +1.25    Left -3.00 +1.25 175 +1.25      Expiration Date: 5/31/2025    Comments: SVL distance ok              Eyeglass Final Rx #2         Sphere Cylinder Axis Add    Right -1.25 +1.00 030     Left -2.00 +1.25 175       Type: computer    Expiration Date: 5/31/2025                    RTC 1 yr for undilated eye exam or PRN if any problems.   Discussed above and answered questions.

## 2024-06-10 NOTE — PROGRESS NOTES
Individual Psychotherapy Follow-up Visit Progress Note (PhD/LCSW)     Outpatient Psychotherapy - 45 minutes with patient (38-52 minutes) - [id number]    Date: 6/18/2024    Visit Type: Telehealth    Due to the nature of this visit type, a virtual visit with synchronous audio and video, each patient to whom this provider administers behavioral health services by telemedicine is: (1) informed of the relationship between the provider and patient and the respective role of any other health care provider with respect to management of the patient; and (2) notified that he or she may decline to receive services by telemedicine and may withdraw from such care at any time. If technological issues occur, at the professional discretion of the clinical provider, synchronous audio only services may be utilized after unsuccessful attempt(s) to connect via audiovisual services; similarly, if audio only visit occurs, patient's verbal consent will be obtained prior to receipt of service. Prevailing clinical standards of care are upheld despite service methodology; having said this, if the clinical provider is unable to meet the prevailing standards of care, the patient will be rescheduled for the provider's soonest availability - as clinically appropriate.     The patient was informed of the following:     Provider's contact info:  [facility]  [address], [address], [address]  [place] [address]  (Phone) [phone]    If technology issues occur, call office phone: Ph: [phone]  If crisis: Dial 911 or go to nearest Emergency Room (ER)  If questions related to privacy practices: contact [facility] Information Department: [phone]    For security purposes, the pt identified that they were at [address] [place] [address] during today's session and contact number is [phone].    The pt's emergency contact(s) is Extended Emergency Contact Information  Primary  "Emergency Contact: Allen Recinos  Address: [address] [place] [address] [facility]  Work Phone: [phone]  Mobile Phone: [phone]  Relation: Spouse  Secondary Emergency Contact: YukoZina franks  Address: [address] [place] [address] [facility]  Home Phone: [phone]  Mobile Phone: [phone]  Relation: Mother.    Crisis Disclaimer: Patient was informed that due to the virtual nature of the visit, that if a crisis develops, protocols will be implemented to ensure patient safety, including but not limited to: 1) Initiating a welfare check with local law enforcement and/or 2) Calling 911    6/18/2024  MRN: [id number]  Primary Care Provider: Angus Caceres MD Racguy Recinos is a 43 y.o. female who presents today for follow-up of depression and anxiety. Met with patient.      Preferred Name: Joy     Subjective:     Last encounter (with this provider): 5/21/2024     Content of Current Session: Pt reported, "I'm ok" upon entry to this session. LCSW utilized active listening/reflection to engage with pt and review experiences since their last session with this provider. 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 LCSW utilized introspective therapy to support pt's self-reflection. LCSW utilized reality orientation to support pt's objectivity, reframed perspectives, and thought challenging processes. LCSW utilized reality orientation and introspective therapy to support pt's objective reflection of her historical work experiences. LCSW " utilized reality orientation and DBT to support pt's objective identification and refutation of cognitive distortions via Socratic Questioning. LCSW utilized reality orientation and solution focused therapy to support pt's objective critical thinking skills as it relates to her job search. LCSW utilized CBT to support pt's identification of her personal locus of control and understanding its role in personal mental health outlook. LCSW utilized psychoeducation to further review pt's historical coping skills and discussed their application. LCSW utilized compassion focused therapy to support pt's self-acceptance. LCSW utilized person centered and strengths based perspectives to further empower and edify pt. LCSW and pt practiced aforementioned skills in session. Pt responded appropriately to aforementioned interventions. Pt denied SI/HI/AVH.     Therapeutic Interventions Utilized During Current Session: Cognitive Processing Therapy, Cognitive Behavioral Therapy, Compassion-Focused Therapy, Dialectical Behavior Therapy, Introspective Therapy, Person-Centered Therapy, Psychoeducation, Reality Therapy, Strength-Based Therapy, Solution Focused Therapy, Supportive Therapy        5/21/2024    10:53 AM 5/6/2024    10:57 AM 4/23/2024    12:44 PM 4/19/2024    11:03 AM 1/18/2024     3:33 PM 10/20/2023     3:30 PM 1/12/2023     2:53 PM   GAD7   1. Feeling nervous, anxious, or on edge? 2 3 3 3 3 3 3   2. Not being able to stop or control worrying? 2 3 2 2 3 3 3   3. Worrying too much about different things? 2 3 3 2 3 3 3   4. Trouble relaxing? 1 3 3 3 3 3 3   5. Being so restless that it is hard to sit still? 1 2 2 3 3 3 3   6. Becoming easily annoyed or irritable? 1 2 2 1 3 3 1   7. Feeling afraid as if something awful might happen? 1 3 2 3 3 3 1   DOUG-7 Score 10 19 17 17 21 21 17      0-4 = Minimal anxiety  5-9 = Mild anxiety  10-14 = Moderate anxiety  15-21 = Severe anxiety         5/21/2024    10:55 AM 5/6/2024    11:00 AM  4/23/2024    12:56 PM   PHQ-9 Depression Patient Health Questionnaire   Patient agreed to terms: Yes Yes Yes   Little interest or pleasure in doing things 1 2 1   Feeling down, depressed, or hopeless 2 2 1   Trouble falling or staying asleep, or sleeping too much 2 2 3   Feeling tired or having little energy 2 3 3   Poor appetite or overeating 1 2 3   Feeling bad about yourself - or that you are a failure or have let yourself or your family down 2 3 3   Trouble concentrating on things, such as reading the newspaper or watching television 2 3 3   Moving or speaking so slowly that other people could have noticed. Or the opposite - being so fidgety or restless that you have been moving around a lot more than usual 1 2 3   Thoughts that you would be better off dead, or of hurting yourself in some way 0 1 1   PHQ-9 Total Score 13 20 21   If you checked off any problems, how difficult have these problems made it for you to do your work, take care of things at home, or get along with other people? Somewhat difficult Somewhat difficult Somewhat difficult   Interpretation Moderate Severe Severe     0-4 = No intervention  5 to 9 = Mild  10 to 14 = Moderate  15 to 19 = Moderately severe  ?20 = Severe      Objective:       Mental Status Evaluation  Appearance: unremarkable, age appropriate  Behavior: normal, cooperative  Speech: normal tone, normal rate, normal pitch, normal volume  Mood: euthymic  Affect: congruent and appropriate  Thought Process: normal and logical  Thought Content: normal, no suicidality, no homicidality, delusions, or paranoia  Sensorium: grossly intact  Cognition: grossly intact  Insight: intact  Judgment: adequate to circumstances    Risk parameters:  Patient reports no suicidal ideation  Patient reports no homicidal ideation  Patient reports no self-injurious behavior  Patient reports no violent behavior      Assessment & Plan:     The patient's response to the interventions is accepting    The  patient's progress toward treatment goals is fair     Homework assigned: none     Treatment plan:   A. Target symptoms: Depression and Anxiety   B. Therapeutic modalities: insight oriented psychotherapy, behavior modifying psychotherapy, supportive psychotherapy  C. Why chosen therapy is appropriate versus another modality: relevant to diagnosis, patient responds to this modality, evidence based practice   D. Outcome monitoring methods: self report, observation, rating scales, feedback from clinical staff      Visit Diagnosis:   1. Anxiety    2. Moderate episode of recurrent major depressive disorder    3. Attention deficit hyperactivity disorder (ADHD), unspecified ADHD type        Follow-up: individual psychotherapy    Return to Clinic: 2 weeks  Pt Reported to Schedule Self via Epic EMR MyChart Application and/or Department Support Staff          Janelle Rosenberg LCSW  6/18/2024  11:00 AM

## 2024-06-17 ENCOUNTER — OFFICE VISIT (OUTPATIENT)
Dept: PRIMARY CARE CLINIC | Facility: CLINIC | Age: 44
End: 2024-06-17
Payer: COMMERCIAL

## 2024-06-17 DIAGNOSIS — E66.09 CLASS 2 OBESITY DUE TO EXCESS CALORIES WITHOUT SERIOUS COMORBIDITY WITH BODY MASS INDEX (BMI) OF 36.0 TO 36.9 IN ADULT: ICD-10-CM

## 2024-06-17 DIAGNOSIS — R53.83 LOW ENERGY: ICD-10-CM

## 2024-06-17 DIAGNOSIS — E55.9 VITAMIN D DEFICIENCY: ICD-10-CM

## 2024-06-17 DIAGNOSIS — R53.83 FATIGUE, UNSPECIFIED TYPE: Primary | ICD-10-CM

## 2024-06-17 DIAGNOSIS — M25.50 PAIN IN JOINT INVOLVING MULTIPLE SITES: ICD-10-CM

## 2024-06-17 DIAGNOSIS — R79.89 LOW VITAMIN B12 LEVEL: ICD-10-CM

## 2024-06-17 DIAGNOSIS — Z79.890 ON HORMONE REPLACEMENT THERAPY: ICD-10-CM

## 2024-06-17 DIAGNOSIS — M79.7 FIBROMYALGIA: ICD-10-CM

## 2024-06-17 PROCEDURE — G2211 COMPLEX E/M VISIT ADD ON: HCPCS | Mod: 95,,, | Performed by: FAMILY MEDICINE

## 2024-06-17 PROCEDURE — 99215 OFFICE O/P EST HI 40 MIN: CPT | Mod: 95,,, | Performed by: FAMILY MEDICINE

## 2024-06-17 RX ORDER — SEMAGLUTIDE 0.5 MG/.5ML
0.5 INJECTION, SOLUTION SUBCUTANEOUS
Qty: 2 ML | Refills: 12 | Status: SHIPPED | OUTPATIENT
Start: 2024-06-17

## 2024-06-17 RX ORDER — CYANOCOBALAMIN 1000 UG/ML
1000 INJECTION, SOLUTION INTRAMUSCULAR; SUBCUTANEOUS
Qty: 1 ML | Refills: 12 | Status: SHIPPED | OUTPATIENT
Start: 2024-06-17

## 2024-06-17 NOTE — PROGRESS NOTES
"    [facility]       [address] [place] [address]      Phone: [phone]      Fax: [phone]    Hakeem Mayers MD                Video Visit  06/17/2024        Subjective      HPI:  Joy Recinos is a 44 y.o. female presents today via video for "No chief complaint on file.  ."     4-year-old female presents today via video visit to discuss a couple of issues.    Patient states he has been having a lot more pain lately.  She has fibromyalgia, so she is always in pain.  Lately, though come seems to be worse.  Muscles, joints, knees, shoulders, neck all hurt.  She has trouble holding her shoulders back and sitting with correct posture.  Feels stiffness all over.  She works from home, so wonders if it could be stress related?  Has done physical therapy in the past.  Thought about going to see a chiropractor.    Also states she has not noticed a difference in her energy levels.  Her vitamin-D levels were low in the past, so she started taking a weekly supplement.  Has been taking this faithfully, but has not noticed any difference.    Also feels like her hair is thinning.  Over the last couple of years feels like it has thinned out.  No bald spots, nor patches yet.    She is also interested in using medication to help with weight loss.  Would like to try Wegovy.  [facility] recently started a digital medicine program that she should be able to get the medication for much cheaper.  She also has a savings card that she got off the Wegov[employer] website.    PMH: Anxiety/depression.  GERD.  Allergies.  Fibromyalgia.  Endometriosis.    PSH:  Endometriosis (multiple).  Hysterectomy.  Oophorectomy.    F MH: Skin cancer.  Thyroid.    Allergies:  NKDA   Social:  Works as a project  for an engineering firm.   works for [employer].  T: Denies  A:  Occasionally  D:  Denies    Exercise:  No regular exercise program    Pap:  Hysterectomy  MMG:  " 06/30/2023     Gyn: NP at the [facility]  Psychiatry: Dr. Willett        The following were updated and reviewed by myself in the chart: medications, past medical history, past surgical history, family history, social history, and allergies.     Medications:  Current Outpatient Medications on File Prior to Visit   Medication Sig Dispense Refill    clonazePAM (KLONOPIN) 0.5 MG tablet Take 1 tablet (0.5 mg total) by mouth daily as needed for Anxiety. 30 tablet 1    ergocalciferol (ERGOCALCIFEROL) 50,000 unit Cap Take 1 capsule (50,000 Units total) by mouth every 7 days. 13 capsule 3    estradioL (ESTRACE) 2 MG tablet Take 1 tablet (2 mg total) by mouth once daily. 90 tablet 3    levocetirizine (XYZAL) 5 MG tablet Take 1 tablet (5 mg total) by mouth every evening. 30 tablet 11    omeprazole (PRILOSEC) 20 MG capsule Take 1 capsule (20 mg total) by mouth once daily. 90 capsule 0    traZODone (DESYREL) 100 MG tablet Take ½ to 1 ½ tablets by mouth at bedtime as needed for sleep. 45 tablet 3    venlafaxine (EFFEXOR-XR) 150 MG Cp24 Take 2 capsules (300 mg total) by mouth once daily. 60 capsule 3    [DISCONTINUED] predniSONE (DELTASONE) 20 MG tablet Take 1 tablet (20 mg total) by mouth once daily. 5 tablet 0     No current facility-administered medications on file prior to visit.        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      Patient Active Problem List    Diagnosis Date Noted    Neurogenic bladder 11/05/2020    Decreased strength 09/30/2020    Incomplete emptying of bladder 09/25/2020    Chronic bilateral low  back pain without sciatica 2020    Attention deficit hyperactivity disorder (ADHD) 2020    S/P hysterectomy 04/10/2019    Endometriosis of pelvis 2019    ARELLANO (dyspnea on exertion) 2018    Class 2 obesity due to excess calories without serious comorbidity with body mass index (BMI) of 36.0 to 36.9 in adult 2018    Pedal edema 2018    DOUG (generalized anxiety disorder) 2018    Depression with anxiety 2018    Great toe pain, right 2018    Positive DARRION (antinuclear antibody) 2017    Periorbital erythema 2017    Vitamin D deficiency 2016    Breakthrough bleeding on birth control pills 10/03/2014    B12 deficiency     Idiopathic hypersomnia     Migraines     Fibromyalgia syndrome         PSHx:  Past Surgical History:   Procedure Laterality Date     SECTION      x 2     CYSTOSCOPY N/A 04/10/2019    Procedure: CYSTOSCOPY;  Surgeon: Gillian Carpenter MD;  Location: [facility] OR;  Service: OB/GYN;  Laterality: N/A;    HYSTERECTOMY  2019    Total    OOPHORECTOMY Bilateral     left tube and ovary removed. later RSO with hyst. ovaries removed at differnt times,  and .    PELVIC LAPAROSCOPY      ROBOT-ASSISTED LAPAROSCOPIC ABDOMINAL HYSTERECTOMY USING DA LONI XI N/A 04/10/2019    Procedure: XI ROBOTIC HYSTERECTOMY;  Surgeon: Gillian Carpenter MD;  Location: [facility] OR;  Service: OB/GYN;  Laterality: N/A;    ROBOT-ASSISTED LAPAROSCOPIC LYSIS OF ADHESIONS USING DA LONI XI N/A 04/10/2019    Procedure: XI ROBOTIC LYSIS, ADHESIONS;  Surgeon: Gillian Carpenter MD;  Location: [facility] OR;  Service: OB/GYN;  Laterality: N/A;    ROBOT-ASSISTED LAPAROSCOPIC SALPINGO-OOPHORECTOMY USING DA LONI XI Right 04/10/2019    Procedure: XI ROBOTIC SALPINGO-OOPHORECTOMY;  Surgeon: Gillian Carpenter MD;  Location: [facility] OR;  Service: OB/GYN;  Laterality: Right;    TUBAL LIGATION          FHx:  Family History   Problem Relation Name Age of Onset    No Known Problems  Mother      Heart disease Father          Bypass    Skin cancer Father      Cataracts Maternal Grandmother      Diabetes Maternal Grandmother      Glaucoma Maternal Grandmother      Skin cancer Maternal Grandmother      Cataracts Maternal Grandfather      Skin cancer Paternal Grandfather      Breast cancer Neg Hx      Ovarian cancer Neg Hx      Deep vein thrombosis Neg Hx          Social:  Social History     Socioeconomic History    Marital status:     Number of children: 2   Occupational History     Employer: [employer]   Tobacco Use    Smoking status: Never     Passive exposure: Never    Smokeless tobacco: Never   Substance and Sexual Activity    Alcohol use: Yes     Comment: rarely    Drug use: No    Sexual activity: Yes     Partners: Male     Birth control/protection: Condom     Comment:    Other Topics Concern    Are you pregnant or think you may be? No    Breast-feeding No   Social History Narrative    No smokers in household, 1 dog.     Social Determinants of Health     Financial Resource Strain: Low Risk  (4/23/2024)    Overall Financial Resource Strain (CARDIA)     Difficulty of Paying Living Expenses: Not very hard   Food Insecurity: Food Insecurity Present (4/23/2024)    Hunger Vital Sign     Worried About Running Out of Food in the Last Year: Sometimes true     Ran Out of Food in the Last Year: Never true   Transportation Needs: No Transportation Needs (4/23/2024)    PRAPARE - Transportation     Lack of Transportation (Medical): No     Lack of Transportation (Non-Medical): No   Physical Activity: Insufficiently Active (4/23/2024)    Exercise Vital Sign     Days of Exercise per Week: 5 days     Minutes of Exercise per Session: 10 min   Stress: Stress Concern Present (4/23/2024)    [employer] - Occupational Stress Questionnaire     Feeling of Stress : Very much   Housing Stability: Unknown (4/23/2024)    Housing Stability Vital Sign     Unable to Pay for  "Housing in the Last Year: No        Allergies:  Review of patient's allergies indicates:  No Known Allergies     ROS:  Review of Systems   Constitutional:  Negative for activity change, appetite change, chills, fever and unexpected weight change.   HENT:  Negative for congestion, hearing loss, postnasal drip, rhinorrhea, sore throat and trouble swallowing.    Eyes:  Negative for discharge and visual disturbance.   Respiratory:  Positive for chest tightness. Negative for cough, shortness of breath and wheezing.    Cardiovascular:  Negative for chest pain and palpitations.   Gastrointestinal:  Positive for constipation and diarrhea. Negative for abdominal pain, blood in stool, nausea and vomiting.   Endocrine: Positive for polydipsia and polyuria.   Genitourinary:  Negative for difficulty urinating, dysuria, hematuria and menstrual problem.   Musculoskeletal:  Positive for arthralgias and neck pain. Negative for joint swelling and myalgias.   Skin:  Negative for color change and rash.   Neurological:  Positive for headaches. Negative for speech difficulty and weakness.   Psychiatric/Behavioral:  Positive for confusion and dysphoric mood.    All other systems reviewed and are negative.         Objective      LMP 03/22/2019 (Exact Date)   Ht Readings from Last 3 Encounters:   04/09/24 5' 8" (1.727 m)   12/27/23 5' 8" (1.727 m)   08/18/23 5' 8" (1.727 m)     Wt Readings from Last 3 Encounters:   04/09/24 99.8 kg (220 lb)   12/27/23 100 kg (220 lb 7.4 oz)   08/18/23 98.9 kg (218 lb 0.6 oz)       PHYSICAL EXAM:  Physical Exam  Constitutional:       General: She is not in acute distress.     Appearance: Normal appearance. She is not ill-appearing.   HENT:      Head: Normocephalic and atraumatic.   Pulmonary:      Effort: Pulmonary effort is normal. No respiratory distress.   Neurological:      Mental Status: She is alert.   Psychiatric:         Mood and Affect: Mood normal.         Behavior: Behavior normal.         Thought " Content: Thought content normal.              LABS / IMAGING:  No results found for this or any previous visit (from the past 4368 hour(s)).      Assessment    1. Fatigue, unspecified type    2. Low energy    3. Vitamin D deficiency    4. Low vitamin B12 level    5. Fibromyalgia    6. Pain in joint involving multiple sites    7. On hormone replacement therapy    8. Class 2 obesity due to excess calories without serious comorbidity with body mass index (BMI) of 36.0 to 36.9 in adult          Plan    Diagnoses and all orders for this visit:    Fatigue, unspecified type  -     CBC Auto Differential; Future  -     Comprehensive Metabolic Panel; Future  -     TSH; Future  -     Lipid Panel; Future  -     Hemoglobin A1C; Future  -     VITAMIN B12; Future  -     Vitamin D 25-Hydroxy; Future  -     Testosterone; Future  -     ESTRADIOL; Future  -     FOLLICLE STIMULATING HORMONE; Future  -     cyanocobalamin 1,000 mcg/mL injection; Inject 1 mL (1,000 mcg total) into the skin every 28 days.    Low energy  -     CBC Auto Differential; Future  -     Comprehensive Metabolic Panel; Future  -     TSH; Future  -     Lipid Panel; Future  -     Hemoglobin A1C; Future  -     VITAMIN B12; Future  -     Vitamin D 25-Hydroxy; Future  -     Testosterone; Future  -     ESTRADIOL; Future  -     FOLLICLE STIMULATING HORMONE; Future  -     cyanocobalamin 1,000 mcg/mL injection; Inject 1 mL (1,000 mcg total) into the skin every 28 days.    Vitamin D deficiency  -     Vitamin D 25-Hydroxy; Future    Low vitamin B12 level  -     VITAMIN B12; Future  -     cyanocobalamin 1,000 mcg/mL injection; Inject 1 mL (1,000 mcg total) into the skin every 28 days.    Fibromyalgia  -     CBC Auto Differential; Future  -     Comprehensive Metabolic Panel; Future  -     TSH; Future  -     Lipid Panel; Future  -     Hemoglobin A1C; Future  -     VITAMIN B12; Future  -     Vitamin D 25-Hydroxy; Future  -     Testosterone; Future  -     ESTRADIOL; Future  -      FOLLICLE STIMULATING HORMONE; Future    Pain in joint involving multiple sites  -     CBC Auto Differential; Future  -     Comprehensive Metabolic Panel; Future  -     TSH; Future  -     Lipid Panel; Future  -     Hemoglobin A1C; Future  -     VITAMIN B12; Future  -     Vitamin D 25-Hydroxy; Future  -     Testosterone; Future  -     ESTRADIOL; Future  -     FOLLICLE STIMULATING HORMONE; Future    On hormone replacement therapy  -     CBC Auto Differential; Future  -     Comprehensive Metabolic Panel; Future  -     TSH; Future  -     Lipid Panel; Future  -     Hemoglobin A1C; Future  -     VITAMIN B12; Future  -     Vitamin D 25-Hydroxy; Future  -     Testosterone; Future  -     ESTRADIOL; Future  -     FOLLICLE STIMULATING HORMONE; Future    Class 2 obesity due to excess calories without serious comorbidity with body mass index (BMI) of 36.0 to 36.9 in adult  -     CBC Auto Differential; Future  -     Comprehensive Metabolic Panel; Future  -     TSH; Future  -     Lipid Panel; Future  -     Hemoglobin A1C; Future  -     VITAMIN B12; Future  -     Vitamin D 25-Hydroxy; Future  -     Testosterone; Future  -     ESTRADIOL; Future  -     FOLLICLE STIMULATING HORMONE; Future  -     semaglutide, weight loss, (WEGOVY) 0.5 mg/0.5 mL PnIj; Inject 0.5 mg into the skin every 7 days.    On screen, she looks okay.      Reviewing previous labs, vitamin-D levels are a bit low, but would not necessarily expect supplementation to increase energy levels.  On the other hand, B12 levels are fairly low.  Recommended we try doing monthly B12 injections to supplement.    With her annual wellness exam coming up, we will go ahead and repeat screening labs today.  Can check vitamin-D, B12, other hormones, as well.    If the B12 supplementation does not make her feel better, we can always have her see Dr. Max to discuss hormone pellets/replacement.      For her joint pains, recommended she see Darrius Lebron to see if his PREP program could  offer any benefit.  Info given.      She would like us to go ahead and send a prescription for Wegovy to the pharmacy just to see her savings card we will bring the cost down.  Suspect that it will not, rather she should enroll in digital medicine weight management program.  Link given.      FOLLOW-UP:  Follow up in 9 weeks (on 8/19/2024) for annual exam.    The patient location is:  [place]  The chief complaint leading to consultation is:  Weight loss, joint pains, fatigue, labs.    Visit type: audiovisual    Face to Face time with patient: 26 minutes    45 minutes of total time spent on the encounter, which includes face to face time and non-face to face time preparing to see the patient (eg, review of tests), Obtaining and/or reviewing separately obtained history, Documenting clinical information in the electronic or other health record, Independently interpreting results (not separately reported) and communicating results to the patient/family/caregiver, or Care coordination (not separately reported). Visit today included increased complexity associated with the care of the episodic problem addressed and managing the longitudinal care of the patient due to the serious and/or complex managed problem(s).    Each patient to whom he or she provides medical services by telemedicine is:  (1) informed of the relationship between the physician and patient and the respective role of any other health care provider with respect to management of the patient; and (2) notified that he or she may decline to receive medical services by telemedicine and may withdraw from such care at any time.    Signed by:  Hakeem Mayers MD

## 2024-06-17 NOTE — PATIENT INSTRUCTIONS
On screen, everything looks pretty good.      Let us get some screening blood work done between now and next visit.  We can recheck your vitamin-D levels, B12 levels, hormones, and some other screening items.  Those results will be posted to [facility] as soon as they are available.      The last several times we checked your B12 levels, they were quite low.  This certainly could be contributing to your fatigue.  I am okay with doing some B12 supplementation to see if that helps improve the way you feel.  I am sending a prescription to the pharmacy today.  They should be able to walk you through doing the injections.  Or, they maybe able to them for you.  If you have any questions, just bring the medication up here we can walk you through the process.      For some of the joint pains, rather than going directly to chiropractor, let us try talking with my colleague, Darrius Lebron.  He was with the spine and sports [facility] clinic here, on [place].  In addition to being a private physical therapist, he has a certified athletic trainer and strengthening conditioning specialist.  He has a program where he can design an individualized, pain relieving exercise program that gets great results.  Feel free to call him directly at [phone] to schedule an appointment.  You can also check out his website here:  [email or web address]    Lastly, I am okay with sending a prescription for Wegovy to the pharmacy to see if insurance covers it and whether or not the savings card works.  I am not convinced that it will, though.  Since you are on the [facility] employee health insurance, they have a program through [employer] that should be able to get you that medication for much cheaper.  Try using this link to enroll:  [email or web address]

## 2024-06-18 ENCOUNTER — OFFICE VISIT (OUTPATIENT)
Dept: PSYCHIATRY | Facility: CLINIC | Age: 44
End: 2024-06-18
Payer: COMMERCIAL

## 2024-06-18 DIAGNOSIS — F90.9 ATTENTION DEFICIT HYPERACTIVITY DISORDER (ADHD), UNSPECIFIED ADHD TYPE: ICD-10-CM

## 2024-06-18 DIAGNOSIS — F41.9 ANXIETY: Primary | ICD-10-CM

## 2024-06-18 DIAGNOSIS — F33.1 MODERATE EPISODE OF RECURRENT MAJOR DEPRESSIVE DISORDER: ICD-10-CM

## 2024-06-18 PROCEDURE — 90834 PSYTX W PT 45 MINUTES: CPT | Mod: 95,,, | Performed by: SOCIAL WORKER

## 2024-06-21 ENCOUNTER — LAB VISIT (OUTPATIENT)
Dept: LAB | Facility: HOSPITAL | Age: 44
End: 2024-06-21
Attending: FAMILY MEDICINE
Payer: COMMERCIAL

## 2024-06-21 DIAGNOSIS — E55.9 VITAMIN D DEFICIENCY: ICD-10-CM

## 2024-06-21 DIAGNOSIS — E66.09 CLASS 2 OBESITY DUE TO EXCESS CALORIES WITHOUT SERIOUS COMORBIDITY WITH BODY MASS INDEX (BMI) OF 36.0 TO 36.9 IN ADULT: ICD-10-CM

## 2024-06-21 DIAGNOSIS — R53.83 LOW ENERGY: ICD-10-CM

## 2024-06-21 DIAGNOSIS — Z79.890 ON HORMONE REPLACEMENT THERAPY: ICD-10-CM

## 2024-06-21 DIAGNOSIS — M79.7 FIBROMYALGIA: ICD-10-CM

## 2024-06-21 DIAGNOSIS — R79.89 LOW VITAMIN B12 LEVEL: ICD-10-CM

## 2024-06-21 DIAGNOSIS — R53.83 FATIGUE, UNSPECIFIED TYPE: ICD-10-CM

## 2024-06-21 DIAGNOSIS — M25.50 PAIN IN JOINT INVOLVING MULTIPLE SITES: ICD-10-CM

## 2024-06-21 LAB
BASOPHILS # BLD AUTO: 0.01 K/UL (ref 0–0.2)
BASOPHILS NFR BLD: 0.2 % (ref 0–1.9)
DIFFERENTIAL METHOD BLD: ABNORMAL
EOSINOPHIL # BLD AUTO: 0 K/UL (ref 0–0.5)
EOSINOPHIL NFR BLD: 0 % (ref 0–8)
ERYTHROCYTE [DISTWIDTH] IN BLOOD BY AUTOMATED COUNT: 13.1 % (ref 11.5–14.5)
ESTIMATED AVG GLUCOSE: 97 MG/DL (ref 68–131)
HBA1C MFR BLD: 5 % (ref 4–5.6)
HCT VFR BLD AUTO: 42.3 % (ref 37–48.5)
HGB BLD-MCNC: 13.3 G/DL (ref 12–16)
IMM GRANULOCYTES # BLD AUTO: 0.01 K/UL (ref 0–0.04)
IMM GRANULOCYTES NFR BLD AUTO: 0.2 % (ref 0–0.5)
LYMPHOCYTES # BLD AUTO: 1.6 K/UL (ref 1–4.8)
LYMPHOCYTES NFR BLD: 29.3 % (ref 18–48)
MCH RBC QN AUTO: 30.3 PG (ref 27–31)
MCHC RBC AUTO-ENTMCNC: 31.4 G/DL (ref 32–36)
MCV RBC AUTO: 96 FL (ref 82–98)
MONOCYTES # BLD AUTO: 0.3 K/UL (ref 0.3–1)
MONOCYTES NFR BLD: 5.9 % (ref 4–15)
NEUTROPHILS # BLD AUTO: 3.4 K/UL (ref 1.8–7.7)
NEUTROPHILS NFR BLD: 64.4 % (ref 38–73)
NRBC BLD-RTO: 0 /100 WBC
PLATELET # BLD AUTO: 204 K/UL (ref 150–450)
PMV BLD AUTO: 12.3 FL (ref 9.2–12.9)
RBC # BLD AUTO: 4.39 M/UL (ref 4–5.4)
WBC # BLD AUTO: 5.29 K/UL (ref 3.9–12.7)

## 2024-06-21 PROCEDURE — 84403 ASSAY OF TOTAL TESTOSTERONE: CPT | Performed by: FAMILY MEDICINE

## 2024-06-21 PROCEDURE — 82306 VITAMIN D 25 HYDROXY: CPT | Performed by: FAMILY MEDICINE

## 2024-06-21 PROCEDURE — 36415 COLL VENOUS BLD VENIPUNCTURE: CPT | Mod: PN | Performed by: FAMILY MEDICINE

## 2024-06-21 PROCEDURE — 83036 HEMOGLOBIN GLYCOSYLATED A1C: CPT | Performed by: FAMILY MEDICINE

## 2024-06-21 PROCEDURE — 84443 ASSAY THYROID STIM HORMONE: CPT | Performed by: FAMILY MEDICINE

## 2024-06-21 PROCEDURE — 82670 ASSAY OF TOTAL ESTRADIOL: CPT | Performed by: FAMILY MEDICINE

## 2024-06-21 PROCEDURE — 82607 VITAMIN B-12: CPT | Performed by: FAMILY MEDICINE

## 2024-06-21 PROCEDURE — 80053 COMPREHEN METABOLIC PANEL: CPT | Performed by: FAMILY MEDICINE

## 2024-06-21 PROCEDURE — 83001 ASSAY OF GONADOTROPIN (FSH): CPT | Performed by: FAMILY MEDICINE

## 2024-06-21 PROCEDURE — 85025 COMPLETE CBC W/AUTO DIFF WBC: CPT | Performed by: FAMILY MEDICINE

## 2024-06-21 PROCEDURE — 80061 LIPID PANEL: CPT | Performed by: FAMILY MEDICINE

## 2024-06-22 LAB
25(OH)D3+25(OH)D2 SERPL-MCNC: 30 NG/ML (ref 30–96)
ALBUMIN SERPL BCP-MCNC: 3.6 G/DL (ref 3.5–5.2)
ALP SERPL-CCNC: 101 U/L (ref 55–135)
ALT SERPL W/O P-5'-P-CCNC: 9 U/L (ref 10–44)
ANION GAP SERPL CALC-SCNC: 8 MMOL/L (ref 8–16)
AST SERPL-CCNC: 10 U/L (ref 10–40)
BILIRUB SERPL-MCNC: 0.3 MG/DL (ref 0.1–1)
BUN SERPL-MCNC: 12 MG/DL (ref 6–20)
CALCIUM SERPL-MCNC: 9.3 MG/DL (ref 8.7–10.5)
CHLORIDE SERPL-SCNC: 104 MMOL/L (ref 95–110)
CHOLEST SERPL-MCNC: 201 MG/DL (ref 120–199)
CHOLEST/HDLC SERPL: 3 {RATIO} (ref 2–5)
CO2 SERPL-SCNC: 26 MMOL/L (ref 23–29)
CREAT SERPL-MCNC: 0.9 MG/DL (ref 0.5–1.4)
EST. GFR  (NO RACE VARIABLE): >60 ML/MIN/1.73 M^2
ESTRADIOL SERPL-MCNC: 78 PG/ML
FSH SERPL-ACNC: 43.93 MIU/ML
GLUCOSE SERPL-MCNC: 92 MG/DL (ref 70–110)
HDLC SERPL-MCNC: 66 MG/DL (ref 40–75)
HDLC SERPL: 32.8 % (ref 20–50)
LDLC SERPL CALC-MCNC: 117.8 MG/DL (ref 63–159)
NONHDLC SERPL-MCNC: 135 MG/DL
POTASSIUM SERPL-SCNC: 3.9 MMOL/L (ref 3.5–5.1)
PROT SERPL-MCNC: 6.5 G/DL (ref 6–8.4)
SODIUM SERPL-SCNC: 138 MMOL/L (ref 136–145)
TESTOST SERPL-MCNC: 19 NG/DL (ref 5–73)
TRIGL SERPL-MCNC: 86 MG/DL (ref 30–150)
TSH SERPL DL<=0.005 MIU/L-ACNC: 1.33 UIU/ML (ref 0.4–4)
VIT B12 SERPL-MCNC: 367 PG/ML (ref 210–950)

## 2024-07-08 NOTE — TELEPHONE ENCOUNTER
No care due was identified.  [facility] Embedded Care Due Messages. Reference number: [id number].   7/08/2024 10:24:54 AM CDT

## 2024-07-09 RX ORDER — OMEPRAZOLE 20 MG/1
20 CAPSULE, DELAYED RELEASE ORAL DAILY
Qty: 90 CAPSULE | Refills: 3 | Status: SHIPPED | OUTPATIENT
Start: 2024-07-09

## 2024-07-09 NOTE — TELEPHONE ENCOUNTER
Refill Routing Note   Medication(s) are not appropriate for processing by [facility] for the following reason(s):        No active prescription written by provider  Responsible provider unclear    [facility] action(s):  Defer             Appointments  past 12m or future 3m with PCP    Date Provider   Last Visit   6/17/2024 Hakeem Mayers MD   Next Visit   8/19/2024 Hakeem Mayers MD   ED visits in past 90 days: 0        Note composed:6:43 AM 07/09/2024

## 2024-07-10 DIAGNOSIS — Z12.31 OTHER SCREENING MAMMOGRAM: ICD-10-CM

## 2024-08-14 ENCOUNTER — TELEPHONE (OUTPATIENT)
Dept: PSYCHIATRY | Facility: CLINIC | Age: 44
End: 2024-08-14
Payer: COMMERCIAL

## 2024-08-16 ENCOUNTER — OFFICE VISIT (OUTPATIENT)
Dept: PSYCHIATRY | Facility: CLINIC | Age: 44
End: 2024-08-16
Payer: COMMERCIAL

## 2024-08-16 DIAGNOSIS — F90.9 ATTENTION DEFICIT HYPERACTIVITY DISORDER (ADHD), UNSPECIFIED ADHD TYPE: ICD-10-CM

## 2024-08-16 DIAGNOSIS — F41.1 GAD (GENERALIZED ANXIETY DISORDER): Primary | ICD-10-CM

## 2024-08-16 DIAGNOSIS — G47.00 INSOMNIA, UNSPECIFIED TYPE: ICD-10-CM

## 2024-08-16 PROCEDURE — 3044F HG A1C LEVEL LT 7.0%: CPT | Mod: CPTII,95,, | Performed by: PSYCHIATRY & NEUROLOGY

## 2024-08-16 PROCEDURE — 99214 OFFICE O/P EST MOD 30 MIN: CPT | Mod: 95,,, | Performed by: PSYCHIATRY & NEUROLOGY

## 2024-08-16 RX ORDER — VENLAFAXINE HYDROCHLORIDE 150 MG/1
300 CAPSULE, EXTENDED RELEASE ORAL DAILY
Qty: 180 CAPSULE | Refills: 1 | Status: SHIPPED | OUTPATIENT
Start: 2024-08-16 | End: 2025-02-12

## 2024-08-16 RX ORDER — CLONAZEPAM 0.5 MG/1
0.5 TABLET ORAL DAILY PRN
Qty: 30 TABLET | Refills: 2 | Status: SHIPPED | OUTPATIENT
Start: 2024-08-16 | End: 2025-08-16

## 2024-08-16 RX ORDER — TRAZODONE HYDROCHLORIDE 100 MG/1
TABLET ORAL
Qty: 135 TABLET | Refills: 1 | Status: SHIPPED | OUTPATIENT
Start: 2024-08-16

## 2024-08-16 NOTE — PROGRESS NOTES
"Outpatient Psychiatry Follow-up Visit (MD/NP)    8/16/2024    Joy Recinos, a 44 y.o. female, presenting for follow-up visit. Met with patient.    Reason for Encounter: f/u, DOUG.     Interval History: Patient seen and interviewed for follow-up, last seen about three months ago. Moods somewhat better. Started therapy. More comfortable than past experiences. Difficult to keep to schedule in context of her schedule and workload.    Had a good experience with retreat, "good new perspective". Health problems - tired with normal activity exertion. Napping more than usual to cope. Saw PCP. No new identified health problems, but started vitamin D, B-12 injections. No changes in symptoms since then. No new health problems.     Prescribed wegovy, but not covered.   Mild agitation on medication. Not taking clonazepam. Trazodone nightly  Venlafaxine daily.     Past med List:   Sertraline  Duloxetine  escitalopram  Fluoxetine   milnacipran    Background: 38 y/o F presents for establishment of care, reports problems with anxiety & depression. From PCP note (3.16.18): Here today for f/u on depression & anxiety. She reports that since medication adjustment she has been less irritable. Her  is here with her today & affirms this. She has a psych appointment scheduled for May 11. She reports it has been some mild improvement in her volition. Reports treatment with cymbalta - for fibromyalgia, anxiety, depression for 5 years. Neither fibromyalgia pain no moods improved. wellbutrin trial has been perhaps helpful. Describes baseline symptoms as "worried about everything", "worried about worrying", sad, not interested in things, "like I don't have a purpose". Negative thoughts about self. Tired all the time, even when sleeps excessively. Denies SI. "All my life". "stays all the time", though better/worse at times, influenced by stressful situations. Problems with attention/concentration. Reports all of the following " "daily in past 2 weeks (around which time she was laid off): Feeling nervous, anxious or on edge, Not being able to stop or control worrying, Worrying too much about different things   Trouble relaxing   Being so restless that it is hard to sit still   Becoming easily annoyed or irritable   Feeling afraid as if something awful might happen    DOUG-7 = 21 (estimates a 19 in weeks prior to the layoff)    Sleep Problems - hypersomnia  Sad Mood more than 1/2 time  Appetite and weight changes (decreased appetite, small subjective weight loss)  Concentration problems  Guilt   Thoughts of Emptiness  Anhedonia  Anergia  Slowing/PMR    QIDS = 12    Also has decreased sexual interest, pain during intercourse.    PsychHx: first treatment by Obgyn - sertraline(?) when had problems with endometriosis. No AVH, no SI/HI, no delusions. No hospitalizations or self-harm behaviors. Prolonged duloxetine trial, thinks transient or minimal benefit. Past treatment with sertraline.     MedHx: idiopathic hypersomnia. 2 sleep studies, narcolepsy ruled out. Endometriosis. 2 herniated lumbar discs. Migraines. GERD. Seasonal allergies.   FamHx: mother & dad both take medication for depression. Father diagnosed with adhd in 60's.   SocHx: from [facility]. Heart murmur at birth. "fainted and blacked out school", had several episodes between 5 and 9. Worked up but never medically explained. Still gets dizzy and weak, but doesn't black out. Also had back pain frequently as a child. No developmental problems. Regular classes in school. Ok with going to school. Normal socially. Average student. 3 semesters of college. Went to technical school for 1 year, finished (computer/). 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 Applying for new jobs. Lives with  & 2 kids.  x 14 years. "really good" relationship x decreased sexual " "interest and pain. Week before  pulled out a tampon - was extremely painful. [place] has been painful ever since. Has had PT recommended for this. 2 kids (13 & 9). Dtr has been bullied most of her life due to being overweight. She also has problems with oppositionality at home (though is a good student, doesn't have discipline problems at school), won't take a bath. Son is "good kid", "very attached to me". Quiet, not many friends. Mom is supportive, lives nearby.     Review Of Systems:     GENERAL:  No weight gain or loss  SKIN:  No rashes or lacerations  HEAD:  No headaches  CHEST:  No shortness of breath, hyperventilation or cough  CARDIOVASCULAR:  No tachycardia or chest pain  ABDOMEN:  No nausea, vomiting, pain, constipation or diarrhea  URINARY:  No frequency, dysuria or sexual dysfunction  ENDOCRINE:  No polydipsia, polyuria  MUSCULOSKELETAL:  pain and stiffness of the joints  NEUROLOGIC:  No weakness, sensory changes, seizures, confusion, memory loss, tremor or other abnormal movements    Current Evaluation:     Nutritional Screening: Considering the patient's height and weight, medications, medical history and preferences, should a referral be made to the dietitian? no    Constitutional  Vitals:  Most recent vital signs, dated less than 90 days prior to this appointment, were not reviewed.    There were no vitals filed for this visit.       General:  unremarkable, age appropriate     Musculoskeletal  Muscle Strength/Tone:  no tremor, no tic   Gait & Station:  non-ataxic     Psychiatric  Appearance: casually dressed & groomed;   Behavior: calm,   Cooperation: cooperative with assessment  Speech: normal rate, volume, tone  Thought Process: linear, goal-directed  Thought Content: No suicidal or homicidal ideation; no delusions  Affect: anxious  Mood: anxious  Perceptions: No auditory or visual hallucinations  Level of Consciousness: alert throughout interview  Insight: fair  Cognition: Oriented to " person, place, time, & situation  Memory: no apparent deficits to general clinical interview; not formally assessed  Attention/Concentration: no apparent deficits to general clinical interview; not formally assessed  Fund of Knowledge: average by vocabulary/education    Laboratory Data  No visits with results within 1 Month(s) from this visit.   Latest known visit with results is:   Lab Visit on 06/21/2024   Component Date Value Ref Range Status    WBC 06/21/2024 5.29  3.90 - 12.70 K/uL Final    RBC 06/21/2024 4.39  4.00 - 5.40 M/uL Final    Hemoglobin 06/21/2024 13.3  12.0 - 16.0 g/dL Final    Hematocrit 06/21/2024 42.3  37.0 - 48.5 % Final    MCV 06/21/2024 96  82 - 98 fL Final    MCH 06/21/2024 30.3  27.0 - 31.0 pg Final    MCHC 06/21/2024 31.4 (L)  32.0 - 36.0 g/dL Final    RDW 06/21/2024 13.1  11.5 - 14.5 % Final    Platelets 06/21/2024 204  150 - 450 K/uL Final    MPV 06/21/2024 12.3  9.2 - 12.9 fL Final    Immature Granulocytes 06/21/2024 0.2  0.0 - 0.5 % Final    Gran # (ANC) 06/21/2024 3.4  1.8 - 7.7 K/uL Final    Immature Grans (Abs) 06/21/2024 0.01  0.00 - 0.04 K/uL Final    Lymph # 06/21/2024 1.6  1.0 - 4.8 K/uL Final    Mono # 06/21/2024 0.3  0.3 - 1.0 K/uL Final    Eos # 06/21/2024 0.0  0.0 - 0.5 K/uL Final    Baso # 06/21/2024 0.01  0.00 - 0.20 K/uL Final    nRBC 06/21/2024 0  0 /100 WBC Final    Gran % 06/21/2024 64.4  38.0 - 73.0 % Final    Lymph % 06/21/2024 29.3  18.0 - 48.0 % Final    Mono % 06/21/2024 5.9  4.0 - 15.0 % Final    Eosinophil % 06/21/2024 0.0  0.0 - 8.0 % Final    Basophil % 06/21/2024 0.2  0.0 - 1.9 % Final    Differential Method 06/21/2024 Automated   Final    Sodium 06/21/2024 138  136 - 145 mmol/L Final    Potassium 06/21/2024 3.9  3.5 - 5.1 mmol/L Final    Chloride 06/21/2024 104  95 - 110 mmol/L Final    CO2 06/21/2024 26  23 - 29 mmol/L Final    Glucose 06/21/2024 92  70 - 110 mg/dL Final    BUN 06/21/2024 12  6 - 20 mg/dL Final    Creatinine 06/21/2024 0.9  0.5 - 1.4 mg/dL  Final    Calcium 06/21/2024 9.3  8.7 - 10.5 mg/dL Final    Total Protein 06/21/2024 6.5  6.0 - 8.4 g/dL Final    Albumin 06/21/2024 3.6  3.5 - 5.2 g/dL Final    Total Bilirubin 06/21/2024 0.3  0.1 - 1.0 mg/dL Final    Alkaline Phosphatase 06/21/2024 101  55 - 135 U/L Final    AST 06/21/2024 10  10 - 40 U/L Final    ALT 06/21/2024 9 (L)  10 - 44 U/L Final    eGFR 06/21/2024 >60.0  >60 mL/min/1.73 m^2 Final    Anion Gap 06/21/2024 8  8 - 16 mmol/L Final    TSH 06/21/2024 1.330  0.400 - 4.000 uIU/mL Final    Cholesterol 06/21/2024 201 (H)  120 - 199 mg/dL Final    Triglycerides 06/21/2024 86  30 - 150 mg/dL Final    HDL 06/21/2024 66  40 - 75 mg/dL Final    LDL Cholesterol 06/21/2024 117.8  63.0 - 159.0 mg/dL Final    HDL/Cholesterol Ratio 06/21/2024 32.8  20.0 - 50.0 % Final    Total Cholesterol/HDL Ratio 06/21/2024 3.0  2.0 - 5.0 Final    Non-HDL Cholesterol 06/21/2024 135  mg/dL Final    Hemoglobin A1C 06/21/2024 5.0  4.0 - 5.6 % Final    Estimated Avg Glucose 06/21/2024 97  68 - 131 mg/dL Final    Vitamin B-12 06/21/2024 367  210 - 950 pg/mL Final    Vit D, 25-Hydroxy 06/21/2024 30  30 - 96 ng/mL Final    Testosterone, Total 06/21/2024 19  5 - 73 ng/dL Final    Estradiol 06/21/2024 78  See Text pg/mL Final    Follicle Stimulating Hormone 06/21/2024 43.93  See Text mIU/mL Final     Medications  Outpatient Encounter Medications as of 8/16/2024   Medication Sig Dispense Refill    clonazePAM (KLONOPIN) 0.5 MG tablet Take 1 tablet (0.5 mg total) by mouth daily as needed for Anxiety. 30 tablet 1    cyanocobalamin 1,000 mcg/mL injection Inject 1 mL (1,000 mcg total) into the skin every 28 days. 1 mL 12    ergocalciferol (ERGOCALCIFEROL) 50,000 unit Cap Take 1 capsule (50,000 Units total) by mouth every 7 days. 13 capsule 3    estradioL (ESTRACE) 2 MG tablet Take 1 tablet (2 mg total) by mouth once daily. 90 tablet 3    levocetirizine (XYZAL) 5 MG tablet Take 1 tablet (5 mg total) by mouth every evening. 30 tablet 11     omeprazole (PRILOSEC) 20 MG capsule Take 1 capsule (20 mg total) by mouth once daily. 90 capsule 3    semaglutide, weight loss, (WEGOVY) 0.5 mg/0.5 mL PnIj Inject 0.5 mg into the skin every 7 days. 2 mL 12    traZODone (DESYREL) 100 MG tablet Take ½ to 1 ½ tablets by mouth at bedtime as needed for sleep. 45 tablet 3    venlafaxine (EFFEXOR-XR) 150 MG Cp24 Take 2 capsules (300 mg total) by mouth once daily. 60 capsule 3     No facility-administered encounter medications on file as of 8/16/2024.     Assessment - Diagnosis - Goals:     Impression: 42 y/o F with generalized anxiety disorder & adhd. Previous trials of duloxetine with minimal benefit, low dose bupropion with modest benefit. adhd by clinical history supplemented by family input, screening tools. Had persistent symptoms despite treatment & anxious in context of worse stressors. Failed atomoxetine, concerta. Has been reluctant to try psychotherapy.     Dx: generalized anxiety disorder. adhd    Treatment Goals:  Specify outcomes written in observable, behavioral terms:   Reduce anxiety by DOUG-7.     Treatment Plan/Recommendations:   Venlafaxine 300 mg daily for mood.   More liberal use of Clonazepam prn anxiety. Encouraged use prn.   Trazodone for sleep.   Psychotherapy today - motivational interviewing toward therapy   Discussed risks, benefits, and alternatives to treatment plan documented above with patient. I answered all patient questions related to this plan and patient expressed understanding and agreement.     Return to Clinic: 3 months    WINIFRED Nieto MD  Psychiatry, [facility]

## 2024-08-23 ENCOUNTER — HOSPITAL ENCOUNTER (OUTPATIENT)
Dept: RADIOLOGY | Facility: HOSPITAL | Age: 44
Discharge: HOME | End: 2024-08-23
Attending: FAMILY MEDICINE
Payer: COMMERCIAL

## 2024-08-23 DIAGNOSIS — Z12.31 OTHER SCREENING MAMMOGRAM: ICD-10-CM

## 2024-08-23 PROCEDURE — 77067 SCR MAMMO BI INCL CAD: CPT | Mod: TC,PO

## 2024-08-23 PROCEDURE — 77063 BREAST TOMOSYNTHESIS BI: CPT | Mod: 26,,, | Performed by: RADIOLOGY

## 2024-08-23 PROCEDURE — 77067 SCR MAMMO BI INCL CAD: CPT | Mod: 26,,, | Performed by: RADIOLOGY

## 2024-10-04 ENCOUNTER — PATIENT MESSAGE (OUTPATIENT)
Dept: INTERNAL MEDICINE | Facility: CLINIC | Age: 44
End: 2024-10-04

## 2024-10-04 ENCOUNTER — OFFICE VISIT (OUTPATIENT)
Dept: INTERNAL MEDICINE | Facility: CLINIC | Age: 44
End: 2024-10-04
Payer: COMMERCIAL

## 2024-10-04 DIAGNOSIS — E66.812 CLASS 2 OBESITY DUE TO EXCESS CALORIES WITHOUT SERIOUS COMORBIDITY WITH BODY MASS INDEX (BMI) OF 36.0 TO 36.9 IN ADULT: Primary | ICD-10-CM

## 2024-10-04 DIAGNOSIS — E66.09 CLASS 2 OBESITY DUE TO EXCESS CALORIES WITHOUT SERIOUS COMORBIDITY WITH BODY MASS INDEX (BMI) OF 36.0 TO 36.9 IN ADULT: Primary | ICD-10-CM

## 2024-10-04 RX ORDER — SEMAGLUTIDE 1 MG/.5ML
1 INJECTION, SOLUTION SUBCUTANEOUS
Qty: 2 ML | Refills: 0 | Status: SHIPPED | OUTPATIENT
Start: 2024-11-29

## 2024-10-04 RX ORDER — SEMAGLUTIDE 0.5 MG/.5ML
0.5 INJECTION, SOLUTION SUBCUTANEOUS
Qty: 2 ML | Refills: 0 | Status: SHIPPED | OUTPATIENT
Start: 2024-11-01

## 2024-10-04 RX ORDER — SEMAGLUTIDE 0.25 MG/.5ML
0.25 INJECTION, SOLUTION SUBCUTANEOUS
Qty: 2 ML | Refills: 0 | Status: SHIPPED | OUTPATIENT
Start: 2024-10-04

## 2024-10-04 NOTE — PATIENT INSTRUCTIONS
Wegovy Patient Education  Savings Card  Follow this link to sign up for your Wegovy savings card. You will need this information when the [place] specialty pharmacy contacts you to help pay for your prescription.  Wegovy Savings Card    Dosing Schedule      Choosing an Injection Site and Using your Pen       - Pens are stored in the refrigerator and can be removed 20-30 minutes before the injection to allow the medication to come to room temperature to avoid irritation, burning, or bruising with injection.     What to Expect      Rare, but Serious Side Effects  - Wegovy may cause pancreatitis or gallstones. If you experience severe abdominal pain that may radiate to the back and may or may not be accompanied by vomiting, you are encouraged to seek emergency medical attention to assess your symptoms.     Reproduction, Pregnancy, and Lactation Considerations  - Wegovy is not recommended for use in patients who are currently pregnant or breastfeeding.   - If you plan to become pregnant, the use of this medication is not recommended at the time of conception. It is recommended that this medication should be discontinued at least 2 months prior to conception.     Patients using Oral Contraceptives  - Use of medications like Wegovy may decrease the effectiveness of your oral hormonal contraceptives.   - You are encouraged to add a barrier method of contraception for 4 weeks after initiation and for 4 weeks after each dose titration of Wegovy to minimize this potential risk.     Missed or Changing Your Dosing Schedule  - If you miss a dose of Wegovy, take it as soon as possible, within 5 days of your scheduled dose. If more than 5 days have passed, skip the missed dose and take your next dose on your regularly scheduled day.  - If you would like to change the day of the week you take your Wegovy dose, make sure there are at least 2 days between doses.

## 2024-10-04 NOTE — PROGRESS NOTES
Patient ID: Joy Recinos is a 44 y.o. White female    Subjective  Chief Complaint: patient presents for medical weight loss management.    Contraindications to GLP-1 receptor agonist therapy:   Denies personal or family history of MTC, personal history of MEN2, history of allergic reaction while taking a GLP-1 receptor agonist, and history of pancreatitis while taking a GLP-1 receptor agonist     Co-morbidities: none    History of weight loss therapy:  None. Is interested in starting Wegovy today.    Weight loss history:  Starting weight:    9/30/2024   Recent Readings    Weight (lbs) 221 lb    BMI 34.61 BMI        Current weight: N/a  % weight loss since GLP-1 initiation: 0 %    Objective  Lab Results   Component Value Date     06/21/2024     08/18/2023     07/29/2022     Lab Results   Component Value Date    K 3.9 06/21/2024    K 4.1 08/18/2023    K 4.8 07/29/2022     Lab Results   Component Value Date     06/21/2024     08/18/2023     07/29/2022     Lab Results   Component Value Date    CO2 26 06/21/2024    CO2 28 08/18/2023    CO2 28 07/29/2022     Lab Results   Component Value Date    BUN 12 06/21/2024    BUN 11 08/18/2023    BUN 12 07/29/2022     Lab Results   Component Value Date    GLU 92 06/21/2024    GLU 87 08/18/2023    GLU 86 07/29/2022     Lab Results   Component Value Date    CALCIUM 9.3 06/21/2024    CALCIUM 9.0 08/18/2023    CALCIUM 9.2 07/29/2022     Lab Results   Component Value Date    PROT 6.5 06/21/2024    PROT 7.0 08/18/2023    PROT 6.4 07/29/2022     Lab Results   Component Value Date    ALBUMIN 3.6 06/21/2024    ALBUMIN 3.8 08/18/2023    ALBUMIN 3.7 07/29/2022     Lab Results   Component Value Date    BILITOT 0.3 06/21/2024    BILITOT 0.3 08/18/2023    BILITOT 0.3 07/29/2022     Lab Results   Component Value Date    AST 10 06/21/2024    AST 12 08/18/2023    AST 10 07/29/2022     Lab Results   Component Value Date    ALT 9 (L) 06/21/2024    ALT 8  (L) 08/18/2023    ALT 10 07/29/2022     Lab Results   Component Value Date    ANIONGAP 8 06/21/2024    ANIONGAP 9 08/18/2023    ANIONGAP 10 07/29/2022     Lab Results   Component Value Date    CREATININE 0.9 06/21/2024    CREATININE 0.8 08/18/2023    CREATININE 0.8 07/29/2022     Lab Results   Component Value Date    EGFRNORACEVR >60.0 06/21/2024    EGFRNORACEVR >60.0 08/18/2023     Assessment/Plan  -Pt qualifies for GLP-1 RA therapy based on BMI greater than or equal to 30 kg/m2  -Initiate Wegovy 0.25 mg once weekly for 1 month  -Then increase to Wegovy 0.5 mg once weekly for 1 month  -Then increase to Wegovy 1 mg once weekly  -RTC in 3 months      Patient consented to pharmacist management via collaborative practice.

## 2024-10-10 DIAGNOSIS — J30.89 NON-SEASONAL ALLERGIC RHINITIS, UNSPECIFIED TRIGGER: ICD-10-CM

## 2024-10-10 RX ORDER — LEVOCETIRIZINE DIHYDROCHLORIDE 5 MG/1
5 TABLET, FILM COATED ORAL NIGHTLY
Qty: 30 TABLET | Refills: 11 | Status: SHIPPED | OUTPATIENT
Start: 2024-10-10 | End: 2025-10-10

## 2024-12-15 DIAGNOSIS — E55.9 VITAMIN D DEFICIENCY: ICD-10-CM

## 2024-12-15 NOTE — TELEPHONE ENCOUNTER
No care due was identified.  [facility] Embedded Care Due Messages. Reference number: [id number].   12/15/2024 1:56:46 PM CST

## 2024-12-16 NOTE — TELEPHONE ENCOUNTER
Refill Routing Note   Medication(s) are not appropriate for processing by [facility] for the following reason(s):        Outside of protocol    [facility] action(s):  Route             Appointments  past 12m or future 3m with PCP    Date Provider   Last Visit   6/17/2024 Hakeem Mayers MD   Next Visit   Visit date not found Hakeem Mayers MD   ED visits in past 90 days: 0        Note composed:5:46 PM 12/16/2024

## 2024-12-17 RX ORDER — ERGOCALCIFEROL 1.25 MG/1
50000 CAPSULE ORAL
Qty: 13 CAPSULE | Refills: 3 | Status: SHIPPED | OUTPATIENT
Start: 2024-12-17

## 2024-12-20 ENCOUNTER — OFFICE VISIT (OUTPATIENT)
Dept: PSYCHIATRY | Facility: CLINIC | Age: 44
End: 2024-12-20
Payer: COMMERCIAL

## 2024-12-20 DIAGNOSIS — G47.00 INSOMNIA, UNSPECIFIED TYPE: ICD-10-CM

## 2024-12-20 DIAGNOSIS — F90.9 ATTENTION DEFICIT HYPERACTIVITY DISORDER (ADHD), UNSPECIFIED ADHD TYPE: ICD-10-CM

## 2024-12-20 DIAGNOSIS — F41.1 GAD (GENERALIZED ANXIETY DISORDER): Primary | ICD-10-CM

## 2024-12-20 PROCEDURE — 3044F HG A1C LEVEL LT 7.0%: CPT | Mod: CPTII,95,, | Performed by: PSYCHIATRY & NEUROLOGY

## 2024-12-20 PROCEDURE — 99214 OFFICE O/P EST MOD 30 MIN: CPT | Mod: 95,,, | Performed by: PSYCHIATRY & NEUROLOGY

## 2024-12-20 RX ORDER — VENLAFAXINE HYDROCHLORIDE 150 MG/1
300 CAPSULE, EXTENDED RELEASE ORAL DAILY
Qty: 180 CAPSULE | Refills: 1 | Status: SHIPPED | OUTPATIENT
Start: 2024-12-20 | End: 2025-06-18

## 2024-12-20 RX ORDER — TRAZODONE HYDROCHLORIDE 100 MG/1
TABLET ORAL
Qty: 135 TABLET | Refills: 1 | Status: SHIPPED | OUTPATIENT
Start: 2024-12-20

## 2024-12-20 NOTE — PROGRESS NOTES
"Outpatient Psychiatry Follow-up Visit (MD/NP)    12/20/2024    Joy Recinos, a 44 y.o. female, presenting for follow-up visit. Met with patient.    Reason for Encounter: f/u, DOUG.     Interval History: Patient seen and interviewed for follow-up, last seen about four months ago. Moods symptoms are ongoing. Reports stressed by work situation - her company has been going through many layoffs. Interview upcoming, hoping to join new job before laid off. More conflict and communication problems in relationship. Hasn't been doing therapy (difficult to keep in context of schedule, other responsibilities). Adherent to prescribed medications. Prescribed wegovy, but not covered. Mild agitation on medication. Not taking clonazepam. Trazodone nightly. Venlafaxine daily.     Past med List:   Sertraline  Duloxetine  escitalopram  Fluoxetine   milnacipran    Background: 38 y/o F presents for establishment of care, reports problems with anxiety & depression. From PCP note (3.16.18): Here today for f/u on depression & anxiety. She reports that since medication adjustment she has been less irritable. Her  is here with her today & affirms this. She has a psych appointment scheduled for May 11. She reports it has been some mild improvement in her volition. Reports treatment with cymbalta - for fibromyalgia, anxiety, depression for 5 years. Neither fibromyalgia pain no moods improved. wellbutrin trial has been perhaps helpful. Describes baseline symptoms as "worried about everything", "worried about worrying", sad, not interested in things, "like I don't have a purpose". Negative thoughts about self. Tired all the time, even when sleeps excessively. Denies SI. "All my life". "stays all the time", though better/worse at times, influenced by stressful situations. Problems with attention/concentration. Reports all of the following daily in past 2 weeks (around which time she was laid off): Feeling nervous, anxious or on " "edge, Not being able to stop or control worrying, Worrying too much about different things. Trouble relaxing. Being so restless that it is hard to sit still   Becoming easily annoyed or irritable   Feeling afraid as if something awful might happen    DOUG-7 = 21 (estimates a 19 in weeks prior to the layoff)    Sleep Problems - hypersomnia  Sad Mood more than 1/2 time  Appetite and weight changes (decreased appetite, small subjective weight loss)  Concentration problems  Guilt   Thoughts of Emptiness  Anhedonia  Anergia  Slowing/PMR    QIDS = 12    Also has decreased sexual interest, pain during intercourse.    PsychHx: first treatment by Obgyn - sertraline(?) when had problems with endometriosis. No AVH, no SI/HI, no delusions. No hospitalizations or self-harm behaviors. Prolonged duloxetine trial, thinks transient or minimal benefit. Past treatment with sertraline.     MedHx: idiopathic hypersomnia. 2 sleep studies, narcolepsy ruled out. Endometriosis. 2 herniated lumbar discs. Migraines. GERD. Seasonal allergies.   FamHx: mother & dad both take medication for depression. Father diagnosed with adhd in 60's.   SocHx: from [facility]. Heart murmur at birth. "fainted and blacked out school", had several episodes between 5 and 9. Worked up but never medically explained. Still gets dizzy and weak, but doesn't black out. Also had back pain frequently as a child. No developmental problems. Regular classes in school. Ok with going to school. Normal socially. Average student. 3 semesters of college. Went to technical school for 1 year, finished (computer/). 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 Applying for new jobs. Lives with  & 2 kids.  x 14 years. "really good" relationship x decreased sexual interest and pain. Week before  pulled out a tampon - was extremely painful. [place] " "has been painful ever since. Has had PT recommended for this. 2 kids (13 & 9). Dtr has been bullied most of her life due to being overweight. She also has problems with oppositionality at home (though is a good student, doesn't have discipline problems at school), won't take a bath. Son is "good kid", "very attached to me". Quiet, not many friends. Mom is supportive, lives nearby.     Review Of Systems:     GENERAL:  No weight gain or loss  SKIN:  No rashes or lacerations  HEAD:  No headaches  CHEST:  No shortness of breath, hyperventilation or cough  CARDIOVASCULAR:  No tachycardia or chest pain  ABDOMEN:  No nausea, vomiting, pain, constipation or diarrhea  URINARY:  No frequency, dysuria or sexual dysfunction  ENDOCRINE:  No polydipsia, polyuria  MUSCULOSKELETAL:  pain and stiffness of the joints  NEUROLOGIC:  No weakness, sensory changes, seizures, confusion, memory loss, tremor or other abnormal movements    Current Evaluation:     Nutritional Screening: Considering the patient's height and weight, medications, medical history and preferences, should a referral be made to the dietitian? no    Constitutional  Vitals:  Most recent vital signs, dated less than 90 days prior to this appointment, were not reviewed.    There were no vitals filed for this visit.       General:  unremarkable, age appropriate     Musculoskeletal  Muscle Strength/Tone:  no tremor, no tic   Gait & Station:  non-ataxic     Psychiatric  Appearance: casually dressed & groomed;   Behavior: calm,   Cooperation: cooperative with assessment  Speech: normal rate, volume, tone  Thought Process: linear, goal-directed  Thought Content: No suicidal or homicidal ideation; no delusions  Affect: anxious  Mood: anxious  Perceptions: No auditory or visual hallucinations  Level of Consciousness: alert throughout interview  Insight: fair  Cognition: Oriented to person, place, time, & situation  Memory: no apparent deficits to general clinical interview; not " formally assessed  Attention/Concentration: no apparent deficits to general clinical interview; not formally assessed  Fund of Knowledge: average by vocabulary/education    Laboratory Data  No visits with results within 1 Month(s) from this visit.   Latest known visit with results is:   Lab Visit on 06/21/2024   Component Date Value Ref Range Status    WBC 06/21/2024 5.29  3.90 - 12.70 K/uL Final    RBC 06/21/2024 4.39  4.00 - 5.40 M/uL Final    Hemoglobin 06/21/2024 13.3  12.0 - 16.0 g/dL Final    Hematocrit 06/21/2024 42.3  37.0 - 48.5 % Final    MCV 06/21/2024 96  82 - 98 fL Final    MCH 06/21/2024 30.3  27.0 - 31.0 pg Final    MCHC 06/21/2024 31.4 (L)  32.0 - 36.0 g/dL Final    RDW 06/21/2024 13.1  11.5 - 14.5 % Final    Platelets 06/21/2024 204  150 - 450 K/uL Final    MPV 06/21/2024 12.3  9.2 - 12.9 fL Final    Immature Granulocytes 06/21/2024 0.2  0.0 - 0.5 % Final    Gran # (ANC) 06/21/2024 3.4  1.8 - 7.7 K/uL Final    Immature Grans (Abs) 06/21/2024 0.01  0.00 - 0.04 K/uL Final    Lymph # 06/21/2024 1.6  1.0 - 4.8 K/uL Final    Mono # 06/21/2024 0.3  0.3 - 1.0 K/uL Final    Eos # 06/21/2024 0.0  0.0 - 0.5 K/uL Final    Baso # 06/21/2024 0.01  0.00 - 0.20 K/uL Final    nRBC 06/21/2024 0  0 /100 WBC Final    Gran % 06/21/2024 64.4  38.0 - 73.0 % Final    Lymph % 06/21/2024 29.3  18.0 - 48.0 % Final    Mono % 06/21/2024 5.9  4.0 - 15.0 % Final    Eosinophil % 06/21/2024 0.0  0.0 - 8.0 % Final    Basophil % 06/21/2024 0.2  0.0 - 1.9 % Final    Differential Method 06/21/2024 Automated   Final    Sodium 06/21/2024 138  136 - 145 mmol/L Final    Potassium 06/21/2024 3.9  3.5 - 5.1 mmol/L Final    Chloride 06/21/2024 104  95 - 110 mmol/L Final    CO2 06/21/2024 26  23 - 29 mmol/L Final    Glucose 06/21/2024 92  70 - 110 mg/dL Final    BUN 06/21/2024 12  6 - 20 mg/dL Final    Creatinine 06/21/2024 0.9  0.5 - 1.4 mg/dL Final    Calcium 06/21/2024 9.3  8.7 - 10.5 mg/dL Final    Total Protein 06/21/2024 6.5  6.0 - 8.4  g/dL Final    Albumin 06/21/2024 3.6  3.5 - 5.2 g/dL Final    Total Bilirubin 06/21/2024 0.3  0.1 - 1.0 mg/dL Final    Alkaline Phosphatase 06/21/2024 101  55 - 135 U/L Final    AST 06/21/2024 10  10 - 40 U/L Final    ALT 06/21/2024 9 (L)  10 - 44 U/L Final    eGFR 06/21/2024 >60.0  >60 mL/min/1.73 m^2 Final    Anion Gap 06/21/2024 8  8 - 16 mmol/L Final    TSH 06/21/2024 1.330  0.400 - 4.000 uIU/mL Final    Cholesterol 06/21/2024 201 (H)  120 - 199 mg/dL Final    Triglycerides 06/21/2024 86  30 - 150 mg/dL Final    HDL 06/21/2024 66  40 - 75 mg/dL Final    LDL Cholesterol 06/21/2024 117.8  63.0 - 159.0 mg/dL Final    HDL/Cholesterol Ratio 06/21/2024 32.8  20.0 - 50.0 % Final    Total Cholesterol/HDL Ratio 06/21/2024 3.0  2.0 - 5.0 Final    Non-HDL Cholesterol 06/21/2024 135  mg/dL Final    Hemoglobin A1C 06/21/2024 5.0  4.0 - 5.6 % Final    Estimated Avg Glucose 06/21/2024 97  68 - 131 mg/dL Final    Vitamin B-12 06/21/2024 367  210 - 950 pg/mL Final    Vit D, 25-Hydroxy 06/21/2024 30  30 - 96 ng/mL Final    Testosterone, Total 06/21/2024 19  5 - 73 ng/dL Final    Estradiol 06/21/2024 78  See Text pg/mL Final    Follicle Stimulating Hormone 06/21/2024 43.93  See Text mIU/mL Final     Medications  Outpatient Encounter Medications as of 12/20/2024   Medication Sig Dispense Refill    clonazePAM (KLONOPIN) 0.5 MG tablet Take 1 tablet (0.5 mg total) by mouth daily as needed for Anxiety. 30 tablet 2    cyanocobalamin 1,000 mcg/mL injection Inject 1 mL (1,000 mcg total) into the skin every 28 days. 1 mL 12    ergocalciferol (VITAMIN D2) 50,000 unit Cap Take 1 capsule (50,000 Units total) by mouth every 7 days. 13 capsule 3    estradioL (ESTRACE) 2 MG tablet Take 1 tablet (2 mg total) by mouth once daily. 90 tablet 3    levocetirizine (XYZAL) 5 MG tablet Take 1 tablet (5 mg total) by mouth every evening. 30 tablet 11    omeprazole (PRILOSEC) 20 MG capsule Take 1 capsule (20 mg total) by mouth once daily. 90 capsule 3     semaglutide, weight loss, (WEGOVY) 0.5 mg/0.5 mL PnIj Inject 0.5 mg into the skin every 7 days. 2 mL 0    semaglutide, weight loss, (WEGOVY) 1 mg/0.5 mL PnIj Inject 1 mg into the skin every 7 days. 2 mL 0    traZODone (DESYREL) 100 MG tablet Take ½ to 1 ½ tablets by mouth at bedtime as needed for sleep. 135 tablet 1    venlafaxine (EFFEXOR-XR) 150 MG Cp24 Take 2 capsules (300 mg total) by mouth once daily. 180 capsule 1    [DISCONTINUED] ergocalciferol (ERGOCALCIFEROL) 50,000 unit Cap Take 1 capsule (50,000 Units total) by mouth every 7 days. 13 capsule 3     No facility-administered encounter medications on file as of 12/20/2024.     Assessment - Diagnosis - Goals:     Impression: 43 y/o F with generalized anxiety disorder & adhd. Previous trials of duloxetine with minimal benefit, low dose bupropion with modest benefit. adhd by clinical history supplemented by family input, screening tools. Had persistent symptoms despite treatment & anxious in context of worse stressors. Failed atomoxetine, concerta. Started therapy, hasn't continued.     Dx: generalized anxiety disorder. adhd    Treatment Goals:  Specify outcomes written in observable, behavioral terms:   Reduce anxiety by DOUG-7.     Treatment Plan/Recommendations:   Venlafaxine 300 mg daily for mood.   More liberal use of Clonazepam prn anxiety. Encouraged use prn.   Trazodone for sleep.   Psychotherapy today - motivational interviewing toward therapy   Discussed risks, benefits, and alternatives to treatment plan documented above with patient. I answered all patient questions related to this plan and patient expressed understanding and agreement.     Return to Clinic: 3 months    WINIFRED Nieto MD  Psychiatry, [facility]

## 2024-12-31 ENCOUNTER — OFFICE VISIT (OUTPATIENT)
Dept: URGENT CARE | Facility: CLINIC | Age: 44
End: 2024-12-31
Payer: COMMERCIAL

## 2024-12-31 VITALS
OXYGEN SATURATION: 99 % | BODY MASS INDEX: 31.74 KG/M2 | HEIGHT: 68 IN | HEART RATE: 84 BPM | RESPIRATION RATE: 18 BRPM | DIASTOLIC BLOOD PRESSURE: 81 MMHG | WEIGHT: 209.44 LBS | TEMPERATURE: 99 F | SYSTOLIC BLOOD PRESSURE: 121 MMHG

## 2024-12-31 DIAGNOSIS — B96.89 BACTERIAL SINUSITIS: Primary | ICD-10-CM

## 2024-12-31 DIAGNOSIS — J32.9 BACTERIAL SINUSITIS: ICD-10-CM

## 2024-12-31 DIAGNOSIS — B96.89 BACTERIAL SINUSITIS: ICD-10-CM

## 2024-12-31 DIAGNOSIS — J32.9 BACTERIAL SINUSITIS: Primary | ICD-10-CM

## 2024-12-31 DIAGNOSIS — R51.9 LEFT FACIAL PAIN: ICD-10-CM

## 2024-12-31 PROCEDURE — 99214 OFFICE O/P EST MOD 30 MIN: CPT | Mod: S$GLB,,, | Performed by: PHYSICIAN ASSISTANT

## 2024-12-31 RX ORDER — AMOXICILLIN AND CLAVULANATE POTASSIUM 875; 125 MG/1; MG/1
1 TABLET, FILM COATED ORAL 2 TIMES DAILY
Qty: 20 TABLET | Refills: 0 | Status: SHIPPED | OUTPATIENT
Start: 2024-12-31 | End: 2024-12-31

## 2024-12-31 RX ORDER — AMOXICILLIN 875 MG/1
875 TABLET, FILM COATED ORAL EVERY 12 HOURS
Qty: 20 TABLET | Refills: 0 | Status: SHIPPED | OUTPATIENT
Start: 2024-12-31 | End: 2025-01-10

## 2024-12-31 NOTE — PATIENT INSTRUCTIONS
Take augmentin antibiotic and eat yogurt while on antibiotic. Sterile saline rinses and flonase to decongest. IBUPROFEN for pain. Rest and drink plenty of fluids.     Symptoms suggestive of sinus infection due to recent upper respiratory infection; however you also have symptoms of TMJ disorder. you should follow up with your doctor if not improved after treatment OR if numbness,tingling, severe pain with chewing.

## 2024-12-31 NOTE — PROGRESS NOTES
"Subjective:      Patient ID: Joy Recinos is a 44 y.o. female.    Vitals:  height is 5' 8" (1.727 m) and weight is 95 kg (209 lb 7 oz). Her oral temperature is 98.8 °F (37.1 °C). Her blood pressure is 121/81 and her pulse is 84. Her respiration is 18 and oxygen saturation is 99%.     Chief Complaint: Sinus Problem    Patient presents with cough, nasal congestion, left facial pain. On set of symptoms 7 days ago. OTC - sudafed, musinex, Dyquil, Nyquil. Patient denies direct exposure or contact with sick.no fever. No dental pain. No facial numbness or tingling     Sinus Problem  This is a new problem. The current episode started in the past 7 days. The problem is unchanged. There has been no fever. Her pain is at a severity of 3/10. The pain is mild. Associated symptoms include congestion, coughing, ear pain and sinus pressure. Pertinent negatives include no chills, diaphoresis, headaches, hoarse voice, neck pain, shortness of breath, sneezing, sore throat or swollen glands. Past treatments include oral decongestants, acetaminophen and nasal decongestants. The treatment provided mild relief.       Constitution: Negative for chills, sweating, fatigue and fever.   HENT:  Positive for ear pain, congestion, sinus pain and sinus pressure. Negative for sore throat.    Neck: Negative for neck pain.   Respiratory:  Positive for cough. Negative for sputum production and shortness of breath.    Allergic/Immunologic: Negative for sneezing.   Neurological:  Negative for headaches.      Objective:     Physical Exam   Constitutional: She is oriented to person, place, and time. She appears well-developed. She is cooperative.  Non-toxic appearance. She does not appear ill. No distress.   HENT:   Head: Normocephalic and atraumatic.   Ears:   Right Ear: Hearing, tympanic membrane, external ear and ear canal normal.   Left Ear: Hearing, tympanic membrane, external ear and ear canal normal.   Nose: No mucosal edema, rhinorrhea " or nasal deformity. No epistaxis. Right sinus exhibits no maxillary sinus tenderness and no frontal sinus tenderness. Left sinus exhibits maxillary sinus tenderness. Left sinus exhibits no frontal sinus tenderness.   Mouth/Throat: Uvula is midline, oropharynx is clear and moist and mucous membranes are normal. No trismus in the jaw. Normal dentition. No uvula swelling. No oropharyngeal exudate, posterior oropharyngeal edema or posterior oropharyngeal erythema.   + TMJ click on left side      Comments: + TMJ click on left side  Eyes: Conjunctivae and lids are normal. No scleral icterus.   Neck: Trachea normal and phonation normal. Neck supple. No edema present. No erythema present. No neck rigidity present.   Cardiovascular: Normal rate, regular rhythm, normal heart sounds and normal pulses.   Pulmonary/Chest: Effort normal and breath sounds normal. No respiratory distress. She has no decreased breath sounds. She has no rhonchi.   Abdominal: Normal appearance.   Musculoskeletal: Normal range of motion.         General: No deformity. Normal range of motion.   Neurological: She is alert and oriented to person, place, and time. She exhibits normal muscle tone. Coordination normal.      Comments: No facial asymmetry or paresthesia    Skin: Skin is warm, dry, intact, not diaphoretic and not pale.   Psychiatric: Her speech is normal and behavior is normal. Judgment and thought content normal.   Nursing note and vitals reviewed.    .  Assessment:     1. Bacterial sinusitis    2. Left facial pain        Plan:       Bacterial sinusitis  -     amoxicillin-clavulanate 875-125mg (AUGMENTIN) 875-125 mg per tablet; Take 1 tablet by mouth 2 (two) times daily. for 10 days  Dispense: 20 tablet; Refill: 0    Left facial pain    Olamide White PA-C  [facility]       Patient Instructions   Take augmentin antibiotic and eat yogurt while on antibiotic. Sterile saline rinses and flonase to decongest. IBUPROFEN for pain.  Rest and drink plenty of fluids.     Symptoms suggestive of sinus infection due to recent upper respiratory infection; however you also have symptoms of TMJ disorder. you should follow up with your doctor if not improved after treatment OR if numbness,tingling, severe pain with chewing.

## 2025-01-07 DIAGNOSIS — Z79.890 MENOPAUSAL SYNDROME ON HORMONE REPLACEMENT THERAPY: ICD-10-CM

## 2025-01-07 DIAGNOSIS — N95.1 MENOPAUSAL SYNDROME ON HORMONE REPLACEMENT THERAPY: ICD-10-CM

## 2025-01-07 RX ORDER — ESTRADIOL 2 MG/1
2 TABLET ORAL DAILY
Qty: 28 TABLET | Refills: 1 | Status: SHIPPED | OUTPATIENT
Start: 2025-01-07 | End: 2026-01-07

## 2025-01-17 ENCOUNTER — OFFICE VISIT (OUTPATIENT)
Dept: OBSTETRICS AND GYNECOLOGY | Facility: CLINIC | Age: 45
End: 2025-01-17
Payer: COMMERCIAL

## 2025-01-17 VITALS
BODY MASS INDEX: 32.05 KG/M2 | SYSTOLIC BLOOD PRESSURE: 118 MMHG | DIASTOLIC BLOOD PRESSURE: 68 MMHG | WEIGHT: 210.75 LBS

## 2025-01-17 DIAGNOSIS — Z01.419 ENCOUNTER FOR GYNECOLOGICAL EXAMINATION WITHOUT ABNORMAL FINDING: Primary | ICD-10-CM

## 2025-01-17 DIAGNOSIS — N95.1 MENOPAUSAL SYNDROME ON HORMONE REPLACEMENT THERAPY: ICD-10-CM

## 2025-01-17 DIAGNOSIS — Z90.710 S/P HYSTERECTOMY: ICD-10-CM

## 2025-01-17 DIAGNOSIS — Z79.890 MENOPAUSAL SYNDROME ON HORMONE REPLACEMENT THERAPY: ICD-10-CM

## 2025-01-17 PROCEDURE — 3008F BODY MASS INDEX DOCD: CPT | Mod: CPTII,S$GLB,, | Performed by: NURSE PRACTITIONER

## 2025-01-17 PROCEDURE — 1159F MED LIST DOCD IN RCRD: CPT | Mod: CPTII,S$GLB,, | Performed by: NURSE PRACTITIONER

## 2025-01-17 PROCEDURE — 3074F SYST BP LT 130 MM HG: CPT | Mod: CPTII,S$GLB,, | Performed by: NURSE PRACTITIONER

## 2025-01-17 PROCEDURE — 1160F RVW MEDS BY RX/DR IN RCRD: CPT | Mod: CPTII,S$GLB,, | Performed by: NURSE PRACTITIONER

## 2025-01-17 PROCEDURE — 3078F DIAST BP <80 MM HG: CPT | Mod: CPTII,S$GLB,, | Performed by: NURSE PRACTITIONER

## 2025-01-17 PROCEDURE — 99396 PREV VISIT EST AGE 40-64: CPT | Mod: S$GLB,,, | Performed by: NURSE PRACTITIONER

## 2025-01-17 PROCEDURE — 99999 PR PBB SHADOW E&M-EST. PATIENT-LVL III: CPT | Mod: PBBFAC,,, | Performed by: NURSE PRACTITIONER

## 2025-01-17 RX ORDER — ESTRADIOL 2 MG/1
2 TABLET ORAL DAILY
Qty: 90 TABLET | Refills: 3 | Status: SHIPPED | OUTPATIENT
Start: 2025-01-17 | End: 2026-01-17

## 2025-01-17 RX ORDER — PROGESTERONE 100 MG/1
100 CAPSULE ORAL NIGHTLY
Qty: 90 CAPSULE | Refills: 3 | Status: SHIPPED | OUTPATIENT
Start: 2025-01-17 | End: 2026-01-17

## 2025-01-17 NOTE — PROGRESS NOTES
CC: Well woman exam    Joy Recinos is a 44 y.o. female  presents for a well woman exam.     Doing well on estrace but still feels with some brain fog - not sure if from other meds or her fibromyalgia   Hysterectomy done in 2019 with Dr. Carpenter      Mentioned at times has some right sided cramping worse noted with lying down - total hysterectomy with RO done left was already removed  Has some IBS hx     Health Maintenance Topics with due status: Not Due       Topic Last Completion Date    TETANUS VACCINE 2015    Hemoglobin A1c (Diabetic Prevention Screening) 2024    Mammogram 2024    RSV Vaccine (Age 60+ and Pregnant patients) Not Due      Past Medical History:   Diagnosis Date    ADHD (attention deficit hyperactivity disorder)     B12 deficiency     Chronic low back pain     oberlander    Endometriosis     Fibromyalgia syndrome     Idiopathic hypersomnia     Irritable bowel syndrome     goldman    Migraines     frances    PONV (postoperative nausea and vomiting)     Shingles     Undifferentiated inflammatory arthritis 2018    Undifferentiated inflammatory arthritis 2018     Past Surgical History:   Procedure Laterality Date     SECTION      x 2     CYSTOSCOPY N/A 04/10/2019    Procedure: CYSTOSCOPY;  Surgeon: Gillian Carpenter MD;  Location: [facility] OR;  Service: OB/GYN;  Laterality: N/A;    HYSTERECTOMY  2019    OOPHORECTOMY Bilateral     left removed in . Right removed in .    PELVIC LAPAROSCOPY      ROBOT-ASSISTED LAPAROSCOPIC ABDOMINAL HYSTERECTOMY USING DA LONI XI N/A 04/10/2019    Procedure: XI ROBOTIC HYSTERECTOMY;  Surgeon: Gillian Carpenter MD;  Location: [facility] OR;  Service: OB/GYN;  Laterality: N/A;    ROBOT-ASSISTED LAPAROSCOPIC LYSIS OF ADHESIONS USING DA LONI XI N/A 04/10/2019    Procedure: XI ROBOTIC LYSIS, ADHESIONS;  Surgeon: Gillian Carpenter MD;  Location: [facility] OR;  Service: OB/GYN;  Laterality: N/A;    ROBOT-ASSISTED  LAPAROSCOPIC SALPINGO-OOPHORECTOMY USING DA LONI XI Right 04/10/2019    Procedure: XI ROBOTIC SALPINGO-OOPHORECTOMY;  Surgeon: Gillian Carpenter MD;  Location: [facility];  Service: OB/GYN;  Laterality: Right;    TUBAL LIGATION       Family History   Problem Relation Name Age of Onset    No Known Problems Mother      Heart disease Father          Bypass    Skin cancer Father      Cataracts Maternal Grandmother      Diabetes Maternal Grandmother      Glaucoma Maternal Grandmother      Skin cancer Maternal Grandmother      Cataracts Maternal Grandfather      Skin cancer Paternal Grandfather      Breast cancer Neg Hx      Ovarian cancer Neg Hx      Deep vein thrombosis Neg Hx       Social History     Tobacco Use    Smoking status: Never     Passive exposure: Never    Smokeless tobacco: Never   Substance Use Topics    Alcohol use: Yes     Comment: rarely    Drug use: No     OB History          2    Para   2    Term   2            AB        Living   2         SAB        IAB        Ectopic        Multiple        Live Births   2                 /68   Wt 95.6 kg (210 lb 12.2 oz)   LMP 2019 (Exact Date)   BMI 32.05 kg/m²     ROS:  Per hpi    PE:   APPEARANCE: Well nourished, well developed, in no acute distress.  AFFECT: WNL, alert and oriented x 3.  SKIN: No acne or hirsutism.  NECK: Neck symmetric without masses or thyromegaly.  NODES: No inguinal, cervical, axillary or femoral lymph node enlargement.  CHEST: Good respiratory effort.   ABDOMEN: Soft. No tenderness or masses. No hepatosplenomegaly. No hernias.  BREASTS: Symmetrical, no skin changes or visible lesions. No palpable masses, nipple discharge bilaterally.  PELVIC: Normal external female genitalia without lesions. Normal hair distribution. Adequate perineal body, normal urethral meatus. Vagina atrophic without lesions or discharge. No significant cystocele or rectocele. Bimanual exam shows uterus and cervix to be surgically absent.  Adnexa absent     Physical Exam     1. Encounter for gynecological examination without abnormal finding        2. Menopausal syndrome on hormone replacement therapy  estradioL (ESTRACE) 2 MG tablet    progesterone (PROMETRIUM) 100 MG capsule       and PLAN:    Joy was seen today for gynecologic exam.    Diagnoses and all orders for this visit:    Encounter for gynecological examination without abnormal finding    Menopausal syndrome on hormone replacement therapy  -     estradioL (ESTRACE) 2 MG tablet; Take 1 tablet (2 mg total) by mouth once daily.  -     progesterone (PROMETRIUM) 100 MG capsule; Take 1 capsule (100 mg total) by mouth nightly.     Will add in some Prometrium to see if helps with brain fog  Normal exam feel her right sided pain could be from her IBS - has had colonoscopy already     Patient was counseled today on A.C.S. Pap guidelines and recommendations for yearly pelvic exams, mammograms and monthly self breast exams; to see her PCP for other health maintenance.                       Answers submitted by the patient for this visit:  Gynecologic Exam Questionnaire  (Submitted on 1/17/2025)  Chief Complaint: Gynecologic exam  genital itching: No  genital lesions: No  genital odor: No  genital rash: No  missed menses: No  pelvic pain: Yes  vaginal bleeding: No  vaginal discharge: No  Chronicity: new  Onset: 1 to 4 weeks ago  Frequency: intermittently  Progression since onset: resolved  Pain severity: moderate  Affected side: right  Pregnant now?: No  abdominal pain: Yes  anorexia: No  back pain: Yes  chills: No  constipation: Yes  diarrhea: No  discolored urine: No  dysuria: No  fever: No  flank pain: Yes  frequency: No  headaches: Yes  hematuria: No  nausea: No  painful intercourse: Yes  rash: No  urgency: No  vomiting: No  Vaginal bleeding: no bleeding  Passing clots?: No  Passing tissue?: No  Aggravated by: intercourse  treatments tried: nothing  Improvement on treatment: significant  Sexual  activity: sexually active  Partner with STD symptoms: no  Menstrual history: postmenopausal  STD: No  abdominal surgery: Yes   section: Yes  Ectopic pregnancy: No  Endometriosis: Yes  herpes simplex: No  gynecological surgery: Yes  menorrhagia: Yes  metrorrhagia: Yes  miscarriage: No  ovarian cysts: Yes  perineal abscess: No  PID: No  terminated pregnancy: No  vaginosis: No

## 2025-02-20 ENCOUNTER — OFFICE VISIT (OUTPATIENT)
Dept: DERMATOLOGY | Facility: CLINIC | Age: 45
End: 2025-02-20
Payer: COMMERCIAL

## 2025-02-20 DIAGNOSIS — D18.01 CHERRY ANGIOMA: ICD-10-CM

## 2025-02-20 DIAGNOSIS — D48.5 NEOPLASM OF UNCERTAIN BEHAVIOR OF SKIN: Primary | ICD-10-CM

## 2025-02-20 NOTE — PATIENT INSTRUCTIONS
Shave Biopsy Wound Care    Your doctor has performed a shave biopsy today.  A band aid and vaseline ointment has been placed over the site.  This should remain in place for 24 hours.  It is recommended that you keep the area dry for the first 24 hours.  After 24 hours, you may remove the band aid and wash the area with warm soap and water and apply Vaseline jelly.  Many patients prefer to use Neosporin or Bacitracin ointment.  This is acceptable; however, know that you can develop an allergy to this medication even if you have used it safely for years.  It is important to keep the area moist.  Letting it dry out and get air slows healing time, and will worsen the scar.  Band aid is optional after first 24 hours.      If you notice increasing redness, tenderness, pain, or yellow drainage at the biopsy site, please notify your doctor.  These are signs of an infection.    If your biopsy site is bleeding, apply firm pressure for 15 minutes straight.  Repeat for another 15 minutes, if it is still bleeding.   If the surgical site continues to bleed, then please contact your doctor.      [facility] [place] [address]/ [phone] FAX/ [email or web address]

## 2025-02-20 NOTE — PROGRESS NOTES
Subjective:      Patient ID:  Joy Recinos is a 44 y.o. female who presents for   Chief Complaint   Patient presents with    Spot     Concerning a spot on right arm that is red and inflamed. Spot on scalp change in color and size within the last 6 months.      History of Present Illness: The patient presents with chief complaint of spot.  Location: scalp  Duration: 6-12 months  Signs/Symptoms: grew, changed color; asymptomatic    Prior treatments: none    Patient complains of lesion(s)  Location: R forearm  Duration: 5 or more years  Symptoms: won't heal, always has a little hole in it  Relieving factors/Previous treatments: none      Denies personal h/o skin cancer  Denies fam h/o skin cancer in 1st degree relative              Review of Systems   Skin:  Positive for activity-related sunscreen use.       Objective:   Physical Exam   Constitutional: She appears well-developed and well-nourished. No distress.   Neurological: She is alert and oriented to person, place, and time. She is not disoriented.   Psychiatric: She has a normal mood and affect.   Skin:                    Diagram Legend     Erythematous scaling macule/papule c/w actinic keratosis       Vascular papule c/w angioma      Pigmented verrucoid papule/plaque c/w seborrheic keratosis      Yellow umbilicated papule c/w sebaceous hyperplasia      Irregularly shaped tan macule c/w lentigo     1-2 mm smooth white papules consistent with Milia      Movable subcutaneous cyst with punctum c/w epidermal inclusion cyst      Subcutaneous movable cyst c/w pilar cyst      Firm pink to brown papule c/w dermatofibroma      Pedunculated fleshy papule(s) c/w skin tag(s)      Evenly pigmented macule c/w junctional nevus     Mildly variegated pigmented, slightly irregular-bordered macule c/w mildly atypical nevus      Flesh colored to evenly pigmented papule c/w intradermal nevus       Pink pearly papule/plaque c/w basal cell carcinoma      Erythematous  hyperkeratotic cursted plaque c/w SCC      Surgical scar with no sign of skin cancer recurrence      Open and closed comedones      Inflammatory papules and pustules      Verrucoid papule consistent consistent with wart     Erythematous eczematous patches and plaques     Dystrophic onycholytic nail with subungual debris c/w onychomycosis     Umbilicated papule    Erythematous-base heme-crusted tan verrucoid plaque consistent with inflamed seborrheic keratosis     Erythematous Silvery Scaling Plaque c/w Psoriasis     See annotation                    Assessment / Plan:      Pathology Orders:       Normal Orders This Visit    Specimen to Pathology, Dermatology     Comments:    Number of Specimens:->2  ------------------------->-------------------------  Spec 1 Procedure:->Biopsy  Spec 1 Clinical Impression:->IDN vs SK r/o BCC  Spec 1 Source:->frontal scalp  ------------------------->-------------------------  Spec 2 Procedure:->Biopsy  Spec 2 Clinical Impression:->r/o DF  Spec 2 Source:->R forearm    Questions:    Procedure Type: Dermatology and skin neoplasms    Number of Specimens: 2    ------------------------: -------------------------    Spec 1 Procedure: Biopsy    Spec 1 Clinical Impression: IDN vs SK r/o BCC    Spec 1 Source: frontal scalp    ------------------------: -------------------------    Spec 2 Procedure: Biopsy    Spec 2 Clinical Impression: r/o DF    Spec 2 Source: R forearm    Release to patient:           Neoplasm of uncertain behavior of skin  -     Specimen to Pathology, Dermatology    Shave biopsy procedure note: x 2    Shave biopsy performed after verbal consent including risk of infection, scar, recurrence, need for additional treatment of site. Area prepped with alcohol, anesthetized with approximately 1.0cc of 1% lidocaine with epinephrine. Lesional tissue shaved with razor blade. Hemostasis achieved with application of aluminum chloride followed by hyfrecation. No complications. Dressing  applied. Wound care explained.      Larson angioma  This is a benign vascular lesion. Reassurance given. No treatment required.              Follow up if symptoms worsen or fail to improve.

## 2025-02-25 ENCOUNTER — RESULTS FOLLOW-UP (OUTPATIENT)
Dept: DERMATOLOGY | Facility: CLINIC | Age: 45
End: 2025-02-25

## 2025-02-25 NOTE — PROGRESS NOTES
Informed patient via portal of biopsy showing benign mole and DF. No further intervention needed for these sites.       Final Pathologic Diagnosis   Date Value Ref Range Status   02/20/2025   Final    1. Skin,  Frontal scalp,  shave biopsy:   - INTRADERMAL NEVUS    This lesion is benign.      2. Skin, right forearm, shave biopsy:    - DERMATOFIBROMA    This lesion is benign.       Comment:     Interp By Bushra Yates MD., Signed on 02/24/2025 at 11:23

## 2025-03-10 ENCOUNTER — OFFICE VISIT (OUTPATIENT)
Dept: PHYSICAL MEDICINE AND REHAB | Facility: CLINIC | Age: 45
End: 2025-03-10
Payer: COMMERCIAL

## 2025-03-10 ENCOUNTER — CLINICAL SUPPORT (OUTPATIENT)
Dept: REHABILITATION | Facility: HOSPITAL | Age: 45
End: 2025-03-10
Attending: PHYSICAL MEDICINE & REHABILITATION
Payer: COMMERCIAL

## 2025-03-10 VITALS — BODY MASS INDEX: 31.94 KG/M2 | RESPIRATION RATE: 13 BRPM | HEIGHT: 68 IN | WEIGHT: 210.75 LBS

## 2025-03-10 DIAGNOSIS — R29.898 DECREASED ROM OF NECK: Primary | ICD-10-CM

## 2025-03-10 DIAGNOSIS — M51.361 DEGENERATION OF INTERVERTEBRAL DISC OF LUMBAR REGION WITH LOWER EXTREMITY PAIN: ICD-10-CM

## 2025-03-10 DIAGNOSIS — M79.18 DIFFUSE MYOFASCIAL PAIN SYNDROME: Primary | ICD-10-CM

## 2025-03-10 DIAGNOSIS — M79.18 DIFFUSE MYOFASCIAL PAIN SYNDROME: ICD-10-CM

## 2025-03-10 DIAGNOSIS — M53.86 DECREASED ROM OF LUMBAR SPINE: ICD-10-CM

## 2025-03-10 PROCEDURE — 99204 OFFICE O/P NEW MOD 45 MIN: CPT | Mod: S$GLB,,, | Performed by: PHYSICAL MEDICINE & REHABILITATION

## 2025-03-10 PROCEDURE — 3008F BODY MASS INDEX DOCD: CPT | Mod: CPTII,S$GLB,, | Performed by: PHYSICAL MEDICINE & REHABILITATION

## 2025-03-10 PROCEDURE — 1160F RVW MEDS BY RX/DR IN RCRD: CPT | Mod: CPTII,S$GLB,, | Performed by: PHYSICAL MEDICINE & REHABILITATION

## 2025-03-10 PROCEDURE — 1159F MED LIST DOCD IN RCRD: CPT | Mod: CPTII,S$GLB,, | Performed by: PHYSICAL MEDICINE & REHABILITATION

## 2025-03-10 PROCEDURE — 99999 PR PBB SHADOW E&M-EST. PATIENT-LVL IV: CPT | Mod: PBBFAC,,, | Performed by: PHYSICAL MEDICINE & REHABILITATION

## 2025-03-10 PROCEDURE — 97750 PHYSICAL PERFORMANCE TEST: CPT | Mod: 32,PN

## 2025-03-10 RX ORDER — KETOROLAC TROMETHAMINE 10 MG/1
10 TABLET, FILM COATED ORAL 2 TIMES DAILY
Qty: 10 TABLET | Refills: 0 | Status: SHIPPED | OUTPATIENT
Start: 2025-03-10 | End: 2025-03-15

## 2025-03-10 RX ORDER — TIZANIDINE 2 MG/1
2 TABLET ORAL NIGHTLY PRN
Qty: 30 TABLET | Refills: 1 | Status: SHIPPED | OUTPATIENT
Start: 2025-03-10

## 2025-03-10 NOTE — PROGRESS NOTES
Outpatient Rehab    Physical Therapy Evaluation for Healthy Back and Neck Program    Patient Name: Joy Recinso  MRN: [id number]  YOB: 1980  Encounter Date: 3/10/2025    Therapy Diagnosis:   Encounter Diagnoses   Name Primary?    Diffuse myofascial pain syndrome     Degeneration of intervertebral disc of lumbar region with lower extremity pain     Decreased ROM of neck Yes    Decreased ROM of lumbar spine      Physician: Myrna Lynch MD    Physician Orders: Eval and Treat  Medical Diagnosis:   M79.18 (ICD-10-CM) - Diffuse myofascial pain syndrome   M51.361 (ICD-10-CM) - Degeneration of intervertebral disc of lumbar region with lower extremity pain       Visit # / Visits Authorized:  1 / 1   Date of Evaluation:  3/10/2025   Insurance Authorization Period: 3/10/25 to 5/19/25 (or until 20 visits met)  Plan of Care Certification:  3/10/2025 to 5/19/25   Progress note due visit 10 or 30 days from 3/10/25  MED x cervical and lumbar testing to follow        Time In:   1:45 pm  Time Out:  2:40  Total Time:   55 min  Total Billable Time: 55 min with PT and/or tech support    Intake Outcome Measure for FOTO Survey    Therapist reviewed FOTO scores for Joy Recinos on 3/10/2025.   FOTO report - see Media section or FOTO account episode details.     Intake Score: 54%         Subjective   History of Present Illness  Joy is a 44 y.o. female who reports to physical therapy with a chief concern of L buttock to foot pain radiating down the L LE; R UE pain with tingling to the top of the medial digits. low back and neck pain..     The patient reports a medical diagnosis of M79.18 (ICD-10-CM) - Diffuse myofascial pain syndrome  M51.361 (ICD-10-CM) - Degeneration of intervertebral disc of lumbar region with lower extremity pain.            History of Present Condition/Illness: Pt reports a hx of low back pain on and off as well as neck pain through the years. Pt has had PT in the past for  back pain in the past but none for the neck. Pt currently is having shooting pain in the L UE and L LE. 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 Pt states she is sitting at desk working from home.     Pain     Patient reports a current pain level of 8/10. Pain at best is reported as 8/10. Pain at worst is reported as 10/10.   Location: neck to Fingers on L; back to buttock to foot on the L  Pain Qualities: Aching, Burning, Knife-like, Sharp, Needle-like, Radiating         Living Arrangements  Living Situation  Living Arrangements: Family members    Home Setup  Number of Levels in Home: One level        Employment  Patient reports: Does the patient's condition impact their ability to work?  Employment Status: Employed full-time   In a temporary lay off for now.      Past Medical History/Physical Systems Review:   Joy Recinos  has a past medical history of ADHD (attention deficit hyperactivity disorder), B12 deficiency, Chronic low back pain, Endometriosis, Fibromyalgia syndrome, Idiopathic hypersomnia, Irritable bowel syndrome, Migraines, PONV (postoperative nausea and vomiting), Shingles, Undifferentiated inflammatory arthritis, and Undifferentiated inflammatory arthritis.    Joy Recinos  has a past surgical history that includes Pelvic laparoscopy; Tubal ligation;  section; Robot-assisted laparoscopic abdominal hysterectomy using da Tom Xi (N/A, 04/10/2019); Robot-assisted laparoscopic salpingo-oophorectomy using da Tom Xi (Right, 04/10/2019); Robot-assisted laparoscopic lysis of adhesions using da Tom Xi (N/A, 04/10/2019); Cystoscopy (N/A, 04/10/2019); Hysterectomy (2019); and Oophorectomy (Bilateral).    Joy has a current medication list which includes the following prescription(s): clonazepam, cyanocobalamin, ergocalciferol, estradiol, ketorolac, levocetirizine, omeprazole, progesterone, tizanidine, trazodone, and venlafaxine.    Review of patient's  allergies indicates:  No Known Allergies     Objective   Posture                 PT noted forward head posture, rounded shoulders and increase upper cervical hyperextension, reduced thoracic kyphosis and increased lumbar lordosis with anterior pelvic tilt. Pt tends to sit in R lateral shift to unload the L low back pain. Pt is ambulating in the L lateral extension lean with R hip elevated > L.    Spinal Mobility  Hypomobile: Cervical, Thoracic, and Lumbosacral  Thoracic Mobility Details: Seated L trunk rotation 50% ; R trunk rotation 20 % and painful on the R low back.  Lumbosacral Mobility Details: Lumbar flexion 14 inches from reaching the floor; extension leans to L but has 70% ROM; R side flexion to knee  and painful more than L side flexion to the knee (non painful). Core strength 2/5 or better.        Subcranial Range of Motion   Active Restricted? Passive Restricted? Pain   Flexion         Protraction         Retraction           Cervical Range of Motion   Active (deg) Passive (deg) Pain   Flexion 50       Extension 70       Right Lateral Flexion 50       Right Rotation 80       Left Lateral Flexion 40 (L neck pain)       Left Rotation 70 (pulls on L side)              Shoulder Range of Motion     Shoulder, Elbow, or Forearm Range of Motion Details: Shoulder ROM WFL in all planes. Elbow ROM full and Wrist ROM WFL B    Hip Range of Motion   Right Hip   Active (deg) Passive (deg) Pain   Flexion 120       Extension 10       ABduction 4       ADduction         External Rotation 90/90         External Rotation Prone         Internal Rotation 90/90         Internal Rotation Prone             Left Hip   Active (deg) Passive (deg) Pain   Flexion 115       Extension 10       ABduction 4       ADduction         External Rotation 90/90         External Rotation Prone         Internal Rotation 90/90         Internal Rotation Prone             Hip rotation is WFL as with knee ROM and ankle ROM        Shoulder Strength -  Planes of Motion   Right Strength Right Pain Left Strength Left  Pain   Flexion 4+   4     Extension 4   4     ABduction 4   4-     ADduction           Horizontal ABduction           Horizontal ADduction           Internal Rotation 0°           Internal Rotation 90°           External Rotation 0° 4+   4     External Rotation 90°               Shoulder Strength Details  Elbow strength 5/5 generally, wrist 5/5 except L thumb extension 4/5 R 5/5         Hip Strength - Planes of Motion   Right Strength Right Pain Left Strength Left  Pain   Flexion (L2) 4-   4     Extension 4-   4     ABduction 4-   4-     ADduction           Internal Rotation           External Rotation               Knee Strength   Right Strength Right Pain Left Strength Left  Pain   Flexion (S2) 5   5     Prone Flexion 5   5     Extension (L3) 5   5                   Treatment:            Balance/Neuromuscular Re-Education  Balance/Neuromuscular Re-Education Activity 1: PPT 2x 10  Balance/Neuromuscular Re-Education Activity 2: PPT with single leg table top hold 3x 5 x 10 sec per leg  Balance/Neuromuscular Re-Education Activity 3: Side lying clam with green theraband 2x 10  Balance/Neuromuscular Re-Education Activity 4: scapular protraction 2x 10; add 3 # per arm 2x 10  Balance/Neuromuscular Re-Education Activity 5: supine chin tuck with retraction with towel roll 3x 10  Balance/Neuromuscular Re-Education Activity 6: PPT with SLR 2x 10; PPT with SL hip abduction 2x 10         Assessment & Plan   Assessment  Joy presents with a condition of Low complexity.   Presentation of Symptoms: Stable       Functional Limitations: Activity tolerance, Ambulating on uneven surfaces, Bed mobility, Carrying objects, Completing self-care activities, Completing work/school activities, Decreased ambulation distance/endurance, Driving, Fine motor coordination, Functional mobility, Gait limitations, Getting off the floor, Gross motor coordination, Increased risk of  fall, Maintaining balance, Manipulating objects, Pain with ADLs/IADLs, Painful locomotion/ambulation, Participating in leisure activities, Participating in sports, Performing household chores, Proprioception, Range of motion, Reaching, Sitting tolerance, Squatting, Standing tolerance, Transfers  Impairments: Abnormal gait, Abnormal muscle firing, Abnormal muscle tone, Abnormal or restricted range of motion, Activity intolerance, Impaired balance, Impaired cognition, Impaired physical strength, Lack of appropriate home exercise program, Pain with functional activity, Safety issue    Patient Goal for Therapy (PT): less pain in the neck and back and less tingling in the L UE  Prognosis: Good  Assessment Details: PT noted ROM loss, strength loss and functional mobility resulting in pain in the neck and back pain. Pt is a good candidate for PT to advance functional mobility and reduce pain.      Plan  From a physical therapy perspective, the patient would benefit from: Skilled Rehab Services    Planned therapy interventions include: Therapeutic exercise, Therapeutic activities, Neuromuscular re-education, Manual therapy, ADLs/IADLs, and Gait training.    Planned modalities to include: Mechanical traction, Thermotherapy (hot pack), Electrical stimulation - passive/unattended, Electrical stimulation - attended, Cryotherapy (cold pack), and Biofeedback.        Visit Frequency: 2 times Per Week for 10 Weeks.       This plan was discussed with Patient.   Discussion participants: Agreed Upon Plan of Care  Plan details: PT to advance pt with skilled PT to advance functional mobility and reduce pain.          Patient's spiritual, cultural, and educational needs considered and patient agreeable to plan of care and goals.     Education  Education was done with Patient. The patient's learning style includes Demonstration. The patient Requires assistance and Requires continuing/additional education.         PT educated pt in the  need to increase motor control, ROM, balance and stability to reduce pain. PT educated pt in the idea that consistent work on HEP will help her progress faster. PT reviewed HEP with pt and provided a handout.        Goals:   Active       long term goals       cervical L side flexion 45 degree or better without radicular pain       Start:  03/10/25    Expected End:  05/19/25            T spine rotation at 80 % or better       Start:  03/10/25    Expected End:  05/19/25            dead lifting up to 60# or better for home tasks       Start:  03/10/25    Expected End:  05/19/25               med x goals       increase cervical and lumbar med x score by 5% or more       Start:  03/10/25    Expected End:  05/19/25               short term goals       I with HEP       Start:  03/10/25    Expected End:  04/07/25            core strength 2+/5 or better       Start:  03/10/25    Expected End:  04/07/25            hip abduction at 4+/5 or better       Start:  03/10/25    Expected End:  04/07/25                Sindhu Meneses, PT

## 2025-03-10 NOTE — PROGRESS NOTES
PM&R NEW PATIENT HISTORY & PHYSICAL :    Referring Physician:    Chief Complaint   Patient presents with    Muscle Pain    Shoulder Pain    Neck Pain       HPI: This is a 44 y.o.  female being seen in clinic today for re-evaluation of chronic neck and low back achy pain with spasms in her neck/shoulders to the head at times-worse on the left.  She also has an increase of left glut pain with radiation into her leg.  She is limited with ROM due to back/leg pain to the ankle.  Position change provides some relief. She has a history of fibromyalgia as well and hasn't been sleeping well.  She denies numbness, tingling, or weakness into her extremities    History obtained from patient    Functional History:  Walking: Not limited  Transfers: Independent  Assistive devices: No  Power mobility: No  Falls: None     Needs help with:  Nothing - all ADLS normal    Cooking   Cleaning  Bathing   Dressing   Toileting     Past family, medical, social, and surgical history reviewed in chart    Review of Systems:     General- denies lethargy, weight change, fever, chills +insomnia  Head/neck- denies swallowing difficulties  ENT- denies hearing changes  Cardiovascular-denies chest pain  Pulmonary- denies shortness of breath  GI- denies constipation or bowel incontinence  - denies bladder incontinence  Skin- denies wounds or rashes  Musculoskeletal- denies weakness, +pain  Neurologic- denies numbness and tingling  Psychiatric-+depression and anxiety  Lymphatic-denies swelling  Endocrine- denies hypoglycemic symptoms/DM history  All other pertinent systems negative     Physical Examination:  General: Well developed, well nourished female, NAD  HEENT:NCAT EOMI bilaterally   Pulmonary:Normal respirations    Spinal Examination: CERVICAL  Active ROM is within normal limits.  Inspection: No deformity of spinal alignment.  Palpation:  Tight and tender at bilateral trapezius, paraspinals, rhomboids    Spinal Examination: LUMBAR or  THORACIC  Active ROM is limited in flex and ext  Inspection: No deformity of spinal alignment.  No palpable olisthesis.  Palpation: ttp at si joints, ttp at left piriformis   Facet loading +Bilaterally  SLR Test (seated ):negative bilaterally  Slump test + on left  Able to stand on heels and toes    Musculoskeletal Tests:    Elbow compression (ulnar): neg  Tinels at wrist: neg  Phalen: neg    Bilateral Upper and Lower Extremities:  Pulses are 2+ at radial,bilaterally.  Shoulder/Elbow/Wrist/Hand ROM wnl  Hip/Knee/Ankle ROM wnl  Bilateral Extremities show normal capillary refill.  No signs of cyanosis, rubor, edema, skin changes, or dysvascular changes of appendages.  Nails appear intact.    Neurological Exam:  Cranial Nerves:  II-XII grossly intact    Manual Muscle Testing: (Motor 5=normal)    RIGHT Upper extremity: Shoulder abduction 5/5, Biceps 5/5, Triceps 5/5, Wrist extension 5/5, Abductor pollicis brevis 5/5, Ulnar hand intrinsics 5/5,  LEFT Upper extremity: Shoulder abduction 5/5, Biceps 5/5, Triceps 5/5, Wrist extension 5/5, Abductor pollicis brevis 5/5, Ulnar hand intrinsics 5/5,  RIGHT Lower extremity: Hip flexion 5/5, Hip Abduction 4/5, Hip Adduction 4/5, Knee extension 5/5, Knee flexion 5/5, Ankle dorsiflexion 5/5, Extensor hallucis longus 5/5, Ankle plantarflexion 5/5  LEFT Lower extremity:  Hip flexion 5/5, Hip Abduction 4/5,Hip Adduction 4/5, Knee extension 5/5, Knee flexion 5/5, Ankle dorsiflexion 5/5, Extensor hallucis longus 5/5, Ankle plantarflexion 5/5    No focal atrophy is noted of either upper or lower extremity.    Bilateral Reflexes:1+patellar, hypo at bic tric br  Ko's response is absent bilaterally.  No clonus at knee or ankle.    Sensation: tested to light touch  - intact in all four limbs.    Gait: Narrow base and good arm swing.      IMPRESSION/PLAN: This is a 44 y.o.  female with fibromyalgia and myofascial pain, lumbar facet arthropathy, lumbar DDD with left leg radiculitis     1.  Handouts on back and hip exercises, neck/shoulder stretch and exercise  2. Ice instead of overusing heat  3. Refer to [facility] Cervical and Lumbar 2PEP  4. Ketorolac and restart zanaflex 2mg QHS. Natural anti-inflammatories and supplements recommended  5. Rec fu with claire Lynch M.D.  Physical Medicine and Rehab

## 2025-03-18 ENCOUNTER — CLINICAL SUPPORT (OUTPATIENT)
Dept: REHABILITATION | Facility: HOSPITAL | Age: 45
End: 2025-03-18
Payer: COMMERCIAL

## 2025-03-18 DIAGNOSIS — M53.86 DECREASED ROM OF LUMBAR SPINE: ICD-10-CM

## 2025-03-18 DIAGNOSIS — M51.361 DEGENERATION OF INTERVERTEBRAL DISC OF LUMBAR REGION WITH LOWER EXTREMITY PAIN: ICD-10-CM

## 2025-03-18 DIAGNOSIS — M79.18 DIFFUSE MYOFASCIAL PAIN SYNDROME: ICD-10-CM

## 2025-03-18 DIAGNOSIS — R29.898 DECREASED ROM OF NECK: Primary | ICD-10-CM

## 2025-03-18 PROCEDURE — 97750 PHYSICAL PERFORMANCE TEST: CPT | Mod: 32,PN

## 2025-03-18 NOTE — PROGRESS NOTES
Outpatient Rehab    Physical Therapy Visit healthy Neck and Back program    Patient Name: Joy Recinos  MRN: [id number]  YOB: 1980  Encounter Date: 3/18/2025    Therapy Diagnosis:   Encounter Diagnoses   Name Primary?    Decreased ROM of neck Yes    Decreased ROM of lumbar spine     Degeneration of intervertebral disc of lumbar region with lower extremity pain     Diffuse myofascial pain syndrome      Physician: Myrna Lynch MD    Physician Orders: Eval and Treat  Medical Diagnosis: Diffuse myofascial pain syndrome  Degeneration of intervertebral disc of lumbar region with lower extremity pain    Visit # / Visits Authorized:  2 / 20 (test on visit 3 if ok. Pt had radicular pain to foot recently so held testing)  Date of Evaluation:  3/10/2025   Insurance Authorization Period: 3/10/25 to 5/19/25 (or until 20 visits met)  Plan of Care Certification:  3/10/2025 to 5/19/25   Progress note due visit 10 or 30 days from 3/10/25  MED x cervical and lumbar testing to follow       PT/PTA:   0  Number of PTA visits since last PT visit: 0  Time In:   11:03 am  Time Out:  12:00  Total Time:   57 with PT and tech assistance  Total Billable Time: 57 min    FOTO:  Intake Score:  %  Survey Score 1:  %  Survey Score 2:  %    Precautions     L knee pain      Subjective   Pt report she had some radcular pain to the L foot sunday walking in the yard..  Pain reported as 5/10. low back and L buttock    Objective            Treatment:  Therapeutic Exercise  TE 1: bike seated 4 min level 1 then 4 min level 3  Balance/Neuromuscular Re-Education  NMR 1: PPT 2x 10  NMR 2: PPT with single leg table top hold 3x 5 x 10 sec per leg  NMR 3: Side lying clam with green theraband 2x 10  NMR 6: PPT with SLR 2x 10; PPT with SL hip abduction 2x 10  NMR 7: sitting at edge of plinth (one leg held in SKC) while other hip performs hip flexion 3x 5 x 5 sec while sustaining PPT  NMR 8: side lying hip adduction 2x 10 x 3  sec    Time Entry(in minutes):       Assessment & Plan   Assessment: PT worked with pt on therex to engage the core. Pt struggles to engage the core over the +kate test noted B (L rectus, interal rotaton and lateral with R rectus and hip internal rotation chiefly). PT noted a lot of trigger points and guarding in the L lateral hip tissues when assessing tone which is consistent with her L hip and L knee pain as well.  Evaluation/Treatment Tolerance: Patient limited by fatigue    Patient will continue to benefit from skilled outpatient physical therapy to address the deficits listed in the problem list box on initial evaluation, provide pt/family education and to maximize pt's level of independence in the home and community environment.     Patient's spiritual, cultural, and educational needs considered and patient agreeable to plan of care and goals.     Education  Education was done with Patient. The patient's learning style includes Demonstration. The patient Requires assistance and Requires continuing/additional education.                 Plan: Continue with therex to increase hip loading tolerances to reduce +kate signs and increase hip control with adduction and rotation.    Goals:   Active       long term goals       cervical L side flexion 45 degree or better without radicular pain (Progressing)       Start:  03/10/25    Expected End:  05/19/25            T spine rotation at 80 % or better (Progressing)       Start:  03/10/25    Expected End:  05/19/25            dead lifting up to 60# or better for home tasks (Progressing)       Start:  03/10/25    Expected End:  05/19/25               med x goals       increase cervical and lumbar med x score by 5% or more (Progressing)       Start:  03/10/25    Expected End:  05/19/25               short term goals       I with HEP (Progressing)       Start:  03/10/25    Expected End:  04/07/25            core strength 2+/5 or better (Progressing)       Start:   03/10/25    Expected End:  04/07/25            hip abduction at 4+/5 or better (Progressing)       Start:  03/10/25    Expected End:  04/07/25                Sindhu Meneses PT

## 2025-03-19 ENCOUNTER — CLINICAL SUPPORT (OUTPATIENT)
Dept: REHABILITATION | Facility: HOSPITAL | Age: 45
End: 2025-03-19
Payer: COMMERCIAL

## 2025-03-19 DIAGNOSIS — M79.18 DIFFUSE MYOFASCIAL PAIN SYNDROME: ICD-10-CM

## 2025-03-19 DIAGNOSIS — M53.86 DECREASED ROM OF LUMBAR SPINE: ICD-10-CM

## 2025-03-19 DIAGNOSIS — M51.361 DEGENERATION OF INTERVERTEBRAL DISC OF LUMBAR REGION WITH LOWER EXTREMITY PAIN: ICD-10-CM

## 2025-03-19 DIAGNOSIS — R29.898 DECREASED ROM OF NECK: Primary | ICD-10-CM

## 2025-03-19 PROCEDURE — 97750 PHYSICAL PERFORMANCE TEST: CPT | Mod: 32,PN

## 2025-03-19 NOTE — PROGRESS NOTES
Outpatient Rehab    Physical Therapy Visit healthy Neck and Back program    Patient Name: Joy Recinos  MRN: [id number]  YOB: 1980  Encounter Date: 3/19/2025    Therapy Diagnosis:   Encounter Diagnoses   Name Primary?    Decreased ROM of neck Yes    Decreased ROM of lumbar spine     Degeneration of intervertebral disc of lumbar region with lower extremity pain     Diffuse myofascial pain syndrome      Physician: Myrna Lynch MD    Physician Orders: Eval and Treat  Medical Diagnosis: Diffuse myofascial pain syndrome  Degeneration of intervertebral disc of lumbar region with lower extremity pain    Visit # / Visits Authorized:  3 / 20 (cervical testing)  Date of Evaluation:  3/10/2025   Insurance Authorization Period: 3/10/25 to 5/19/25 (or until 20 visits met)  Plan of Care Certification:  3/10/2025 to 5/19/25   Progress note due visit 10 or 30 days from 3/10/25  MED x cervical and lumbar testing to follow       PT/PTA:   0  Number of PTA visits since last PT visit: 0  Time In:   11:03 am  Time Out:  12:00  Total Time:   57 with PT and tech assistance  Total Billable Time: 57 min    FOTO:  Intake Score:  %  Survey Score 1:  %  Survey Score 2:  %    Med x cervical test results 3/19/25 (pt is below average on testing vs peers)    INSURANCE and OUTCOMES: Program Benefit Group with FOTO Outcomes    Pattern of pain determined:     Med x testing today      Objective      Cervical  Isometric Testing on Med X equipment: Testing administered by PT    Test Initial Baseline Midpoint Final   Date 3/19/25     ROM 6-90 deg     Max Peak Torque 193      Min Peak Torque 105      Flex/Ext Ratio 1.838     % variance  normative data 29% below     % change from initial test N/A visit 1         Outcomes:  Intake Score: -29% below normal  Visit 10 Score:   Discharge Score:  Goal Score: 5% above normal or better         Subjective   pt reports she finds it more challenging than she thought to properly engage  her core but she feels she is standing more upright after her PT sessions. Pt had a mild migraine 2 hours after last session (frontal L.  Pain reported as 5/10. low back and neck    Objective            Treatment:  Balance/Neuromuscular Re-Education  NMR 2: PPT with single leg table top hold 3x 5 x 10 sec per leg; PPT with table top hold 10x 10 sec hold  NMR 5: supine chin tuck with retraction with towel roll 3x 10; supine deep neck flexor engaged 2x10 x 5 sec hold  NMR 9: seated trunk rotation machine 20 # 1x 20; seated trunk extension machine 80# 1 x20; Med x cervical test    Time Entry(in minutes):       Assessment & Plan   Assessment: PT tested pt on cervical today and began addressing lumbar more on the med x non computerized system. Pt still struggles a lot with simple core engagment.  Evaluation/Treatment Tolerance: Patient limited by fatigue    Patient will continue to benefit from skilled outpatient physical therapy to address the deficits listed in the problem list box on initial evaluation, provide pt/family education and to maximize pt's level of independence in the home and community environment.     Patient's spiritual, cultural, and educational needs considered and patient agreeable to plan of care and goals.             Plan: Continue with therex to increase hip loading tolerances to reduce +kate signs and increase hip control with adduction and rotation. PT to advance as tolerated to increase core support to reduce lordosis and increase deep neck flexor support as she is very weak in that posture.    Goals:   Active       long term goals       cervical L side flexion 45 degree or better without radicular pain (Progressing)       Start:  03/10/25    Expected End:  05/19/25            T spine rotation at 80 % or better (Progressing)       Start:  03/10/25    Expected End:  05/19/25            dead lifting up to 60# or better for home tasks (Progressing)       Start:  03/10/25    Expected End:   05/19/25               med x goals       increase cervical and lumbar med x score by 5% or more (Progressing)       Start:  03/10/25    Expected End:  05/19/25               short term goals       I with HEP (Progressing)       Start:  03/10/25    Expected End:  04/07/25            core strength 2+/5 or better (Progressing)       Start:  03/10/25    Expected End:  04/07/25            hip abduction at 4+/5 or better (Progressing)       Start:  03/10/25    Expected End:  04/07/25                  Sindhu Meneses, PT

## 2025-03-25 ENCOUNTER — CLINICAL SUPPORT (OUTPATIENT)
Dept: REHABILITATION | Facility: HOSPITAL | Age: 45
End: 2025-03-25
Payer: COMMERCIAL

## 2025-03-25 DIAGNOSIS — M53.86 DECREASED ROM OF LUMBAR SPINE: ICD-10-CM

## 2025-03-25 DIAGNOSIS — R29.898 DECREASED ROM OF NECK: Primary | ICD-10-CM

## 2025-03-25 PROCEDURE — 97750 PHYSICAL PERFORMANCE TEST: CPT | Mod: 32,PN

## 2025-03-25 NOTE — PROGRESS NOTES
Outpatient Rehab    Physical Therapy Visit healthy Neck and Back program    Patient Name: Joy Recinos  MRN: [id number]  YOB: 1980  Encounter Date: 3/25/2025    Therapy Diagnosis:   Encounter Diagnoses   Name Primary?    Decreased ROM of neck Yes    Decreased ROM of lumbar spine      Physician: Myrna Lynch MD    Physician Orders: Eval and Treat  Medical Diagnosis: Diffuse myofascial pain syndrome  Degeneration of intervertebral disc of lumbar region with lower extremity pain    Visit # / Visits Authorized:  4 / 20 (cervical training med x computerized; back non-computerized)  Date of Evaluation:  3/10/2025   Insurance Authorization Period: 3/10/25 to 5/19/25 (or until 20 visits met)  Plan of Care Certification:  3/10/2025 to 5/19/25   Progress note due visit 10 or 30 days from 3/10/25  MED x cervical testing complete      PT/PTA:   0  Number of PTA visits since last PT visit: 0  Time In:   10:35 am  Time Out:  11:30  Total Time:   55 with PT and tech assistance  Total Billable Time: 55 min    FOTO:  Intake Score:  %  Survey Score 1:  %  Survey Score 2:  %    Med x cervical test results 3/19/25 (pt is below average on testing vs peers)    INSURANCE and OUTCOMES: Program Benefit Group with FOTO Outcomes    Pattern of pain determined:           Objective      Cervical  Isometric Testing on Med X equipment: Testing administered by PT    Test Initial Baseline Midpoint Final   Date 3/19/25     ROM 6-90 deg     Max Peak Torque 193      Min Peak Torque 105      Flex/Ext Ratio 1.838     % variance  normative data 29% below     % change from initial test N/A visit 1         Outcomes:  Intake Score: -29% below normal  Visit 10 Score:   Discharge Score:  Goal Score: 5% above normal or better         Subjective   Pt reports she feels sore in multiple joints and muscles. Pt has been working on her HEP and trying to focus on her core more..  Pain reported as 5/10. low back and neck and  legs/thighs    Objective            Treatment:  Therapeutic Exercise  TE 2: recumbant bike 4 min  TE 3: ellipitcal 4 min  TE 4: heel raises 1x 20  Balance/Neuromuscular Re-Education  NMR 1: PPT with standing hip flexion hold 3 sec 3x 10 per leg; then hip abduction with PPT 3 sec hold 3x 10 per leg  NMR 5: med x machine for cervical 102 pounds for 20 reps (RPE 3); med x non computerized extension 72 # 1x 20 (RPE 3); seated hip adduction machine 45 # 1x20 (RPE 3); seated hip abduction 45# 1x 20 (RPE 3  NMR 9: seated trunk rotation machine 24 # 1x 20;    Time Entry(in minutes):       Assessment & Plan   Assessment: Pt had a difficult time initially with cervical machine motor control but this improved with slow progression. Pt can go up on load on the neck, back and hips on follow up as introducing the weights and her learning control just needed some training. Pt stands in sway back postures so PT used a lot of cues to encourage core engagement.  Evaluation/Treatment Tolerance: Patient limited by fatigue    Patient will continue to benefit from skilled outpatient physical therapy to address the deficits listed in the problem list box on initial evaluation, provide pt/family education and to maximize pt's level of independence in the home and community environment.     Patient's spiritual, cultural, and educational needs considered and patient agreeable to plan of care and goals.             Plan: PT to continue with core and hip control as single leg stand simply was challenging for her. Pt is still limited in t spine rotation on the med x machine and fatigues easily on core tasks.    Goals:   Active       long term goals       cervical L side flexion 45 degree or better without radicular pain (Progressing)       Start:  03/10/25    Expected End:  05/19/25            T spine rotation at 80 % or better (Progressing)       Start:  03/10/25    Expected End:  05/19/25            dead lifting up to 60# or better for home  tasks (Progressing)       Start:  03/10/25    Expected End:  05/19/25               med x goals       increase cervical and lumbar med x score by 5% or more (Progressing)       Start:  03/10/25    Expected End:  05/19/25               short term goals       I with HEP (Progressing)       Start:  03/10/25    Expected End:  04/07/25            core strength 2+/5 or better (Progressing)       Start:  03/10/25    Expected End:  04/07/25            hip abduction at 4+/5 or better (Progressing)       Start:  03/10/25    Expected End:  04/07/25                  Sindhu Meneses, PT

## 2025-03-27 ENCOUNTER — CLINICAL SUPPORT (OUTPATIENT)
Dept: REHABILITATION | Facility: HOSPITAL | Age: 45
End: 2025-03-27
Attending: PHYSICAL MEDICINE & REHABILITATION
Payer: COMMERCIAL

## 2025-03-27 DIAGNOSIS — M51.361 DEGENERATION OF INTERVERTEBRAL DISC OF LUMBAR REGION WITH LOWER EXTREMITY PAIN: ICD-10-CM

## 2025-03-27 DIAGNOSIS — M79.18 DIFFUSE MYOFASCIAL PAIN SYNDROME: ICD-10-CM

## 2025-03-27 DIAGNOSIS — M53.86 DECREASED ROM OF LUMBAR SPINE: ICD-10-CM

## 2025-03-27 DIAGNOSIS — R29.898 DECREASED ROM OF NECK: Primary | ICD-10-CM

## 2025-03-27 PROCEDURE — 97112 NEUROMUSCULAR REEDUCATION: CPT | Mod: PN

## 2025-03-27 PROCEDURE — 97110 THERAPEUTIC EXERCISES: CPT | Mod: PN

## 2025-03-27 NOTE — PROGRESS NOTES
Outpatient Rehab    Physical Therapy Visit healthy Neck program    Patient Name: Joy Recinos  MRN: [id number]  YOB: 1980  Encounter Date: 3/27/2025    Therapy Diagnosis:   Encounter Diagnoses   Name Primary?    Decreased ROM of neck Yes    Decreased ROM of lumbar spine     Degeneration of intervertebral disc of lumbar region with lower extremity pain     Diffuse myofascial pain syndrome      Physician: Myrna Lynch MD    Physician Orders: Eval and Treat  Medical Diagnosis: Diffuse myofascial pain syndrome  Degeneration of intervertebral disc of lumbar region with lower extremity pain    Visit # / Visits Authorized:  5 / 20 (cervical training med x computerized; back non-computerized)  Date of Evaluation:  3/10/2025   Insurance Authorization Period: 3/10/25 to 5/19/25 (or until 20 visits met)  Plan of Care Certification:  3/10/2025 to 5/19/25   Progress note due visit 10 or 30 days from 3/10/25  MED x cervical testing complete      PT/PTA:   0  Number of PTA visits since last PT visit: 0  Time In:   12:31 pm  Time Out:  1:25 pm  Total Time:   54 with PT and tech assistance  Total Billable Time: 54 min    FOTO:  Intake Score:  %  Survey Score 1:  %  Survey Score 2:  %    Med x cervical test results 3/19/25 (pt is below average on testing vs peers)    INSURANCE and OUTCOMES: Program Benefit Group with FOTO Outcomes    Pattern of pain determined:           Objective      Cervical  Isometric Testing on Med X equipment: Testing administered by PT    Test Initial Baseline Midpoint Final   Date 3/19/25     ROM 6-90 deg     Max Peak Torque 193      Min Peak Torque 105      Flex/Ext Ratio 1.838     % variance  normative data 29% below     % change from initial test N/A visit 1         Outcomes:  Intake Score: -29% below normal  Visit 10 Score:   Discharge Score:  Goal Score: 5% above normal or better         Subjective   sore in shoulders and R groin/SI area today.  Pain reported as 5/10. low  back and neck and R lateral hip and anterior hip    Objective            Treatment:  Therapeutic Exercise  TE 3: ellipitcal 5 min  TE 4: heel raises 1x 20  TE 5: shuttle 5 bands 3 min  Manual Therapy  MT 1: suboccipital release with mild traction and grade II upper cervical flexion mobs  MT 2: rib mobs for ribs 1/2/3 B grade II-III  Balance/Neuromuscular Re-Education  NMR 5: med x machine for cervical 108 pounds for 20 reps (RPE 3); med x non computerized extension 80 # 1x 20 (RPE 4); seated hip adduction machine 50 # 1x20 (RPE 3); seated hip abduction 50# 1x 15 (RPE 5)  NMR 9: seated trunk rotation machine 20 # 1x 20; hip abduction 2x 10 3 sec hold; R hip posterior circles in side lying  3x 5    Time Entry(in minutes):       Assessment & Plan   Assessment: Pt had better control on the med x today but limited tolerance with rotation on the back machine so we reduced the load. PT to increase hip support as PT noted pt walking with R hip elevated.  Evaluation/Treatment Tolerance: Patient limited by fatigue    Patient will continue to benefit from skilled outpatient physical therapy to address the deficits listed in the problem list box on initial evaluation, provide pt/family education and to maximize pt's level of independence in the home and community environment.     Patient's spiritual, cultural, and educational needs considered and patient agreeable to plan of care and goals.             Plan: PT to advance with therex to increase core engagement, hip control and scapular support.    Goals:   Active       long term goals       cervical L side flexion 45 degree or better without radicular pain (Progressing)       Start:  03/10/25    Expected End:  05/19/25            T spine rotation at 80 % or better (Progressing)       Start:  03/10/25    Expected End:  05/19/25            dead lifting up to 60# or better for home tasks (Progressing)       Start:  03/10/25    Expected End:  05/19/25               med x goals        increase cervical and lumbar med x score by 5% or more (Progressing)       Start:  03/10/25    Expected End:  05/19/25               short term goals       I with HEP (Progressing)       Start:  03/10/25    Expected End:  04/07/25            core strength 2+/5 or better (Progressing)       Start:  03/10/25    Expected End:  04/07/25            hip abduction at 4+/5 or better (Progressing)       Start:  03/10/25    Expected End:  04/07/25                  Sindhu Meneses, PT

## 2025-03-31 ENCOUNTER — CLINICAL SUPPORT (OUTPATIENT)
Dept: REHABILITATION | Facility: HOSPITAL | Age: 45
End: 2025-03-31
Payer: COMMERCIAL

## 2025-03-31 DIAGNOSIS — R29.898 DECREASED ROM OF NECK: Primary | ICD-10-CM

## 2025-03-31 DIAGNOSIS — M53.86 DECREASED ROM OF LUMBAR SPINE: ICD-10-CM

## 2025-03-31 PROCEDURE — 97750 PHYSICAL PERFORMANCE TEST: CPT | Mod: 32,PN

## 2025-03-31 NOTE — PROGRESS NOTES
Outpatient Rehab    Physical Therapy Visit healthy Neck program    Patient Name: Joy Recinos  MRN: [id number]  YOB: 1980  Encounter Date: 3/31/2025    Therapy Diagnosis:   Encounter Diagnoses   Name Primary?    Decreased ROM of neck Yes    Decreased ROM of lumbar spine      Physician: Myrna Lynch MD    Physician Orders: Eval and Treat  Medical Diagnosis: Diffuse myofascial pain syndrome  Degeneration of intervertebral disc of lumbar region with lower extremity pain    Visit # / Visits Authorized:  6 / 20 (cervical training med x computerized; back non-computerized)  Date of Evaluation:  3/10/2025   Insurance Authorization Period: 3/10/25 to 5/19/25 (or until 20 visits met)  Plan of Care Certification:  3/10/2025 to 5/19/25   Progress note due visit 10 or 30 days from 3/10/25  MED x cervical testing complete      PT/PTA:   0  Number of PTA visits since last PT visit: 0  Time In:   10:00 am  Time Out:  10:56 am  Total Time:   56 with PT and tech assistance  Total Billable Time: 56 min    FOTO:  Intake Score:  %  Survey Score 1:  %  Survey Score 2:  %    Med x cervical test results 3/19/25 (pt is below average on testing vs peers)    INSURANCE and OUTCOMES: Program Benefit Group with FOTO Outcomes    Pattern of pain determined:           Objective      Cervical  Isometric Testing on Med X equipment: Testing administered by PT    Test Initial Baseline Midpoint Final   Date 3/19/25     ROM 6-90 deg     Max Peak Torque 193      Min Peak Torque 105      Flex/Ext Ratio 1.838     % variance  normative data 29% below     % change from initial test N/A visit 1         Outcomes:  Intake Score: -29% below normal  Visit 10 Score:   Discharge Score:  Goal Score: 5% above normal or better         Subjective   pt reports she feels overall she is getting stronger but the work outs definitely are still a challenge.  Pain reported as 3/10. low back and neck and shoulders    Objective             Treatment:  Therapeutic Exercise  TE 5: shuttle 5 bands 3 min; seated UBE 5 min  Balance/Neuromuscular Re-Education  NMR 4: shoulder shrugs 8# per arm with 3 sec hold 2x 10 per arm; Shoulder external rotation at 0 abduction seated 2x 10 RTB B; shoulder external rotation in hookling R 2x 10 b 2#; prone T 2x 10 3 sec hold  NMR 5: med x machine for cervical 108 pounds for 20 reps (RPE 3); med x non computerized extension 80 # 1x 20 (RPE 4); seated hip adduction machine 50 # 1x20 (RPE 3); seated hip abduction 50# 1x 15 (RPE 5)  NMR 7: sitting at edge of plinth (one leg held in SKC) while other hip performs hip flexion 3x 8 x 5 sec while sustaining PPT    Time Entry(in minutes):       Assessment & Plan   Assessment: PT noted pt is more upright. scapular control is still limited resulting in shoulder hiking but strength is slowly gaining in the neck and scapular muscles as motor control improved. Pt is still needing considerable hip and core support to advance.  Evaluation/Treatment Tolerance: Patient limited by fatigue    Patient will continue to benefit from skilled outpatient physical therapy to address the deficits listed in the problem list box on initial evaluation, provide pt/family education and to maximize pt's level of independence in the home and community environment.     Patient's spiritual, cultural, and educational needs considered and patient agreeable to plan of care and goals.             Plan: PT to advance with therex to increase core engagement, hip control and scapular support.    Goals:   Active       long term goals       cervical L side flexion 45 degree or better without radicular pain (Progressing)       Start:  03/10/25    Expected End:  05/19/25            T spine rotation at 80 % or better (Progressing)       Start:  03/10/25    Expected End:  05/19/25            dead lifting up to 60# or better for home tasks (Progressing)       Start:  03/10/25    Expected End:  05/19/25                med x goals       increase cervical and lumbar med x score by 5% or more (Progressing)       Start:  03/10/25    Expected End:  05/19/25               short term goals       I with HEP (Progressing)       Start:  03/10/25    Expected End:  04/07/25            core strength 2+/5 or better (Progressing)       Start:  03/10/25    Expected End:  04/07/25            hip abduction at 4+/5 or better (Progressing)       Start:  03/10/25    Expected End:  04/07/25                  Sindhu Meneses PT

## 2025-04-04 ENCOUNTER — CLINICAL SUPPORT (OUTPATIENT)
Dept: REHABILITATION | Facility: HOSPITAL | Age: 45
End: 2025-04-04
Payer: COMMERCIAL

## 2025-04-04 DIAGNOSIS — M53.86 DECREASED ROM OF LUMBAR SPINE: ICD-10-CM

## 2025-04-04 DIAGNOSIS — R29.898 DECREASED ROM OF NECK: Primary | ICD-10-CM

## 2025-04-04 DIAGNOSIS — M79.18 DIFFUSE MYOFASCIAL PAIN SYNDROME: ICD-10-CM

## 2025-04-04 DIAGNOSIS — M51.361 DEGENERATION OF INTERVERTEBRAL DISC OF LUMBAR REGION WITH LOWER EXTREMITY PAIN: ICD-10-CM

## 2025-04-04 PROCEDURE — 97750 PHYSICAL PERFORMANCE TEST: CPT | Mod: 32,PN

## 2025-04-04 NOTE — PROGRESS NOTES
Outpatient Rehab    Physical Therapy Visit healthy Neck program    Patient Name: Joy Recinos  MRN: [id number]  YOB: 1980  Encounter Date: 4/4/2025    Therapy Diagnosis:   Encounter Diagnoses   Name Primary?    Decreased ROM of neck Yes    Decreased ROM of lumbar spine     Degeneration of intervertebral disc of lumbar region with lower extremity pain     Diffuse myofascial pain syndrome      Physician: Myrna Lynch MD    Physician Orders: Eval and Treat  Medical Diagnosis: Diffuse myofascial pain syndrome  Degeneration of intervertebral disc of lumbar region with lower extremity pain    Visit # / Visits Authorized:  7 / 20 (cervical training med x computerized; back non-computerized)  Date of Evaluation:  3/10/2025   Insurance Authorization Period: 3/10/25 to 5/19/25 (or until 20 visits met)  Plan of Care Certification:  3/10/2025 to 5/19/25   Progress note due visit 10 or 30 days from 3/10/25  MED x cervical testing complete      PT/PTA:   0  Number of PTA visits since last PT visit: 0  Time In:   1:02 pm  Time Out:  1:58 pm  Total Time:   56 with PT and tech assistance  Total Billable Time: 56 min    FOTO:  Intake Score:  %  Survey Score 1:  %  Survey Score 2:  %    Med x cervical test results 3/19/25 (pt is below average on testing vs peers)    INSURANCE and OUTCOMES: Program Benefit Group with FOTO Outcomes    Pattern of pain determined:           Objective      Cervical  Isometric Testing on Med X equipment: Testing administered by PT    Test Initial Baseline Midpoint Final   Date 3/19/25     ROM 6-90 deg     Max Peak Torque 193      Min Peak Torque 105      Flex/Ext Ratio 1.838     % variance  normative data 29% below     % change from initial test N/A visit 1         Outcomes:  Intake Score: -29% below normal  Visit 10 Score:   Discharge Score:  Goal Score: 5% above normal or better         Subjective   sharp pain are no longer an issue but she still has general soreness/aches in  multiple tissue from head to toe.  Pain reported as 4/10. low back and mid back and neck and shoulders    Objective            Treatment:  Therapeutic Exercise  TE 5: shuttle 5 bands 3 min; seated UBE 5 min  Balance/Neuromuscular Re-Education  NMR 1: PPT with standing hip flexion hold 3 sec 1x 10 per leg; then SL hip abduction with PPT 3 sec hold 2x 10 per leg; L SL R hip adduction  1x 5 with stool  NMR 5: med x machine for cervical 124 pounds for 20 reps (RPE 3); med x non computerized extension 80 # 1x 10 (RPE 4) 1x 10 86#; seated hip adduction machine 55 # 1x20 (RPE 3); seated hip abduction 50# 1x 15 (RPE 5); trunk rotatoin machine 26# 1x 20  NMR 7: sitting at edge of plinth (one leg held in SKC) while other hip performs hip flexion 1x 10 per side then 3x 5 4 # 3 sec while sustaining PPT    Time Entry(in minutes):  Neuromuscular Re-Education Time Entry: 48  Therapeutic Exercise Time Entry: 8    Assessment & Plan   Assessment: Pt motor control is improving but she still needs frequent cues to slow her pace and max cues to engage the core otherwise she falls into lordosis. PT to add hip thrust glute work on follow up to increase glute firing and increase mobility at the anterior hips.  Evaluation/Treatment Tolerance: Patient limited by fatigue    Patient will continue to benefit from skilled outpatient physical therapy to address the deficits listed in the problem list box on initial evaluation, provide pt/family education and to maximize pt's level of independence in the home and community environment.     Patient's spiritual, cultural, and educational needs considered and patient agreeable to plan of care and goals.             Plan: PT to advance with therex to increase core engagement, hip control and scapular support.    Goals:   Active       long term goals       cervical L side flexion 45 degree or better without radicular pain (Progressing)       Start:  03/10/25    Expected End:  05/19/25            T  spine rotation at 80 % or better (Progressing)       Start:  03/10/25    Expected End:  05/19/25            dead lifting up to 60# or better for home tasks (Progressing)       Start:  03/10/25    Expected End:  05/19/25               med x goals       increase cervical and lumbar med x score by 5% or more (Progressing)       Start:  03/10/25    Expected End:  05/19/25               short term goals       I with HEP (Progressing)       Start:  03/10/25    Expected End:  04/07/25            core strength 2+/5 or better (Progressing)       Start:  03/10/25    Expected End:  04/07/25            hip abduction at 4+/5 or better (Progressing)       Start:  03/10/25    Expected End:  04/07/25                  Sindhu Meneses, PT

## 2025-04-07 ENCOUNTER — CLINICAL SUPPORT (OUTPATIENT)
Dept: REHABILITATION | Facility: HOSPITAL | Age: 45
End: 2025-04-07
Payer: COMMERCIAL

## 2025-04-07 DIAGNOSIS — M51.361 DEGENERATION OF INTERVERTEBRAL DISC OF LUMBAR REGION WITH LOWER EXTREMITY PAIN: ICD-10-CM

## 2025-04-07 DIAGNOSIS — R29.898 DECREASED ROM OF NECK: Primary | ICD-10-CM

## 2025-04-07 DIAGNOSIS — M53.86 DECREASED ROM OF LUMBAR SPINE: ICD-10-CM

## 2025-04-07 DIAGNOSIS — M79.18 DIFFUSE MYOFASCIAL PAIN SYNDROME: ICD-10-CM

## 2025-04-07 PROCEDURE — 97750 PHYSICAL PERFORMANCE TEST: CPT | Mod: 32,PN

## 2025-04-07 NOTE — PROGRESS NOTES
Outpatient Rehab    Physical Therapy Visit healthy Neck program    Patient Name: Joy Recinos  MRN: [id number]  YOB: 1980  Encounter Date: 4/7/2025    Therapy Diagnosis:   Encounter Diagnoses   Name Primary?    Decreased ROM of neck Yes    Decreased ROM of lumbar spine     Degeneration of intervertebral disc of lumbar region with lower extremity pain     Diffuse myofascial pain syndrome        Physician: Myrna Lynch MD    Physician Orders: Eval and Treat  Medical Diagnosis: Diffuse myofascial pain syndrome  Degeneration of intervertebral disc of lumbar region with lower extremity pain    Visit # / Visits Authorized:  7 / 20 (cervical training med x computerized; back non-computerized)  Date of Evaluation:  3/10/2025   Insurance Authorization Period: 3/10/25 to 5/19/25 (or until 20 visits met)  Plan of Care Certification:  3/10/2025 to 5/19/25   Progress note due visit 10 or 30 days from 3/10/25  MED x cervical testing complete      PT/PTA:   0  Number of PTA visits since last PT visit: 0  Time In:   9:30 am  Time Out:  10:25 am  Total Time:   55 with PT and tech assistance  Total Billable Time: 56 min    FOTO:  Intake Score:  %  Survey Score 1:  %  Survey Score 2:  %    Med x cervical test results 3/19/25 (pt is below average on testing vs peers)    INSURANCE and OUTCOMES: Program Benefit Group with FOTO Outcomes    Pattern of pain determined:           Objective      Cervical  Isometric Testing on Med X equipment: Testing administered by PT    Test Initial Baseline Midpoint Final   Date 3/19/25     ROM 6-90 deg     Max Peak Torque 193      Min Peak Torque 105      Flex/Ext Ratio 1.838     % variance  normative data 29% below     % change from initial test N/A visit 1         Outcomes:  Intake Score: -29% below normal  Visit 10 Score:   Discharge Score:  Goal Score: 5% above normal or better         Subjective   neck and shoulders very stiff this weekend..  Pain reported as 5/10. low  back and mid back and neck and shoulders    Objective            Treatment:  Therapeutic Exercise  TE 3: ellipitcal 5 min  TE 5: shuttle 5 bands 3 min  Balance/Neuromuscular Re-Education  NMR 5: med x machine for cervical 123 pounds for 20 reps (RPE 3); med x non computerized extension 80 # 1x 10 (RPE 4) 1x 10 86#; seated hip adduction machine 55 # 1x20 (RPE 3); seated hip abduction 50# 1x 20 (RPE 5); trunk rotatoin machine 28# 1x 20; shoulder shrugs 15# 1x 10 10 # 1x 10 B; paloff press 20 # 2x 10    Time Entry(in minutes):       Assessment & Plan   Assessment: PT did not increased load on med x for neck today as pt was sore from last session still. PT noted her control on med x is much better for neck and back but she still needs cues at times to engage the core.  Evaluation/Treatment Tolerance: Patient limited by fatigue    Patient will continue to benefit from skilled outpatient physical therapy to address the deficits listed in the problem list box on initial evaluation, provide pt/family education and to maximize pt's level of independence in the home and community environment.     Patient's spiritual, cultural, and educational needs considered and patient agreeable to plan of care and goals.             Plan: PT to advance with therex to increase core engagement, hip control and scapular support.    Goals:   Active       long term goals       cervical L side flexion 45 degree or better without radicular pain (Progressing)       Start:  03/10/25    Expected End:  05/19/25            T spine rotation at 80 % or better (Progressing)       Start:  03/10/25    Expected End:  05/19/25            dead lifting up to 60# or better for home tasks (Progressing)       Start:  03/10/25    Expected End:  05/19/25               med x goals       increase cervical and lumbar med x score by 5% or more (Progressing)       Start:  03/10/25    Expected End:  05/19/25               short term goals       I with HEP (Met)        Start:  03/10/25    Expected End:  04/07/25    Resolved:  04/07/25         core strength 2+/5 or better (Progressing)       Start:  03/10/25    Expected End:  04/07/25            hip abduction at 4+/5 or better (Progressing)       Start:  03/10/25    Expected End:  04/07/25                    Sindhu Meneses, PT

## 2025-04-10 ENCOUNTER — TELEPHONE (OUTPATIENT)
Dept: HEMATOLOGY/ONCOLOGY | Facility: CLINIC | Age: 45
End: 2025-04-10
Payer: COMMERCIAL

## 2025-04-10 DIAGNOSIS — F41.8 DEPRESSION WITH ANXIETY: Primary | ICD-10-CM

## 2025-04-10 DIAGNOSIS — F41.1 GAD (GENERALIZED ANXIETY DISORDER): ICD-10-CM

## 2025-04-11 ENCOUNTER — TELEPHONE (OUTPATIENT)
Dept: REHABILITATION | Facility: HOSPITAL | Age: 45
End: 2025-04-11

## 2025-04-11 ENCOUNTER — CLINICAL SUPPORT (OUTPATIENT)
Dept: REHABILITATION | Facility: HOSPITAL | Age: 45
End: 2025-04-11
Payer: COMMERCIAL

## 2025-04-11 DIAGNOSIS — M53.86 DECREASED ROM OF LUMBAR SPINE: ICD-10-CM

## 2025-04-11 DIAGNOSIS — R29.898 DECREASED ROM OF NECK: Primary | ICD-10-CM

## 2025-04-11 PROCEDURE — 97750 PHYSICAL PERFORMANCE TEST: CPT | Mod: 32,PN

## 2025-04-11 NOTE — PROGRESS NOTES
Outpatient Rehab    Physical Therapy Visit healthy Neck program    Patient Name: Joy Recinos  MRN: [id number]  YOB: 1980  Encounter Date: 4/11/2025    Therapy Diagnosis:   Encounter Diagnoses   Name Primary?    Decreased ROM of neck Yes    Decreased ROM of lumbar spine          Physician: Myrna Lynch MD    Physician Orders: Eval and Treat  Medical Diagnosis: Diffuse myofascial pain syndrome  Degeneration of intervertebral disc of lumbar region with lower extremity pain    Visit # / Visits Authorized:  8 / 20 (cervical training med x computerized; back non-computerized)  Date of Evaluation:  3/10/2025   Insurance Authorization Period: 3/10/25 to 5/19/25 (or until 20 visits met)  Plan of Care Certification:  3/10/2025 to 5/19/25   Progress note due visit 10 or 30 days from 3/10/25  MED x cervical testing complete      PT/PTA:   0  Number of PTA visits since last PT visit: 0  Time In:   10:40 am pt late  Time Out:  11:44 am  Total Time:   64 with PT and tech assistance  Total Billable Time: 64 min    FOTO:  Intake Score:  %  Survey Score 1:  %  Survey Score 2:  %    Med x cervical test results 3/19/25 (pt is below average on testing vs peers)    INSURANCE and OUTCOMES: Program Benefit Group with FOTO Outcomes    Pattern of pain determined:           Objective      Cervical  Isometric Testing on Med X equipment: Testing administered by PT    Test Initial Baseline Midpoint Final   Date 3/19/25     ROM 6-90 deg     Max Peak Torque 193      Min Peak Torque 105      Flex/Ext Ratio 1.838     % variance  normative data 29% below     % change from initial test N/A visit 1         Outcomes:  Intake Score: -29% below normal  Visit 10 Score:   Discharge Score:  Goal Score: 5% above normal or better         Subjective   getting headaches after the last 3 sessions after PT sessions.  Pain reported as 5/10. low back and mid back and neck and shoulders    Objective            Treatment:  Therapeutic  Exercise  TE 6: seated UBE 5 min  TE 7: T spine extension on foam roller 3 min  Balance/Neuromuscular Re-Education  NMR 4: shoulder shrugs 8# per arm with 3 sec hold 2x 10 per arm; side lying Shoulder external rotation at 0 abduction 3x 10 2# per arm; prone T 3x 10 3 sec hold 2 #; prone Y with wand overhead 3x 10; prone Y to U with wand 2x 10; supine cervical retraction with chin tuck 3x 10; side lying shoulder horizontal abduction 3x 10 (cues on scapular retract/depression sustained during)  NMR 5: med x machine for cervical 129 pounds for 20 reps (RPE 3); med x non computerized extension 80 # 1x 20 (RPE 4);  DEFERRED today: seated hip adduction machine 55 # 1x20 (RPE 3); seated hip abduction 50# 1x 20 (RPE 5); trunk rotatoin machine 28# 1x 20; shoulder shrugs 15# 1x 10 10 # 1x 10 B; paloff press 20 # 2x 10    Time Entry(in minutes):  Neuromuscular Re-Education Time Entry: 56  Therapeutic Exercise Time Entry: 8    Assessment & Plan   Assessment: pt struggles to engage the lower traps without compensating with lumbar lordosis. Pt also hikes the L scapula a lot vs R on most reaching and lifting task at the arms which leads to her neck and shoulder pains.  Evaluation/Treatment Tolerance: Patient limited by fatigue    Patient will continue to benefit from skilled outpatient physical therapy to address the deficits listed in the problem list box on initial evaluation, provide pt/family education and to maximize pt's level of independence in the home and community environment.     Patient's spiritual, cultural, and educational needs considered and patient agreeable to plan of care and goals.             Plan: PT to advance with therex to increase core engagement, hip control and scapular support and motor control to reduce L shoulder hiking and falling into lordosis for stability. PT to add in pect major/lat stretches as well as more T spine extension for mobility and core engagement in loaded scapular postures like  QPED.    Goals:   Active       long term goals       cervical L side flexion 45 degree or better without radicular pain (Progressing)       Start:  03/10/25    Expected End:  05/19/25            T spine rotation at 80 % or better (Progressing)       Start:  03/10/25    Expected End:  05/19/25            dead lifting up to 60# or better for home tasks (Progressing)       Start:  03/10/25    Expected End:  05/19/25               med x goals       increase cervical and lumbar med x score by 5% or more (Progressing)       Start:  03/10/25    Expected End:  05/19/25               short term goals       I with HEP (Met)       Start:  03/10/25    Expected End:  04/07/25    Resolved:  04/07/25         core strength 2+/5 or better (Progressing)       Start:  03/10/25    Expected End:  04/07/25            hip abduction at 4+/5 or better (Progressing)       Start:  03/10/25    Expected End:  04/07/25                      Sindhu Meneses, PT

## 2025-04-16 ENCOUNTER — CLINICAL SUPPORT (OUTPATIENT)
Dept: REHABILITATION | Facility: HOSPITAL | Age: 45
End: 2025-04-16
Payer: COMMERCIAL

## 2025-04-16 DIAGNOSIS — R29.898 DECREASED ROM OF NECK: Primary | ICD-10-CM

## 2025-04-16 DIAGNOSIS — M79.18 DIFFUSE MYOFASCIAL PAIN SYNDROME: ICD-10-CM

## 2025-04-16 DIAGNOSIS — M53.86 DECREASED ROM OF LUMBAR SPINE: ICD-10-CM

## 2025-04-16 DIAGNOSIS — M51.361 DEGENERATION OF INTERVERTEBRAL DISC OF LUMBAR REGION WITH LOWER EXTREMITY PAIN: ICD-10-CM

## 2025-04-16 PROCEDURE — 97750 PHYSICAL PERFORMANCE TEST: CPT | Mod: 32,PN

## 2025-04-16 NOTE — PROGRESS NOTES
Outpatient Rehab    Physical Therapy Visit healthy Neck program    Patient Name: Joy Recinos  MRN: [id number]  YOB: 1980  Encounter Date: 4/16/2025    Therapy Diagnosis:   Encounter Diagnoses   Name Primary?    Decreased ROM of neck Yes    Decreased ROM of lumbar spine     Degeneration of intervertebral disc of lumbar region with lower extremity pain     Diffuse myofascial pain syndrome            Physician: Myrna Lynch MD    Physician Orders: Eval and Treat  Medical Diagnosis: Diffuse myofascial pain syndrome  Degeneration of intervertebral disc of lumbar region with lower extremity pain    Visit # / Visits Authorized:  10 / 20 (cervical training med x computerized; back non-computerized)  Date of Evaluation:  3/10/2025   Insurance Authorization Period: 3/10/25 to 5/19/25 (or until 20 visits met)  Plan of Care Certification:  3/10/2025 to 5/19/25   Progress note due 30 days from 4/16/25  MED x cervical testing complete      PT/PTA:   0  Number of PTA visits since last PT visit: 0  Time In:   10:19 am pt late  Time Out:  11:07 am  Total Time:   48 with PT and tech assistance  Total Billable Time: 48 min    FOTO:  Intake Score:  %  Survey Score 1:  %  Survey Score 2:  %    Med x cervical test results 3/19/25 (pt is below average on testing vs peers)    INSURANCE and OUTCOMES: Program Benefit Group with FOTO Outcomes    Pattern of pain determined:           Objective      Cervical  Isometric Testing on Med X equipment: Testing administered by PT    Test Initial Baseline Midpoint Final   Date 3/19/25     ROM 6-90 deg     Max Peak Torque 193      Min Peak Torque 105      Flex/Ext Ratio 1.838     % variance  normative data 29% below     % change from initial test N/A visit 1         Outcomes:  Intake Score: -29% below normal  Visit 10 Score: 3% increase  Discharge Score:  Goal Score: 5% above normal or better         Subjective   pt states still getting headaches a few hours after PT and  it lasts a few days. PT discussed ergonomics and postures at the computer to see if this helps as well. Pt has a sinus infection today so PT is holding on DN..  Pain reported as 4/10. low back and mid back and neck and shoulders    Objective        Cervical side flexion 45 degrees and no radicular pain; T spine rotation 80% ROM    Treatment:  Therapeutic Exercise  TE 6: seated UBE 5 min  TE 7: T spine extension on foam roller 3 min  Balance/Neuromuscular Re-Education  NMR 3: med x testing; cervical med x machine use 129 # 1x 15; med x lumbar 90 # 1x 20; shoulder shrugs 8# B 2x 10 3 sec holds; supine cervical chin tuck with retraction 2x 10    Time Entry(in minutes):       Assessment & Plan   Assessment: PT noted pt sits in upper cervical postures so PT used cues to help advance this using more deep neck flexor cues. pt compensates at her weak points of neck extension with shoulder shrugs and needed cues to progress this.  Evaluation/Treatment Tolerance: Patient limited by fatigue    Patient will continue to benefit from skilled outpatient physical therapy to address the deficits listed in the problem list box on initial evaluation, provide pt/family education and to maximize pt's level of independence in the home and community environment.     Patient's spiritual, cultural, and educational needs considered and patient agreeable to plan of care and goals.             Plan: PT to advance with therex to increase core engagement, hip control and scapular support and motor control to reduce L shoulder hiking and falling into lordosis for stability. PT to add in pect major/lat stretches as well as more T spine extension for mobility and core engagement in loaded scapular postures like QPED.    Goals:   Active       long term goals       cervical L side flexion 45 degree or better without radicular pain (Met)       Start:  03/10/25    Expected End:  05/19/25    Resolved:  04/16/25         T spine rotation at 80 % or better  (Met)       Start:  03/10/25    Expected End:  05/19/25    Resolved:  04/16/25         dead lifting up to 60# or better for home tasks (Progressing)       Start:  03/10/25    Expected End:  05/19/25               med x goals       increase cervical and lumbar med x score by 5% or more (Progressing)       Start:  03/10/25    Expected End:  05/19/25               short term goals       I with HEP (Met)       Start:  03/10/25    Expected End:  04/07/25    Resolved:  04/07/25         core strength 2+/5 or better (Progressing)       Start:  03/10/25    Expected End:  04/07/25            hip abduction at 4+/5 or better (Progressing)       Start:  03/10/25    Expected End:  04/07/25                        Sindhu Meneses, PT

## 2025-04-24 ENCOUNTER — OFFICE VISIT (OUTPATIENT)
Dept: URGENT CARE | Facility: CLINIC | Age: 45
End: 2025-04-24
Payer: COMMERCIAL

## 2025-04-24 VITALS
SYSTOLIC BLOOD PRESSURE: 134 MMHG | HEIGHT: 68 IN | HEART RATE: 90 BPM | TEMPERATURE: 98 F | DIASTOLIC BLOOD PRESSURE: 78 MMHG | RESPIRATION RATE: 16 BRPM | OXYGEN SATURATION: 98 % | BODY MASS INDEX: 32.13 KG/M2 | WEIGHT: 212 LBS

## 2025-04-24 DIAGNOSIS — R09.81 SINUS CONGESTION: ICD-10-CM

## 2025-04-24 DIAGNOSIS — U07.1 COVID: Primary | ICD-10-CM

## 2025-04-24 DIAGNOSIS — Z20.822 CLOSE EXPOSURE TO COVID-19 VIRUS: ICD-10-CM

## 2025-04-24 LAB
CTP QC/QA: YES
SARS CORONAVIRUS 2 ANTIGEN: POSITIVE

## 2025-04-24 PROCEDURE — 99213 OFFICE O/P EST LOW 20 MIN: CPT | Mod: S$GLB,,, | Performed by: PHYSICIAN ASSISTANT

## 2025-04-24 PROCEDURE — 87811 SARS-COV-2 COVID19 W/OPTIC: CPT | Mod: QW,S$GLB,, | Performed by: PHYSICIAN ASSISTANT

## 2025-04-24 NOTE — PROGRESS NOTES
"Subjective:      Patient ID: Joy Recinos is a 44 y.o. female.    Vitals:  height is 5' 8" (1.727 m) and weight is 96.2 kg (212 lb). Her tympanic temperature is 98.3 °F (36.8 °C). Her blood pressure is 134/78 and her pulse is 90. Her respiration is 16 and oxygen saturation is 98%.     Chief Complaint: Sinus Problem    Patient presents with sinus issues, itchy eyes and throat.  Symptoms started 7 days ago. Pt took day quil today which did provide relief of symptoms. Her  tested positive for COVID on 4/17/25.    Sinus Problem  This is a new problem. The current episode started in the past 7 days. The problem is unchanged. There has been no fever. The fever has been present for Less than 1 day. Her pain is at a severity of 8/10. The pain is mild. Associated symptoms include congestion, coughing, sinus pressure, sneezing and a sore throat. Pertinent negatives include no chills, diaphoresis, ear pain, headaches, hoarse voice, neck pain, shortness of breath or swollen glands. The treatment provided mild relief.       Constitution: Negative for chills and sweating.   HENT:  Positive for congestion, sinus pressure and sore throat. Negative for ear pain.    Neck: Negative for neck pain.   Respiratory:  Positive for cough. Negative for shortness of breath.    Allergic/Immunologic: Positive for sneezing.   Neurological:  Negative for headaches.      Objective:     Physical Exam   Constitutional: She is oriented to person, place, and time. She appears well-developed.   HENT:   Head: Normocephalic and atraumatic.   Ears:   Right Ear: External ear normal.   Left Ear: External ear normal.   Nose: Congestion present.   Mouth/Throat: Oropharynx is clear and moist.   Eyes: Conjunctivae, EOM and lids are normal.   Neck: Trachea normal and phonation normal. Neck supple.   Cardiovascular: Normal rate.   Pulmonary/Chest: Effort normal. No respiratory distress. She has no wheezes. She has no rhonchi.   Musculoskeletal: " Normal range of motion.         General: Normal range of motion.   Neurological: She is alert and oriented to person, place, and time.   Skin: Skin is warm, dry and intact.   Psychiatric: Her speech is normal and behavior is normal. Judgment and thought content normal.   Nursing note and vitals reviewed.      Assessment:     1. COVID    2. Sinus congestion    3. Close exposure to COVID-19 virus      Results for orders placed or performed in visit on 04/24/25   SARS Coronavirus 2 Antigen, POCT Manual Read    Collection Time: 04/24/25  4:14 PM   Result Value Ref Range    SARS Coronavirus 2 Antigen Positive (A) Negative, Presumptive Negative     Acceptable Yes        Plan:   Informed pt of positive covid results. Pt instructed to stay home until fever free for 24 hours and symptoms are generally improving. Work/school excuse will be provided, as needed. Strict ED precautions given for any emergent symptoms.      COVID    Sinus congestion    Close exposure to COVID-19 virus  -     SARS Coronavirus 2 Antigen, POCT Manual Read      Medical Decision Making:   Clinical Tests:   Lab Tests: Ordered and Reviewed       <> Summary of Lab: COVID positive

## 2025-04-24 NOTE — PATIENT INSTRUCTIONS
You have tested positive for COVID-19 today.      Per CDC recommendations, if you test positive for COVID-19 you may return to normal activities when, for at least 24 hours, both are true:     Your symptoms are getting better overall, and:  You have not had a fever AND are not using fever reducing medication     The CDC also recommends added precautions in the 5 days after return to normal activity including frequent hand washing, mask wearing, physical distancing.      CDC also recommends that if you develop a fever or starts to feel worse after you have returned to normal activities, you should return home and away from others for at least another 24 hours. The link below is a direct link to the CDC website with all this information.     https://www.cdc.gov/respiratory-viruses/prevention/precautions-when-sick.html    This is the most important part, both the CDC and the [facility] emphasize that you do not test out of isolation.  In fact, we do not retest if you were positive in the last 90 days.      During quarantine:   Separate yourself from other people in your home.  Call ahead before visiting your doctor.  Wear a facemask if you have to leave your home or around others.  Cover your coughs and sneezes.  Wash your hands often with soap and water; hand  can be used, too.  Avoid sharing personal household items.  Wipe down surfaces used daily.  Monitor your symptoms. Seek prompt medical attention if your illness is worsening (e.g., difficulty breathing).   Before seeking care, call your healthcare provider.  If you have a medical emergency and need to call 911, notify the dispatch personnel that you have, or are being evaluated for COVID-19. If possible, put on a facemask before emergency medical services arrive.       Close contacts should also follow these recommendations:  Make sure that you understand and can help the patient follow their provider's instructions for medication(s) and care. You should help  the patient with basic needs in the home and provide support for getting groceries, prescriptions, and other personal needs.  Monitor the patient's symptoms. If the patient is getting sicker, call his or her healthcare provider and tell them that the patient has laboratory-confirmed COVID-19. If the patient has a medical emergency and you need to call 911, notify the dispatch personnel that the patient has, or is being evaluated for COVID-19.  Household members should stay in another room or be  from the patient. Household members should use a separate bedroom and bathroom, if available.  Prohibit visitors.  Household members should care for any pets in the home.  Make sure that shared spaces in the home have good air flow, such as by an air conditioner or an opened window, weather permitting.  Perform hand hygiene frequently. Wash your hands often with soap and water for at least 20 seconds or use an alcohol-based hand  (that contains > 60% alcohol) covering all surfaces of your hands and rubbing them together until they feel dry. Soap and water should be used preferentially.  Avoid touching your eyes, nose, and mouth.  The patient should wear a facemask. If the patient is not able to wear a facemask (for example, because it causes trouble breathing), caregivers should wear a mask when they are in the same room as the patient.  Wear a disposable facemask and gloves when you touch or have contact with the patient's blood, stool, or body fluids, such as saliva, sputum, nasal mucus, vomit, urine.  Throw out disposable facemasks and gloves after using them. Do not reuse.  When removing personal protective equipment, first remove and dispose of gloves. Then, immediately clean your hands with soap and water or alcohol-based hand . Next, remove and dispose of facemask, and immediately clean your hands again with soap and water or alcohol-based hand .  You should not share dishes,  drinking glasses, cups, eating utensils, towels, bedding, or other items with the patient. After the patient uses these items, you should wash them thoroughly (see below Wash laundry thoroughly).  Clean all high-touch surfaces, such as counters, tabletops, doorknobs, bathroom fixtures, toilets, phones, keyboards, tablets, and bedside tables, every day. Also, clean any surfaces that may have blood, stool, or body fluids on them.  Use a household cleaning spray or wipe, according to the label instructions. Labels contain instructions for safe and effective use of the cleaning product including precautions you should take when applying the product, such as wearing gloves and making sure you have good ventilation during use of the product.  Wash laundry thoroughly.  Immediately remove and wash clothes or bedding that have blood, stool, or body fluids on them.  Wear disposable gloves while handling soiled items and keep soiled items away from your body. Clean your hands (with soap and water or an alcohol-based hand ) immediately after removing your gloves.  Read and follow directions on labels of laundry or clothing items and detergent. In general, using a normal laundry detergent according to washing machine instructions and dry thoroughly using the warmest temperatures recommended on the clothing label.  Place all used disposable gloves, facemasks, and other contaminated items in a lined container before disposing of them with other household waste. Clean your hands (with soap and water or an alcohol-based hand ) immediately after handling these items. Soap and water should be used preferentially if hands are visibly dirty.  Discuss any additional questions with your state or local health department or healthcare provider. Check available hours when contacting your local health department.     You must understand that you've received an Urgent Care treatment only and that you may be released  before all your medical problems are known or treated. You, the patient, will arrange for follow up care as instructed. Follow up with your PCP or specialty clinic as directed within 2-5 days if not improved or as needed.  You can call [phone] to schedule an appointment with the appropriate provider.  If your condition worsens we recommend that you receive another evaluation at the emergency room immediately or contact your primary medical clinics after hours call service to discuss your concerns.  Please return here or go to the Emergency Department for any concerns or worsening of condition.

## 2025-04-25 ENCOUNTER — TELEPHONE (OUTPATIENT)
Dept: URGENT CARE | Facility: CLINIC | Age: 45
End: 2025-04-25
Payer: COMMERCIAL

## 2025-04-28 ENCOUNTER — TELEPHONE (OUTPATIENT)
Dept: REHABILITATION | Facility: HOSPITAL | Age: 45
End: 2025-04-28
Payer: COMMERCIAL

## 2025-04-28 NOTE — TELEPHONE ENCOUNTER
PT noted pt has covid on the chart. PT reached out to see if we need to cancel her for now but PT was only able to leave her a VM.

## 2025-04-30 ENCOUNTER — CLINICAL SUPPORT (OUTPATIENT)
Dept: REHABILITATION | Facility: HOSPITAL | Age: 45
End: 2025-04-30
Payer: COMMERCIAL

## 2025-04-30 DIAGNOSIS — M53.86 DECREASED ROM OF LUMBAR SPINE: ICD-10-CM

## 2025-04-30 DIAGNOSIS — R29.898 DECREASED ROM OF NECK: Primary | ICD-10-CM

## 2025-04-30 PROCEDURE — 97750 PHYSICAL PERFORMANCE TEST: CPT | Mod: 32,PN

## 2025-04-30 NOTE — PROGRESS NOTES
Outpatient Rehab    Physical Therapy Visit healthy Neck program    Patient Name: Joy Recinos  MRN: [id number]  YOB: 1980  Encounter Date: 4/30/2025    Therapy Diagnosis:   Encounter Diagnoses   Name Primary?    Decreased ROM of neck Yes    Decreased ROM of lumbar spine        Physician: Myrna Lynch MD    Physician Orders: Eval and Treat  Medical Diagnosis: Diffuse myofascial pain syndrome  Degeneration of intervertebral disc of lumbar region with lower extremity pain    Visit # / Visits Authorized:  11 / 20 (cervical training med x computerized; back non-computerized)  Date of Evaluation:  3/10/2025   Insurance Authorization Period: 3/10/25 to 5/19/25 (or until 20 visits met)  Plan of Care Certification:  3/10/2025 to 5/19/25   Progress note due 30 days from 4/16/25  MED x cervical testing complete      PT/PTA:   0  Number of PTA visits since last PT visit: 0  Time In:   9:30 am   Time Out:  10:25 am  Total Time:   55 with PT and tech assistance  Total Billable Time: 55 min    FOTO:  Intake Score:  %  Survey Score 1:  %  Survey Score 2:  %    Med x cervical test results 3/19/25 (pt is below average on testing vs peers)    INSURANCE and OUTCOMES: Program Benefit Group with FOTO Outcomes    Pattern of pain determined:           Objective      Cervical  Isometric Testing on Med X equipment: Testing administered by PT    Test Initial Baseline Midpoint Final   Date 3/19/25     ROM 6-90 deg     Max Peak Torque 193      Min Peak Torque 105      Flex/Ext Ratio 1.838     % variance  normative data 29% below     % change from initial test N/A visit 1         Outcomes:  Intake Score: -29% below normal  Visit 10 Score: 3% increase  Discharge Score:  Goal Score: 5% above normal or better         Subjective   pt has been ill with covid so PT and pt discussed pushing her therex loads down lower today..  Pain reported as 3/10. low back and mid back and neck and shoulders    Objective        Cervical  side flexion 45 degrees and no radicular pain; T spine rotation 80% ROM    Treatment:  Therapeutic Exercise  TE 5: shuttle 5 bands 3 min  TE 7: T spine extension on foam roller 3 min  TE 8: open book 2x 10  Balance/Neuromuscular Re-Education  NMR 3: med x lumbar 70 # 1x 20; shoulder shrugs 6# B 2x 10 3 sec holds; supine cervical chin tuck with retraction 2x 10  NMR 6: PPT with SLR 2x 10; PPT with SL hip abduction 2x 10; seated trunk rotation machine 20# 1x 20 setting 3      Time Entry(in minutes):  Neuromuscular Re-Education Time Entry: 43  Therapeutic Exercise Time Entry: 12    Assessment & Plan   Assessment: pt states she was not able to adjust her desk set up so PT suggested some options to help her improved shoulder positioning to reduce neck pain with working at home. PT continued to load pt slowly with therex as she is recovering from illness and hasn't done therex much in a few days but also was slowing down prior to her DX of Covid so she is still in recovery.  Evaluation/Treatment Tolerance: Patient limited by fatigue    Patient will continue to benefit from skilled outpatient physical therapy to address the deficits listed in the problem list box on initial evaluation, provide pt/family education and to maximize pt's level of independence in the home and community environment.     Patient's spiritual, cultural, and educational needs considered and patient agreeable to plan of care and goals.             Plan: PT to advance loading as tolerated but slowly as recovering from illness. No DN for a week or two more until illness and inflammation reduced.    Goals:   Active       long term goals       cervical L side flexion 45 degree or better without radicular pain (Met)       Start:  03/10/25    Expected End:  05/19/25    Resolved:  04/16/25         T spine rotation at 80 % or better (Met)       Start:  03/10/25    Expected End:  05/19/25    Resolved:  04/16/25         dead lifting up to 60# or better for  home tasks (Progressing)       Start:  03/10/25    Expected End:  05/19/25               med x goals       increase cervical and lumbar med x score by 5% or more (Progressing)       Start:  03/10/25    Expected End:  05/19/25               short term goals       I with HEP (Met)       Start:  03/10/25    Expected End:  04/07/25    Resolved:  04/07/25         core strength 2+/5 or better (Progressing)       Start:  03/10/25    Expected End:  04/07/25            hip abduction at 4+/5 or better (Progressing)       Start:  03/10/25    Expected End:  04/07/25                        Sindhu Meneses, PT

## 2025-05-01 ENCOUNTER — TELEPHONE (OUTPATIENT)
Dept: PSYCHIATRY | Facility: CLINIC | Age: 45
End: 2025-05-01
Payer: COMMERCIAL

## 2025-05-01 ENCOUNTER — CLINICAL SUPPORT (OUTPATIENT)
Dept: REHABILITATION | Facility: HOSPITAL | Age: 45
End: 2025-05-01
Payer: COMMERCIAL

## 2025-05-01 DIAGNOSIS — M53.86 DECREASED ROM OF LUMBAR SPINE: ICD-10-CM

## 2025-05-01 DIAGNOSIS — R29.898 DECREASED ROM OF NECK: Primary | ICD-10-CM

## 2025-05-01 DIAGNOSIS — M51.361 DEGENERATION OF INTERVERTEBRAL DISC OF LUMBAR REGION WITH LOWER EXTREMITY PAIN: ICD-10-CM

## 2025-05-01 DIAGNOSIS — M79.18 DIFFUSE MYOFASCIAL PAIN SYNDROME: ICD-10-CM

## 2025-05-01 PROCEDURE — 97750 PHYSICAL PERFORMANCE TEST: CPT | Mod: 32,PN

## 2025-05-01 NOTE — TELEPHONE ENCOUNTER
----- Message from Petrona sent at 5/1/2025  3:14 PM CDT -----  Contact: Patient, [phone]  [email or web address] Patient is calling for Medical Advice regarding: Calling to get an earlier time for her appointment tomorrow. Please call her. Thanks.How long has patient had these symptoms: N/APharmacy name and phone#: N/APatient wants a call back or thru [facility]: N/AComments: N/APlease advise patient replies from provider may take up to 48 hours.

## 2025-05-01 NOTE — TELEPHONE ENCOUNTER
Spoke with pt and she will try to see if she can find someone to pick her son up tomorrow from school so she can keep the appt after all and if not mimi c/b to r/s

## 2025-05-01 NOTE — PROGRESS NOTES
Outpatient Rehab    Physical Therapy Visit healthy Neck program    Patient Name: Joy Recinos  MRN: [id number]  YOB: 1980  Encounter Date: 5/1/2025    Therapy Diagnosis:   Encounter Diagnoses   Name Primary?    Decreased ROM of neck Yes    Decreased ROM of lumbar spine     Degeneration of intervertebral disc of lumbar region with lower extremity pain     Diffuse myofascial pain syndrome        Physician: Myrna Lynch MD    Physician Orders: Eval and Treat  Medical Diagnosis: Diffuse myofascial pain syndrome  Degeneration of intervertebral disc of lumbar region with lower extremity pain    Visit # / Visits Authorized:  11 / 20 (cervical training med x computerized; back non-computerized)  Date of Evaluation:  3/10/2025   Insurance Authorization Period: 3/10/25 to 5/19/25 (or until 20 visits met)  Plan of Care Certification:  3/10/2025 to 5/19/25   Progress note due 30 days from 4/16/25        PT/PTA:   0  Number of PTA visits since last PT visit: 0  Time In:   10:34 am   Time Out:  11:30 am  Total Time:   56 with PT and tech assistance  Total Billable Time: 56 min    FOTO:  Intake Score:  %  Survey Score 1:  %  Survey Score 2:  %    Med x cervical test results 3/19/25 (pt is below average on testing vs peers)    INSURANCE and OUTCOMES: Program Benefit Group with FOTO Outcomes    Pattern of pain determined:           Objective      Cervical  Isometric Testing on Med X equipment: Testing administered by PT    Test Initial Baseline Midpoint Final   Date 3/19/25     ROM 6-90 deg     Max Peak Torque 193      Min Peak Torque 105      Flex/Ext Ratio 1.838     % variance  normative data 29% below     % change from initial test N/A visit 1         Outcomes:  Intake Score: -29% below normal  Visit 10 Score: 3% increase  Discharge Score:  Goal Score: 5% above normal or better         Subjective   pt is a little sore from the last session as expected but not too bad. mild headache after  therapy..  Pain reported as 5/10. low back and mid back and neck and shoulders    Objective        Cervical side flexion 45 degrees and no radicular pain; T spine rotation 80% ROM    Treatment:  Therapeutic Exercise  TE 5: shuttle 5 bands 3 min  TE 6: seated UBE 5 min  TE 7: T spine extension on foam roller 3 min  Balance/Neuromuscular Re-Education  NMR 3: med x lumbar 70 # 1x 20; shoulder shrugs 6# B 2x 10 3 sec holds;cervical med x machines 123# 1x17; hip adduction machine 40 # 1x20; hip abduction machine 40# 1x 20  NMR 6: PPT with SLR 2x 10; PPT with SL hip abduction 2x 10; seated trunk rotation machine 22# 1x 20 setting 3        Time Entry(in minutes):       Assessment & Plan   Assessment: Pt is progressing with therex control better today. PT was able to resume med x cervical but had to reduce load still and will slowly advance as tolerated.  Evaluation/Treatment Tolerance: Patient limited by fatigue    Patient will continue to benefit from skilled outpatient physical therapy to address the deficits listed in the problem list box on initial evaluation, provide pt/family education and to maximize pt's level of independence in the home and community environment.     Patient's spiritual, cultural, and educational needs considered and patient agreeable to plan of care and goals.             Plan: PT to advance loading as tolerated but slowly as recovering from illness. No DN for a week or two more until illness and inflammation reduced.    Goals:   Active       long term goals       cervical L side flexion 45 degree or better without radicular pain (Met)       Start:  03/10/25    Expected End:  05/19/25    Resolved:  04/16/25         T spine rotation at 80 % or better (Met)       Start:  03/10/25    Expected End:  05/19/25    Resolved:  04/16/25         dead lifting up to 60# or better for home tasks (Progressing)       Start:  03/10/25    Expected End:  05/19/25               med x goals       increase cervical  and lumbar med x score by 5% or more (Progressing)       Start:  03/10/25    Expected End:  05/19/25               short term goals       I with HEP (Met)       Start:  03/10/25    Expected End:  04/07/25    Resolved:  04/07/25         core strength 2+/5 or better (Progressing)       Start:  03/10/25    Expected End:  04/07/25            hip abduction at 4+/5 or better (Progressing)       Start:  03/10/25    Expected End:  04/07/25                          Sindhu Meneses PT

## 2025-05-02 ENCOUNTER — OFFICE VISIT (OUTPATIENT)
Dept: PSYCHIATRY | Facility: CLINIC | Age: 45
End: 2025-05-02
Payer: COMMERCIAL

## 2025-05-02 ENCOUNTER — PATIENT MESSAGE (OUTPATIENT)
Dept: PSYCHIATRY | Facility: CLINIC | Age: 45
End: 2025-05-02
Payer: COMMERCIAL

## 2025-05-02 DIAGNOSIS — F41.1 GAD (GENERALIZED ANXIETY DISORDER): ICD-10-CM

## 2025-05-02 DIAGNOSIS — F90.9 ATTENTION DEFICIT HYPERACTIVITY DISORDER (ADHD), UNSPECIFIED ADHD TYPE: Primary | ICD-10-CM

## 2025-05-02 PROCEDURE — 98006 SYNCH AUDIO-VIDEO EST MOD 30: CPT | Mod: 95,,, | Performed by: PSYCHIATRY & NEUROLOGY

## 2025-05-02 PROCEDURE — 90833 PSYTX W PT W E/M 30 MIN: CPT | Mod: 95,,, | Performed by: PSYCHIATRY & NEUROLOGY

## 2025-05-02 RX ORDER — CLONAZEPAM 0.5 MG/1
0.5 TABLET ORAL DAILY PRN
Qty: 30 TABLET | Refills: 2 | Status: SHIPPED | OUTPATIENT
Start: 2025-05-02 | End: 2026-05-02

## 2025-05-02 RX ORDER — TRAZODONE HYDROCHLORIDE 100 MG/1
TABLET ORAL
Qty: 135 TABLET | Refills: 0 | Status: SHIPPED | OUTPATIENT
Start: 2025-05-02

## 2025-05-02 RX ORDER — LISDEXAMFETAMINE DIMESYLATE 40 MG/1
40 CAPSULE ORAL DAILY
Qty: 30 CAPSULE | Refills: 0 | Status: SHIPPED | OUTPATIENT
Start: 2025-06-01

## 2025-05-02 RX ORDER — OMEPRAZOLE 20 MG/1
20 CAPSULE, DELAYED RELEASE ORAL DAILY
Qty: 90 CAPSULE | Refills: 0 | Status: SHIPPED | OUTPATIENT
Start: 2025-05-02

## 2025-05-02 RX ORDER — VENLAFAXINE HYDROCHLORIDE 150 MG/1
300 CAPSULE, EXTENDED RELEASE ORAL DAILY
Qty: 180 CAPSULE | Refills: 0 | Status: SHIPPED | OUTPATIENT
Start: 2025-05-02 | End: 2025-10-29

## 2025-05-02 RX ORDER — LISDEXAMFETAMINE DIMESYLATE 40 MG/1
40 CAPSULE ORAL DAILY
Qty: 30 CAPSULE | Refills: 0 | Status: SHIPPED | OUTPATIENT
Start: 2025-05-02

## 2025-05-02 NOTE — PROGRESS NOTES
"Outpatient Psychiatry Follow-up Visit (MD/NP)    5/2/2025    Joy Recinos, a 44 y.o. female, presenting for follow-up visit. Met with patient.    Reason for Encounter: f/u, DOUG. adhd    Interval History: Patient seen and interviewed for follow-up, last seen about four months ago. Moods symptoms are ongoing. Reports stressed by work situation - her company work situation is unstable. Currently employed but without hours. 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  Less conflict in her marriage. Hasn't been doing therapy (difficult to keep in context of schedule, other responsibilities). Adherent to prescribed medications.     Past med List:   Sertraline  Duloxetine  escitalopram  Fluoxetine   milnacipran    Background: 36 y/o F presents for establishment of care, reports problems with anxiety & depression. From PCP note (3.16.18): Here today for f/u on depression & anxiety. She reports that since medication adjustment she has been less irritable. Her  is here with her today & affirms this. She has a psych appointment scheduled for May 11. She reports it has been some mild improvement in her volition. Reports treatment with cymbalta - for fibromyalgia, anxiety, depression for 5 years. Neither fibromyalgia pain no moods improved. wellbutrin trial has been perhaps helpful. Describes baseline symptoms as "worried about everything", "worried about worrying", sad, not interested in things, "like I don't have a purpose". Negative thoughts about self. Tired all the time, even when sleeps excessively. Denies SI. "All my life". "stays all the time", though better/worse at times, influenced by stressful situations. Problems with attention/concentration. Reports all of the following daily in past 2 weeks (around which time she was laid off): Feeling nervous, anxious or on edge, Not being able to stop or control worrying, " "Worrying too much about different things. Trouble relaxing. Being so restless that it is hard to sit still   Becoming easily annoyed or irritable   Feeling afraid as if something awful might happen    DOUG-7 = 21 (estimates a 19 in weeks prior to the layoff)    Sleep Problems - hypersomnia  Sad Mood more than 1/2 time  Appetite and weight changes (decreased appetite, small subjective weight loss)  Concentration problems  Guilt   Thoughts of Emptiness  Anhedonia  Anergia  Slowing/PMR    QIDS = 12    Also has decreased sexual interest, pain during intercourse.    PsychHx: first treatment by Obgyn - sertraline(?) when had problems with endometriosis. No AVH, no SI/HI, no delusions. No hospitalizations or self-harm behaviors. Prolonged duloxetine trial, thinks transient or minimal benefit. Past treatment with sertraline.     MedHx: idiopathic hypersomnia. 2 sleep studies, narcolepsy ruled out. Endometriosis. 2 herniated lumbar discs. Migraines. GERD. Seasonal allergies.   FamHx: mother & dad both take medication for depression. Father diagnosed with adhd in 60's.   SocHx: from [facility]. Heart murmur at birth. "fainted and blacked out school", had several episodes between 5 and 9. Worked up but never medically explained. Still gets dizzy and weak, but doesn't black out. Also had back pain frequently as a child. No developmental problems. Regular classes in school. Ok with going to school. Normal socially. Average student. 3 semesters of college. Went to technical school for 1 year, finished (computer/). 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 Applying for new jobs. Lives with  & 2 kids.  x 14 years. "really good" relationship x decreased sexual interest and pain. Week before  pulled out a tampon - was extremely painful. [place] has been painful ever since. Has had PT recommended " "for this. 2 kids (13 & 9). Dtr has been bullied most of her life due to being overweight. She also has problems with oppositionality at home (though is a good student, doesn't have discipline problems at school), won't take a bath. Son is "good kid", "very attached to me". Quiet, not many friends. Mom is supportive, lives nearby.     Review Of Systems:     GENERAL:  No weight gain or loss  SKIN:  No rashes or lacerations  HEAD:  No headaches  CHEST:  No shortness of breath, hyperventilation or cough  CARDIOVASCULAR:  No tachycardia or chest pain  ABDOMEN:  No nausea, vomiting, pain, constipation or diarrhea  URINARY:  No frequency, dysuria or sexual dysfunction  ENDOCRINE:  No polydipsia, polyuria  MUSCULOSKELETAL:  pain and stiffness of the joints  NEUROLOGIC:  No weakness, sensory changes, seizures, confusion, memory loss, tremor or other abnormal movements    Current Evaluation:     Nutritional Screening: Considering the patient's height and weight, medications, medical history and preferences, should a referral be made to the dietitian? no    Constitutional  Vitals:  Most recent vital signs, dated less than 90 days prior to this appointment, were not reviewed.    There were no vitals filed for this visit.       General:  unremarkable, age appropriate     Musculoskeletal  Muscle Strength/Tone:  no tremor, no tic   Gait & Station:  non-ataxic     Psychiatric  Appearance: casually dressed & groomed;   Behavior: calm,   Cooperation: cooperative with assessment  Speech: normal rate, volume, tone  Thought Process: linear, goal-directed  Thought Content: No suicidal or homicidal ideation; no delusions  Affect: anxious  Mood: anxious  Perceptions: No auditory or visual hallucinations  Level of Consciousness: alert throughout interview  Insight: fair  Cognition: Oriented to person, place, time, & situation  Memory: no apparent deficits to general clinical interview; not formally assessed  Attention/Concentration: no " apparent deficits to general clinical interview; not formally assessed  Fund of Knowledge: average by vocabulary/education    Laboratory Data  Office Visit on 04/24/2025   Component Date Value Ref Range Status    SARS Coronavirus 2 Antigen 04/24/2025 Positive (A)  Negative, Presumptive Negative Final     Acceptable 04/24/2025 Yes   Final     Medications  Outpatient Encounter Medications as of 5/2/2025   Medication Sig Dispense Refill    clonazePAM (KLONOPIN) 0.5 MG tablet Take 1 tablet (0.5 mg total) by mouth daily as needed for Anxiety. 30 tablet 2    cyanocobalamin 1,000 mcg/mL injection Inject 1 mL (1,000 mcg total) into the skin every 28 days. 1 mL 12    ergocalciferol (VITAMIN D2) 50,000 unit Cap Take 1 capsule (50,000 Units total) by mouth every 7 days. 13 capsule 3    estradioL (ESTRACE) 2 MG tablet Take 1 tablet (2 mg total) by mouth once daily. 90 tablet 3    levocetirizine (XYZAL) 5 MG tablet Take 1 tablet (5 mg total) by mouth every evening. 30 tablet 11    omeprazole (PRILOSEC) 20 MG capsule Take 1 capsule (20 mg total) by mouth once daily. 90 capsule 3    progesterone (PROMETRIUM) 100 MG capsule Take 1 capsule (100 mg total) by mouth nightly. 90 capsule 3    tiZANidine (ZANAFLEX) 2 MG tablet Take 1 tablet (2 mg total) by mouth nightly as needed (muscle spasms). 30 tablet 1    traZODone (DESYREL) 100 MG tablet Take ½ to 1 ½ tablets by mouth at bedtime as needed for sleep. 135 tablet 1    venlafaxine (EFFEXOR-XR) 150 MG Cp24 Take 2 capsules (300 mg total) by mouth once daily. 180 capsule 1     No facility-administered encounter medications on file as of 5/2/2025.     Assessment - Diagnosis - Goals:     Impression: 45 y/o F with generalized anxiety disorder & adhd. Previous trials of duloxetine with minimal benefit, low dose bupropion with modest benefit. adhd by clinical history supplemented by family input, screening tools. Had persistent symptoms despite treatment & anxious in context of  worse stressors. Failed atomoxetine, concerta. Started therapy, hasn't continued. Open to try another stimulant.    Dx: generalized anxiety disorder. adhd    Treatment Goals:  Specify outcomes written in observable, behavioral terms:   Reduce anxiety by DOUG-7.     Treatment Plan/Recommendations:   Vyvanse trial.   Venlafaxine 300 mg daily for mood.   More liberal use of Clonazepam prn anxiety. Encouraged use prn.   Trazodone for sleep.   Psychotherapy today - strategies for compensating for executive dysfunction.  Discussed risks, benefits, and alternatives to treatment plan documented above with patient. I answered all patient questions related to this plan and patient expressed understanding and agreement.     Return to Clinic: 3 months    WINIFRED Nieto MD  Psychiatry, [facility]

## 2025-05-02 NOTE — TELEPHONE ENCOUNTER
Provider Staff:  Action required for this patient    Requires appointment   Requires labs      Please see care gap opportunities below in Care Due Message.    Thanks!  [facility]     Appointments      Date Provider   Last Visit   6/17/2024 Hakeem Mayers MD   Next Visit   Visit date not found Hakeem Mayers MD     Refill Decision Note   Joy Recinos  is requesting a refill authorization.  Brief Assessment and Rationale for Refill:  Approve     Medication Therapy Plan:        Comments:     Note composed:5:01 PM 05/02/2025

## 2025-05-02 NOTE — TELEPHONE ENCOUNTER
Care Due:                  Date            Visit Type   Department     Provider  --------------------------------------------------------------------------------                                ESTABLISHED                              PATIENT -    [facility]  Last Visit: 06-      VIRTUAL      CARE           Hakeemnoé Mayers  Next Visit: None Scheduled  None         None Found                                                            Last  Test          Frequency    Reason                     Performed    Due Date  --------------------------------------------------------------------------------    Office Visit  12 months..  ergocalciferol...........  06- 06-    Ca..........  12 months..  ergocalciferol...........  06- 06-    Vitamin D...  12 months..  ergocalciferol...........  06- 06-    [employer] Embedded Care Due Messages. Reference number: [id number].   5/02/2025 3:40:45 PM CDT

## 2025-05-05 ENCOUNTER — TELEPHONE (OUTPATIENT)
Dept: PSYCHIATRY | Facility: CLINIC | Age: 45
End: 2025-05-05
Payer: COMMERCIAL

## 2025-05-06 ENCOUNTER — CLINICAL SUPPORT (OUTPATIENT)
Dept: REHABILITATION | Facility: HOSPITAL | Age: 45
End: 2025-05-06
Payer: COMMERCIAL

## 2025-05-06 DIAGNOSIS — R29.898 DECREASED ROM OF NECK: Primary | ICD-10-CM

## 2025-05-06 DIAGNOSIS — M53.86 DECREASED ROM OF LUMBAR SPINE: ICD-10-CM

## 2025-05-06 PROCEDURE — 97750 PHYSICAL PERFORMANCE TEST: CPT | Mod: 32,PN

## 2025-05-06 NOTE — PROGRESS NOTES
Outpatient Rehab    Physical Therapy Visit healthy Neck program    Patient Name: Joy Recinos  MRN: [id number]  YOB: 1980  Encounter Date: 5/6/2025    Therapy Diagnosis:   Encounter Diagnoses   Name Primary?    Decreased ROM of neck Yes    Decreased ROM of lumbar spine          Physician: Myrna Lynch MD    Physician Orders: Eval and Treat  Medical Diagnosis: Diffuse myofascial pain syndrome  Degeneration of intervertebral disc of lumbar region with lower extremity pain    Visit # / Visits Authorized:  13/ 20 (cervical training med x computerized; back non-computerized)  Date of Evaluation:  3/10/2025   Insurance Authorization Period: 3/10/25 to 5/19/25 (or until 20 visits met)  Plan of Care Certification:  3/10/2025 to 5/19/25   Progress note due 30 days from 4/16/25        PT/PTA:   0  Number of PTA visits since last PT visit: 0  Time In:   10:35 am   Time Out:  11:40 am  Total Time:   65 with PT and tech assistance  Total Billable Time: 65 min    FOTO:  Intake Score:  %  Survey Score 1:  %  Survey Score 2:  %    Med x cervical test results 3/19/25 (pt is below average on testing vs peers)    INSURANCE and OUTCOMES: Program Benefit Group with FOTO Outcomes    Pattern of pain determined:           Objective      Cervical  Isometric Testing on Med X equipment: Testing administered by PT    Test Initial Baseline Midpoint Final   Date 3/19/25     ROM 6-90 deg     Max Peak Torque 193      Min Peak Torque 105      Flex/Ext Ratio 1.838     % variance  normative data 29% below     % change from initial test N/A visit 1         Outcomes:  Intake Score: -29% below normal  Visit 10 Score: 3% increase  Discharge Score:  Goal Score: 5% above normal or better         Subjective   Pt reports she sat twisted in a chair yesterday at an event so her back and neck are sore..  Pain reported as 5/10. low back and mid back and neck and shoulders    Objective        Cervical side flexion 45 degrees and no  radicular pain; T spine rotation 80% ROM    Treatment:  Therapeutic Exercise  TE 5: shuttle 5 bands 3 min  TE 7: T spine extension on foam roller 3 min  Manual Therapy  MT 1: cupping and soft tissue mobilization to the upper traps, mid back and lumbar paraspinals  Balance/Neuromuscular Re-Education  NMR 3: med x lumbar 76 # 1x 20; shoulder shrugs 8# B 2x 10 3 sec holds;cervical med x machines 123# 1x20; hip adduction machine 45 # 1x20; hip abduction machine 45# 1x 20  NMR 6: PPT with SLR 2x 10; PPT with SL hip abduction 2x 10; seated trunk rotation machine 24# 1x 20 setting 3  NMR 8: wand flexion 2# pronated 1x10 then supinated 2x 10; prone T 2x 10 with cues on scapular posterior tipping and prone Y 1x 10        Time Entry(in minutes):       Assessment & Plan   Assessment: PT continued with therex to increase T spine extension, lower trap engagement and cues to reduce hiking at the shoulders. Pt is able to engage the core better and had better control on the med x cervical machine allowing PT to increase load vs last session on some tasks but she is still slowly recovering from her illness.  Evaluation/Treatment Tolerance: Patient limited by fatigue    Patient will continue to benefit from skilled outpatient physical therapy to address the deficits listed in the problem list box on initial evaluation, provide pt/family education and to maximize pt's level of independence in the home and community environment.     Patient's spiritual, cultural, and educational needs considered and patient agreeable to plan of care and goals.             Plan: PT to advance loading as tolerated but slowly as recovering from illness. No DN for a week or two more until illness and inflammation reduced.  PT to increase lower trap work, T spine extension and increase load on neck med x.    Goals:   Active       long term goals       cervical L side flexion 45 degree or better without radicular pain (Met)       Start:  03/10/25    Expected  End:  05/19/25    Resolved:  04/16/25         T spine rotation at 80 % or better (Met)       Start:  03/10/25    Expected End:  05/19/25    Resolved:  04/16/25         dead lifting up to 60# or better for home tasks (Progressing)       Start:  03/10/25    Expected End:  05/19/25               med x goals       increase cervical and lumbar med x score by 5% or more (Progressing)       Start:  03/10/25    Expected End:  05/19/25               short term goals       I with HEP (Met)       Start:  03/10/25    Expected End:  04/07/25    Resolved:  04/07/25         core strength 2+/5 or better (Progressing)       Start:  03/10/25    Expected End:  04/07/25            hip abduction at 4+/5 or better (Progressing)       Start:  03/10/25    Expected End:  04/07/25                            Sindhu Meneses, PT

## 2025-05-08 ENCOUNTER — CLINICAL SUPPORT (OUTPATIENT)
Dept: REHABILITATION | Facility: HOSPITAL | Age: 45
End: 2025-05-08
Payer: COMMERCIAL

## 2025-05-08 DIAGNOSIS — R29.898 DECREASED ROM OF NECK: Primary | ICD-10-CM

## 2025-05-08 DIAGNOSIS — M53.86 DECREASED ROM OF LUMBAR SPINE: ICD-10-CM

## 2025-05-08 DIAGNOSIS — M79.18 DIFFUSE MYOFASCIAL PAIN SYNDROME: ICD-10-CM

## 2025-05-08 DIAGNOSIS — M51.361 DEGENERATION OF INTERVERTEBRAL DISC OF LUMBAR REGION WITH LOWER EXTREMITY PAIN: ICD-10-CM

## 2025-05-08 PROCEDURE — 97750 PHYSICAL PERFORMANCE TEST: CPT | Mod: 32,PN

## 2025-05-08 NOTE — PROGRESS NOTES
Outpatient Rehab    Physical Therapy Visit Healthy Neck program    Patient Name: Joy Recinos  MRN: [id number]  YOB: 1980  Encounter Date: 5/8/2025    Therapy Diagnosis:   Encounter Diagnoses   Name Primary?    Decreased ROM of neck Yes    Decreased ROM of lumbar spine     Degeneration of intervertebral disc of lumbar region with lower extremity pain     Diffuse myofascial pain syndrome          Physician: Myrna Lynch MD    Physician Orders: Eval and Treat  Medical Diagnosis: Diffuse myofascial pain syndrome  Degeneration of intervertebral disc of lumbar region with lower extremity pain    Visit # / Visits Authorized:  14/ 20 (cervical training med x computerized; back non-computerized)  Date of Evaluation:  3/10/2025   Insurance Authorization Period: 3/10/25 to 5/19/25 (or until 20 visits met)  Plan of Care Certification:  3/10/2025 to 5/19/25   Progress note due 30 days from 4/16/25        PT/PTA:   0  Number of PTA visits since last PT visit: 0  Time In:   10:35 am   Time Out:  11:30 am  Total Time:   55 with PT and tech assistance  Total Billable Time: 55 min    FOTO:  Intake Score:  %  Survey Score 1:  %  Survey Score 2:  %    Med x cervical test results 3/19/25 (pt is below average on testing vs peers)    INSURANCE and OUTCOMES: Program Benefit Group with FOTO Outcomes    Pattern of pain determined:           Objective      Cervical  Isometric Testing on Med X equipment: Testing administered by PT    Test Initial Baseline Midpoint Final   Date 3/19/25     ROM 6-90 deg     Max Peak Torque 193      Min Peak Torque 105      Flex/Ext Ratio 1.838     % variance  normative data 29% below     % change from initial test N/A visit 1         Outcomes:  Intake Score: -29% below normal  Visit 10 Score: 3% increase  Discharge Score:  Goal Score: 5% above normal or better         Subjective   pt states her back pain is better but her neck is a bit stiff today..  Pain reported as 5/10. low back  and mid back and neck and shoulders    Objective        Cervical side flexion 45 degrees and no radicular pain; T spine rotation 80% ROM    Treatment:  Therapeutic Exercise  TE 5: shuttle 5 bands 3 min  Balance/Neuromuscular Re-Education  NMR 2: prone on proped on elbow cervical retraction 2x 10  NMR 3: med x lumbar 50 # 1x 5 then 80# 1x 15; shoulder shrugs 8# B 2x 10 3 sec holds;cervical med x machines 129# 1x20; hip adduction machine (setting 4 50 # 1x 5 then 45# 3 at mid ROM 1x 15; hip abduction machine 45# 1x 20  NMR 6: PPT with SLR 2x 10; PPT with SL hip abduction 2x 10; seated trunk rotation machine 24# 1x 20 setting 3; LAQ 10 # machine 2x 10; qped alternating arm 2x 10        Time Entry(in minutes):  Neuromuscular Re-Education Time Entry: 52  Therapeutic Exercise Time Entry: 3    Assessment & Plan   Assessment: PT was able to increase load some today. Pt needs a lot of cues on qped postures to increase neck and core engagement still.  Evaluation/Treatment Tolerance: Patient limited by fatigue    Patient will continue to benefit from skilled outpatient physical therapy to address the deficits listed in the problem list box on initial evaluation, provide pt/family education and to maximize pt's level of independence in the home and community environment.     Patient's spiritual, cultural, and educational needs considered and patient agreeable to plan of care and goals.             Plan: PT to advance loading as tolerated but slowly as recovering from illness. No DN for a week or two more until illness and inflammation reduced.  PT to increase lower trap work, T spine extension and increase load on neck med x.    Goals:   Active       long term goals       cervical L side flexion 45 degree or better without radicular pain (Met)       Start:  03/10/25    Expected End:  05/19/25    Resolved:  04/16/25         T spine rotation at 80 % or better (Met)       Start:  03/10/25    Expected End:  05/19/25    Resolved:   04/16/25         dead lifting up to 60# or better for home tasks (Progressing)       Start:  03/10/25    Expected End:  05/19/25               med x goals       increase cervical and lumbar med x score by 5% or more (Progressing)       Start:  03/10/25    Expected End:  05/19/25               short term goals       I with HEP (Met)       Start:  03/10/25    Expected End:  04/07/25    Resolved:  04/07/25         core strength 2+/5 or better (Progressing)       Start:  03/10/25    Expected End:  04/07/25            hip abduction at 4+/5 or better (Progressing)       Start:  03/10/25    Expected End:  04/07/25                              Sindhu Meneses, PT

## 2025-05-13 ENCOUNTER — CLINICAL SUPPORT (OUTPATIENT)
Dept: REHABILITATION | Facility: HOSPITAL | Age: 45
End: 2025-05-13
Payer: COMMERCIAL

## 2025-05-13 DIAGNOSIS — R29.898 DECREASED ROM OF NECK: Primary | ICD-10-CM

## 2025-05-13 DIAGNOSIS — M53.86 DECREASED ROM OF LUMBAR SPINE: ICD-10-CM

## 2025-05-13 PROCEDURE — 97750 PHYSICAL PERFORMANCE TEST: CPT | Mod: 32,PN

## 2025-05-13 NOTE — PROGRESS NOTES
Outpatient Rehab    Physical Therapy Progress Note : Updated Plan of Care    Outpatient Rehab    Physical Therapy Visit Healthy Neck program    Patient Name: Joy Recinos  MRN: [id number]  YOB: 1980  Encounter Date: 5/13/2025    Therapy Diagnosis:   Encounter Diagnoses   Name Primary?    Decreased ROM of neck Yes    Decreased ROM of lumbar spine            Physician: Myrna Lynch MD    Physician Orders: Eval and Treat  Medical Diagnosis: Diffuse myofascial pain syndrome  Degeneration of intervertebral disc of lumbar region with lower extremity pain    Visit # / Visits Authorized:  14/ 20 (cervical training med x computerized; back non-computerized)  Date of Evaluation:  3/10/2025   Insurance Authorization Period: 3/10/25 to 5/19/25 (or until 20 visits met)   Plan of Care Certification:  3/10/2025 to 5/19/25 extend to 6/13/25  Progress note due 30 days from 5/13/25        Time In:   10:36 am   Time Out:  11:50 am  Total Time:   74 with PT and tech assistance  Total Billable Time: 74 min    FOTO:  Intake Score:  %  Survey Score 1:  %  Survey Score 2:  %    Med x cervical test results 3/19/25 (pt is below average on testing vs peers)    INSURANCE and OUTCOMES: Program Benefit Group with FOTO Outcomes    Pattern of pain determined:           Objective      Cervical  Isometric Testing on Med X equipment: Testing administered by PT    Test Initial Baseline Midpoint Final   Date 3/19/25     ROM 6-90 deg     Max Peak Torque 193      Min Peak Torque 105      Flex/Ext Ratio 1.838     % variance  normative data 29% below     % change from initial test N/A visit 1         Outcomes:  Intake Score: -29% below normal  Visit 10 Score: 3% increase  Discharge Score:  Goal Score: 5% above normal or better     Thoracic Mobility Details: Seated L trunk rotation 50% ; R trunk rotation 20 % and painful on the R low back but today at 60 % to the L and 70% to the R  Lumbosacral Mobility Details: Lumbar  flexion 14 inches from reaching the floor at [facility] but today to 4 inches; extension leans to L but has 70% ROM at eval but today to full extension; R side flexion to knee  and painful more than L side flexion to the knee (non painful) but today to just below knee to R and L. Core strength 2/5 or better at eval but today with bent knee 2/5 and with straight knee.           Subcranial Range of Motion    Active Restricted? Passive Restricted? Pain   Flexion         Protraction         Retraction            Cervical Range of Motion    Active (deg) today Pain   Flexion 50  60     Extension 70  70     Right Lateral Flexion 50  50     Right Rotation 80  85     Left Lateral Flexion 40 (L neck pain)  50     Left Rotation 70 (pulls on L side)  85              Shoulder Range of Motion     Shoulder, Elbow, or Forearm Range of Motion Details: Shoulder ROM WFL in all planes. Elbow ROM full and Wrist ROM WFL B     Hip Range of Motion   Right Hip    Active (deg) today Pain   Flexion 120  120     Extension 10  15     ABduction 4  20     ADduction         External Rotation 90/90    45     External Rotation Prone         Internal Rotation 90/90         Internal Rotation Prone               Left Hip    Active (deg) today Pain   Flexion 115  120     Extension 10  10     ABduction 4  30     ADduction         External Rotation 90/90    45     External Rotation Prone         Internal Rotation 90/90         Internal Rotation Prone                   Shoulder Strength - Planes of Motion    Right Strength today Left Strength today   Flexion 4+  5 4  5   Extension 4  5 4  5   ABduction 4  4+ 4-  4+   ADduction           Horizontal ABduction           Horizontal ADduction           Internal Rotation 0°           Internal Rotation 90°           External Rotation 0° 4+  4+ 4  4+   External Rotation 90°                      Hip Strength - Planes of Motion    Right Strength today Left Strength today   Flexion (L2) 4-  5 4  5   Extension 4- 4+ 4  4+    ABduction 4- 5 4-  5   ADduction           Internal Rotation           External Rotation                 Knee Strength    Right Strength Today Left Strength Today   Flexion (S2) 5  5 5  5   Prone Flexion 5  5 5  5   Extension (L3) 5  5 5  5             Subjective   back and neck are sore as she worked on a big home cleaning project recently..  Pain reported as 5/10. low back and mid back and neck and shoulders    Objective        Cervical side flexion 45 degrees and no radicular pain; T spine rotation 80% ROM    Treatment:  Therapeutic Exercise  TE 5: shuttle 6 bands 3 min  TE 6: seated UBE 5 min  Balance/Neuromuscular Re-Education  NMR 5: cervical med x machine 135 # 1x 20 (RPE 6); seated hip adduction 40 # setting 5 1x 20 cues on PPT to reduce anterior tilt and anterior lean when on machine; seated hip abduction 40 # 5 setting 1x 20; lumbar extension machine 60 # 1x 5 then 86# 1x 20; seated trunk rotation setting 3 26# 1x 20  NMR 9: zombie sit up with cues on core engagement 1x 5 with assistance; bent knee sit up 1x 5 with assistance and training on transverse abdominus 1x 10 3-5 sec hold        Time Entry(in minutes):       Assessment & Plan   Assessment: PT continued to advance load as tolerated. PT noted a trigger point inthe R paraspinal near C2-4 with soft tissue work. Pt is still needing cues to engage the core as she sits in a lot of lordosis and anterior pelvic tilt. Pt is getting back to her pre Covid strength/endurance. Pt tends to push out into her lower pelvis unless cued to engage better.  Evaluation/Treatment Tolerance: Patient limited by fatigue    Patient will continue to benefit from skilled outpatient physical therapy to address the deficits listed in the problem list box on initial evaluation, provide pt/family education and to maximize pt's level of independence in the home and community environment.     Patient's spiritual, cultural, and educational needs considered and patient agreeable to  plan of care and goals.             Plan: PT to continue with therex program for healthy neck and back.    Goals:   Active       long term goals       cervical L side flexion 45 degree or better without radicular pain (Met)       Start:  03/10/25    Expected End:  05/19/25    Resolved:  04/16/25         T spine rotation at 80 % or better (Met)       Start:  03/10/25    Expected End:  05/19/25    Resolved:  04/16/25         dead lifting up to 60# or better for home tasks (Progressing)       Start:  03/10/25    Expected End:  06/13/25               med x goals       increase cervical and lumbar med x score by 5% or more (Progressing)       Start:  03/10/25    Expected End:  06/13/25               short term goals       I with HEP (Met)       Start:  03/10/25    Expected End:  04/07/25    Resolved:  04/07/25         core strength 2+/5 or better (Progressing)       Start:  03/10/25    Expected End:  06/13/25            hip abduction at 4+/5 or better (Met)       Start:  03/10/25    Expected End:  04/07/25    Resolved:  05/13/25             Sindhu Meneses, PT

## 2025-05-15 ENCOUNTER — CLINICAL SUPPORT (OUTPATIENT)
Dept: REHABILITATION | Facility: HOSPITAL | Age: 45
End: 2025-05-15
Payer: COMMERCIAL

## 2025-05-15 DIAGNOSIS — M53.86 DECREASED ROM OF LUMBAR SPINE: ICD-10-CM

## 2025-05-15 DIAGNOSIS — R29.898 DECREASED ROM OF NECK: Primary | ICD-10-CM

## 2025-05-15 PROCEDURE — 97750 PHYSICAL PERFORMANCE TEST: CPT | Mod: 32,PN

## 2025-05-15 NOTE — PROGRESS NOTES
Outpatient Rehab    Physical Therapy Visit Healthy Neck program    Patient Name: Joy Recinos  MRN: [id number]  YOB: 1980  Encounter Date: 5/15/2025    Therapy Diagnosis:   Encounter Diagnoses   Name Primary?    Decreased ROM of neck Yes    Decreased ROM of lumbar spine            Physician: Myrna Lynch MD    Physician Orders: Eval and Treat  Medical Diagnosis: Diffuse myofascial pain syndrome  Degeneration of intervertebral disc of lumbar region with lower extremity pain    Visit # / Visits Authorized:  15/ 20 (cervical training med x computerized; back non-computerized)  Date of Evaluation:  3/10/2025   Insurance Authorization Period: 3/10/25 to 5/19/25 (or until 20 visits met)   Plan of Care Certification:  3/10/2025 to 5/19/25 extend to 6/13/25  Progress note due 30 days from 5/13/25        Time In:   10:32 am   Time Out:  11:30 am  Total Time:   58 with PT and tech assistance  Total Billable Time: 58 min    FOTO:  Intake Score:  %  Survey Score 1:  %  Survey Score 2:  %    Med x cervical test results 3/19/25 (pt is below average on testing vs peers)    INSURANCE and OUTCOMES: Program Benefit Group with FOTO Outcomes    Pattern of pain determined:           Objective      Cervical  Isometric Testing on Med X equipment: Testing administered by PT    Test Initial Baseline Midpoint Final   Date 3/19/25     ROM 6-90 deg     Max Peak Torque 193      Min Peak Torque 105      Flex/Ext Ratio 1.838     % variance  normative data 29% below     % change from initial test N/A visit 1         Outcomes:  Intake Score: -29% below normal  Visit 10 Score: 3% increase  Discharge Score:  Goal Score: 5% above normal or better          Subjective   pt had a migraine coming on today. Pt noted execedrin migraine relief helped a lot so she is feeling better vs this am. Pt states her back was sore on the med x for the back but felt more managable on the rotation machine today..  Pain reported as  5/10. low back and mid back and neck and shoulders    Objective            Treatment:  Therapeutic Exercise  TE 3: elliptical 5 min (became a bit SOB as still recovering from illness)  TE 6: seated UBE 3 min  Balance/Neuromuscular Re-Education  NMR 3: Plank on elbows 10 x 10 sec hold; PPT 2x 10; qped cat with alternating arm 1x 10 per arm  NMR 5: cervical med x machine 138 # 1x 20 (RPE 6); seated hip adduction 45 # setting 5 1x 20 cues on PPT to reduce anterior tilt and anterior lean when on machine; seated hip abduction 45 # 5 setting 1x 20; lumbar extension machine 60 # 1x 5 then 86# 1x 20; seated trunk rotation setting 3 24# 1x 20        Time Entry(in minutes):  Neuromuscular Re-Education Time Entry: 50  Therapeutic Exercise Time Entry: 8    Assessment & Plan   Assessment: Pt struggled on modified planks. PT to add in more tasks to help advance core control as pt still struggles with engaging them. PT noted cervical retraction held in QPEd was more tolerated than on prior sessions showing strength is progressing as well as endurance in mid T spine neck supports but arms fatigued needing more scapular control still.  Evaluation/Treatment Tolerance: Patient limited by fatigue    Patient will continue to benefit from skilled outpatient physical therapy to address the deficits listed in the problem list box on initial evaluation, provide pt/family education and to maximize pt's level of independence in the home and community environment.     Patient's spiritual, cultural, and educational needs considered and patient agreeable to plan of care and goals.             Plan: PT to continue with therex program for healthy neck and back.    Goals:   Active       long term goals       cervical L side flexion 45 degree or better without radicular pain (Met)       Start:  03/10/25    Expected End:  05/19/25    Resolved:  04/16/25         T spine rotation at 80 % or better (Met)       Start:  03/10/25    Expected End:  05/19/25     Resolved:  04/16/25         dead lifting up to 60# or better for home tasks (Progressing)       Start:  03/10/25    Expected End:  06/13/25               med x goals       increase cervical and lumbar med x score by 5% or more (Progressing)       Start:  03/10/25    Expected End:  06/13/25               short term goals       I with HEP (Met)       Start:  03/10/25    Expected End:  04/07/25    Resolved:  04/07/25         core strength 2+/5 or better (Progressing)       Start:  03/10/25    Expected End:  06/13/25            hip abduction at 4+/5 or better (Met)       Start:  03/10/25    Expected End:  04/07/25    Resolved:  05/13/25             Sindhu Meneses, PT

## 2025-05-19 ENCOUNTER — CLINICAL SUPPORT (OUTPATIENT)
Dept: REHABILITATION | Facility: HOSPITAL | Age: 45
End: 2025-05-19
Payer: COMMERCIAL

## 2025-05-19 DIAGNOSIS — M51.361 DEGENERATION OF INTERVERTEBRAL DISC OF LUMBAR REGION WITH LOWER EXTREMITY PAIN: ICD-10-CM

## 2025-05-19 DIAGNOSIS — M53.86 DECREASED ROM OF LUMBAR SPINE: ICD-10-CM

## 2025-05-19 DIAGNOSIS — R29.898 DECREASED ROM OF NECK: Primary | ICD-10-CM

## 2025-05-19 DIAGNOSIS — M79.18 DIFFUSE MYOFASCIAL PAIN SYNDROME: ICD-10-CM

## 2025-05-19 PROCEDURE — 97750 PHYSICAL PERFORMANCE TEST: CPT | Mod: 32,PN

## 2025-05-19 NOTE — PROGRESS NOTES
Outpatient Rehab    Physical Therapy Visit Healthy Neck program    Patient Name: Joy Recinos  MRN: [id number]  YOB: 1980  Encounter Date: 5/19/2025    Therapy Diagnosis:   Encounter Diagnoses   Name Primary?    Decreased ROM of neck Yes    Decreased ROM of lumbar spine     Degeneration of intervertebral disc of lumbar region with lower extremity pain     Diffuse myofascial pain syndrome      Physician: Myrna Lynch MD    Physician Orders: Eval and Treat  Medical Diagnosis: Diffuse myofascial pain syndrome  Degeneration of intervertebral disc of lumbar region with lower extremity pain    Visit # / Visits Authorized:  16/ 20 (cervical training med x computerized; back non-computerized)  Date of Evaluation:  3/10/2025   Insurance Authorization Period: 3/10/25  until 20 visits met)   Plan of Care Certification:  3/10/2025 to 5/19/25 extend to 6/13/25  Progress note due 30 days from 5/13/25        Time In:   10:37 am   Time Out:  11:44 am  Total Time:   67 with PT and tech assistance  Total Billable Time: 67 min    FOTO:  Intake Score:  %  Survey Score 1:  %  Survey Score 2:  %    Med x cervical test results 3/19/25 (pt is below average on testing vs peers)    INSURANCE and OUTCOMES: Program Benefit Group with FOTO Outcomes    Pattern of pain determined:           Objective      Cervical  Isometric Testing on Med X equipment: Testing administered by PT    Test Initial Baseline Midpoint Final   Date 3/19/25     ROM 6-90 deg     Max Peak Torque 193      Min Peak Torque 105      Flex/Ext Ratio 1.838     % variance  normative data 29% below     % change from initial test N/A visit 1         Outcomes:  Intake Score: -29% below normal  Visit 10 Score: 3% increase  Discharge Score:  Goal Score: 5% above normal or better          Subjective   Pt states she woke with a cramp with R cervical rotation. It eased some with PT today allowing for better ROM. PT cued pt to keep chin down as she  tends to rotated in a lot of upper C spine extension..  Pain reported as 5/10. low back and mid back and neck and shoulders    Objective            Treatment:  Therapeutic Exercise  TE 3: elliptical 5 min (became a bit SOB as still recovering from illness)  TE 6: seated UBE 3 min  Manual Therapy  MT 2: syed mobs on R grade II to Ribs 2/3 and soft tissue mobs to R lateral paraspinals at T3/4 and C2-5 area; grade II lateral mobs at C2/3/4  Balance/Neuromuscular Re-Education  NMR 3: Plank on knees and on elbows 5x30 sec hold; PPT 2x 10; qped cat with alternating arm 3x 10 per arm; pone on elbows cervical retraction 2x 10  NMR 5: cervical med x machine 135 # 1x 16 (RPE 6); seated hip adduction 50 # 1x 5 then 45 # setting 5 1x 15 cues on PPT to reduce anterior tilt and anterior lean when on machine; seated hip abduction 50 # 1x 5 then  45 # 5 setting 1x 15; lumbar extension machine 60 # 1x 5 then 86# 1x 20; seated trunk rotation setting 3 24# 1x 20  NMR 9: 100's 3x 20 sec        Time Entry(in minutes):   67 min    Assessment & Plan   Assessment: Pt was able to engage lanette core better. C ROM to R was still a bit lagging about 15 degrees by end of session (at 50 degrees on arrival). Pt still needs better deep neck flexor control to help reduce protracted cervical posture at rest. PT noted Trigger poin C2/3/4 area on R and T3/4 but eased with soft tissue work some.  Evaluation/Treatment Tolerance: Patient limited by fatigue    Patient will continue to benefit from skilled outpatient physical therapy to address the deficits listed in the problem list box on initial evaluation, provide pt/family education and to maximize pt's level of independence in the home and community environment.     Patient's spiritual, cultural, and educational needs considered and patient agreeable to plan of care and goals.             Plan: PT to continue with therex program for healthy neck and back.    Goals:   Active       long term goals        cervical L side flexion 45 degree or better without radicular pain (Met)       Start:  03/10/25    Expected End:  05/19/25    Resolved:  04/16/25         T spine rotation at 80 % or better (Met)       Start:  03/10/25    Expected End:  05/19/25    Resolved:  04/16/25         dead lifting up to 60# or better for home tasks (Progressing)       Start:  03/10/25    Expected End:  06/13/25               med x goals       increase cervical and lumbar med x score by 5% or more (Progressing)       Start:  03/10/25    Expected End:  06/13/25               short term goals       I with HEP (Met)       Start:  03/10/25    Expected End:  04/07/25    Resolved:  04/07/25         core strength 2+/5 or better (Progressing)       Start:  03/10/25    Expected End:  06/13/25            hip abduction at 4+/5 or better (Met)       Start:  03/10/25    Expected End:  04/07/25    Resolved:  05/13/25               Sindhu Meneses, PT

## 2025-05-22 ENCOUNTER — CLINICAL SUPPORT (OUTPATIENT)
Dept: REHABILITATION | Facility: HOSPITAL | Age: 45
End: 2025-05-22
Payer: COMMERCIAL

## 2025-05-22 DIAGNOSIS — R29.898 DECREASED ROM OF NECK: Primary | ICD-10-CM

## 2025-05-22 PROCEDURE — 97750 PHYSICAL PERFORMANCE TEST: CPT | Mod: 32,PN

## 2025-05-22 NOTE — PROGRESS NOTES
Outpatient Rehab     Physical Therapy Visit Healthy Neck program     Patient Name: Joy Recinos  MRN: [id number]  YOB: 1980  Encounter Date: 5/19/2025     Therapy Diagnosis:        Encounter Diagnoses   Name Primary?    Decreased ROM of neck Yes    Decreased ROM of lumbar spine      Degeneration of intervertebral disc of lumbar region with lower extremity pain      Diffuse myofascial pain syndrome        Physician: Myrna Lynch MD     Physician Orders: Eval and Treat  Medical Diagnosis: Diffuse myofascial pain syndrome  Degeneration of intervertebral disc of lumbar region with lower extremity pain     Visit # / Visits Authorized:  16/ 20 (cervical training med x computerized; back non-computerized)  Date of Evaluation:  3/10/2025   Insurance Authorization Period: 3/10/25  until 20 visits met)   Plan of Care Certification:  3/10/2025 to 5/19/25 extend to 6/13/25  Progress note due 30 days from 5/13/25                    Time In:   10:30 am   Time Out:  11:30 am  Total Time:   60 with PT and tech assistance  Total Billable Time: 60 min     FOTO:  Intake Score:  %  Survey Score 1:  %  Survey Score 2:  %     Med x cervical test results 3/19/25 (pt is below average on testing vs peers)     INSURANCE and OUTCOMES: Program Benefit Group with FOTO Outcomes     Pattern of pain determined:               Objective       Cervical  Isometric Testing on Med X equipment: Testing administered by PT     Test Initial Baseline Midpoint Final   Date 3/19/25       ROM 6-90 deg       Max Peak Torque 193        Min Peak Torque 105        Flex/Ext Ratio 1.838       % variance  normative data 29% below       % change from initial test N/A visit 1             Outcomes:  Intake Score: -29% below normal  Visit 10 Score: 3% increase  Discharge Score:  Goal Score: 5% above normal or better            Subjective  Pt reports she is stiff today.      Objective         Treatment:  Therapeutic Exercise  TE 3: elliptical 3  min    Balance/Neuromuscular Re-Education  NMR 3: Plank on knees and on elbows 5x30 sec hold; PPT 2x 10; qped cat with alternating arm 3x 10 per arm; prone on elbows cervical retraction 2x 10  NMR 5: cervical med x machine 135 # 1x 20 (RPE 6); seated hip adduction 50 # 2x10 ; seated hip abduction 50 # 2x10, lumbar extension machine 60 # 1x 5 then 86# 1x 15; seated trunk rotation setting 3 24# 1x 20  NMR 9: 100's 3x 20 sec           Time Entry(in minutes):   60 min     Assessment & Plan  Assessment: Pt tolerated treatment well today. Able to get to 20 reps on cervical med x machine. Required cueing for correct set up and form on mat exercises. Pt still needs better deep neck flexor control.  Evaluation/Treatment Tolerance: Patient limited by fatigue     Patient will continue to benefit from skilled outpatient physical therapy to address the deficits listed in the problem list box on initial evaluation, provide pt/family education and to maximize pt's level of independence in the home and community environment.      Patient's spiritual, cultural, and educational needs considered and patient agreeable to plan of care and goals.            Plan: PT to continue with therex program for healthy neck and back.     Goals:   Active         long term goals         cervical L side flexion 45 degree or better without radicular pain (Met)         Start:  03/10/25    Expected End:  05/19/25    Resolved:  04/16/25           T spine rotation at 80 % or better (Met)         Start:  03/10/25    Expected End:  05/19/25    Resolved:  04/16/25           dead lifting up to 60# or better for home tasks (Progressing)         Start:  03/10/25    Expected End:  06/13/25                 med x goals         increase cervical and lumbar med x score by 5% or more (Progressing)         Start:  03/10/25    Expected End:  06/13/25                 short term goals         I with HEP (Met)         Start:  03/10/25    Expected End:  04/07/25     Resolved:  04/07/25           core strength 2+/5 or better (Progressing)         Start:  03/10/25    Expected End:  06/13/25              hip abduction at 4+/5 or better (Met)         Start:  03/10/25    Expected End:  04/07/25    Resolved:  05/13/25             Debora Staley PT, DPT

## 2025-05-23 ENCOUNTER — OFFICE VISIT (OUTPATIENT)
Dept: RHEUMATOLOGY | Facility: CLINIC | Age: 45
End: 2025-05-23
Payer: COMMERCIAL

## 2025-05-23 VITALS
HEIGHT: 68 IN | BODY MASS INDEX: 32.41 KG/M2 | OXYGEN SATURATION: 96 % | SYSTOLIC BLOOD PRESSURE: 140 MMHG | WEIGHT: 213.88 LBS | HEART RATE: 77 BPM | DIASTOLIC BLOOD PRESSURE: 91 MMHG

## 2025-05-23 DIAGNOSIS — M25.50 ARTHRALGIA, UNSPECIFIED JOINT: ICD-10-CM

## 2025-05-23 DIAGNOSIS — R76.8 POSITIVE ANA (ANTINUCLEAR ANTIBODY): Primary | ICD-10-CM

## 2025-05-23 DIAGNOSIS — R79.82 ELEVATED C-REACTIVE PROTEIN (CRP): ICD-10-CM

## 2025-05-23 DIAGNOSIS — M25.519 SHOULDER PAIN, UNSPECIFIED CHRONICITY, UNSPECIFIED LATERALITY: ICD-10-CM

## 2025-05-23 DIAGNOSIS — M79.18 DIFFUSE MYOFASCIAL PAIN SYNDROME: ICD-10-CM

## 2025-05-23 PROCEDURE — 99999 PR PBB SHADOW E&M-EST. PATIENT-LVL IV: CPT | Mod: PBBFAC,,, | Performed by: STUDENT IN AN ORGANIZED HEALTH CARE EDUCATION/TRAINING PROGRAM

## 2025-05-23 RX ORDER — GABAPENTIN 100 MG/1
100 CAPSULE ORAL 3 TIMES DAILY
Qty: 90 CAPSULE | Refills: 2 | Status: SHIPPED | OUTPATIENT
Start: 2025-05-23 | End: 2025-08-21

## 2025-05-23 NOTE — PROGRESS NOTES
RHEUMATOLOGY OUTPATIENT CLINIC NOTE    5/23/2025    Attending Rheumatologist: Maribel Dixon  Primary Care Provider: Hakeem Mayers MD   Physician Requesting Consultation: Myrna Lynch MD  [id number] [facility]  [place] [id number]  Chief Complaint/Reason For Consultation:  No chief complaint on file.      Subjective:       HPI  Joy Recinos is a 44 y.o. White female with migraines, anxiety, depression, who comes for evaluation of pain.     She was diagnosed with fibromyalgia many years ago. Saw Dr. CARDENAS in [place] for a couple of years. She was found to have positive DARRION but no CTD. At some point she was prescribed steroids but did not help. She was recommended to follow with psychiatry for assistance with meds for fibromyalgia.   She has been on Cymbalta, she is unsure if it helped before. This is been a while since she has been on effexor now for the past at least a year and she has been noticing more pain and thinks anxiety is worse. She has been on lexapro. She has been on lyrica and gabapentin many years ago, she does not recall being on it recently. Savella caused SE.   Today she reports that she everything hurts. Reports that she feels knots all over. Feels that she is bruised everywhere but she has noted bruises a couple of times. She feels stiffness in hands, first thing in the morning. She noted a little bit swelling. Report pain in elbows, wrists, knees, ankles, feet, hips.   She has cervical and lumbar DJD. She follows with PM&R. She does PT and she fels that it helps   She denies any smoking or vaping. Occasional alcohol use. Denies any illicit drug use.   She does not exercise too much due to pain.   She has a sedentary job, works from home.     Labs  8/2023  RF, CCP negative  ESR normal  CRP 18 - chronically elevated.     2018  DARRION 1:320 homogeneous with negative profile   C3, C4 normal   ESR normal  CRP elevated  SPEP with IF normal  Uric acid  5.4    Imaging  MRI lumbar spine: L4-5 and L5-S1 spondylosis and degenerative disc disease as detailed.    Chronic comorbid conditions affecting medical decision making today:  Past Medical History:   Diagnosis Date    ADHD (attention deficit hyperactivity disorder)     B12 deficiency     Chronic low back pain     oberlander    Endometriosis     Fibromyalgia syndrome     Idiopathic hypersomnia     Irritable bowel syndrome     goldman    Migraines     frances    PONV (postoperative nausea and vomiting)     Shingles     Undifferentiated inflammatory arthritis 2018    Undifferentiated inflammatory arthritis 2018     Past Surgical History:   Procedure Laterality Date     SECTION      x 2     CYSTOSCOPY N/A 04/10/2019    Procedure: CYSTOSCOPY;  Surgeon: Gillian Carpenter MD;  Location: [facility] [place];  Service: OB/GYN;  Laterality: N/A;    HYSTERECTOMY  2019    OOPHORECTOMY Bilateral     left removed in . Right removed in .    PELVIC LAPAROSCOPY      ROBOT-ASSISTED LAPAROSCOPIC ABDOMINAL HYSTERECTOMY USING DA LONI XI N/A 04/10/2019    Procedure: XI ROBOTIC HYSTERECTOMY;  Surgeon: Gillian Carpenter MD;  Location: [facility] [place];  Service: OB/GYN;  Laterality: N/A;    ROBOT-ASSISTED LAPAROSCOPIC LYSIS OF ADHESIONS USING DA LONI XI N/A 04/10/2019    Procedure: XI ROBOTIC LYSIS, ADHESIONS;  Surgeon: Gillian Carpenter MD;  Location: [facility] [place];  Service: OB/GYN;  Laterality: N/A;    ROBOT-ASSISTED LAPAROSCOPIC SALPINGO-OOPHORECTOMY USING DA LONI XI Right 04/10/2019    Procedure: XI ROBOTIC SALPINGO-OOPHORECTOMY;  Surgeon: Gillian Carpenter MD;  Location: [facility] [place];  Service: OB/GYN;  Laterality: Right;    TUBAL LIGATION       Family History   Problem Relation Name Age of Onset    No Known Problems Mother      Heart disease Father          Bypass    Skin cancer Father      Cataracts Maternal Grandmother      Diabetes Maternal Grandmother      Glaucoma Maternal Grandmother      Skin cancer Maternal  Grandmother      Cataracts Maternal Grandfather      Skin cancer Paternal Grandfather      Breast cancer Neg Hx      Ovarian cancer Neg Hx      Deep vein thrombosis Neg Hx       Social History     Substance and Sexual Activity   Alcohol Use Yes    Comment: rarely     Tobacco Use History[1]  Social History     Substance and Sexual Activity   Drug Use No     Current Medications[2]     Objective:         Vitals:    05/23/25 0808   BP: (!) 140/91   Pulse: 77     Physical Exam   Constitutional: She is oriented to person, place, and time. She appears well-developed.   HENT:   Head: Normocephalic.   Cardiovascular: Normal rate and normal heart sounds.   Pulmonary/Chest: Effort normal and breath sounds normal.   Abdominal: Soft.   Musculoskeletal:      Cervical back: Neck supple.      Comments: Tender Points:  No   Yes  [x ]    [ ]   Low cervical anterior aspect    [x ]    [ ]   Costochondral Junction   [ ]    [ x]   Lateral Epicondyle  [ ]    [ x]   Suboccipital   [ ]    [x ]   Trapezius   [ ]    [x ]   Supraspinatus   [ ]    [x ]   Gluteal   [ ]    [x ]   Greater trochanter  [ ]    [x ]   Knee    No synovitis noted on exam   Lymphadenopathy:     She has no cervical adenopathy.   Neurological: She is alert and oriented to person, place, and time.   Skin: No rash noted.   No Heliotrope rash  No Gottron papules  No sclerodactyly   No Raynaud's  No Petechiae  No discoid lesions  No alopecia  Normal Capillaroscopy   Vitals reviewed.      Reviewed old and all outside pertinent medical records available.    All lab results personally reviewed and interpreted by me.  Lab Results   Component Value Date    WBC 5.29 06/21/2024    HGB 13.3 06/21/2024    HCT 42.3 06/21/2024    MCV 96 06/21/2024    MCH 30.3 06/21/2024    MCHC 31.4 (L) 06/21/2024    RDW 13.1 06/21/2024     06/21/2024    MPV 12.3 06/21/2024    PLTEST Adequate 09/14/2010       Lab Results   Component Value Date     06/21/2024    K 3.9 06/21/2024      "06/21/2024    CO2 26 06/21/2024    GLU 92 06/21/2024    BUN 12 06/21/2024    CALCIUM 9.3 06/21/2024    PROT 6.5 06/21/2024    ALBUMIN 3.6 06/21/2024    BILITOT 0.3 06/21/2024    AST 10 06/21/2024    ALKPHOS 101 06/21/2024    ALT 9 (L) 06/21/2024       Lab Results   Component Value Date    COLORU Yellow 09/25/2020    APPEARANCEUA Clear 08/15/2020    SPECGRAV 1.015 09/25/2020    PHUR 5.0 01/30/2022    PROTEINUA Negative 08/15/2020    KETONESU neg 09/25/2020    LEUKOCYTESUR Negative 08/15/2020    NITRITE neg 09/25/2020    UROBILINOGEN neg 09/25/2020       Lab Results   Component Value Date    CRP 18.0 (H) 08/18/2023       Lab Results   Component Value Date    DARRION Negative 05/28/2009    RF <13.0 08/18/2023    SEDRATE 4 08/18/2023    CCPANTIBODIE <0.5 08/18/2023       No components found for: "25OHVITDTOT", "43JPLCFB3", "61VQUPFJ8", "METHODNOTE"    Lab Results   Component Value Date    URICACID 5.4 07/24/2018       Lab Results   Component Value Date    HEPCAB Negative 08/17/2020     Imaging:  All imaging reviewed and independently interpreted by me.     ASSESSMENT / PLAN:     Joy Recinos is a 44 y.o. White female with:    1. Diffuse myofascial pain syndrome  -long standing history of diffuse myalgias and arthralgias.   -currently on Effexor which is not helping. Asked her to discuss with her psychiatry to see if she may be a candidate to go back to [facility].   -start gabapentin 100 mg TID. Start with QHS and then uptitrate to TID as tolerated.   -anti inflammatory diet, low impact exercises  -reassurance     2. Positive DARRION (antinuclear antibody) (Primary)  -DARRION 1:320 homogeneous negative profile.   -no s/s of CTD now  -recheck titer now, TPO, C3, C4.     3. Elevated C-reactive protein (CRP)  4. Arthralgia, unspecified joint  5. Shoulder pain, unspecified chronicity, unspecified laterality  -there was a question a couple of years ago about undifferentiated inflammatory arthritis however her symptoms did not " change much with steroids or NSAIDs.   -she has persistently elevated CRP.   -at this moment, will repeat serologies  -obtain NM Bone Scan Whole Body to see if there is evidence of inflammatory arthritis. If not, continue management as fibromyalgia     Follow up in about 4 weeks (around 6/20/2025) for Virtual Visit.    Method of contact with patient concerns: [facility]    Disclaimer:  This note is prepared using voice recognition software and as such is likely to have errors and has not been proof read. Please contact me for questions.     Time spent: 60 minutes in face to face discussion concerning diagnosis, prognosis, review of lab and test results, benefits of treatment as well as management of disease, counseling of patient and coordination of care between various health care providers.      Maribel Dixon M.D.  Rheumatology  [facility]          [1]   Social History  Tobacco Use   Smoking Status Never    Passive exposure: Never   Smokeless Tobacco Never   [2]   Current Outpatient Medications:     clonazePAM (KLONOPIN) 0.5 MG tablet, Take 1 tablet (0.5 mg total) by mouth daily as needed for Anxiety., Disp: 30 tablet, Rfl: 2    ergocalciferol (VITAMIN D2) 50,000 unit Cap, Take 1 capsule (50,000 Units total) by mouth every 7 days., Disp: 13 capsule, Rfl: 3    estradioL (ESTRACE) 2 MG tablet, Take 1 tablet (2 mg total) by mouth once daily., Disp: 90 tablet, Rfl: 3    levocetirizine (XYZAL) 5 MG tablet, Take 1 tablet (5 mg total) by mouth every evening., Disp: 30 tablet, Rfl: 11    lisdexamfetamine (VYVANSE) 40 MG Cap, Take 1 capsule (40 mg total) by mouth once daily., Disp: 30 capsule, Rfl: 0    [START ON 6/1/2025] lisdexamfetamine (VYVANSE) 40 MG Cap, Take 1 capsule (40 mg total) by mouth once daily., Disp: 30 capsule, Rfl: 0    [START ON 6/1/2025] lisdexamfetamine (VYVANSE) 40 MG Cap, Take 1 capsule (40 mg total) by mouth once daily., Disp: 30 capsule, Rfl: 0    omeprazole  (PRILOSEC) 20 MG capsule, Take 1 capsule (20 mg total) by mouth once daily., Disp: 90 capsule, Rfl: 0    progesterone (PROMETRIUM) 100 MG capsule, Take 1 capsule (100 mg total) by mouth nightly., Disp: 90 capsule, Rfl: 3    tiZANidine (ZANAFLEX) 2 MG tablet, Take 1 tablet (2 mg total) by mouth nightly as needed (muscle spasms)., Disp: 30 tablet, Rfl: 1    traZODone (DESYREL) 100 MG tablet, Take ½ to 1 ½ tablets by mouth at bedtime as needed for sleep., Disp: 135 tablet, Rfl: 0    venlafaxine (EFFEXOR-XR) 150 MG Cp24, Take 2 capsules (300 mg total) by mouth once daily., Disp: 180 capsule, Rfl: 0    cyanocobalamin 1,000 mcg/mL injection, Inject 1 mL (1,000 mcg total) into the skin every 28 days. (Patient not taking: Reported on 5/23/2025), Disp: 1 mL, Rfl: 12    gabapentin (NEURONTIN) 100 MG capsule, Take 1 capsule (100 mg total) by mouth 3 (three) times daily., Disp: 90 capsule, Rfl: 2

## 2025-05-28 ENCOUNTER — CLINICAL SUPPORT (OUTPATIENT)
Dept: REHABILITATION | Facility: HOSPITAL | Age: 45
End: 2025-05-28
Payer: COMMERCIAL

## 2025-05-28 DIAGNOSIS — R29.898 DECREASED ROM OF NECK: Primary | ICD-10-CM

## 2025-05-28 DIAGNOSIS — M53.86 DECREASED ROM OF LUMBAR SPINE: ICD-10-CM

## 2025-05-28 PROCEDURE — 97750 PHYSICAL PERFORMANCE TEST: CPT | Mod: 32,PN

## 2025-05-28 NOTE — PROGRESS NOTES
Outpatient Rehab     Physical Therapy Visit Healthy Neck program     Patient Name: Joy Recinos  MRN: [id number]  YOB: 1980  Encounter Date: 5/19/2025     Therapy Diagnosis:        Encounter Diagnoses   Name Primary?    Decreased ROM of neck Yes    Decreased ROM of lumbar spine      Degeneration of intervertebral disc of lumbar region with lower extremity pain      Diffuse myofascial pain syndrome        Physician: Myrna Lynch MD     Physician Orders: Eval and Treat  Medical Diagnosis: Diffuse myofascial pain syndrome  Degeneration of intervertebral disc of lumbar region with lower extremity pain     Visit # / Visits Authorized:  16/ 20 (cervical training med x computerized; back non-computerized)  Date of Evaluation:  3/10/2025   Insurance Authorization Period: 3/10/25  until 20 visits met)   Plan of Care Certification:  3/10/2025 to 5/19/25 extend to 6/13/25  Progress note due 30 days from 5/13/25                    Time In:   9:02 am   Time Out:  10:00 am  Total Time:   58 with PT and tech assistance  Total Billable Time: 58 min     FOTO:  Intake Score:  %  Survey Score 1:  %  Survey Score 2:  %     Med x cervical test results 3/19/25 (pt is below average on testing vs peers)     INSURANCE and OUTCOMES: Program Benefit Group with FOTO Outcomes     Pattern of pain determined:         Precautions: standard     L knee pain      Subjective   she is on a new medication (gabapentin) and she is very sleepy and challenged with coordination some but pain is ok..  Pain reported as 3/10. low back and mid back and neck and shoulders          Objective       Cervical  Isometric Testing on Med X equipment: Testing administered by PT     Test Initial Baseline Midpoint Final   Date 3/19/25       ROM 6-90 deg       Max Peak Torque 193        Min Peak Torque 105        Flex/Ext Ratio 1.838       % variance  normative data 29% below       % change from initial test N/A visit 1              Outcomes:  Intake Score: -29% below normal  Visit 10 Score: 3% increase  Discharge Score:  Goal Score: 5% above normal or better          Objective            Treatment:  Therapeutic Exercise  TE 3: elliptical 5 min  TE 4: heel raises 1x 20 5 sec holds  Balance/Neuromuscular Re-Education  NMR 3: Plank on knees and on elbows 10x30 sec hold; PPT 2x 10; qped cat with alternating arm 3x 10 per arm; qped alterating leg 3x 10; prone on elbows cervical retraction 2x 10  NMR 5: cervical med x machine 138 # 1x 20 (RPE 6); lumbar extension machine 60 # 1x 5 then 90# 1x 20; seated trunk rotation setting 3 24# 1x 20    Time Entry(in minutes): 58 min       Assessment & Plan   Assessment: Pt performed better but was dizzy and mildly challenged with coordination on two occasions as she feels off balance with the increased dose. PT encouraged pt to speak with her Dr as she has been on this medication for nearly a week and symptoms of dizziness are not reducing. Pt performed therex with good control but PT used SBA for safety due to symptoms.  Evaluation/Treatment Tolerance: Patient limited by fatigue    The patient will continue to benefit from skilled outpatient physical therapy in order to address the deficits listed in the problem list on the initial evaluation, provide patient and family education, and maximize the patients level of independence in the home and community environments.     The patient's spiritual, cultural, and educational needs were considered, and the patient is agreeable to the plan of care and goals.           Plan: PT to continue with therex program for healthy neck and back.    Goals:   Active       long term goals       cervical L side flexion 45 degree or better without radicular pain (Met)       Start:  03/10/25    Expected End:  05/19/25    Resolved:  04/16/25         T spine rotation at 80 % or better (Met)       Start:  03/10/25    Expected End:  05/19/25    Resolved:  04/16/25         dead  lifting up to 60# or better for home tasks (Progressing)       Start:  03/10/25    Expected End:  06/13/25               med x goals       increase cervical and lumbar med x score by 5% or more (Progressing)       Start:  03/10/25    Expected End:  06/13/25              Resolved       short term goals       I with HEP (Met)       Start:  03/10/25    Expected End:  04/07/25    Resolved:  04/07/25         core strength 2+/5 or better (Met)       Start:  03/10/25    Expected End:  06/13/25    Resolved:  05/28/25         hip abduction at 4+/5 or better (Met)       Start:  03/10/25    Expected End:  04/07/25    Resolved:  05/13/25             Sindhu Meneses, PT

## 2025-05-30 ENCOUNTER — TELEPHONE (OUTPATIENT)
Dept: RHEUMATOLOGY | Facility: CLINIC | Age: 45
End: 2025-05-30
Payer: COMMERCIAL

## 2025-06-04 ENCOUNTER — CLINICAL SUPPORT (OUTPATIENT)
Dept: REHABILITATION | Facility: HOSPITAL | Age: 45
End: 2025-06-04
Payer: COMMERCIAL

## 2025-06-04 ENCOUNTER — HOSPITAL ENCOUNTER (OUTPATIENT)
Dept: RADIOLOGY | Facility: HOSPITAL | Age: 45
Discharge: HOME | End: 2025-06-04
Attending: STUDENT IN AN ORGANIZED HEALTH CARE EDUCATION/TRAINING PROGRAM
Payer: COMMERCIAL

## 2025-06-04 DIAGNOSIS — M79.18 DIFFUSE MYOFASCIAL PAIN SYNDROME: ICD-10-CM

## 2025-06-04 DIAGNOSIS — R76.8 POSITIVE ANA (ANTINUCLEAR ANTIBODY): ICD-10-CM

## 2025-06-04 DIAGNOSIS — M53.86 DECREASED ROM OF LUMBAR SPINE: ICD-10-CM

## 2025-06-04 DIAGNOSIS — R29.898 DECREASED ROM OF NECK: Primary | ICD-10-CM

## 2025-06-04 DIAGNOSIS — M25.50 ARTHRALGIA, UNSPECIFIED JOINT: ICD-10-CM

## 2025-06-04 DIAGNOSIS — R79.82 ELEVATED C-REACTIVE PROTEIN (CRP): ICD-10-CM

## 2025-06-04 DIAGNOSIS — M25.519 SHOULDER PAIN, UNSPECIFIED CHRONICITY, UNSPECIFIED LATERALITY: ICD-10-CM

## 2025-06-04 PROCEDURE — 78306 BONE IMAGING WHOLE BODY: CPT | Mod: 26,,, | Performed by: RADIOLOGY

## 2025-06-04 PROCEDURE — 97750 PHYSICAL PERFORMANCE TEST: CPT | Mod: 32,PN

## 2025-06-04 PROCEDURE — A9503 TC99M MEDRONATE: HCPCS | Performed by: STUDENT IN AN ORGANIZED HEALTH CARE EDUCATION/TRAINING PROGRAM

## 2025-06-04 PROCEDURE — 78306 BONE IMAGING WHOLE BODY: CPT | Mod: TC

## 2025-06-04 RX ADMIN — TECHNETIUM TC 99M MEDRONATE 25 MILLICURIE: 20 INJECTION, POWDER, LYOPHILIZED, FOR SOLUTION INTRAVENOUS at 08:06

## 2025-06-14 ENCOUNTER — CLINICAL SUPPORT (OUTPATIENT)
Dept: REHABILITATION | Facility: HOSPITAL | Age: 45
End: 2025-06-14
Payer: COMMERCIAL

## 2025-06-14 DIAGNOSIS — M53.86 DECREASED ROM OF LUMBAR SPINE: ICD-10-CM

## 2025-06-14 DIAGNOSIS — R29.898 DECREASED ROM OF NECK: Primary | ICD-10-CM

## 2025-06-14 PROCEDURE — 97750 PHYSICAL PERFORMANCE TEST: CPT | Mod: 32,PN

## 2025-06-14 NOTE — PROGRESS NOTES
Outpatient Rehab     Physical Therapy Visit Healthy Neck program and Discharge Note     Patient Name: Joy Recinos  MRN: [id number]  YOB: 1980  Encounter Date: 5/19/2025     Therapy Diagnosis:        Encounter Diagnoses   Name Primary?    Decreased ROM of neck Yes    Decreased ROM of lumbar spine      Degeneration of intervertebral disc of lumbar region with lower extremity pain      Diffuse myofascial pain syndrome        Physician: Myrna Lynch MD     Physician Orders: Eval and Treat  Medical Diagnosis: Diffuse myofascial pain syndrome  Degeneration of intervertebral disc of lumbar region with lower extremity pain     Visit # / Visits Authorized:  20/ 20 (cervical training med x computerized; back non-computerized)  Date of Evaluation:  3/10/2025   Insurance Authorization Period: 3/10/25  until 20 visits met)   Plan of Care Certification:  3/10/2025 to 5/19/25 extend to 6/13/25  Progress note due 30 days from 5/13/25  PT TO test on next visit                    Time In:   9:00 am   Time Out:  10:15 am  Total Time:   75 with PT and tech assistance  Total Billable Time: 75 min     FOTO:  Intake Score:  %  Survey Score 1:  %  Survey Score 2:  %     Med x cervical test results 3/19/25 (pt is below average on testing vs peers)     INSURANCE and OUTCOMES: Program Benefit Group with FOTO Outcomes     Pattern of pain determined:         Precautions: standard     L knee pain      Subjective   she is on a new medication (gabapentin) and she is very sleepy and challenged with coordination some but pain is ok..  Pain reported as 3/10. low back and mid back and neck and shoulders          Objective       Cervical  Isometric Testing on Med X equipment: Testing administered by PT     Test Initial Baseline Midpoint Final   Date 3/19/25  4/6/25  6/14/25   ROM 6-90 deg  6-90  6-90   Max Peak Torque 193   200 185   Min Peak Torque 105   79  147   Flex/Ext Ratio 1.838    .79   % variance  normative data  29% below    30% above   % change from initial test N/A visit 1             Outcomes:  Intake Score: -29% below normal  Visit 10 Score: 3% increase  Discharge Score: 30 %above normal for age group  Goal Score: 5% above normal or better          Objective            Hypomobile: Cervical, Thoracic, and Lumbosacral  Thoracic Mobility Details: Seated L trunk rotation 50% ; R trunk rotation 20 % and painful on the R low back at eval but today rotation 90 % and pain free.  Lumbosacral Mobility Details: Lumbar flexion 14 inches from reaching the floor; extension leans to L but has 70% ROM but today at 3 inches from floor with flexion and extension full; R side flexion to knee and painful more than L side flexion to the knee (non painful). Core strength 2/5 or better.           Cervical Range of Motion    Active (deg) today Pain   Flexion 50  60     Extension 70  70     Right Lateral Flexion 50  50     Right Rotation 80  85     Left Lateral Flexion 40 (L neck pain)  45     Left Rotation 70 (pulls on L side)  85              Shoulder Range of Motion     Shoulder, Elbow, or Forearm Range of Motion Details: Shoulder ROM WFL in all planes. Elbow ROM full and Wrist ROM WFL B     Hip Range of Motion   Right Hip    Active (deg) Today Pain   Flexion 120  130     Extension 10  15     ABduction 4  30     ADduction         External Rotation 90/90         External Rotation Prone         Internal Rotation 90/90         Internal Rotation Prone               Left Hip    Active (deg) Passive (deg) Pain   Flexion 115  130     Extension 10  18     ABduction 4  30     ADduction         External Rotation 90/90         External Rotation Prone         Internal Rotation 90/90         Internal Rotation Prone               Hip rotation is WFL as with knee ROM and ankle ROM           Shoulder Strength - Planes of Motion    Right Strength today Left Strength today   Flexion 4+  5 4  4+   Extension 4  5 4  5   ABduction 4  5 4-  5   ADduction            Horizontal ABduction           Horizontal ADduction           Internal Rotation 0°           Internal Rotation 90°           External Rotation 0° 4+  5 4  5   External Rotation 90°                 Shoulder Strength Details  Elbow strength 5/5 generally, wrist 5/5 except L thumb extension 4/5 R 5/5           Hip Strength - Planes of Motion    Right Strength today Left Strength today   Flexion (L2) 4-  5 4  5   Extension 4-  5 4  5   ABduction 4-  5 4-  5   ADduction           Internal Rotation           External Rotation                 Knee Strength    Right Strength Today Left Strength Today   Flexion (S2) 5  5 5  5   Prone Flexion 5  5 5  5   Extension (L3) 5  5 5  5              Treatment:  Therapeutic Exercise  TE 3: elliptical 5 min  TE 4: heel raises 1x 20 5 sec holds  TE 5: shuttle 4 bands 3 min  Balance/Neuromuscular Re-Education  NMR 3: PPT 2x 10; qped cat with alternating arm 3x 10 per arm; qped alterating leg 3x 10; prone on elbows cervical retraction 2x 10; rambos in doorway with red theraband 2x 10  NMR 5: cervical med x machine test; lumbar extension machine 60 # 1x 5 then 90# 1x 20; seated hip adduction 45 # 1x 20; seated abduction 45# 1x 20; seated trunk rotation setting 3 24# 1x 20  NMR 6: SL shoulder horizontal abduction 2# 1x 5; 1# 1x 10L and R 0# 1x 10  NMR 7: Dead lifting 50 # bar 2x 5    Time Entry(in minutes): 55 min       Assessment & Plan   Assessment: Pt has improved but is ready for D/C as 20 visits met. Pt to continue with HEP on her own.  Evaluation/Treatment Tolerance: Patient limited by fatigue    The patient will continue to benefit from skilled outpatient physical therapy in order to address the deficits listed in the problem list on the initial evaluation, provide patient and family education, and maximize the patients level of independence in the home and community environments.     The patient's spiritual, cultural, and educational needs were considered, and the patient is  agreeable to the plan of care and goals.           Plan: D/C to self care.    Goals:   Active       long term goals       cervical L side flexion 45 degree or better without radicular pain (Met)       Start:  03/10/25    Expected End:  05/19/25    Resolved:  04/16/25         T spine rotation at 80 % or better (Met)       Start:  03/10/25    Expected End:  05/19/25    Resolved:  04/16/25         dead lifting up to 60# or better for home tasks (Adequate for Care Transition)       Start:  03/10/25    Expected End:  06/13/25              Resolved       med x goals       increase cervical and lumbar med x score by 5% or more (Met)       Start:  03/10/25    Expected End:  06/13/25    Resolved:  06/14/25            short term goals       I with HEP (Met)       Start:  03/10/25    Expected End:  04/07/25    Resolved:  04/07/25         core strength 2+/5 or better (Met)       Start:  03/10/25    Expected End:  06/13/25    Resolved:  05/28/25         hip abduction at 4+/5 or better (Met)       Start:  03/10/25    Expected End:  04/07/25    Resolved:  05/13/25               Sindhu Meneses, PT

## 2025-06-25 ENCOUNTER — OFFICE VISIT (OUTPATIENT)
Dept: RHEUMATOLOGY | Facility: CLINIC | Age: 45
End: 2025-06-25
Payer: COMMERCIAL

## 2025-06-25 DIAGNOSIS — M25.50 ARTHRALGIA, UNSPECIFIED JOINT: ICD-10-CM

## 2025-06-25 DIAGNOSIS — R76.8 POSITIVE ANA (ANTINUCLEAR ANTIBODY): ICD-10-CM

## 2025-06-25 DIAGNOSIS — M25.519 SHOULDER PAIN, UNSPECIFIED CHRONICITY, UNSPECIFIED LATERALITY: ICD-10-CM

## 2025-06-25 DIAGNOSIS — R79.82 ELEVATED C-REACTIVE PROTEIN (CRP): ICD-10-CM

## 2025-06-25 DIAGNOSIS — M79.18 DIFFUSE MYOFASCIAL PAIN SYNDROME: Primary | ICD-10-CM

## 2025-06-25 PROCEDURE — G2211 COMPLEX E/M VISIT ADD ON: HCPCS | Mod: 95,,, | Performed by: STUDENT IN AN ORGANIZED HEALTH CARE EDUCATION/TRAINING PROGRAM

## 2025-06-25 PROCEDURE — 98006 SYNCH AUDIO-VIDEO EST MOD 30: CPT | Mod: 95,,, | Performed by: STUDENT IN AN ORGANIZED HEALTH CARE EDUCATION/TRAINING PROGRAM

## 2025-06-25 NOTE — PROGRESS NOTES
RHEUMATOLOGY OUTPATIENT CLINIC NOTE    6/25/2025    Attending Rheumatologist: Maribel Dixon  Primary Care Provider: Hakeem Mayers MD   Physician Requesting Consultation: No referring provider defined for this encounter.  Chief Complaint/Reason For Consultation:  No chief complaint on file.    The patient location is: home  The chief complaint leading to consultation is: joint pain     Visit type: audiovisual    Each patient to whom he or she provides medical services by telemedicine is:  (1) informed of the relationship between the physician and patient and the respective role of any other health care provider with respect to management of the patient; and (2) notified that he or she may decline to receive medical services by telemedicine and may withdraw from such care at any time.    Notes:      Subjective:       HPI  Joy Recinos is a 45 y.o. White female with migraines, anxiety, depression, who comes for evaluation of pain.     Today 6/2025  -bone scan showed Uptake is present in a symmetrical and degenerative pattern in the sacroiliac joints, shoulders, elbows, ankles, and wrists.  -labs showed normal CK.  CRP 19.6, ESR normal.  Hepatitis-B serologies normal.  RF, CCP negative.  DARRION 1:320 homogeneous 1:80 speckled.  SSA, SSB, TPO, dsDNA, Lebron, Sm/RNP all negative.  Thyroid function tests normal.  C3, C4 normal   -she developed drowsiness with gabapentin.  I asked patient to see if she could be started on Cymbalta.    Answers submitted by the patient for this visit:  Rheumatology Questionnaire (Submitted on 6/25/2025)  fever: No  eye redness: No  mouth sores: Yes  headaches: Yes  shortness of breath: No  chest pain: No  trouble swallowing: No  diarrhea: No  constipation: Yes  unexpected weight change: No  genital sore: No  dysuria: No  During the last 3 days, have you had a skin rash?: No  Bruises or bleeds easily: Yes  cough: No      Disease history     She was diagnosed with  fibromyalgia many years ago. Saw Dr. CARDENAS in [place] for a couple of years. She was found to have positive DARRION but no CTD. At some point she was prescribed steroids but did not help. She was recommended to follow with psychiatry for assistance with meds for fibromyalgia.   She has been on Cymbalta, she is unsure if it helped before. This is been a while since she has been on effexor now for the past at least a year and she has been noticing more pain and thinks anxiety is worse. She has been on lexapro. She has been on lyrica and gabapentin many years ago, she does not recall being on it recently. Savella caused SE.   Today she reports that she everything hurts. Reports that she feels knots all over. Feels that she is bruised everywhere but she has noted bruises a couple of times. She feels stiffness in hands, first thing in the morning. She noted a little bit swelling. Report pain in elbows, wrists, knees, ankles, feet, hips.   She has cervical and lumbar DJD. She follows with PM&R. She does PT and she fels that it helps   She denies any smoking or vaping. Occasional alcohol use. Denies any illicit drug use.   She does not exercise too much due to pain.   She has a sedentary job, works from home.     Labs  8/2023  RF, CCP negative  ESR normal  CRP 18 - chronically elevated.     2018  DARRION 1:320 homogeneous with negative profile   C3, C4 normal   ESR normal  CRP elevated  SPEP with IF normal  Uric acid 5.4    Imaging  MRI lumbar spine: L4-5 and L5-S1 spondylosis and degenerative disc disease as detailed.    Chronic comorbid conditions affecting medical decision making today:  Past Medical History:   Diagnosis Date    ADHD (attention deficit hyperactivity disorder)     B12 deficiency     Chronic low back pain     oberlander    Endometriosis     Fibromyalgia syndrome     Idiopathic hypersomnia     Irritable bowel syndrome     goldman    Migraines     frances    PONV (postoperative nausea and vomiting)     Shingles      Undifferentiated inflammatory arthritis 2018    Undifferentiated inflammatory arthritis 2018     Past Surgical History:   Procedure Laterality Date     SECTION      x 2     CYSTOSCOPY N/A 04/10/2019    Procedure: CYSTOSCOPY;  Surgeon: Gillian Carpenter MD;  Location: [facility] OR;  Service: OB/GYN;  Laterality: N/A;    HYSTERECTOMY  2019    OOPHORECTOMY Bilateral     left removed in . Right removed in .    PELVIC LAPAROSCOPY      ROBOT-ASSISTED LAPAROSCOPIC ABDOMINAL HYSTERECTOMY USING DA LONI XI N/A 04/10/2019    Procedure: XI ROBOTIC HYSTERECTOMY;  Surgeon: Gillian Carpenter MD;  Location: [facility] OR;  Service: OB/GYN;  Laterality: N/A;    ROBOT-ASSISTED LAPAROSCOPIC LYSIS OF ADHESIONS USING DA LONI XI N/A 04/10/2019    Procedure: XI ROBOTIC LYSIS, ADHESIONS;  Surgeon: Gillian Carpenter MD;  Location: [facility] OR;  Service: OB/GYN;  Laterality: N/A;    ROBOT-ASSISTED LAPAROSCOPIC SALPINGO-OOPHORECTOMY USING DA LONI XI Right 04/10/2019    Procedure: XI ROBOTIC SALPINGO-OOPHORECTOMY;  Surgeon: Gillian Carpenter MD;  Location: [facility] OR;  Service: OB/GYN;  Laterality: Right;    TUBAL LIGATION       Family History   Problem Relation Name Age of Onset    No Known Problems Mother      Heart disease Father          Bypass    Skin cancer Father      Cataracts Maternal Grandmother      Diabetes Maternal Grandmother      Glaucoma Maternal Grandmother      Skin cancer Maternal Grandmother      Cataracts Maternal Grandfather      Skin cancer Paternal Grandfather      Breast cancer Neg Hx      Ovarian cancer Neg Hx      Deep vein thrombosis Neg Hx       Social History     Substance and Sexual Activity   Alcohol Use Yes    Comment: rarely     Tobacco Use History[1]  Social History     Substance and Sexual Activity   Drug Use No     Current Medications[2]     Objective:         There were no vitals filed for this visit.    Physical Exam   Constitutional: She is oriented to person, place, and time. She  appears well-developed.   HENT:   Head: Normocephalic.   Cardiovascular: Normal rate and normal heart sounds.   Pulmonary/Chest: Effort normal and breath sounds normal.   Abdominal: Soft.   Musculoskeletal:      Cervical back: Neck supple.      Comments: Tender Points:  No   Yes  [x ]    [ ]   Low cervical anterior aspect    [x ]    [ ]   Costochondral Junction   [ ]    [ x]   Lateral Epicondyle  [ ]    [ x]   Suboccipital   [ ]    [x ]   Trapezius   [ ]    [x ]   Supraspinatus   [ ]    [x ]   Gluteal   [ ]    [x ]   Greater trochanter  [ ]    [x ]   Knee    No synovitis noted on exam   Lymphadenopathy:     She has no cervical adenopathy.   Neurological: She is alert and oriented to person, place, and time.   Skin: No rash noted.   No Heliotrope rash  No Gottron papules  No sclerodactyly   No Raynaud's  No Petechiae  No discoid lesions  No alopecia  Normal Capillaroscopy   Vitals reviewed.    Virtual visit 6/25: no rashes in face    Reviewed old and all outside pertinent medical records available.    All lab results personally reviewed and interpreted by me.  Lab Results   Component Value Date    WBC 5.29 06/21/2024    HGB 13.3 06/21/2024    HCT 42.3 06/21/2024    MCV 96 06/21/2024    MCH 30.3 06/21/2024    MCHC 31.4 (L) 06/21/2024    RDW 13.1 06/21/2024     06/21/2024    MPV 12.3 06/21/2024    PLTEST Adequate 09/14/2010       Lab Results   Component Value Date     06/21/2024    K 3.9 06/21/2024     06/21/2024    CO2 26 06/21/2024    GLU 92 06/21/2024    BUN 12 06/21/2024    CALCIUM 9.3 06/21/2024    PROT 6.5 06/21/2024    ALBUMIN 3.6 06/21/2024    BILITOT 0.3 06/21/2024    AST 10 06/21/2024    ALKPHOS 101 06/21/2024    ALT 9 (L) 06/21/2024       Lab Results   Component Value Date    COLORU Yellow 09/25/2020    APPEARANCEUA Clear 08/15/2020    SPECGRAV 1.015 09/25/2020    PHUR 5.0 01/30/2022    PROTEINUA Negative 08/15/2020    KETONESU neg 09/25/2020    LEUKOCYTESUR Negative 08/15/2020    NITRITE  "neg 09/25/2020    UROBILINOGEN neg 09/25/2020       Lab Results   Component Value Date    CRP 19.6 (H) 05/23/2025       Lab Results   Component Value Date    DARRION Positive (A) 05/23/2025    RF <13.0 05/23/2025    SEDRATE 7 05/23/2025    CCPANTIBODIE 4.8 05/23/2025       No components found for: "25OHVITDTOT", "58FBFVIE8", "73EWYPWS0", "METHODNOTE"    Lab Results   Component Value Date    URICACID 5.4 07/24/2018       Lab Results   Component Value Date    HEPBSAB Reactive 05/23/2025    HEPBSAG Non-Reactive 05/23/2025    HEPCAB Negative 08/17/2020     Imaging:  All imaging reviewed and independently interpreted by me.     ASSESSMENT / PLAN:     Joy Recinos is a 45 y.o. White female with:    1. Diffuse myofascial pain syndrome- active and profgressing.   -long standing history of diffuse myalgias and arthralgias.   -currently on Effexor which is not helping. Asked her to discuss with her psychiatry to see if she may be a candidate to go back to [facility]. She will have cahty with them on 7/3.   -developed drowsiness and worsening depression with gabapentin.  -I think she will be a great candidate for LDN. I will send prescription to pharmacy.   -anti inflammatory diet, low impact exercises  -reassurance     2. Positive DARRION (antinuclear antibody) (Primary)  -DARRION 1:320 homogeneous negative profile. Repeat DARRION 1:320 homogeneous, 1:80 speckled with negative profile  -no s/s of CTD now    3. Elevated C-reactive protein (CRP)  4. Arthralgia, unspecified joint  5. Shoulder pain, unspecified chronicity, unspecified laterality  -there was a question a couple of years ago about undifferentiated inflammatory arthritis however her symptoms did not change much with steroids or NSAIDs.   -she has persistently elevated CRP.   -bone scan showed Uptake is present in a symmetrical and degenerative pattern in the sacroiliac joints, shoulders, elbows, ankles, and wrists.  -reassurance. Manage as fibromyalgia as above    Follow up " in about 3 months (around 9/25/2025) for Virtual Visit. Or sooner PRN for disease management.     Method of contact with patient concerns: [facility]    Disclaimer:  This note is prepared using voice recognition software and as such is likely to have errors and has not been proof read. Please contact me for questions.     Maribel Dixon M.D.  Rheumatology  [facility]          [1]   Social History  Tobacco Use   Smoking Status Never    Passive exposure: Never   Smokeless Tobacco Never   [2]   Current Outpatient Medications:     clonazePAM (KLONOPIN) 0.5 MG tablet, Take 1 tablet (0.5 mg total) by mouth daily as needed for Anxiety., Disp: 30 tablet, Rfl: 2    cyanocobalamin 1,000 mcg/mL injection, Inject 1 mL (1,000 mcg total) into the skin every 28 days. (Patient not taking: Reported on 5/23/2025), Disp: 1 mL, Rfl: 12    ergocalciferol (VITAMIN D2) 50,000 unit Cap, Take 1 capsule (50,000 Units total) by mouth every 7 days., Disp: 13 capsule, Rfl: 3    estradioL (ESTRACE) 2 MG tablet, Take 1 tablet (2 mg total) by mouth once daily., Disp: 90 tablet, Rfl: 3    levocetirizine (XYZAL) 5 MG tablet, Take 1 tablet (5 mg total) by mouth every evening., Disp: 30 tablet, Rfl: 11    lisdexamfetamine (VYVANSE) 40 MG Cap, Take 1 capsule (40 mg total) by mouth once daily., Disp: 30 capsule, Rfl: 0    lisdexamfetamine (VYVANSE) 40 MG Cap, Take 1 capsule (40 mg total) by mouth once daily., Disp: 30 capsule, Rfl: 0    lisdexamfetamine (VYVANSE) 40 MG Cap, Take 1 capsule (40 mg total) by mouth once daily., Disp: 30 capsule, Rfl: 0    omeprazole (PRILOSEC) 20 MG capsule, Take 1 capsule (20 mg total) by mouth once daily., Disp: 90 capsule, Rfl: 0    progesterone (PROMETRIUM) 100 MG capsule, Take 1 capsule (100 mg total) by mouth nightly., Disp: 90 capsule, Rfl: 3    tiZANidine (ZANAFLEX) 2 MG tablet, Take 1 tablet (2 mg total) by mouth nightly as needed (muscle spasms)., Disp: 30 tablet, Rfl: 1     traZODone (DESYREL) 100 MG tablet, Take ½ to 1 ½ tablets by mouth at bedtime as needed for sleep., Disp: 135 tablet, Rfl: 0    venlafaxine (EFFEXOR-XR) 150 MG Cp24, Take 2 capsules (300 mg total) by mouth once daily., Disp: 180 capsule, Rfl: 0

## 2025-07-02 ENCOUNTER — TELEPHONE (OUTPATIENT)
Dept: PSYCHIATRY | Facility: CLINIC | Age: 45
End: 2025-07-02
Payer: COMMERCIAL

## 2025-07-02 NOTE — TELEPHONE ENCOUNTER
Copied from CRM #[id number]. Topic: Appointments - Same Day Access  >> Jul 2, 2025  4:18 PM Cesar wrote:  Name of Who is Calling: Joy ARAUZ        What is the request in detail: pt wants an earlier appt time for 7/3        Can the clinic reply by KEENER: yes, but pt prefers cb        [phone]

## 2025-07-07 ENCOUNTER — DOCUMENTATION ONLY (OUTPATIENT)
Dept: HEMATOLOGY/ONCOLOGY | Facility: CLINIC | Age: 45
End: 2025-07-07
Payer: COMMERCIAL

## 2025-07-07 ENCOUNTER — PATIENT MESSAGE (OUTPATIENT)
Dept: HEMATOLOGY/ONCOLOGY | Facility: CLINIC | Age: 45
End: 2025-07-07
Payer: COMMERCIAL

## 2025-07-07 NOTE — PROGRESS NOTES
"Pharmacogenomics (PGx) Consult     Chief Complaint: Joy Recinos has undergone pharmacogenomic testing via self-enrollment in the [employer] PGx . Patient completed sample collection on 5/7/2025 and results were provided on 5/16/2025.     Test results: Pharmacogenomic results can be found in GENOMIC INDICATORS    Past Medical History:  -------------------------------------    ADHD (attention deficit hyperactivity disorder)    B12 deficiency    Chronic low back pain    oberlander    Endometriosis    Fibromyalgia syndrome    Idiopathic hypersomnia    Irritable bowel syndrome    goldman    Migraines    frances    PONV (postoperative nausea and vomiting)    Shingles    Undifferentiated inflammatory arthritis    Undifferentiated inflammatory arthritis        Allergies: Review of patient's allergies indicates:  No Known Allergies     Medications: Current Medications[1]      Clinical Recommendations based on PGx    -Patient is an intermediate metabolizer of omeprazole which may increase their risk of developing side effects due to likely increased plasma concentrations. If patient is currently tolerating/finds benefit with omeprazole at current dose of 20mg daily, would NOT recommend making changes based solely on pharmacogenomic results.      -Of note, patient has one copy of an F5 variant (Factor V Leiden) which has been associated with increased risk for thromboembolic events in patients taking oral estrogen-containing medications.      -None of the patient's other current medications are likely impacted by their pharmacogenomic results     Future Medication Considerations based on PGx    -Patient has 1 PGx results that can impact future medication selection- please see "Clinically Actionable PGx Results" for full list of medication interactions    -For future antidepressant prescribing recommendations, use the "Antidepressants with PGx" Smart Set under Orders to view personalized antidepressant " "recommendations based on the patients Pgx    -If there is an interaction with any future medications and the patient's genetic results, a clinical alert will fire for both providers and pharmacists. These interactions are reviewed by the PGx team and updated as new data becomes available.      -PGx information can be accessed by patients within [facility]- results with "actionable result DETECTED" should be communicated to providers outside of [facility] as our clinical alerts will NOT fire    Clinically Actionable PGx Results   *Note, actionable recommendations may change based on updates to existing PGx literature. For the most up to date medication recommendations based on patients' results, please view the GENOMIC INDICATORS module in Epic.    Genomic Indicators        PVK1R38 Intermediate Metabolizer Updated 5/16/2025 by Security, Standard Interface User      Genomic results predict that this patient may metabolize JRM1G84 substrates at a rate that falls below average metabolic capacity. Thus, this patient may have an increased risk of adverse or poor response to medications metabolized by AIL1R37. To avoid these types of responses, dose adjustments or alternative therapeutic agents may be necessary when medications metabolized by RDN8C29 are being considered. Commonly used medications metabolized by CFV9W66 include the following: amitriptyline, brivaracetam, citalopram, clobazam, clopidogrel, dexlansoprazole, doxepin, escitalopram, lansoprazole, omeprazole, pantoprazole, sertraline, and voriconazole. For questions, call [email or web address] ([phone]) or order PGx Consult [[id number]].        Intermediate Metabolizer of Clopidogrel          Genetic results for this patient indicate increased risk of treatment failure with clopidogrel (PLAVIX), based on CMQ3O74 genotype. Consider an alternative drug such as prasugrel or ticagrelor.     See CPIC clinical guidelines for more information.                 Intermediate Metabolizer of " Dexlansoprazole        This patient has a YSJ6H31 genomic indicator which indicates reduced metabolism of pantoprazole, omeprazole, lansoprazole, dexlansoprazole and increased risk of side effects. For chronic therapy (>12 weeks) consider 50% reduction of dose. Esomeprazole and raberprazole are not affected and may be used at normal doses.     See CPIC clinical guidelines for more information.                 Intermediate Metabolizer of Lansoprazole        This patient has a CMN6R32 genomic indicator which indicates reduced metabolism of pantoprazole, omeprazole, lansoprazole, dexlansoprazole and increased risk of side effects. For chronic therapy (>12 weeks) consider 50% reduction of dose. Esomeprazole and raberprazole are not affected and may be used at normal doses.     See CPIC clinical guidelines for more information.                 Intermediate Metabolizer of Omeprazole        This patient has a LRY1A06 genomic indicator which indicates reduced metabolism of pantoprazole, omeprazole, lansoprazole, dexlansoprazole and increased risk of side effects. For chronic therapy (>12 weeks) consider 50% reduction of dose. Esomeprazole and raberprazole are not affected and may be used at normal doses.     See CPIC clinical guidelines for more information.                 Intermediate Metabolizer of Pantoprazole        This patient has a SZL1D05 genomic indicator which indicates reduced metabolism of pantoprazole, omeprazole, lansoprazole, dexlansoprazole and increased risk of side effects. For chronic therapy (>12 weeks) consider 50% reduction of dose. Esomeprazole and raberprazole are not affected and may be used at normal doses.     See CPIC clinical guidelines for more information.                Reference Links    CPIC® Guideline for Clopidogrel and OLV9Y42    CPIC® Guideline for Proton Pump Inhibitors and DSP7H91    CPIC® Guideline for Voriconazole and PBD3D42    CPIC® Guideline for Serotonin Reuptake Inhibitor  Antidepressants and CYP2D6, FQU5R04, CYP2B6, SLC6A4, and HTR2A    CPIC® Guideline for Tricyclic Antidepressants and CYP2D6 and IZZ4A21                    -For any additional questions, please don't hesitate to reach out to me or contact [phone] ([phone]).    Fidelina Martin, PharmD   Clinical Pharmacogenomics Pharmacist               [1]   clonazePAM (KLONOPIN) 0.5 MG tablet    cyanocobalamin 1,000 mcg/mL injection    ergocalciferol (VITAMIN D2) 50,000 unit Cap    estradioL (ESTRACE) 2 MG tablet    levocetirizine (XYZAL) 5 MG tablet    lisdexamfetamine (VYVANSE) 40 MG Cap    lisdexamfetamine (VYVANSE) 40 MG Cap    lisdexamfetamine (VYVANSE) 40 MG Cap    naltrexone capsule    omeprazole (PRILOSEC) 20 MG capsule    progesterone (PROMETRIUM) 100 MG capsule    tiZANidine (ZANAFLEX) 2 MG tablet    traZODone (DESYREL) 100 MG tablet    venlafaxine (EFFEXOR-XR) 150 MG Cp24

## 2025-07-07 NOTE — Clinical Note
Hello,   Just wanted to bring to your awareness that Joy Recinos had pharmacogenomics (PGx) testing done through a  initiative [facility] is currently offering for select patients who are taking medications that may be impacted by genetic results.   As part of this , I have reviewed their genetic results and current medications to help provide insight into current medication optimization options, as well as future medication choices as appropriate.   For any future medications prescribed by any [facility] provider, if there is an interaction with the medication and the patient's genetic results, a clinical alert will fire for both providers and pharmacists. These interactions are reviewed by the PGx team monthly and updated as new data becomes available.   If you have any questions about the results or PGx at [facility] in general, please don't hesitate to reach out to me directly.   Fidelina Broderick, PharmD  Clinical Pharmacogenomics Pharmacist

## 2025-07-21 ENCOUNTER — PATIENT MESSAGE (OUTPATIENT)
Dept: RHEUMATOLOGY | Facility: CLINIC | Age: 45
End: 2025-07-21
Payer: COMMERCIAL

## 2025-07-21 DIAGNOSIS — M25.50 ARTHRALGIA, UNSPECIFIED JOINT: ICD-10-CM

## 2025-07-21 DIAGNOSIS — R79.82 ELEVATED C-REACTIVE PROTEIN (CRP): ICD-10-CM

## 2025-07-21 DIAGNOSIS — M79.18 DIFFUSE MYOFASCIAL PAIN SYNDROME: Primary | ICD-10-CM

## 2025-07-29 ENCOUNTER — OFFICE VISIT (OUTPATIENT)
Dept: PSYCHIATRY | Facility: CLINIC | Age: 45
End: 2025-07-29
Payer: COMMERCIAL

## 2025-07-29 DIAGNOSIS — G47.00 INSOMNIA, UNSPECIFIED TYPE: ICD-10-CM

## 2025-07-29 DIAGNOSIS — F90.9 ATTENTION DEFICIT HYPERACTIVITY DISORDER (ADHD), UNSPECIFIED ADHD TYPE: ICD-10-CM

## 2025-07-29 DIAGNOSIS — F41.1 GAD (GENERALIZED ANXIETY DISORDER): Primary | ICD-10-CM

## 2025-07-29 PROCEDURE — 98006 SYNCH AUDIO-VIDEO EST MOD 30: CPT | Mod: 95,,, | Performed by: PSYCHIATRY & NEUROLOGY

## 2025-07-29 RX ORDER — TRAZODONE HYDROCHLORIDE 100 MG/1
TABLET ORAL
Qty: 135 TABLET | Refills: 0 | Status: SHIPPED | OUTPATIENT
Start: 2025-07-29

## 2025-07-29 RX ORDER — DULOXETIN HYDROCHLORIDE 30 MG/1
CAPSULE, DELAYED RELEASE ORAL
Qty: 90 CAPSULE | Refills: 3 | Status: SHIPPED | OUTPATIENT
Start: 2025-07-29

## 2025-07-29 NOTE — PROGRESS NOTES
The patient location is: [place]  The chief complaint leading to consultation is: depression, anxiety.     Visit type: audiovisual  30 minutes of total time spent on the encounter, which includes face to face time and non-face to face time preparing to see the patient (eg, review of tests), Obtaining and/or reviewing separately obtained history, Documenting clinical information in the electronic or other health record, Independently interpreting results (not separately reported) and communicating results to the patient/family/caregiver, or Care coordination (not separately reported). Each pt to whom he or she provides medical services by telemedicine is: (1) informed of the relationship between the physician and patient and the respective role of any other health care provider with respect to management of the pt; & (2) notified that he or she may decline to receive medical services by telemedicine and may withdraw from such care at any time.    Outpatient Psychiatry Follow-up Visit (MD/NP)    7/29/2025    Joy Recinos, a 45 y.o. female, presenting for follow-up visit. Met with patient.    Reason for Encounter: Patient complains of anxiety, adhd    History of Present Illness: Patient presents for follow-up of multiple chronic conditions including generalized anxiety disorder, attention deficit hyperactivity disorder, insomnia, chronic depression, and fibromyalgia, and to discuss medication adjustments.    Patient recently saw a rheumatologist and underwent a bone scan, resulting in continued treatment for fibromyalgia with the addition of low-dose naltrexone. She reports no significant improvement with the new medication but notes extreme fatigue.    Patient reports feeling more depressed lately and experiencing extreme tiredness, wanting to sleep most of the time. 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    Regarding medication management, the patient is considering returning to Cymbalta as suggested by the rheumatologist for its pain-treating properties. Genetic testing for medication metabolism indicates faster metabolism of Cymbalta, suggesting a need for a higher dose. She has been increasing her Trazodone dosage for sleep, from half a pill to one full pill, occasionally taking 1.5 when there's no need to wake up early. Vyvanse was previously prescribed but discontinued due to side effects.    Patient reports that Trazodone helps with sleep but notes feeling sleepy all the time lately. 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    Patient mentions her daughter is graduating college and pursuing a master's degree, while her son is a high school senior. She expresses concern about financial stability in light of these family developments.    MEDICATIONS:  Patient recently started low-dose Naltrexone for fibromyalgia. She discontinued Cymbalta 60 mg in the past, which was ineffective for pain and depression. She is on Effexor, taking 2 capsules daily, which she is to be weaned off. For sleep, she takes Trazodone 50-75 mg at night. Patient is on Klonopin as needed for anxiety. Vyvanse was discontinued due to side effects, which was initially prescribed for executive dysfunction.    FAMILY HISTORY:  Family history is significant for mother with recent skin cancer, who is currently awaiting a breast biopsy.    LABS:  Patient's pharmacogenomics panel results indicate she metabolizes Cymbalta faster than average.    IMAGING:  A bone scan was performed, and the results led to continued treatment for  fibromyalgia.    LIFESTYLE:  Patient is currently unemployed. 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 She has been searching for jobs daily and had some promising leads that did not materialize. 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 Painting is her   hobby and interest. 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 Patient was planning to do summer camps and other activities with kids to grow her business, but these plans were interrupted.         Review Of Systems:     GENERAL:  No weight gain or loss  SKIN:  No rashes or lacerations  HEAD:  No headaches  MUSCULOSKELETAL:  No pain or stiffness of the joints  NEUROLOGIC:  No weakness, sensory changes, seizures, confusion, memory loss, tremor or other abnormal movements    Current Evaluation:     Nutritional Screening: Considering the patient's height and weight, medications, medical history and preferences, should a referral be made to the dietitian? no    Constitutional  Vitals:  Most recent vital signs, dated less than 90 days prior to this appointment, were reviewed.       General:  unremarkable, age appropriate     Musculoskeletal  Muscle Strength/Tone:  no tremor, no tic   Gait & Station:  non-ataxic     Psychiatric  Appearance: casually dressed & groomed;   Behavior: calm,   Cooperation: cooperative with assessment  Speech: normal rate, volume, tone  Thought Process: linear, goal-directed  Thought Content: No suicidal or homicidal ideation; no delusions  Affect: normal range  Mood: euthymic  Perceptions: No auditory or visual hallucinations  Level of Consciousness: alert throughout interview  Insight: fair  Cognition: Oriented to person, place, time, & situation  Memory: no apparent deficits to general clinical interview; not formally assessed  Attention/Concentration: no apparent deficits to general clinical interview; not formally  assessed  Fund of Knowledge: average by vocabulary/education    Laboratory Data  No visits with results within 1 Month(s) from this visit.   Latest known visit with results is:   Lab Visit on 05/23/2025   Component Date Value Ref Range Status    CRP 05/23/2025 19.6 (H)  <=8.2 mg/L Final    Sed Rate 05/23/2025 7  <=20 mm/hr Final    Hep BsAg Interp 05/23/2025 Non-Reactive  Non-Reactive Final    Hep BcAb Interp 05/23/2025 Non-Reactive  Non-Reactive Final    Hep BsAb Interp 05/23/2025 Reactive    Final    Hep BsAb 05/23/2025 13.52  mIU/mL Final    Rheumatoid Factor 05/23/2025 <13.0  <=15.0 IU/mL Final    CCP Quant 05/23/2025 4.8  <=4.9 U/mL Final    DARRION 05/23/2025 Positive (A)  Negative <1:80 Final    DARRION Titer 1 05/23/2025 1:320   Final    DARRION Pattern 1  05/23/2025 Homogeneous   Final    DARRION Titer 2 05/23/2025 1:80   Final    DARRION Pattern 2 05/23/2025 Speckled   Final    SSA Interpretation 05/23/2025 Negative  Negative Final    SSA Antibody 05/23/2025 0.12  0.00 - 0.99 Ratio Final    SSB Interpretation 05/23/2025 Negative   Final    SSB Antibody 05/23/2025 0.08  Ratio Final    C4 Complement 05/23/2025 41  11 - 44 mg/dL Final    C3 Complement 05/23/2025 150  50 - 180 mg/dL Final    Thyroid Peroxidase 05/23/2025 <6.0  <6.0 IU/mL Final    TSH 05/23/2025 1.766  0.400 - 4.000 uIU/mL Final    CPK 05/23/2025 68  20 - 180 U/L Final    dsDNA Ab Qual 05/23/2025 Negative 1:10  Negative 1:10 Final    Anti Sm Antibody 05/23/2025 0.16  Ratio Final    SM Interpretation 05/23/2025 Negative   Final    Anti-Sm/RNP Interpretation 05/23/2025 Negative  Negative Final    SM/RNP Antibody 05/23/2025 0.09  Ratio Final       Medications  Encounter Medications[1]    Assessment - Diagnosis - Goals:     Impression: this 45 year old woman with mostly treatment-resistant chronic anxiety, adhd, problems with insomnia    Treatment Goals:  Specify outcomes written in observable, behavioral terms:       Treatment Plan/Recommendations:    Generalized  anxiety disorder  Attention-deficit hyperactivity disorder, unspecified type  Insomnia    Reviewed pharmacogenomics panel results, indicating patient metabolizes Cymbalta faster than average, requiring a higher dose for therapeutic effect.    Anxiety disorder:  1. Started Cymbalta 30 mg: Take 1 capsule daily for the first 3 days, then 2 capsules daily for 3 days, then 3 capsules daily thereafter to reach 90 mg dose, potentially addressing both mood and fibromyalgia symptoms.  2. Decreased Effexor: Take 1 capsule daily while starting Cymbalta, then discontinue when feeling stable on reduced dose.  3. Patient to contact Employee Assistance Program ([employer]) for mental health therapy services.  4. Continued Klonopin: As needed for anxiety.    ATTENTION-DEFICIT HYPERACTIVITY DISORDER, UNSPECIFIED TYPE:  1. Restarted Vyvanse: Take in the morning to address executive dysfunction, fatigue, and alertness due to long-acting nature.    INSOMNIA DUE TO MEDICAL CONDITION:  1. Continued Trazodone: Current dose for sleep as it appears effective without significant morning drowsiness.    LIFESTYLE CHANGES:  1. Patient to continue job search efforts.  2. Patient to pursue art business alongside job applications as a potential career path.       WINIFRED Nieto MD  Psychiatry  [facility] [address]  [phone]    This note was generated with the assistance of ambient listening technology. Verbal consent was obtained by the patient and accompanying visitor(s) for the recording of patient appointment to facilitate this note. I attest to having reviewed and edited the generated note for accuracy, though some syntax or spelling errors may persist. Please contact the author of this note for any clarification.                [1]   Outpatient Encounter Medications as of 7/29/2025   Medication Sig Dispense Refill    clonazePAM (KLONOPIN) 0.5 MG tablet Take 1 tablet (0.5 mg total) by mouth daily as needed for Anxiety.  30 tablet 2    cyanocobalamin 1,000 mcg/mL injection Inject 1 mL (1,000 mcg total) into the skin every 28 days. (Patient not taking: Reported on 5/23/2025) 1 mL 12    DULoxetine (CYMBALTA) 30 MG capsule Take 1 capsule daily for 3 days then 2 capsules daily for 3 days then 3 capsules daily thereafter. 90 capsule 3    ergocalciferol (VITAMIN D2) 50,000 unit Cap Take 1 capsule (50,000 Units total) by mouth every 7 days. 13 capsule 3    estradioL (ESTRACE) 2 MG tablet Take 1 tablet (2 mg total) by mouth once daily. 90 tablet 3    levocetirizine (XYZAL) 5 MG tablet Take 1 tablet (5 mg total) by mouth every evening. 30 tablet 11    lisdexamfetamine (VYVANSE) 40 MG Cap Take 1 capsule (40 mg total) by mouth once daily. 30 capsule 0    lisdexamfetamine (VYVANSE) 40 MG Cap Take 1 capsule (40 mg total) by mouth once daily. 30 capsule 0    lisdexamfetamine (VYVANSE) 40 MG Cap Take 1 capsule (40 mg total) by mouth once daily. 30 capsule 0    naltrexone capsule Take 1 capsule (4.5 mg total) by mouth every evening. 90 capsule 3    omeprazole (PRILOSEC) 20 MG capsule Take 1 capsule (20 mg total) by mouth once daily. 90 capsule 0    progesterone (PROMETRIUM) 100 MG capsule Take 1 capsule (100 mg total) by mouth nightly. 90 capsule 3    tiZANidine (ZANAFLEX) 2 MG tablet Take 1 tablet (2 mg total) by mouth nightly as needed (muscle spasms). 30 tablet 1    traZODone (DESYREL) 100 MG tablet Take ½ to 1 ½ tablets by mouth at bedtime as needed for sleep. 135 tablet 0    [DISCONTINUED] traZODone (DESYREL) 100 MG tablet Take ½ to 1 ½ tablets by mouth at bedtime as needed for sleep. 135 tablet 0    [DISCONTINUED] venlafaxine (EFFEXOR-XR) 150 MG Cp24 Take 2 capsules (300 mg total) by mouth once daily. 180 capsule 0     No facility-administered encounter medications on file as of 7/29/2025.

## 2025-07-31 ENCOUNTER — LAB VISIT (OUTPATIENT)
Dept: LAB | Facility: HOSPITAL | Age: 45
End: 2025-07-31
Attending: STUDENT IN AN ORGANIZED HEALTH CARE EDUCATION/TRAINING PROGRAM
Payer: COMMERCIAL

## 2025-07-31 ENCOUNTER — PATIENT MESSAGE (OUTPATIENT)
Dept: OTHER | Facility: OTHER | Age: 45
End: 2025-07-31
Payer: COMMERCIAL

## 2025-07-31 DIAGNOSIS — R79.82 ELEVATED C-REACTIVE PROTEIN (CRP): ICD-10-CM

## 2025-07-31 DIAGNOSIS — M25.50 ARTHRALGIA, UNSPECIFIED JOINT: ICD-10-CM

## 2025-07-31 DIAGNOSIS — M79.18 DIFFUSE MYOFASCIAL PAIN SYNDROME: ICD-10-CM

## 2025-07-31 PROCEDURE — 36415 COLL VENOUS BLD VENIPUNCTURE: CPT | Mod: PN

## 2025-08-11 ENCOUNTER — PATIENT OUTREACH (OUTPATIENT)
Dept: OTHER | Facility: OTHER | Age: 45
End: 2025-08-11
Payer: COMMERCIAL

## 2025-08-14 ENCOUNTER — PATIENT OUTREACH (OUTPATIENT)
Dept: OTHER | Facility: OTHER | Age: 45
End: 2025-08-14
Payer: COMMERCIAL

## 2025-08-19 ENCOUNTER — PATIENT OUTREACH (OUTPATIENT)
Dept: OTHER | Facility: OTHER | Age: 45
End: 2025-08-19
Payer: COMMERCIAL

## 2025-08-19 ENCOUNTER — OFFICE VISIT (OUTPATIENT)
Dept: OPHTHALMOLOGY | Facility: CLINIC | Age: 45
End: 2025-08-19
Payer: COMMERCIAL

## 2025-08-19 DIAGNOSIS — H52.13 MYOPIA WITH ASTIGMATISM AND PRESBYOPIA, BILATERAL: Primary | ICD-10-CM

## 2025-08-19 DIAGNOSIS — H10.10 SEASONAL ALLERGIC CONJUNCTIVITIS: ICD-10-CM

## 2025-08-19 DIAGNOSIS — H52.203 MYOPIA WITH ASTIGMATISM AND PRESBYOPIA, BILATERAL: Primary | ICD-10-CM

## 2025-08-19 DIAGNOSIS — H52.4 MYOPIA WITH ASTIGMATISM AND PRESBYOPIA, BILATERAL: Primary | ICD-10-CM

## 2025-08-19 PROCEDURE — 92015 DETERMINE REFRACTIVE STATE: CPT | Mod: S$GLB,,, | Performed by: OPTOMETRIST

## 2025-08-19 PROCEDURE — 92014 COMPRE OPH EXAM EST PT 1/>: CPT | Mod: S$GLB,,, | Performed by: OPTOMETRIST

## 2025-08-19 PROCEDURE — 99999 PR PBB SHADOW E&M-EST. PATIENT-LVL III: CPT | Mod: PBBFAC,,, | Performed by: OPTOMETRIST

## (undated) DEVICE — SOL NS 1000CC

## (undated) DEVICE — GLOVE 6.5 PROTEXIS PI MICRO

## (undated) DEVICE — NDL PNEUMO INSUFFLATI 120MM

## (undated) DEVICE — GLOVE PROTEXIS HYDROGEL SZ7

## (undated) DEVICE — ADHESIVE DERMABOND ADVANCED

## (undated) DEVICE — SEE MEDLINE ITEM 154981

## (undated) DEVICE — DRAPE COLUMN DAVINCI XI

## (undated) DEVICE — SYR 10CC LUER LOCK

## (undated) DEVICE — SEE MEDLINE ITEM 146292

## (undated) DEVICE — SEE MEDLINE ITEM 157181

## (undated) DEVICE — OBTURATOR BLADELESS 8MM XI CLR

## (undated) DEVICE — EVACUATOR KIT SMOKE PLUME AWAY

## (undated) DEVICE — TROCAR ENDOPATH XCEL 5X100MM

## (undated) DEVICE — MANIPULATOR TIP RUMI ORANGE

## (undated) DEVICE — GLOVE PROTEXIS HYDROGEL SZ6.5

## (undated) DEVICE — Device

## (undated) DEVICE — SOL 9P NACL IRR PIC IL

## (undated) DEVICE — IRRIGATOR ENDOSCOPY DISP.

## (undated) DEVICE — SEE MEDLINE ITEM 152622

## (undated) DEVICE — SOL ELECTROLUBE ANTI-STIC

## (undated) DEVICE — POSITIONER HEAD DONUT 9IN FOAM

## (undated) DEVICE — SEE MEDLINE ITEM 157117

## (undated) DEVICE — DRAPE LAVH LAPAROSCOPY W/FLUID

## (undated) DEVICE — COVER OVERHEAD SURG LT BLUE

## (undated) DEVICE — ELECTRODE REM PLYHSV RETURN 9

## (undated) DEVICE — SEAL UNIVERSAL 5MM-8MM XI

## (undated) DEVICE — SEE MEDLINE ITEM 157027

## (undated) DEVICE — SUT MONOCRYL 4-0 PS-1 UND

## (undated) DEVICE — DRAPE STERI LONG

## (undated) DEVICE — OCCLUDER COLPO-PNEUMO STERILE

## (undated) DEVICE — SUPPORT ULNA NERVE PROTECTOR

## (undated) DEVICE — TUBING HEATED INSUFFLATOR

## (undated) DEVICE — SYR 50CC LL

## (undated) DEVICE — TIP GRASPER FENESTRATED DISP

## (undated) DEVICE — APPLICATOR CHLORAPREP ORN 26ML

## (undated) DEVICE — DRAPE ARM DAVINCI XI

## (undated) DEVICE — SUT VICRYL PLUS 0 CT1 36IN

## (undated) DEVICE — COVER TIP CURVED SCISSORS XI

## (undated) DEVICE — KIT ANTIFOG

## (undated) DEVICE — SYR 3CC LUER LOC

## (undated) DEVICE — DECANTER VIAL ASEPTIC TRANSFER